# Patient Record
Sex: FEMALE | Race: ASIAN | NOT HISPANIC OR LATINO | Employment: FULL TIME | URBAN - METROPOLITAN AREA
[De-identification: names, ages, dates, MRNs, and addresses within clinical notes are randomized per-mention and may not be internally consistent; named-entity substitution may affect disease eponyms.]

---

## 2017-01-03 ENCOUNTER — OFFICE VISIT (OUTPATIENT)
Dept: FAMILY MEDICINE | Facility: CLINIC | Age: 42
End: 2017-01-03

## 2017-01-03 VITALS
OXYGEN SATURATION: 97 % | TEMPERATURE: 97.7 F | SYSTOLIC BLOOD PRESSURE: 115 MMHG | WEIGHT: 227 LBS | HEART RATE: 66 BPM | RESPIRATION RATE: 18 BRPM | BODY MASS INDEX: 35.63 KG/M2 | DIASTOLIC BLOOD PRESSURE: 78 MMHG | HEIGHT: 67 IN

## 2017-01-03 DIAGNOSIS — R73.03 PRE-DIABETES: Primary | ICD-10-CM

## 2017-01-03 DIAGNOSIS — Z00.00 HEALTHCARE MAINTENANCE: ICD-10-CM

## 2017-01-03 DIAGNOSIS — M54.6 ACUTE LEFT-SIDED THORACIC BACK PAIN: ICD-10-CM

## 2017-01-03 DIAGNOSIS — R05.3 PERSISTENT DRY COUGH: ICD-10-CM

## 2017-01-03 DIAGNOSIS — E89.0 POSTABLATIVE HYPOTHYROIDISM: ICD-10-CM

## 2017-01-03 LAB
HCT VFR BLD AUTO: 40.4 % (ref 35–47)
HEMOGLOBIN: 13 G/DL (ref 11.7–15.7)
MCH RBC QN AUTO: 28.3 PG (ref 26.5–35)
MCHC RBC AUTO-ENTMCNC: 32.2 G/DL (ref 32–36)
MCV RBC AUTO: 88.1 FL (ref 78–100)
PLATELET # BLD AUTO: 287 K/UL (ref 150–450)
RBC # BLD AUTO: 4.59 M/UL (ref 3.8–5.2)
WBC # BLD AUTO: 7.3 K/UL (ref 4–11)

## 2017-01-03 RX ORDER — METHOCARBAMOL 500 MG/1
1500 TABLET, FILM COATED ORAL 3 TIMES DAILY PRN
Qty: 30 TABLET | Refills: 1 | Status: SHIPPED | OUTPATIENT
Start: 2017-01-03 | End: 2017-09-07

## 2017-01-03 NOTE — PROGRESS NOTES
SUBJECTIVE: Mary Wray is a 41 year old female who presents with persistent cough since beginning of November, back pain for over a week.  Pimples on face for small niece thought to be herpes infection and sister had bad ear infection with mastoiditis.  Pt denies having any temps.  No drainage, hard to take a full breath, no ST.  Nonproductive cough, typically clears out in the morning, no allergies.  No BP meds.  Not having issues with acid reflux.  No fullness in ears and no sinus pain.  Delsum for a couple nights, Nyquil, ibuprofen taken for back pain and for her menses.  Back pain x 1 week, poor sleep and off schedule.  More strength training than she has done in a long time.  Tightness in left side of her chest, she hasn't seen any rash like shingles.  Hurts to wear her bra.  Treatment currently for anal fissure, using steroid cream.  On synthroid for Grave's disease.  A1C was high in Sept, has lost weight, wondering if her numbers have improved.  Mucous coughed but not really coming up.    PAST MEDICAL, SOCIAL, SURGICAL AND FAMILY HISTORY: She  has a past medical history of History of Graves' disease; Postablative hypothyroidism; Hypovitaminosis D; and Premenstrual dysphoria.  She  has past surgical history that includes leep tx, cervical (2002).  Her family history includes DIABETES in her father.  She reports that she has never smoked. She does not have any smokeless tobacco history on file. She reports that she drinks about 1.0 oz of alcohol per week. She reports that she does not use illicit drugs.      ALLERGIES: She has No Known Allergies.    CURRENT MEDICATIONS: She has a current medication list which includes the following prescription(s): methocarbamol, diltiazem 2% in plo cream (fv compounded), synthroid, synthroid, prenatal multivitamin  plus iron, and vitamin d.     REVIEW OF SYSTEMS: 10 point review of systems is negative except as noted above.    EXAM:  /78 mmHg  Pulse 66  Temp(Src)  "97.7  F (36.5  C) (Oral)  Resp 18  Ht 5' 7\" (170.2 cm)  Wt 227 lb (102.967 kg)  BMI 35.55 kg/m2  SpO2 97%  CONSTITUTIONIAL: healthy, alert, no distress, cooperative and obese  HEAD: Normocephalic. No masses, lesions, tenderness or abnormalities  ENT: ENT exam normal, no neck nodes or sinus tenderness  SKIN: no suspicious lesions or rashes  LUNGS: clear  Cardiac: RRR  GAIT: normal  Stance: normal  NEUROLOGIC: Normal muscle tone and strength, reflexes normal, sensation grossly normal.  PSYCHIATRIC: affect normal/bright and mentation appears normal.    MUSCULOSKELETAL: spasm and swelling of left side parathoracic spinal musculature, pain mid trap and rhomboids      ASSESSMENT/PLAN:  1. Persistent dry cough- CBC- normal, CXR- no infiltrates, trial of Prilosec OTC for possible silent reflux.  Denies allergy history, not on medications to induce cough  2. Pre-diabetes- weight 241 in Sept, down to 227 lbs.  Yoga and eating better.  No gluten, no sugar and no cheese.  recheck A1C.  Possibility of Lifestyle clinic with Dr. Stanley, pt may request at later date  3. Postablative hypothyroidism, TSH  4. Acute left sided thoracic back pain- robaxin trial, going to massage therapy, chiropractic, remain as active as she can  RTC 1 month  Encounter Diagnoses   Name Primary?     Persistent dry cough      Pre-diabetes Yes     Postablative hypothyroidism      Acute left-sided thoracic back pain      Healthcare maintenance        X-RAY INTERPRETATION:   CXR: Impression:  No acute cardiopulmonary abnormality.  "

## 2017-01-03 NOTE — MR AVS SNAPSHOT
"              After Visit Summary   1/3/2017    Mary Wray    MRN: [id number]           Patient Information     Date Of Birth          1975        Visit Information        Provider Department      1/3/2017 8:00 AM Barbara Lujan MD [facility]        Today's Diagnoses     Pre-diabetes    -  1     Persistent dry cough         Postablative hypothyroidism         Acute left-sided thoracic back pain         Healthcare maintenance            Follow-ups after your visit        Who to contact     Please call your clinic at [phone] to:    Ask questions about your health    Make or cancel appointments    Discuss your medicines    Learn about your test results    Speak to your doctor   If you have compliments or concerns about an experience at your clinic, or if you wish to file a complaint, please contact [facility] Patient Relations at [phone] or email us at [email or web address]         Additional Information About Your Visit        MyChart Information     [employer] gives you secure access to your electronic health record. If you see a primary care provider, you can also send messages to your care team and make appointments. If you have questions, please call your primary care clinic.  If you do not have a primary care provider, please call [phone] and they will assist you.      "[employer] is an electronic gateway that provides easy, online access to your medical records. With "[employer] you can request a clinic appointment, read your test results, renew a prescription or communicate with your care team.     To access your existing account, please contact your [facility] or call [phone] for assistance.        Your Vitals Were     Pulse Temperature Respirations Height BMI (Body Mass Index) Pulse Oximetry    66 97.7  F (36.5  C) (Oral) 18 5' 7\" (170.2 cm) 35.55 kg/m2 97%       Blood Pressure from Last 3 " Encounters:   01/03/17 115/78   11/28/16 119/73   09/27/16 110/74    Weight from Last 3 Encounters:   01/03/17 227 lb (102.967 kg)   11/28/16 231 lb 14.4 oz (105.189 kg)   09/27/16 240 lb 3.2 oz (108.954 kg)              We Performed the Following     ADMIN VACCINE, INITIAL     CBC with Plt ([facility])     Flu vaccine, quad, preserve-free, 0.5 ml          Today's Medication Changes          These changes are accurate as of: 1/3/17  9:28 AM.  If you have any questions, ask your nurse or doctor.               Start taking these medicines.        Dose/Directions    methocarbamol 500 MG tablet   Commonly known as:  ROBAXIN   Used for:  Acute left-sided thoracic back pain   Started by:  Barbara Lujan MD        Dose:  1500 mg   Take 3 tablets (1,500 mg) by mouth 3 times daily as needed for muscle spasms   Quantity:  30 tablet   Refills:  1            Where to get your medicines      These medications were sent to [address] [address]     Phone:  [phone]    - methocarbamol 500 MG tablet             Primary Care Provider Office Phone # Fax #    Jazlyn Huitron [phone]       [facility] [address] [facility] [address]        Thank you!     Thank you for choosing [facility]  for your care. Our goal is always to provide you with excellent care. Hearing back from our patients is one way we can continue to improve our services. Please take a few minutes to complete the written survey that you may receive in the mail after your visit with us. Thank you!             Your Updated Medication List - Protect others around you: Learn how to safely use, store and throw away your medicines at www.disposemymeds.org.          This list is accurate as of: 1/3/17  9:28 AM.  Always use your most recent med list.                   Brand Name Dispense Instructions for use    diltiazem  2% in PLO cream (FV COMPOUNDED) 2% Gel     60 g    To anal opening three times daily.  Use a pea-sized amount.  Store at room temperature.       methocarbamol 500 MG tablet    ROBAXIN    30 tablet    Take 3 tablets (1,500 mg) by mouth 3 times daily as needed for muscle spasms       prenatal multivitamin  plus iron 27-0.8 MG Tabs per tablet     100 tablet    Take 1 tablet by mouth daily       * SYNTHROID 150 MCG tablet   Generic drug:  levothyroxine     90 tablet    Take 1 tablet (150 mcg) by mouth daily       * SYNTHROID 175 MCG tablet   Generic drug:  levothyroxine     90 tablet    Take 1 tablet (175 mcg) by mouth daily       vitamin D 1000 UNITS capsule      Take 2 capsules by mouth daily       * Notice:  This list has 2 medication(s) that are the same as other medications prescribed for you. Read the directions carefully, and ask your doctor or other care provider to review them with you.

## 2017-01-05 ENCOUNTER — TELEPHONE (OUTPATIENT)
Dept: FAMILY MEDICINE | Facility: CLINIC | Age: 42
End: 2017-01-05

## 2017-01-05 NOTE — TELEPHONE ENCOUNTER
[facility] phone call message- patient requesting results:    Test: Lab    Date of test: 1/3/17    Additional Comments: Patient inquiring as to why [facility]    OK to leave a message on voice mail? Yes    Primary language: English      needed? No    Call taken on January 5, 2017 at 3:46 PM by Katharine Oropeza

## 2017-01-05 NOTE — TELEPHONE ENCOUNTER
Message routed to Dr. Lujan to release results in MyChart per patient request.    Annemarie Cutler RN

## 2017-01-06 LAB
HBA1C MFR BLD: 5.5 % (ref 4.1–5.7)
T4 FREE SERPL-MCNC: 1.91 NG/DL (ref 0.76–1.46)
TSH SERPL DL<=0.005 MIU/L-ACNC: 0.03 MU/L (ref 0.4–4)

## 2017-01-06 NOTE — TELEPHONE ENCOUNTER
Patient is calling about her results. They still are not released to [employer]. It does not look like these tests were done as they are future. Please follow up with the patient.

## 2017-01-16 ENCOUNTER — OFFICE VISIT (OUTPATIENT)
Dept: FAMILY MEDICINE | Facility: CLINIC | Age: 42
End: 2017-01-16

## 2017-01-16 VITALS
HEIGHT: 67 IN | SYSTOLIC BLOOD PRESSURE: 120 MMHG | OXYGEN SATURATION: 99 % | HEART RATE: 80 BPM | DIASTOLIC BLOOD PRESSURE: 75 MMHG | TEMPERATURE: 97.3 F | RESPIRATION RATE: 16 BRPM | WEIGHT: 235 LBS | BODY MASS INDEX: 36.88 KG/M2

## 2017-01-16 DIAGNOSIS — R05.3 CHRONIC COUGH: ICD-10-CM

## 2017-01-16 DIAGNOSIS — Z30.09 FAMILY PLANNING ADVICE: ICD-10-CM

## 2017-01-16 DIAGNOSIS — R73.03 PRE-DIABETES: Primary | ICD-10-CM

## 2017-01-16 DIAGNOSIS — R45.89 DYSPHORIC MOOD: ICD-10-CM

## 2017-01-16 ASSESSMENT — ENCOUNTER SYMPTOMS
ACTIVITY CHANGE: 0
FATIGUE: 0
COUGH: 1
SHORTNESS OF BREATH: 0
NEUROLOGICAL NEGATIVE: 1
UNEXPECTED WEIGHT CHANGE: 0
APPETITE CHANGE: 0
DYSPHORIC MOOD: 1
SLEEP DISTURBANCE: 0
NERVOUS/ANXIOUS: 0
BACK PAIN: 1

## 2017-01-16 NOTE — PROGRESS NOTES
"      HPI:       Mary Wray is a 41 year old who presents for the following  Patient presents with:  RECHECK: F/U on visit 2weeks ago      F/u on Cough  - trial of prilosec x 2 wks  - mild improvement, still with occasional cough  - using nasal saline to hydrate mucous membranes  - no SOB, allergy symptoms, no recent F/C/URI symptoms  - does do \"a lot\" of talking in the fall (admissions time), feels that throat gets dry and cough happens when talking sometimes  - drinks plenty of water    Back pain, gradually improving  - mild, working with chiropractor  - YOGA  - no weakness, numbness, tingling in extremities  - midback, L side, occurred over the holidays, different bed, hadn't been sleeping well    Mood  - election has affected her, hard to be \"positive\"   - put fertility plans on hold when family introduced her to a possible partner (semi-arranged), but that didn't work out  - feels that it's hard being alone, without much intimacy  - ok in general, just in the background    Back to horse-back riding - helps with activity and mood    Has f/u appt with Dr. Del Castillo scheduled    Problem, Medication and Allergy Lists were reviewed and are current.  Patient is an established patient of this clinic.         Review of Systems:   Review of Systems   Constitutional: Negative for activity change, appetite change, fatigue and unexpected weight change.   Respiratory: Positive for cough (mild ongoing cough - better since starting low dose of prilosec). Negative for shortness of breath.    Cardiovascular: Negative for chest pain.   Musculoskeletal: Positive for back pain (mild mid-thoracic pain - improving gradually, see HPI).   Neurological: Negative.    Psychiatric/Behavioral: Positive for dysphoric mood (on and off - definitely triggered by the election; trying to maintain positive outlook, staying in therapy, goind out). Negative for suicidal ideas and sleep disturbance. The patient is not nervous/anxious.             " " Physical Exam:   Patient Vitals for the past 24 hrs:   BP Temp Temp src Pulse Resp SpO2 Height Weight   01/16/17 1439 120/75 mmHg 97.3  F (36.3  C) Oral 80 16 99 % 5' 7\" (170.2 cm) 235 lb (106.595 kg)     Body mass index is 36.8 kg/(m^2).  Vitals were reviewed and were normal     Physical Exam   Constitutional: She is oriented to person, place, and time. She appears well-developed and well-nourished.   Pulmonary/Chest: Effort normal. No respiratory distress. She has no wheezes.   Musculoskeletal: Normal range of motion.   Neurological: She is alert and oriented to person, place, and time.   Psychiatric: She has a normal mood and affect. Her behavior is normal. Judgment and thought content normal.     MENTAL STATUS EXAM  Appearance: appropriate  Attitude: cooperative  Behavior: normal  Eye Contact: normal  Speech: normal  Orientation: oriented to person, place, time and situation  Mood: normal  Affect: Congruent with mood  Thought Process: appropriate  Suicidal Ideation: reports no suicidal ideation  Hallucination: denies      Results:     Results from last visit:  Office Visit on 01/03/2017   Component Date Value Ref Range Status     WBC 01/03/2017 7.3  4.0 - 11.0 K/uL Final     RBC 01/03/2017 4.59  3.80 - 5.20 M/uL Final     Hemoglobin 01/03/2017 13.0  11.7 - 15.7 g/dL Final     Hematocrit 01/03/2017 40.4  35.0 - 47.0 % Final     MCV 01/03/2017 88.1  78.0 - 100.0 fL Final     MCH 01/03/2017 28.3  26.5 - 35.0 pg Final     MCHC 01/03/2017 32.2  32.0 - 36.0 g/dL Final     Platelets 01/03/2017 287.0  150.0 - 450.0 K/uL Final     TSH 01/06/2017 0.03* 0.40 - 4.00 mU/L Final     Hemoglobin A1C 01/06/2017 5.5  4.1 - 5.7 % Final     T4 Free 01/06/2017 1.91* 0.76 - 1.46 ng/dL Final     Assessment and Plan     1. Pre-diabetes  Continue with exercise, healthy diet  Improvements noted on last set of labs  - consider lifestyle clinic referral if not continue to meet own goals    2. Dysphoric mood  Doing ok " overall  Yoga  Continue prn therapy  Stay social    3. Family planning advice  Discussed re-initiating plan for IUI  Will reconnect with [facility] (knows where to order sperm)  Discussed creating other connections for extended family when single-parenting (friendships, parenting groups)  Happy to provide obstetric care if she becomes pregnant    4. Chronic cough  Prilosec seems to be helping a little - could double the dose and see if that helps more  - would consider ENT referral for laryngoscopy first if PPI not entirely helping symptoms, evaluate larynx and see if there is evidence of edema (most of her job is talking)  - endoscopy if symptoms improve, she stays on PPI x 3-6 months and then symptoms recur when off the meds    There are no discontinued medications.  Options for treatment and follow-up care were reviewed with the patient. Mary Wray  engaged in the decision making process and verbalized understanding of the options discussed and agreed with the final plan.    Jazlyn Huitron MD

## 2017-02-20 NOTE — PATIENT INSTRUCTIONS
Use of a weekly pill tray (with boxes divided by day of the week)  is highly recommended.  IF you miss a dose one day, you can double the dose the next day.    Normal thyroid levels are important for pregnancy.  Ideally you would have normal thyroid labs prior to pregnancy.  You  should confirm normal thyroid levels immediately upon diagnosis of pregnancy.      Levothyroxine dose requirements often increase with pregnancy.      It is routine to check thyroid levels once/month for the lst 20 weeks gestation, then between 26 and 32 weeks, and also 2 months post partum.  We also check labs one month after any change in the levothyroxine dose.   Pregnancy specific TSH targets are < 2.5 lst trimester, < 3 2nd and 3rd trimester.    Information for patients on Levo-thyroxine      The thyroid hormone, Levo-thyroxine (L-T4) is one of the main hormones normally produced by the thyroid.  The drug is identical in chemical structure to the natural product.  Levothyroxine is used to treat hypothyroidism (underactive thyroid).  Sometimes it is used even when the thyroid is not underactive, to treat a goiter (enlarged thyroid) or a thyroid nodule.  For patients with a history of thyroid removal, L-T4 is used to replace the deficiency created by the surgery.    The purpose of giving L-T4 to treat hypothyroidism is to make up for the thyroid hormone previously produced by your thyroid before it failed.  Depending on the extent of your thyroid failure, you may require a higher or a lower dose of L-T4.  The goal is to make your blood level of TSH (a hormone made by the master gland, the pituitary, the gland which controls and judges the thyroid) normal.    This drug is usually well-tolerated and safe but it is important to take the proper dose for YOU.  This involves periodic measurements of TSH, usually within 1-2 months of a dose change.  You should be off biotin containing supplements for at least one week prior to thyroid blood  tests.    You should alert your doctor if you have signs you are getting too much L-T4.  These include excessive nervousness, heart fluttering or skipping beats, diarrhea, or feeling too hot and/or sweaty.      There are many brand names for Levo-thyroxine (L-T4), including Synthroid, Levoxyl, Levothroid, Unithroid, Tirosint and others.  Changing from one brand name thyroid hormone product to another or to a generic product (all generics are called levothyroxine) can cause changes in your thyroid hormone balance, even if the dose stays the same.  Since generic products can come from many different companies (such as [employer], [employer], [employer] and others), there may be less consistency among the different generic preparations.  The decision to change brands or to change to a generic product must be made with your physician or other caregiver.  Follow-up testing should be arranged to monitor for any needed adjustments.  Monitoring may need to be more frequent if you always receive a generic thyroid hormone product.    For proper thyroid hormone balance the dose of thyroid hormone in your pills must be precise and consistent.    Be sure to take the medication as prescribed on an empty stomach, and at least 4 hours apart from calcium supplements, iron and soy products.  Store the medication away from severe heat and humidity.    You should be OFF any biotin containing supplements at least 2 days prior to thyroid lab draws.       Many insurance companies and other providers charge higher co-payments for brand name medications.  Since brand name L-T4 is not very expensive, be sure to ask if the actual cost of buying the medication is less than the co-payment.  In some instances the charge for a co-payment may be greater than buying the drug outright, especially if the prescription needs to be refilled every 30 days.

## 2017-02-20 NOTE — PROGRESS NOTES
TELEPHONE VISIT    Attending ASSESSMENT/PLAN:     Abnormal TFTS on LT4 - labs read as biochemical thyrotoxicosis with suppressed TSH  and high free T4. The record suggests she was sick at the time of the 1/17 draw.   Repeat TFTSs now.   Information for patients on LT4 on correct administration practices.  Use of a pill tray is highly recommended .    Postablative hypothyroidism. Consistently unstable over time.  - as per #1 .    Family planning -upcoming IUI-  Detectable TSH is recommended prior to pregnancy. I have counseled her on pregnancy and the thyroid.    Information for patients on LT4.    History of Graves'     Obesity as defined by BMI > 30 kgm/2.  As per # 4    Prediabetes on past A1cs. Family history of DM;  The most recent from 1/6/17 is normal.      Acanthosis nigricans had been noted in the past.      Phone visit:   Start time 2/21/17 731 AM  Stop time 2/21/17 745 AM  Total time : 14 minutes    Loli Del Castillo MD      Cc/ HISTORY OF PRESENT ILLNESS 41 yr old woman presents for telephone visit follow up. I had last seen her in 8/14. She sent me a mychart note 2/20/17 inquiring about labs that had been drawn on 1/6/17: TSH 0.03, free T4 1.91. She was sick that day.   She was on Synthroid brand 175 mcg/day then and she remains on the same dose.  She dose not use a pill tray.  She doesn't think she ever forgets to take the Synthroid.     She has upcoming IUI.      In 1997 she was diagnosed with Graves' , treated with radioactive iodine, soon resulting in hypothyroidism. She has been on L-T4 since then. See my 8/14 note for her past history of TFTS dating 7843-5475.     When I lst met her 4/14 she was on Synthroid 150 mcg/day with high free T4 and normal TSH.  Since then, there have been a number of dose changes and mostly abnormal labs. As follows:  10/24/13: TSH 1.58  4/29/14 TSH 1.38, free T4 1.96 on Synthroid 150/day  6/16/14 TSH 18.5, free T4 1.07 on Synthroid 150 mcg/day  6/230/14: Rx Synthroid  "175 mcg/day  6/25/14: TSH 4.57, free T4 1.9  8/11/14 TSH 0.04, free T4 1.64-- recommend Synthroid 175 * 6.5/week , divided   10/2/14: TSH 0.06, free T4 1.46  2/3/15: TSH 0.15, free T4 1.46  11/30/15: TSH 0.07, free T4 1.46  11/30/15 clinic note - \"hard to remember to take 1/2 pill on Sunday\"  12/14/15: Rx Synthroid 150 mcg/day  9/1/16: TSH 35.53, free T4 1.02  9/8/16 Rx Synthroid 175 mcg/day  9/27/16 [place] with LUGOLS applied   1/6/17: TSH 0.03, free T4 1.91-- she was sick at the time -     REVIEW OF SYSTEMS  Weight loss 14#  Early to late fall- she did this by giving up gluten, sugar.  She continues on this diet; she isn't counting kcal - she doesn't know.   Feels normal  Energy is good  Sleep is OK - never great.  Hair continues to fall.  She is not taking   Cardiac: negative; heart pounding a little fast the other day  Respiratory: exercise tolerance is good - doing yoga 60 minutes, walking 30-45 minutes 3 -4 times/week  GI: negative  Menses monthly; IUI was supposed to start yesterday but it has been delayed.     Past Medical History  Past Medical History   Diagnosis Date     Anal fissure      History of Graves' disease      Graves     Hypovitaminosis D      Obesity      KHURRAM (obstructive sleep apnea)      Papanicolaou smear of cervix with low grade squamous intraepithelial lesion (LGSIL)      Postablative hypothyroidism      Prediabetes      Premenstrual dysphoria      Past Surgical History   Procedure Laterality Date     Leep tx, cervical  2002     \"for HPV\"       Medications  Current Outpatient Prescriptions   Medication Sig Dispense Refill     cholecalciferol (VITAMIN  -D) 1000 UNITS capsule Take 5 capsules (5,000 Units) by mouth daily 30 capsule      Prenatal Vit-Fe Fumarate-FA (PRENATAL MULTIVITAMIN  PLUS IRON) 27-0.8 MG TABS per tablet Take 1 tablet by mouth daily 100 tablet 3     methocarbamol (ROBAXIN) 500 MG tablet Take 3 tablets (1,500 mg) by mouth 3 times daily as needed for muscle spasms 30 " tablet 1     diltiazem 2% in PLO cream, FV COMPOUNDED, 2% GEL To anal opening three times daily.  Use a pea-sized amount.  Store at room temperature. 60 g 0     SYNTHROID 175 MCG tablet Take 1 tablet (175 mcg) by mouth daily 90 tablet 3       Allergies  No Known Allergies    Family History  family history includes DIABETES in her father; Obesity in her father and mother.    Social History  Social History   Substance Use Topics     Smoking status: Never Smoker     Smokeless tobacco: Not on file     Alcohol use 1.0 oz/week     2 drink(s) per week      Comment: occasional

## 2017-02-21 ENCOUNTER — OFFICE VISIT (OUTPATIENT)
Dept: ENDOCRINOLOGY | Facility: CLINIC | Age: 42
End: 2017-02-21

## 2017-02-21 DIAGNOSIS — Z00.00 ENCOUNTER FOR ROUTINE ADULT HEALTH EXAMINATION WITHOUT ABNORMAL FINDINGS: Primary | ICD-10-CM

## 2017-02-21 DIAGNOSIS — E89.0 POSTABLATIVE HYPOTHYROIDISM: Primary | ICD-10-CM

## 2017-02-21 DIAGNOSIS — Z30.09 FAMILY PLANNING: ICD-10-CM

## 2017-02-21 DIAGNOSIS — E89.0 POSTABLATIVE HYPOTHYROIDISM: ICD-10-CM

## 2017-02-21 DIAGNOSIS — Z86.39 HISTORY OF GRAVES' DISEASE: ICD-10-CM

## 2017-02-21 NOTE — LETTER
2/21/2017       RE: Mary Wray  [address] [address]     Dear Colleague,    Thank you for referring your patient, Mary Wray, to the [facility]. Please see a copy of my visit note below.    TELEPHONE VISIT    Attending ASSESSMENT/PLAN:     Abnormal TFTS on LT4 - labs read as biochemical thyrotoxicosis with suppressed TSH  and high free T4. The record suggests she was sick at the time of the 1/17 draw.   Repeat TFTSs now.   Information for patients on LT4 on correct administration practices.  Use of a pill tray is highly recommended .    Postablative hypothyroidism. Consistently unstable over time.  - as per #1 .    Family planning -upcoming IUI-  Detectable TSH is recommended prior to pregnancy. I have counseled her on pregnancy and the thyroid.    Information for patients on LT4.    History of Graves'     Obesity as defined by BMI > 30 kgm/2.  As per # 4    Prediabetes on past A1cs. Family history of DM;  The most recent from 1/6/17 is normal.      Acanthosis nigricans had been noted in the past.      Phone visit:   Start time 2/21/17 731 AM  Stop time 2/21/17 745 AM  Total time : 14 minutes    Loli Del Castillo MD      Cc/ HISTORY OF PRESENT ILLNESS 41 yr old woman presents for telephone visit follow up. I had last seen her in 8/14. She sent me a mychart note 2/20/17 inquiring about labs that had been drawn on 1/6/17: TSH 0.03, free T4 1.91. She was sick that day.   She was on Synthroid brand 175 mcg/day then and she remains on the same dose.  She dose not use a pill tray.  She doesn't think she ever forgets to take the Synthroid.     She has upcoming IUI.      In 1997 she was diagnosed with Graves' , treated with radioactive iodine, soon resulting in hypothyroidism. She has been on L-T4 since then. See my 8/14 note for her past history of TFTS dating 6083-6790.     When I lst met her 4/14 she was on Synthroid 150 mcg/day  "with high free T4 and normal TSH.  Since then, there have been a number of dose changes and mostly abnormal labs. As follows:  10/24/13: TSH 1.58  4/29/14 TSH 1.38, free T4 1.96 on Synthroid 150/day  6/16/14 TSH 18.5, free T4 1.07 on Synthroid 150 mcg/day  6/230/14: Rx Synthroid 175 mcg/day  6/25/14: TSH 4.57, free T4 1.9  8/11/14 TSH 0.04, free T4 1.64-- recommend Synthroid 175 * 6.5/week , divided   10/2/14: TSH 0.06, free T4 1.46  2/3/15: TSH 0.15, free T4 1.46  11/30/15: TSH 0.07, free T4 1.46  11/30/15 clinic note - \"hard to remember to take 1/2 pill on Sunday\"  12/14/15: Rx Synthroid 150 mcg/day  9/1/16: TSH 35.53, free T4 1.02  9/8/16 Rx Synthroid 175 mcg/day  9/27/16 [place] with LUGOLS applied   1/6/17: TSH 0.03, free T4 1.91-- she was sick at the time -     REVIEW OF SYSTEMS  Weight loss 14#  Early to late fall- she did this by giving up gluten, sugar.  She continues on this diet; she isn't counting kcal - she doesn't know.   Feels normal  Energy is good  Sleep is OK - never great.  Hair continues to fall.  She is not taking   Cardiac: negative; heart pounding a little fast the other day  Respiratory: exercise tolerance is good - doing yoga 60 minutes, walking 30-45 minutes 3 -4 times/week  GI: negative  Menses monthly; IUI was supposed to start yesterday but it has been delayed.     Past Medical History  Past Medical History   Diagnosis Date     Anal fissure      History of Graves' disease      Graves     Hypovitaminosis D      Obesity      KHURRAM (obstructive sleep apnea)      Papanicolaou smear of cervix with low grade squamous intraepithelial lesion (LGSIL)      Postablative hypothyroidism      Prediabetes      Premenstrual dysphoria      Past Surgical History   Procedure Laterality Date     Leep tx, cervical  2002     \"for HPV\"       Medications  Current Outpatient Prescriptions   Medication Sig Dispense Refill     cholecalciferol (VITAMIN  -D) 1000 UNITS capsule Take 5 capsules (5,000 Units) by mouth " daily 30 capsule      Prenatal Vit-Fe Fumarate-FA (PRENATAL MULTIVITAMIN  PLUS IRON) 27-0.8 MG TABS per tablet Take 1 tablet by mouth daily 100 tablet 3     methocarbamol (ROBAXIN) 500 MG tablet Take 3 tablets (1,500 mg) by mouth 3 times daily as needed for muscle spasms 30 tablet 1     diltiazem 2% in PLO cream, FV COMPOUNDED, 2% GEL To anal opening three times daily.  Use a pea-sized amount.  Store at room temperature. 60 g 0     SYNTHROID 175 MCG tablet Take 1 tablet (175 mcg) by mouth daily 90 tablet 3       Allergies  No Known Allergies    Family History  family history includes DIABETES in her father; Obesity in her father and mother.    Social History  Social History   Substance Use Topics     Smoking status: Never Smoker     Smokeless tobacco: Not on file     Alcohol use 1.0 oz/week     2 drink(s) per week      Comment: occasional       Again, thank you for allowing me to participate in the care of your patient.      Sincerely,    Loli Del Castillo MD

## 2017-02-21 NOTE — MR AVS SNAPSHOT
After Visit Summary   2/21/2017    Mary Wray    MRN: [id number]           Patient Information     Date Of Birth          1975        Visit Information        Provider Department      2/21/2017 7:30 AM Loli Del Castillo MD [facility]        Today's Diagnoses     Postablative hypothyroidism    -  1    History of Graves' disease        Family planning          Care Instructions    Use of a weekly pill tray (with boxes divided by day of the week)  is highly recommended.  IF you miss a dose one day, you can double the dose the next day.    Normal thyroid levels are important for pregnancy.  Ideally you would have normal thyroid labs prior to pregnancy.  You  should confirm normal thyroid levels immediately upon diagnosis of pregnancy.      Levothyroxine dose requirements often increase with pregnancy.      It is routine to check thyroid levels once/month for the lst 20 weeks gestation, then between 26 and 32 weeks, and also 2 months post partum.  We also check labs one month after any change in the levothyroxine dose.   Pregnancy specific TSH targets are < 2.5 lst trimester, < 3 2nd and 3rd trimester.    Information for patients on Levo-thyroxine      The thyroid hormone, Levo-thyroxine (L-T4) is one of the main hormones normally produced by the thyroid.  The drug is identical in chemical structure to the natural product.  Levothyroxine is used to treat hypothyroidism (underactive thyroid).  Sometimes it is used even when the thyroid is not underactive, to treat a goiter (enlarged thyroid) or a thyroid nodule.  For patients with a history of thyroid removal, L-T4 is used to replace the deficiency created by the surgery.    The purpose of giving L-T4 to treat hypothyroidism is to make up for the thyroid hormone previously produced by your thyroid before it failed.  Depending on the extent of your thyroid failure, you may require a higher or a lower dose of L-T4.  The goal is to make  your blood level of TSH (a hormone made by the master gland, the pituitary, the gland which controls and judges the thyroid) normal.    This drug is usually well-tolerated and safe but it is important to take the proper dose for YOU.  This involves periodic measurements of TSH, usually within 1-2 months of a dose change.  You should be off biotin containing supplements for at least one week prior to thyroid blood tests.    You should alert your doctor if you have signs you are getting too much L-T4.  These include excessive nervousness, heart fluttering or skipping beats, diarrhea, or feeling too hot and/or sweaty.      There are many brand names for Levo-thyroxine (L-T4), including Synthroid, Levoxyl, Levothroid, Unithroid, Tirosint and others.  Changing from one brand name thyroid hormone product to another or to a generic product (all generics are called levothyroxine) can cause changes in your thyroid hormone balance, even if the dose stays the same.  Since generic products can come from many different companies (such as [employer], M[employer], IGG and others), there may be less consistency among the different generic preparations.  The decision to change brands or to change to a generic product must be made with your physician or other caregiver.  Follow-up testing should be arranged to monitor for any needed adjustments.  Monitoring may need to be more frequent if you always receive a generic thyroid hormone product.    For proper thyroid hormone balance the dose of thyroid hormone in your pills must be precise and consistent.    Be sure to take the medication as prescribed on an empty stomach, and at least 4 hours apart from calcium supplements, iron and soy products.  Store the medication away from severe heat and humidity.    You should be OFF any biotin containing supplements at least 2 days prior to thyroid lab draws.       Many insurance companies and other providers charge higher co-payments for brand name  medications.  Since brand name L-T4 is not very expensive, be sure to ask if the actual cost of buying the medication is less than the co-payment.  In some instances the charge for a co-payment may be greater than buying the drug outright, especially if the prescription needs to be refilled every 30 days.            Follow-ups after your visit        Your next 10 appointments already scheduled     Mar 06, 2017  1:00 PM CST   (Arrive by 12:45 PM)   RETURN ENDOCRINE with Loli Del Castillo MD   [facility] Endocrinology ([facility] [place])    [address] [place] [id number] [phone]              Future tests that were ordered for you today     Open Future Orders        Priority Expected Expires Ordered    T4 free Routine  2/20/2018 2/20/2017    Thyroxine total Routine  2/20/2018 2/20/2017    T3 total Routine  2/20/2018 2/20/2017    TSH Routine  2/20/2018 2/20/2017            Who to contact     Please call your clinic at [phone] to:    Ask questions about your health    Make or cancel appointments    Discuss your medicines    Learn about your test results    Speak to your doctor   If you have compliments or concerns about an experience at your clinic, or if you wish to file a complaint, please contact [facility] Patient Relations at [phone] or email us at [email or web address]         Additional Information About Your Visit        LiveHotSpothart Information     L[employer]ar gives you secure access to your electronic health record. If you see a primary care provider, you can also send messages to your care team and make appointments. If you have questions, please call your primary care clinic.  If you do not have a primary care provider, please call [phone] and they will assist you.      Livestar is an electronic gateway that provides easy, online access to your medical records. With Livestar, you can request a clinic appointment, read  your test results, renew a prescription or communicate with your care team.     To access your existing account, please contact your [facility] or call [phone] for assistance.        Care EveryWhere ID     This is your Care EveryWhere ID. This could be used by other organizations to access your [place] medical records  [id number]         Blood Pressure from Last 3 Encounters:   01/16/17 120/75   01/03/17 115/78   11/28/16 119/73    Weight from Last 3 Encounters:   01/16/17 106.6 kg (235 lb)   01/03/17 103 kg (227 lb)   11/28/16 105.2 kg (231 lb 14.4 oz)               Primary Care Provider Office Phone # Fax #    Jazlyn Huitron [phone]       [facility] [address]  [facility] [address]        Thank you!     Thank you for choosing [facility]  for your care. Our goal is always to provide you with excellent care. Hearing back from our patients is one way we can continue to improve our services. Please take a few minutes to complete the written survey that you may receive in the mail after your visit with us. Thank you!             Your Updated Medication List - Protect others around you: Learn how to safely use, store and throw away your medicines at www.disposemymeds.org.          This list is accurate as of: 2/21/17  7:51 AM.  Always use your most recent med list.                   Brand Name Dispense Instructions for use    cholecalciferol 1000 UNITS capsule    vitamin  -D    30 capsule    Take 5 capsules (5,000 Units) by mouth daily       diltiazem 2% in PLO cream (FV COMPOUNDED) 2% Gel     60 g    To anal opening three times daily.  Use a pea-sized amount.  Store at room temperature.       methocarbamol 500 MG tablet    ROBAXIN    30 tablet    Take 3 tablets (1,500 mg) by mouth 3 times daily as needed for muscle spasms       prenatal multivitamin  plus iron 27-0.8 MG Tabs per tablet     100 tablet    Take 1 tablet by mouth  daily       SYNTHROID 175 MCG tablet   Generic drug:  levothyroxine     90 tablet    Take 1 tablet (175 mcg) by mouth daily

## 2017-02-22 LAB
HBV SURFACE AB SERPL IA-ACNC: ABNORMAL M[IU]/ML
HBV SURFACE AG SERPL QL IA: NONREACTIVE
HCV AB SERPL QL IA: NORMAL
HIV 1+2 AB+HIV1 P24 AG SERPL QL IA: NORMAL
T3 SERPL-MCNC: 85 NG/DL (ref 60–181)
T4 FREE SERPL-MCNC: 1.44 NG/DL (ref 0.76–1.46)
T4 SERPL-MCNC: 16 UG/DL (ref 4.5–13.9)
TSH SERPL DL<=0.05 MIU/L-ACNC: 0.56 MU/L (ref 0.4–4)

## 2017-02-23 ENCOUNTER — TELEPHONE (OUTPATIENT)
Dept: FAMILY MEDICINE | Facility: CLINIC | Age: 42
End: 2017-02-23

## 2017-02-23 LAB — T PALLIDUM IGG+IGM SER QL: NEGATIVE

## 2017-02-23 NOTE — TELEPHONE ENCOUNTER
[facility] Family [facility] phone call message- patient requesting results:    Test: Lab    Date of test: 02/21/17    Additional Comments: Patient is requesting that her lab results be released to [facility]. No result note or letter in the chart. Patient states she doesn't necessarily need a call back and would be fine with her results just being released to [facility].     OK to leave a message on voice mail? Yes    Primary language: English      needed? No    Call taken on February 23, 2017 at 8:57 AM by Abigail Hickey

## 2017-05-02 DIAGNOSIS — E89.0 POSTABLATIVE HYPOTHYROIDISM: Primary | ICD-10-CM

## 2017-05-04 DIAGNOSIS — E89.0 POSTABLATIVE HYPOTHYROIDISM: ICD-10-CM

## 2017-05-04 LAB
T4 FREE SERPL-MCNC: 1.36 NG/DL (ref 0.76–1.46)
TSH SERPL DL<=0.05 MIU/L-ACNC: 4.6 MU/L (ref 0.4–4)

## 2017-05-11 ENCOUNTER — TELEPHONE (OUTPATIENT)
Dept: OBGYN | Facility: CLINIC | Age: 42
End: 2017-05-11

## 2017-05-11 PROBLEM — Z87.19 HISTORY OF ANAL FISSURES: Status: ACTIVE | Noted: 2017-05-11

## 2017-05-11 NOTE — TELEPHONE ENCOUNTER
Spoke with Mary who is 4 weeks 2 days pregnant. She has been seen at [facility] for IUI with use of Follistim and that is how pregnancy was achieved. She states about 1 hour ago she noticed some spotting with wiping and significant cramping on her left side. She has a call in to [facility] and is awaiting a call back. She also made an appointment with her primary care doctor. Nurse advised that she wait for a plan from [facility]. If cramping is not improved with Tylenol then concern for ectopic and she should go to ED. Patient is agreeable to plan and will await a call back from [facility].

## 2017-06-01 ENCOUNTER — OFFICE VISIT (OUTPATIENT)
Dept: FAMILY MEDICINE | Facility: CLINIC | Age: 42
End: 2017-06-01

## 2017-06-01 VITALS
RESPIRATION RATE: 16 BRPM | HEART RATE: 69 BPM | HEIGHT: 67 IN | DIASTOLIC BLOOD PRESSURE: 78 MMHG | BODY MASS INDEX: 37.76 KG/M2 | OXYGEN SATURATION: 99 % | TEMPERATURE: 97.8 F | SYSTOLIC BLOOD PRESSURE: 116 MMHG | WEIGHT: 240.6 LBS

## 2017-06-01 DIAGNOSIS — Z53.9 ERRONEOUS ENCOUNTER--DISREGARD: Primary | ICD-10-CM

## 2017-06-01 ASSESSMENT — ANXIETY QUESTIONNAIRES
3. WORRYING TOO MUCH ABOUT DIFFERENT THINGS: NOT AT ALL
1. FEELING NERVOUS, ANXIOUS, OR ON EDGE: NOT AT ALL
IF YOU CHECKED OFF ANY PROBLEMS ON THIS QUESTIONNAIRE, HOW DIFFICULT HAVE THESE PROBLEMS MADE IT FOR YOU TO DO YOUR WORK, TAKE CARE OF THINGS AT HOME, OR GET ALONG WITH OTHER PEOPLE: NOT DIFFICULT AT ALL
7. FEELING AFRAID AS IF SOMETHING AWFUL MIGHT HAPPEN: NOT AT ALL
GAD7 TOTAL SCORE: 0
2. NOT BEING ABLE TO STOP OR CONTROL WORRYING: NOT AT ALL
5. BEING SO RESTLESS THAT IT IS HARD TO SIT STILL: NOT AT ALL
6. BECOMING EASILY ANNOYED OR IRRITABLE: NOT AT ALL

## 2017-06-01 ASSESSMENT — PATIENT HEALTH QUESTIONNAIRE - PHQ9: 5. POOR APPETITE OR OVEREATING: NOT AT ALL

## 2017-06-01 NOTE — PROGRESS NOTES
"      HPI:       Mary Wray is a 41 year old who presents for the following  Patient presents with:  RECHECK: F/U on miscarriage        Concern: Miscarriage   Description of the problem :Needs F/U for miscarriage     When did it start?: Ongoing    Intensity: moderate    Progression of Symptoms:  same    Therapies Tried Nothing    What has worked?  Nothing             Patient needed to leave to go to work - was not seen by the physician           Review of Systems:   Review of Systems          Physical Exam:   Patient Vitals for the past 24 hrs:   BP Temp Temp src Pulse Resp SpO2 Height Weight   06/01/17 1029 116/78 97.8  F (36.6  C) Oral 69 16 99 % 5' 7\" (170.2 cm) 240 lb 9.6 oz (109.1 kg)     Body mass index is 37.68 kg/(m^2).  Vitals were reviewed and were normal     Physical Exam        This encounter was opened in error. Please disregard.  "

## 2017-06-01 NOTE — MR AVS SNAPSHOT
"              After Visit Summary   6/1/2017    Mary Wray    MRN: [id number]           Patient Information     Date Of Birth          1975        Visit Information        Provider Department      6/1/2017 10:00 AM Jazlyn Huitron MD [facility]        Today's Diagnoses     ERRONEOUS ENCOUNTER--DISREGARD    -  1       Follow-ups after your visit        Who to contact     Please call your clinic at [phone] to:    Ask questions about your health    Make or cancel appointments    Discuss your medicines    Learn about your test results    Speak to your doctor   If you have compliments or concerns about an experience at your clinic, or if you wish to file a complaint, please contact [facility] Patient Relations at [phone] or email us at [email or web address]         Additional Information About Your Visit        MyChart Information     [employer] gives you secure access to your electronic health record. If you see a primary care provider, you can also send messages to your care team and make appointments. If you have questions, please call your primary care clinic.  If you do not have a primary care provider, please call [phone] and they will assist you.      World[employer] is an electronic gateway that provides easy, online access to your medical records. With World[employer], you can request a clinic appointment, read your test results, renew a prescription or communicate with your care team.     To access your existing account, please contact your [facility] or call [phone] for assistance.        Care EveryWhere ID     This is your Care EveryWhere ID. This could be used by other organizations to access your [place] medical records  [id number]        Your Vitals Were     Pulse Temperature Respirations Height Pulse Oximetry Breastfeeding?    69 97.8  F (36.6  C) (Oral) 16 5' 7\" (170.2 cm) 99% No    BMI (Body Mass " Index)                   37.68 kg/m2            Blood Pressure from Last 3 Encounters:   06/01/17 116/78   01/16/17 120/75   01/03/17 115/78    Weight from Last 3 Encounters:   06/01/17 240 lb 9.6 oz (109.1 kg)   01/16/17 235 lb (106.6 kg)   01/03/17 227 lb (103 kg)              Today, you had the following     No orders found for display       Primary Care Provider Office Phone # Fax #    Jazlyn Huitron [phone]       [facility] [address] [facility] [place] [address]        Thank you!     Thank you for choosing [facility]  for your care. Our goal is always to provide you with excellent care. Hearing back from our patients is one way we can continue to improve our services. Please take a few minutes to complete the written survey that you may receive in the mail after your visit with us. Thank you!             Your Updated Medication List - Protect others around you: Learn how to safely use, store and throw away your medicines at www.disposemymeds.org.          This list is accurate as of: 6/1/17 12:59 PM.  Always use your most recent med list.                   Brand Name Dispense Instructions for use    cholecalciferol 1000 UNITS capsule    vitamin  -D    30 capsule    Take 5 capsules (5,000 Units) by mouth daily       diltiazem 2% in PLO cream (FV COMPOUNDED) 2% Gel     60 g    To anal opening three times daily.  Use a pea-sized amount.  Store at room temperature.       methocarbamol 500 MG tablet    ROBAXIN    30 tablet    Take 3 tablets (1,500 mg) by mouth 3 times daily as needed for muscle spasms       prenatal multivitamin  plus iron 27-0.8 MG Tabs per tablet     100 tablet    Take 1 tablet by mouth daily       SYNTHROID 175 MCG tablet   Generic drug:  levothyroxine     90 tablet    Take 1 tablet (175 mcg) by mouth daily

## 2017-06-02 ASSESSMENT — PATIENT HEALTH QUESTIONNAIRE - PHQ9: SUM OF ALL RESPONSES TO PHQ QUESTIONS 1-9: 2

## 2017-06-02 ASSESSMENT — ANXIETY QUESTIONNAIRES: GAD7 TOTAL SCORE: 0

## 2017-06-12 ENCOUNTER — OFFICE VISIT (OUTPATIENT)
Dept: FAMILY MEDICINE | Facility: CLINIC | Age: 42
End: 2017-06-12

## 2017-06-12 VITALS
OXYGEN SATURATION: 99 % | SYSTOLIC BLOOD PRESSURE: 116 MMHG | DIASTOLIC BLOOD PRESSURE: 78 MMHG | TEMPERATURE: 97.8 F | RESPIRATION RATE: 16 BRPM | BODY MASS INDEX: 37.76 KG/M2 | HEART RATE: 69 BPM | HEIGHT: 67 IN | WEIGHT: 240.6 LBS

## 2017-06-12 DIAGNOSIS — Z30.09 FAMILY PLANNING: Primary | ICD-10-CM

## 2017-06-12 DIAGNOSIS — O03.9 MISCARRIAGE: ICD-10-CM

## 2017-06-12 NOTE — MR AVS SNAPSHOT
"              After Visit Summary   6/12/2017    Mary Wray    MRN: [id number]           Patient Information     Date Of Birth          1975        Visit Information        Provider Department      6/12/2017 4:20 PM Jazlyn Huitron MD [facility]        Today's Diagnoses     Family planning    -  1    Miscarriage           Follow-ups after your visit        Who to contact     Please call your clinic at [phone] to:    Ask questions about your health    Make or cancel appointments    Discuss your medicines    Learn about your test results    Speak to your doctor   If you have compliments or concerns about an experience at your clinic, or if you wish to file a complaint, please contact [facility] Patient Relations at [phone] or email us at [email or web address]         Additional Information About Your Visit        MyChart Information     [employer] gives you secure access to your electronic health record. If you see a primary care provider, you can also send messages to your care team and make appointments. If you have questions, please call your primary care clinic.  If you do not have a primary care provider, please call [phone] and they will assist you.      [employer] is an electronic gateway that provides easy, online access to your medical records. With [employer], you can request a clinic appointment, read your test results, renew a prescription or communicate with your care team.     To access your existing account, please contact your [facility] or call [phone] for assistance.        Care EveryWhere ID     This is your Care EveryWhere ID. This could be used by other organizations to access your [place] medical records  [id number]        Your Vitals Were     Pulse Temperature Respirations Height Pulse Oximetry Breastfeeding?    69 97.8  F (36.6  C) (Oral) 16 5' 7\" (170.2 cm) 99% No    BMI (Body Mass " Index)                   37.68 kg/m2            Blood Pressure from Last 3 Encounters:   06/12/17 116/78   06/01/17 116/78   01/16/17 120/75    Weight from Last 3 Encounters:   06/12/17 240 lb 9.6 oz (109.1 kg)   06/01/17 240 lb 9.6 oz (109.1 kg)   01/16/17 235 lb (106.6 kg)              Today, you had the following     No orders found for display       Primary Care Provider Office Phone # Fax #    Jazlyn Huitron [phone]       [facility] [address] [facility] [address]        Equal Access to Services     SUGAR MELISSA : Hadii aster hernandezo Somichelet, waaxda luqadaha, qaybta kaalmada maico, adrianna coy . So [facility] [phone].    ATENCIÓN: Si habla español, tiene a perdue disposición servicios gratuitos de asistencia lingüística. LlLakeHealth [facility] [phone].    We comply with applicable federal civil rights laws and [place] laws. We do not discriminate on the basis of race, color, national origin, age, disability sex, sexual orientation or gender identity.            Thank you!     Thank you for choosing [facility]  for your care. Our goal is always to provide you with excellent care. Hearing back from our patients is one way we can continue to improve our services. Please take a few minutes to complete the written survey that you may receive in the mail after your visit with us. Thank you!             Your Updated Medication List - Protect others around you: Learn how to safely use, store and throw away your medicines at www.disposemymeds.org.          This list is accurate as of: 6/12/17 11:59 PM.  Always use your most recent med list.                   Brand Name Dispense Instructions for use Diagnosis    cholecalciferol 1000 UNITS capsule    vitamin  -D    30 capsule    Take 5 capsules (5,000 Units) by mouth daily        diltiazem 2% in PLO cream (FV COMPOUNDED) 2% Gel     60 g    To anal opening three times daily.  Use a  pea-sized amount.  Store at room temperature.    Anal fissure       methocarbamol 500 MG tablet    ROBAXIN    30 tablet    Take 3 tablets (1,500 mg) by mouth 3 times daily as needed for muscle spasms    Acute left-sided thoracic back pain       prenatal multivitamin  plus iron 27-0.8 MG Tabs per tablet     100 tablet    Take 1 tablet by mouth daily    Encounter for preconception consultation       SYNTHROID 175 MCG tablet   Generic drug:  levothyroxine     90 tablet    Take 1 tablet (175 mcg) by mouth daily    Postablative hypothyroidism

## 2017-06-21 ASSESSMENT — ENCOUNTER SYMPTOMS
NEUROLOGICAL NEGATIVE: 1
ACTIVITY CHANGE: 0
CARDIOVASCULAR NEGATIVE: 1
GASTROINTESTINAL NEGATIVE: 1
PSYCHIATRIC NEGATIVE: 1
APPETITE CHANGE: 0

## 2017-06-21 NOTE — PROGRESS NOTES
"      HPI:       Mary Wray is a 41 year old who presents for the following  Patient presents with:  RECHECK: F/U miscarriage    Here to follow up on recent miscarriage, discuss next steps with fertility/ovulation.  Doing well, adjusting emotionally and physically  Waiting to have a real period again - discussed another trial of IUI with her fertility MD and they want to wait until after she returns to her normal cycle  - she's excited to try again  - has been using intravaginal progesterone with IUI  - measuring other options after this    New relationship  - happy with her partner, and he is quite supportive of her independent work on conception  - he has 2 (older) children and does not want to parent himself, but very supportive of her  - has helped her adjust after miscarriage    Generally doing ok - continuing with IUI for at least a few more attempts    Problem, Medication and Allergy Lists were reviewed and are current.  Patient is an established patient of this clinic.         Review of Systems:   Review of Systems   Constitutional: Negative for activity change and appetite change.   Cardiovascular: Negative.    Gastrointestinal: Negative.    Genitourinary: Negative.    Neurological: Negative.    Psychiatric/Behavioral: Negative.              Physical Exam:   /78  Pulse 69  Temp 97.8  F (36.6  C) (Oral)  Resp 16  Ht 5' 7\" (170.2 cm)  Wt 240 lb 9.6 oz (109.1 kg)  SpO2 99%  Breastfeeding? No  BMI 37.68 kg/m2  Body mass index is 37.68 kg/(m^2).  Vitals were reviewed and were normal     Physical Exam   Constitutional: She is oriented to person, place, and time. She appears well-developed and well-nourished.   Neck: Normal range of motion.   Pulmonary/Chest: Effort normal.   Musculoskeletal: Normal range of motion.   Neurological: She is alert and oriented to person, place, and time.   Psychiatric: She has a normal mood and affect. Her behavior is normal. Judgment and thought content normal. "     MENTAL STATUS EXAM  Appearance: appropriate  Attitude: cooperative  Behavior: normal  Eye Contact: normal  Speech: normal  Orientation: oriented to person, place, time and situation  Mood: normal  Affect: Congruent with mood  Thought Process: appropriate  Suicidal Ideation: reports no suicidal ideation  Hallucination: denies      Results:     Assessment and Plan     1. Family planning  Ongoing work with fertility clinic at [facility]  Planning on another IUI attempt  Using condoms with her new partner    2. Miscarriage  Reassurance re: normal return to menses after miscarriage  Emotionally doing well, good supports    There are no discontinued medications.  Options for treatment and follow-up care were reviewed with the patient. Mary Wray  engaged in the decision making process and verbalized understanding of the options discussed and agreed with the final plan.    Jazlyn Huitron MD

## 2017-07-07 DIAGNOSIS — E89.0 POSTABLATIVE HYPOTHYROIDISM: Primary | ICD-10-CM

## 2017-07-08 DIAGNOSIS — E89.0 POSTABLATIVE HYPOTHYROIDISM: ICD-10-CM

## 2017-07-08 LAB
T3 SERPL-MCNC: 81 NG/DL (ref 60–181)
T4 FREE SERPL-MCNC: 1.21 NG/DL (ref 0.76–1.46)
TSH SERPL DL<=0.05 MIU/L-ACNC: 8.1 MU/L (ref 0.4–4)

## 2017-07-10 DIAGNOSIS — E89.0 POSTABLATIVE HYPOTHYROIDISM: Primary | ICD-10-CM

## 2017-07-10 RX ORDER — LEVOTHYROXINE SODIUM 200 MCG
200 TABLET ORAL DAILY
Qty: 90 TABLET | Refills: 3 | Status: SHIPPED | OUTPATIENT
Start: 2017-07-10 | End: 2018-06-27

## 2017-07-11 ENCOUNTER — VIRTUAL VISIT (OUTPATIENT)
Dept: ENDOCRINOLOGY | Facility: CLINIC | Age: 42
End: 2017-07-11

## 2017-07-11 DIAGNOSIS — N97.9 FEMALE INFERTILITY: ICD-10-CM

## 2017-07-11 DIAGNOSIS — Z86.39 HISTORY OF GRAVES' DISEASE: ICD-10-CM

## 2017-07-11 DIAGNOSIS — E89.0 POSTABLATIVE HYPOTHYROIDISM: Primary | ICD-10-CM

## 2017-07-11 NOTE — MR AVS SNAPSHOT
After Visit Summary   7/11/2017    Mary Wray    MRN: [id number]           Patient Information     Date Of Birth          1975        Visit Information        Provider Department      7/11/2017 7:30 AM Loli Del Castillo MD [facility]        Today's Diagnoses     Postablative hypothyroidism    -  1    Female infertility        History of Graves' disease           Follow-ups after your visit        Your next 10 appointments already scheduled     Sep 07, 2017  8:20 AM CDT   PHYSICAL with Jazlyn Huitron MD   [facility] ([facility])    [facility] [address] [place] [address],  [address]  [place] [address]   [phone]              Future tests that were ordered for you today     Open Future Orders        Priority Expected Expires Ordered    TSH Routine 8/10/2017 7/10/2018 7/10/2017    T4 free Routine 8/10/2017 7/10/2018 7/10/2017            Who to contact     Please call your clinic at [phone] to:    Ask questions about your health    Make or cancel appointments    Discuss your medicines    Learn about your test results    Speak to your doctor   If you have compliments or concerns about an experience at your clinic, or if you wish to file a complaint, please contact [facility] Patient Relations at [phone] or email us at [email or web address]         Additional Information About Your Visit        MyChart Information     [employer] gives you secure access to your electronic health record. If you see a primary care provider, you can also send messages to your care team and make appointments. If you have questions, please call your primary care clinic.  If you do not have a primary care provider, please call [phone] and they will assist you.      [employer] is an electronic gateway that provides easy, online access to your medical records. With [employer], you can request a clinic appointment, read your test results, renew a  prescription or communicate with your care team.     To access your existing account, please contact your [facility] or call [phone] for assistance.        Care EveryWhere ID     This is your Care EveryWhere ID. This could be used by other organizations to access your [place] medical records  [id number]         Blood Pressure from Last 3 Encounters:   06/12/17 116/78   06/01/17 116/78   01/16/17 120/75    Weight from Last 3 Encounters:   06/12/17 109.1 kg (240 lb 9.6 oz)   06/01/17 109.1 kg (240 lb 9.6 oz)   01/16/17 106.6 kg (235 lb)              Today, you had the following     No orders found for display       Primary Care Provider Office Phone # Fax #    Jazlyn Huitron [phone]       [facility] [address] [facility] [phone]        Equal Access to Services     SUGAR MELISSA : Hadii aad ku hadasho Soomaali, waaxda luqadaha, qaybta kaalmada adeegyada, waxay jocelinin haytrenton coy . So [facility] [phone].    ATENCIÓN: Si habla español, tiene a perdue disposición servicios gratuitos de asistencia lingüística. Oscar al [phone].    We comply with applicable federal civil rights laws and [place] laws. We do not discriminate on the basis of race, color, national origin, age, disability sex, sexual orientation or gender identity.            Thank you!     Thank you for choosing [facility]  for your care. Our goal is always to provide you with excellent care. Hearing back from our patients is one way we can continue to improve our services. Please take a few minutes to complete the written survey that you may receive in the mail after your visit with us. Thank you!             Your Updated Medication List - Protect others around you: Learn how to safely use, store and throw away your medicines at www.disposemymeds.org.          This list is accurate as of: 7/11/17  7:49 AM.  Always use your most recent med list.                    Brand Name Dispense Instructions for use Diagnosis    cholecalciferol 1000 UNITS capsule    vitamin  -D    30 capsule    Take 5 capsules (5,000 Units) by mouth daily        diltiazem 2% in PLO cream (FV COMPOUNDED) 2% Gel     60 g    To anal opening three times daily.  Use a pea-sized amount.  Store at room temperature.    Anal fissure       methocarbamol 500 MG tablet    ROBAXIN    30 tablet    Take 3 tablets (1,500 mg) by mouth 3 times daily as needed for muscle spasms    Acute left-sided thoracic back pain       prenatal multivitamin  plus iron 27-0.8 MG Tabs per tablet     100 tablet    Take 1 tablet by mouth daily    Encounter for preconception consultation       SYNTHROID 200 MCG tablet   Generic drug:  levothyroxine     90 tablet    Take 1 tablet (200 mcg) by mouth daily    Postablative hypothyroidism

## 2017-07-11 NOTE — PROGRESS NOTES
TELEPHONE VISIT    Attending ASSESSMENT/PLAN:     Postablative hypothyroidism. Consistently unstable over time.  Recent TFTS abnormal. Incresae LT4 to 200 mcg/day. Repeat labs in one month.   I have counseled her on the LT4 absorption interference from Fe and ca (both of which are likely in the MVI). It is harder to anticipate the response to 2 changes (incresae dose and change in pattern of administration) than one change.  I initially suggested we make only one change but she has already started the 2nd change as well.      Infertility - undergoing  IUI- The hormones she is getting for IUI may stimulate both estrogen (The follistim) and the thyroid directly (HCG).  High estrogen will also impact TBG and LT4 requirements.      History of Graves'     Obesity as defined by BMI > 30 kgm/2.  As per # 4    Prediabetes on past A1cs. Family history of DM;  The most recent from 1/6/17 is normal.      Acanthosis nigricans had been noted in the past.      Phone visit:   Start time 0730 AM  Stop time 0736  Total time : 6 minutes    Loli Del Castillo MD      Cc/ HISTORY OF PRESENT ILLNESS 41 yr old woman presents for patient initiated telephone visit follow up.    She was on Synthroid brand 175 mcg/day then and she remains on the same dose.  She has been taking it at the same time as prenatal vitamin.  She dose not use a pill tray.  She doesn't think she ever forgets to take the Synthroid.    Her recent course included the following   2/21/17: TSH 0.56, Free T4 1.44, total T4 16, T3 85  4/12 to 4/19 I  follistim injections   4/19/17 ovidral shot.    inseminated  4/20 and started taking one progesterone pill vaginally everyday until  miscarried on 5/11.   5/4/17: TSH 4.6, free T4 1.36  She may have  continued the progesterone pills for a few more days after the miscarriage .  Today she tells me that in the last days she has been taking the Synthroid at 5 AM and then taking the vitamin 2 hours later.    In 1997 she was  "diagnosed with Graves' , treated with radioactive iodine, soon resulting in hypothyroidism. She has been on L-T4 since then. See my 8/14 note for her past history of TFTS dating 5689-6177. See me 2/17 note for TFTS 2/13 - 2015.      9/1/16: TSH 35.53, free T4 1.02  9/8/16 Rx Synthroid 175 mcg/day  9/27/16 [place] with LUGOLS applied   1/6/17: TSH 0.03, free T4 1.91-- she was sick at the time -       Past Medical History  Past Medical History:   Diagnosis Date     Anal fissure      History of Graves' disease     Graves     Hypovitaminosis D      Obesity      KHURRAM (obstructive sleep apnea)      Papanicolaou smear of cervix with low grade squamous intraepithelial lesion (LGSIL)      Postablative hypothyroidism      Prediabetes      Premenstrual dysphoria      Past Surgical History:   Procedure Laterality Date     LEEP TX, CERVICAL  2002    \"for HPV\"       Medications  Current Outpatient Prescriptions   Medication Sig Dispense Refill     SYNTHROID 200 MCG tablet Take 1 tablet (200 mcg) by mouth daily 90 tablet 3     cholecalciferol (VITAMIN  -D) 1000 UNITS capsule Take 5 capsules (5,000 Units) by mouth daily 30 capsule      Prenatal Vit-Fe Fumarate-FA (PRENATAL MULTIVITAMIN  PLUS IRON) 27-0.8 MG TABS per tablet Take 1 tablet by mouth daily 100 tablet 3     methocarbamol (ROBAXIN) 500 MG tablet Take 3 tablets (1,500 mg) by mouth 3 times daily as needed for muscle spasms 30 tablet 1     diltiazem 2% in PLO cream, FV COMPOUNDED, 2% GEL To anal opening three times daily.  Use a pea-sized amount.  Store at room temperature. 60 g 0     I changed the Rx from Synthroid 175 to 200 mg/day last night.        Results for ASHLI PRATHER (MRN [id number]) as of 7/11/2017 07:27   Ref. Range 2/21/2017 13:01 5/4/2017 12:44 7/8/2017 08:49   T4 Free Latest Ref Range: 0.76 - 1.46 ng/dL 1.44 1.36 1.21   Triiodothyronine (T3) Latest Ref Range: 60 - 181 ng/dL 85  81   TSH Latest Ref Range: 0.40 - 4.00 mU/L 0.56 4.60 (H) 8.10 (H)       "

## 2017-08-07 LAB
ALT SERPL-CCNC: 13 U/L (ref 6–29)
AST SERPL-CCNC: 15 U/L (ref 10–30)
C TRACH DNA SPEC QL PROBE+SIG AMP: NOT DETECTED
CREAT SERPL-MCNC: 0.85 MG/DL (ref 0.5–1.1)
GFR SERPL CREATININE-BSD FRML MDRD: 86 ML/MIN/1.73M2
GLUCOSE SERPL-MCNC: 107 MG/DL (ref 65–99)
HEP C HIM: NORMAL
N GONORRHOEA DNA SPEC QL PROBE+SIG AMP: NOT DETECTED
POTASSIUM SERPL-SCNC: 4.1 MMOL/L (ref 3.5–5.3)
SPECIMEN DESCRIP: NORMAL
SPECIMEN DESCRIPTION: NORMAL

## 2017-08-30 ENCOUNTER — TELEPHONE (OUTPATIENT)
Dept: ENDOCRINOLOGY | Facility: CLINIC | Age: 42
End: 2017-08-30

## 2017-08-30 NOTE — TELEPHONE ENCOUNTER
----- Message from Loli Del Castillo MD sent at 8/30/2017  5:20 PM CDT -----  Regarding: RE: Lab Order needed for Tomorrow - Dr Del Castillo  Contact: [phone]  Order for A1c is already on the system.      Loli Del Castillo      ----- Message -----     From: Ariana Washington RN     Sent: 8/30/2017   3:53 PM       To: Loli Del Castillo MD  Subject: FW: Lab Order needed for Tomorrow - Dr Orellana    She wants an order placed for A1C to be done tomorrow.  Do you want to order one for prediabetes?   ----- Message -----     From: Ирина Cunningham     Sent: 8/30/2017   3:39 PM       To: [facility] Triage-  Subject: Lab Order needed for Tomorrow - Dr YarbroughMagdalene    The pt would like to get an A1c lab drawn tomorrow when she comes in. Please add the order and let the pt know.    The pt can be reached at [phone]    Thanks Alcira Gifford    Please DO NOT send this message and/or reply back to sender.  Call Center Representatives DO NOT respond to messages.

## 2017-08-31 DIAGNOSIS — E89.0 POSTABLATIVE HYPOTHYROIDISM: ICD-10-CM

## 2017-08-31 DIAGNOSIS — N97.9 FEMALE FERTILITY PROBLEM: ICD-10-CM

## 2017-08-31 LAB
DEPRECATED CALCIDIOL+CALCIFEROL SERPL-MC: 25 UG/L (ref 20–75)
HBA1C MFR BLD: 5.9 % (ref 4.3–6)
T4 FREE SERPL-MCNC: 1.87 NG/DL (ref 0.76–1.46)
TSH SERPL DL<=0.005 MIU/L-ACNC: 0.01 MU/L (ref 0.4–4)

## 2017-09-03 LAB — TESTOST SERPL-MCNC: 21 NG/DL (ref 8–60)

## 2017-09-07 ENCOUNTER — OFFICE VISIT (OUTPATIENT)
Dept: FAMILY MEDICINE | Facility: CLINIC | Age: 42
End: 2017-09-07

## 2017-09-07 VITALS
DIASTOLIC BLOOD PRESSURE: 74 MMHG | HEART RATE: 72 BPM | TEMPERATURE: 98.3 F | WEIGHT: 233.4 LBS | HEIGHT: 66 IN | OXYGEN SATURATION: 98 % | SYSTOLIC BLOOD PRESSURE: 111 MMHG | BODY MASS INDEX: 37.51 KG/M2

## 2017-09-07 DIAGNOSIS — Z00.00 ROUTINE GENERAL MEDICAL EXAMINATION AT A HEALTH CARE FACILITY: Primary | ICD-10-CM

## 2017-09-07 DIAGNOSIS — R87.612 PAPANICOLAOU SMEAR OF CERVIX WITH LOW GRADE SQUAMOUS INTRAEPITHELIAL LESION (LGSIL): ICD-10-CM

## 2017-09-07 PROBLEM — Z87.19 HISTORY OF ANAL FISSURES: Status: RESOLVED | Noted: 2017-05-11 | Resolved: 2017-09-07

## 2017-09-07 NOTE — PROGRESS NOTES
"  Female Physical Note          HPI         Concerns today: No special concerns today.  Lighter period this month (and was 2 days late)  Started estrace/testosterone gel/doxycline on 1st day of LMP (9/5/17) - not sure if that had an effect on lightening her period a little  Has an appt coming up for IVF in a few weeks - both nervous and excited  - recent HSG was reassuring    Good relationship right now, feels blessed  Work going well, about to be the \"busy season\" again      Patient Active Problem List   Diagnosis     Vitamin D deficiency     Postablative hypothyroidism     Fatigue     External hemorrhoids     Pre-diabetes     Human papilloma virus (HPV) infection     Abnormal cervical Papanicolaou smear with positive human papilloma virus (HPV) DNA test     Family planning     History of anal fissures       Past Medical History:   Diagnosis Date     Anal fissure      History of Graves' disease     Graves     Hypovitaminosis D      Obesity      KUHRRAM (obstructive sleep apnea)      Postablative hypothyroidism      Premenstrual dysphoria        Family History   Problem Relation Age of Onset     Obesity Mother      DIABETES Father      Obesity Father             Review of Systems:     Review of Systems:  CONSTITUTIONAL: NEGATIVE for fever, chills, change in weight  INTEGUMENTARY/SKIN: NEGATIVE for worrisome rashes, moles or lesions  EYES: NEGATIVE for vision changes or irritation  ENT/MOUTH: NEGATIVE for ear, mouth and throat problems  RESP: NEGATIVE for significant cough or SOB  BREAST: NEGATIVE for masses, tenderness or discharge  CV: NEGATIVE for chest pain, palpitations or peripheral edema  GI: NEGATIVE for nausea, abdominal pain, heartburn, or change in bowel habits  : NEGATIVE for frequency, dysuria, or hematuria  MUSCULOSKELETAL: NEGATIVE for significant arthralgias or myalgia  NEURO: NEGATIVE for weakness, dizziness or paresthesias  ENDOCRINE: NEGATIVE for temperature intolerance, skin/hair " "changes  HEME/ALLERGY: NEGATIVE for bleeding problems  PSYCHIATRIC: NEGATIVE for changes in mood or affect  Sleep:   Do you snore most or the night (as reported by a family member)? No  Do you feel sleepy or extremely tired during most of the day? No           Social History     Social History     Social History     Marital status: Single     Spouse name: N/A     Number of children: N/A     Years of education: 22     Occupational History      [employer]     [employer] of [employer]     Social History Main Topics     Smoking status: Never Smoker     Smokeless tobacco: Never Used     Alcohol use 1.0 oz/week     2 Standard drinks or equivalent per week      Comment: occasional     Drug use: No     Sexual activity: Yes     Partners: Male     Other Topics Concern     Caffeine Concern No     Sleep Concern No     Stress Concern Yes     Weight Concern Yes     Exercise Yes     Bike Helmet Yes     Seat Belt Yes     Social History Narrative    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 self    Has anyone hurt you physically, for example by pushing, hitting, slapping or kicking you or forcing you to have sex? Denies  Do you feel threatened or controlled by a partner, ex-partner or anyone in your life? Denies    Sexual Health     Sexual concerns: No   STI History: Neg  Pregnancy History: No obstetric history on file.  LMP No LMP recorded. 9/5/17  Last Pap Smear Date:   Lab Results   Component Value Date    PAP LSIL 09/01/2016    PAP NIL 10/24/2013     Abnormal Pap History: See problem list    Recommended Screening     Pap due today in f/u for abnormal last year         Physical Exam:     Vitals: /74  Pulse 72  Temp 98.3  F (36.8  C) (Oral)  Ht 5' 6\" (167.6 cm)  Wt 233 lb 6.4 oz (105.9 kg)  SpO2 98%  BMI 37.67 kg/m2  BMI= Body mass " index is 37.67 kg/(m^2).   GENERAL: healthy, alert and no distress  EYES: Eyes grossly normal to inspection, extraocular movements - intact, and PERRL  HENT: ear canals- normal; TMs- normal; Nose- normal; Mouth- no ulcers, no lesions  NECK: no tenderness, no adenopathy, no asymmetry, no masses, no stiffness; thyroid- normal to palpation  RESP: lungs clear to auscultation - no rales, no rhonchi, no wheezes  BREAST: no masses, no tenderness, no nipple discharge, no palpable axillary masses or adenopathy  CV: regular rates and rhythm, normal S1 S2, no S3 or S4 and no murmur, no click or rub -  ABDOMEN: soft, no tenderness, no  hepatosplenomegaly, no masses, normal bowel sounds  MS: extremities- no gross deformities noted, no edema  SKIN: no suspicious lesions, no rashes  NEURO: strength and tone- normal, sensory exam- grossly normal, mentation- intact, speech- normal, reflexes- symmetric  BACK: no CVA tenderness, no paralumbar tenderness  - female: cervix- normal, no d/c  PSYCH: Alert and oriented times 3; speech- coherent , normal rate and volume; able to articulate logical thoughts, able to abstract reason, no tangential thoughts, no hallucinations or delusions, affect- normal  LYMPHATICS: ant. cervical- normal, post. cervical- normal, axillary- normal, supraclavicular- normal      Assessment and Plan      Mary was seen today for physical.    Diagnoses and all orders for this visit:    Routine general medical examination at a health care facility    Papanicolaou smear of cervix with low grade squamous intraepithelial lesion (LGSIL)  -     Pap imaged thin layer diagnostic with HPV (select HPV order below)  -     HPV High Risk Types DNA Cervical    Other orders  -     Cancel: Pap imaged thin layer screen with HPV - recommended age 30 - 65 years (select HPV order below)    PLAN:  1. Diagnostic pap/HPV co-testing today, discussed potential f/u plans depending on results  2. Continue with fertility plans, f/u with  [facility] as scheduled  3. Continue healthy diet, regular exercise  4. Discussed recent labs (for her fertility w/u), TSH is quite low again, she's discussed with Dr. Del Castillo and fertility and planning to stay on current levo dose for now, reassess as directed by Dr. Del Castillo    Options for treatment and follow-up care were reviewed with the patient . Dimsanta Wray and/or guardian engaged in the decision making process and verbalized understanding of the options discussed and agreed with the final plan.    Jazlyn Huitorn MD

## 2017-09-07 NOTE — MR AVS SNAPSHOT
After Visit Summary   9/7/2017    Mary Wray    MRN: [id number]           Patient Information     Date Of Birth          1975        Visit Information        Provider Department      9/7/2017 8:20 AM Jazlyn Huitron MD [facility]        Today's Diagnoses     Routine general medical examination at a health care facility    -  1    Papanicolaou smear of cervix with low grade squamous intraepithelial lesion (LGSIL)          Care Instructions      Preventive Health Recommendations  Female Ages 40 to 49    Yearly exam:     See your health care provider every year in order to  1. Review health changes.   2. Discuss preventive care.    3. Review your medicines if your doctor prescribed any.      Get a Pap test every three years (unless you have an abnormal result and your provider advises testing more often).      If you get Pap tests with HPV test, you only need to test every 5 years, unless you have an abnormal result. You do not need a Pap test if your uterus was removed (hysterectomy) and you have not had cancer.      You should be tested each year for STDs (sexually transmitted diseases), if you're at risk.       Ask your doctor if you should have a mammogram.      Have a colonoscopy (test for colon cancer) if someone in your family has had colon cancer or polyps before age 50.       Have a cholesterol test every 5 years.       Have a diabetes test (fasting glucose) after age 45. If you are at risk for diabetes, you should have this test every 3 years.    Shots: Get a flu shot each year. Get a tetanus shot every 10 years.     Nutrition:     Eat at least 5 servings of fruits and vegetables each day.    Eat whole-grain bread, whole-wheat pasta and brown rice instead of white grains and rice.    Talk to your provider about Calcium and Vitamin D.     Lifestyle    Exercise at least 150 minutes a week (an average of 30 minutes a day, 5 days a week). This will help you control  "your weight and prevent disease.    Limit alcohol to one drink per day.    No smoking.     Wear sunscreen to prevent skin cancer.    See your dentist every six months for an exam and cleaning.          Follow-ups after your visit        Who to contact     Please call your clinic at [phone] to:    Ask questions about your health    Make or cancel appointments    Discuss your medicines    Learn about your test results    Speak to your doctor   If you have compliments or concerns about an experience at your clinic, or if you wish to file a complaint, please contact [facility] Patient Relations at [phone] or email us at [email or web address]         Additional Information About Your Visit        TransLatticehar[employer]Game Information     [employer] gives you secure access to your electronic health record. If you see a primary care provider, you can also send messages to your care team and make appointments. If you have questions, please call your primary care clinic.  If you do not have a primary care provider, please call [phone] and they will assist you.      [employer] is an electronic gateway that provides easy, online access to your medical records. With [employer], you can request a clinic appointment, read your test results, renew a prescription or communicate with your care team.     To access your existing account, please contact your [facility] or call [phone] for assistance.        Care EveryWhere ID     This is your Care EveryWhere ID. This could be used by other organizations to access your [place] medical records  [id number]        Your Vitals Were     Pulse Temperature Height Pulse Oximetry BMI (Body Mass Index)       72 98.3  F (36.8  C) (Oral) 5' 6\" (167.6 cm) 98% 37.67 kg/m2        Blood Pressure from Last 3 Encounters:   09/07/17 111/74   06/12/17 116/78   06/01/17 116/78    Weight from Last 3 Encounters:   09/07/17 233 lb 6.4 oz (105.9 " kg)   06/12/17 240 lb 9.6 oz (109.1 kg)   06/01/17 240 lb 9.6 oz (109.1 kg)              We Performed the Following     HPV High Risk Types DNA Cervical     Pap imaged thin layer diagnostic with HPV (select HPV order below)          Today's Medication Changes          These changes are accurate as of: 9/7/17  9:32 AM.  If you have any questions, ask your nurse or doctor.               Stop taking these medicines if you haven't already. Please contact your care team if you have questions.     diltiazem 2% in PLO cream (FV COMPOUNDED) 2% Gel   Stopped by:  Jazlyn Huitron MD           methocarbamol 500 MG tablet   Commonly known as:  ROBAXIN   Stopped by:  Jazlyn Huitron MD                    Primary Care Provider Office Phone # Fax #    Jazlyn Huitron [phone]       [address] [address]        Equal Access to Services     [facility]: Hadii aster durham hadasho [place], waaxda luqadaha, qaybta kaalmada adeegyada, waxay devante coy . So [facility] [phone].    ATENCIÓN: Si habla español, tiene a perdue disposición servicios gratuitos de asistencia lingüística. Oscar al [phone].    We comply with applicable federal civil rights laws and [place] laws. We do not discriminate on the basis of race, color, national origin, age, disability sex, sexual orientation or gender identity.            Thank you!     Thank you for choosing [facility]  for your care. Our goal is always to provide you with excellent care. Hearing back from our patients is one way we can continue to improve our services. Please take a few minutes to complete the written survey that you may receive in the mail after your visit with us. Thank you!             Your Updated Medication List - Protect others around you: Learn how to safely use, store and throw away your medicines at www.disposemymeds.org.          This list is accurate as of: 9/7/17  9:32 AM.   Always use your most recent med list.                   Brand Name Dispense Instructions for use Diagnosis    cholecalciferol 1000 UNITS capsule    vitamin  -D    30 capsule    Take 5 capsules (5,000 Units) by mouth daily        prenatal multivitamin plus iron 27-0.8 MG Tabs per tablet     100 tablet    Take 1 tablet by mouth daily    Encounter for preconception consultation       SYNTHROID 200 MCG tablet   Generic drug:  levothyroxine     90 tablet    Take 1 tablet (200 mcg) by mouth daily    Postablative hypothyroidism

## 2017-09-12 LAB
COPATH REPORT: NORMAL
PAP: NORMAL

## 2017-09-13 LAB
FINAL DIAGNOSIS: NORMAL
HPV HR 12 DNA CVX QL NAA+PROBE: NEGATIVE
HPV16 DNA SPEC QL NAA+PROBE: NEGATIVE
HPV18 DNA SPEC QL NAA+PROBE: NEGATIVE
SPECIMEN DESCRIPTION: NORMAL

## 2017-09-18 LAB
ALT SERPL-CCNC: 16 U/L (ref 6–29)
AST SERPL-CCNC: 17 U/L (ref 10–30)
CREAT SERPL-MCNC: 0.73 MG/DL (ref 0.5–1.1)
GFR SERPL CREATININE-BSD FRML MDRD: 103 ML/MIN/1.73M2
GLUCOSE SERPL-MCNC: 101 MG/DL (ref 65–99)
HBA1C MFR BLD: 5.5 % (ref 0–5.7)
POTASSIUM SERPL-SCNC: 4.4 MMOL/L (ref 3.5–5.3)

## 2017-10-17 LAB
ALT SERPL-CCNC: 15 U/L (ref 6–29)
AST SERPL-CCNC: 17 U/L (ref 10–30)
CREAT SERPL-MCNC: 0.74 MG/DL (ref 0.5–1.1)
GFR SERPL CREATININE-BSD FRML MDRD: 101 ML/MIN/1.73M2
GLUCOSE SERPL-MCNC: 97 MG/DL (ref 65–99)
POTASSIUM SERPL-SCNC: 4.3 MMOL/L (ref 3.5–5.3)

## 2017-11-27 LAB
ALT SERPL-CCNC: 16 U/L (ref 6–29)
AST SERPL-CCNC: 18 U/L (ref 10–30)
CREAT SERPL-MCNC: 0.79 MG/DL (ref 0.5–1.1)
GFR SERPL CREATININE-BSD FRML MDRD: 93 ML/MIN/1.73M2
GLUCOSE SERPL-MCNC: 124 MG/DL (ref 65–99)
POTASSIUM SERPL-SCNC: 3.9 MMOL/L (ref 3.5–5.3)
TSH SERPL-ACNC: 0.01 UIU/ML (ref 0.27–4.2)

## 2017-12-08 LAB
ALT SERPL-CCNC: 12 U/L (ref 6–29)
AST SERPL-CCNC: 15 U/L (ref 10–30)
CREAT SERPL-MCNC: 0.74 MG/DL (ref 0.5–1.1)
GFR SERPL CREATININE-BSD FRML MDRD: 101 ML/MIN/1.73M2
GLUCOSE SERPL-MCNC: 92 MG/DL (ref 65–99)
POTASSIUM SERPL-SCNC: 4.1 MMOL/L (ref 3.5–5.3)

## 2017-12-19 ENCOUNTER — OFFICE VISIT (OUTPATIENT)
Dept: FAMILY MEDICINE | Facility: CLINIC | Age: 42
End: 2017-12-19
Payer: COMMERCIAL

## 2017-12-19 VITALS
TEMPERATURE: 98.2 F | SYSTOLIC BLOOD PRESSURE: 125 MMHG | WEIGHT: 241.2 LBS | BODY MASS INDEX: 38.76 KG/M2 | HEART RATE: 68 BPM | DIASTOLIC BLOOD PRESSURE: 80 MMHG | RESPIRATION RATE: 18 BRPM | OXYGEN SATURATION: 99 % | HEIGHT: 66 IN

## 2017-12-19 DIAGNOSIS — S93.402A SPRAIN OF LEFT ANKLE, UNSPECIFIED LIGAMENT, INITIAL ENCOUNTER: Primary | ICD-10-CM

## 2017-12-19 DIAGNOSIS — T25.212A PARTIAL THICKNESS BURN OF LEFT ANKLE, INITIAL ENCOUNTER: ICD-10-CM

## 2017-12-19 ASSESSMENT — ENCOUNTER SYMPTOMS
RESPIRATORY NEGATIVE: 1
ARTHRALGIAS: 1
APPETITE CHANGE: 0
CARDIOVASCULAR NEGATIVE: 1
PSYCHIATRIC NEGATIVE: 1
ACTIVITY CHANGE: 0

## 2017-12-19 NOTE — PROGRESS NOTES
"      HPI:       Mary Wray is a 42 year old who presents for the following  Patient presents with:  Burn: Ice pack, left ankle, 12/16/17,5/10, blistersred, dark       Concern: Burn    1. Left Ankle-Ice Pack,12/16/17,5/10,Red blisters  - twisted L ankle 4 days ago (missed a step in her [employer])  - swelling, but able to walk  - started using an ice pack, forgot to put the soft covering on it and ended up with a 2nd degree ice-burn from the ice pack  - using aquaphor topically, wrapping with ACE  - still able to walk  - no significant pain  - swelling improving    F/u on fertility  - lost the embryos (had 6, but none of them continued)  - good support at home  - MD thinks she should try again, good results from ovulation induction last time  - feels positive about another trial    Problem, Medication and Allergy Lists were reviewed and are current.  Patient is an established patient of this clinic.         Review of Systems:   Review of Systems   Constitutional: Negative for activity change and appetite change.   Respiratory: Negative.    Cardiovascular: Negative.    Musculoskeletal: Positive for arthralgias (see HPI).   Skin:        Rash - see drawing   Psychiatric/Behavioral: Negative.              Physical Exam:   Patient Vitals for the past 24 hrs:   BP Temp Temp src Pulse Resp SpO2 Height Weight   12/19/17 1004 125/80 98.2  F (36.8  C) Oral 68 18 99 % 5' 6\" (167.6 cm) 241 lb 3.2 oz (109.4 kg)     Body mass index is 38.93 kg/(m^2).  Vitals were reviewed and were normal     Physical Exam   Constitutional: She is oriented to person, place, and time. She appears well-developed and well-nourished.   Pulmonary/Chest: Effort normal.   Musculoskeletal:        Left ankle: She exhibits swelling. She exhibits normal range of motion and no ecchymosis. No tenderness. No lateral malleolus, no medial malleolus and no proximal fibula tenderness found.        Feet:    Neurological: She is alert and oriented to person, place, " and time.   Psychiatric: She has a normal mood and affect. Her behavior is normal. Judgment and thought content normal.         Results:     Assessment and Plan     1. Sprain of left ankle, unspecified ligament, initial encounter  Continue RICE (but use the protective sleeve for the ice!!)  Does not meet criteria for x-ray based on [facility] Ankle rules; if not improving in 4-6 weeks, reassess for x-ray    2. Partial thickness burn of left ankle, initial encounter  Continue aquaphor and gauze coverage  Healing well    There are no discontinued medications.  Options for treatment and follow-up care were reviewed with the patient. Mary Wray  engaged in the decision making process and verbalized understanding of the options discussed and agreed with the final plan.    Jazlyn Huitron MD

## 2017-12-19 NOTE — MR AVS SNAPSHOT
After Visit Summary   12/19/2017    Mary Wray    MRN: [id number]           Patient Information     Date Of Birth          1975        Visit Information        Provider Department      12/19/2017 10:00 AM Jazlyn Huitron MD [facility]        Today's Diagnoses     Sprain of left ankle, unspecified ligament, initial encounter    -  1    Partial thickness burn of left ankle, initial encounter           Follow-ups after your visit        Who to contact     Please call your clinic at [phone] to:    Ask questions about your health    Make or cancel appointments    Discuss your medicines    Learn about your test results    Speak to your doctor   If you have compliments or concerns about an experience at your clinic, or if you wish to file a complaint, please contact [facility] Patient Relations at [phone] or email us at [email or web address]         Additional Information About Your Visit        MyChart Information     [employer] gives you secure access to your electronic health record. If you see a primary care provider, you can also send messages to your care team and make appointments. If you have questions, please call your primary care clinic.  If you do not have a primary care provider, please call [phone] and they will assist you.      [employer] is an electronic gateway that provides easy, online access to your medical records. With [employer], you can request a clinic appointment, read your test results, renew a prescription or communicate with your care team.     To access your existing account, please contact your [facility] or call [phone] for assistance.        Care EveryWhere ID     This is your Care EveryWhere ID. This could be used by other organizations to access your [place] medical records  [id number]        Your Vitals Were     Pulse Temperature Respirations Height Pulse  "Oximetry BMI (Body Mass Index)    68 98.2  F (36.8  C) (Oral) 18 5' 6\" (167.6 cm) 99% 38.93 kg/m2       Blood Pressure from Last 3 Encounters:   12/19/17 125/80   09/07/17 111/74   06/12/17 116/78    Weight from Last 3 Encounters:   12/19/17 241 lb 3.2 oz (109.4 kg)   09/07/17 233 lb 6.4 oz (105.9 kg)   06/12/17 240 lb 9.6 oz (109.1 kg)              Today, you had the following     No orders found for display       Primary Care Provider Office Phone # Fax #    Jazlyn Huitron [phone]       [address] [address]        Equal Access to Services     [facility] : Martha Sandra, wathad saxena, gideon parkallaure nina, adrianna coy . So [facility] [phone].    ATENCIÓN: Si habla español, tiene a perdue disposición servicios gratuitos de asistencia lingüística. Oscar al [phone].    We comply with applicable federal civil rights laws and [place] laws. We do not discriminate on the basis of race, color, national origin, age, disability, sex, sexual orientation, or gender identity.            Thank you!     Thank you for choosing [facility]  for your care. Our goal is always to provide you with excellent care. Hearing back from our patients is one way we can continue to improve our services. Please take a few minutes to complete the written survey that you may receive in the mail after your visit with us. Thank you!             Your Updated Medication List - Protect others around you: Learn how to safely use, store and throw away your medicines at www.disposemymeds.org.          This list is accurate as of: 12/19/17 12:59 PM.  Always use your most recent med list.                   Brand Name Dispense Instructions for use Diagnosis    cholecalciferol 1000 UNITS capsule    vitamin  -D    30 capsule    Take 5 capsules (5,000 Units) by mouth daily        prenatal multivitamin plus iron 27-0.8 MG Tabs per " tablet     100 tablet    Take 1 tablet by mouth daily    Encounter for preconception consultation       SYNTHROID 200 MCG tablet   Generic drug:  levothyroxine     90 tablet    Take 1 tablet (200 mcg) by mouth daily    Postablative hypothyroidism

## 2018-02-07 LAB
C TRACH DNA SPEC QL PROBE+SIG AMP: NOT DETECTED
HEP C HIM: NORMAL
N GONORRHOEA DNA SPEC QL PROBE+SIG AMP: NOT DETECTED
SPECIMEN DESCRIP: NORMAL
SPECIMEN DESCRIPTION: NORMAL
TSH SERPL-ACNC: 0.01 UIU/ML (ref 0.27–4.2)

## 2018-03-09 LAB — TSH SERPL-ACNC: 0.01 UIU/ML (ref 0.27–4.2)

## 2018-03-14 LAB — TSH SERPL-ACNC: 0.01 UIU/ML (ref 0.27–4.2)

## 2018-03-26 LAB — TSH SERPL-ACNC: 0.04 UIU/ML (ref 0.27–4.2)

## 2018-03-30 DIAGNOSIS — Z34.90 EARLY STAGE OF PREGNANCY: ICD-10-CM

## 2018-03-30 DIAGNOSIS — Z34.90 EARLY STAGE OF PREGNANCY: Primary | ICD-10-CM

## 2018-03-30 LAB — B-HCG SERPL-ACNC: 1157 IU/L (ref 0–5)

## 2018-03-31 DIAGNOSIS — Z34.90 EARLY STAGE OF PREGNANCY: Primary | ICD-10-CM

## 2018-04-02 DIAGNOSIS — Z34.90 EARLY STAGE OF PREGNANCY: ICD-10-CM

## 2018-04-02 LAB — B-HCG SERPL-ACNC: 2781 IU/L (ref 0–5)

## 2018-04-04 DIAGNOSIS — Z34.90 EARLY STAGE OF PREGNANCY: ICD-10-CM

## 2018-04-04 LAB — B-HCG SERPL-ACNC: 4632 IU/L (ref 0–5)

## 2018-04-06 ENCOUNTER — OFFICE VISIT (OUTPATIENT)
Dept: OBGYN | Facility: CLINIC | Age: 43
End: 2018-04-06
Attending: OBSTETRICS & GYNECOLOGY
Payer: COMMERCIAL

## 2018-04-06 VITALS
HEART RATE: 64 BPM | DIASTOLIC BLOOD PRESSURE: 67 MMHG | WEIGHT: 234 LBS | BODY MASS INDEX: 36.73 KG/M2 | HEIGHT: 67 IN | SYSTOLIC BLOOD PRESSURE: 126 MMHG

## 2018-04-06 DIAGNOSIS — Z34.90 EARLY STAGE OF PREGNANCY: Primary | ICD-10-CM

## 2018-04-06 DIAGNOSIS — O09.811 PREGNANCY RESULTING FROM IN VITRO FERTILIZATION IN FIRST TRIMESTER: Primary | ICD-10-CM

## 2018-04-06 PROCEDURE — G0463 HOSPITAL OUTPT CLINIC VISIT: HCPCS

## 2018-04-06 PROCEDURE — 76817 TRANSVAGINAL US OBSTETRIC: CPT | Mod: ZF

## 2018-04-06 ASSESSMENT — PAIN SCALES - GENERAL: PAINLEVEL: NO PAIN (0)

## 2018-04-06 NOTE — PROGRESS NOTES
42 year old female, . IVF 03/15/2018, 5 6/7 weeks. Estimated Date of Delivery: 2018,  presents for confirmation of dates and assessment of viability. This study was done transvaginally.    Measurements     GS = 11.1 mm = 5 6/7 weeks  EGA.        Gestational sac and yolk sac identified. Fetal pole not visualized.     Fetal/Fetal Cardiac Activity: Absent.      Implantation: Intrauterine.     Cervix = 3.7 cm      Maternal structures  - multiple myomas, largest appears intramural-->subserosal at anterior fundus - 2.6 x 3.0 x 3.0cm.    Impression: US today with IUP at 5w6d, consider repeat viability ultrasound in 1-2 weeks given no FP.  Myomatous uterus.    JESUSITA Marti MD MPH

## 2018-04-06 NOTE — PROGRESS NOTES
"Pregnancy Verification Visit    41 yo  at 5+0 by IVF transfer date presents for pregnancy verification and location.   Beta hcg has been rising appropriately. She feels mild bloating and breast enlargement. No nausea.    Patient Active Problem List   Diagnosis     Vitamin D deficiency     Postablative hypothyroidism     Fatigue     Pre-diabetes     Human papilloma virus (HPV) infection     Papanicolaou smear of cervix with low grade squamous intraepithelial lesion (LGSIL)     Family planning     H/O mammogram     Past Medical History:   Diagnosis Date     Anal fissure      History of Graves' disease     Graves     Hypovitaminosis D      Obesity      KHURRAM (obstructive sleep apnea)      Postablative hypothyroidism      Premenstrual dysphoria      Past Surgical History:   Procedure Laterality Date     LEEP TX, CERVICAL      \"for HPV\"       Obstetric History       T0      L0     SAB1   TAB0   Ectopic0   Multiple0   Live Births0       # Outcome Date GA Lbr Arnold/2nd Weight Sex Delivery Anes PTL Lv   2 Current            1 SAB                 Family History   Problem Relation Age of Onset     Obesity Mother      DIABETES Father      Obesity Father      /67  Pulse 64  Ht 1.702 m (5' 7\")  Wt 106.1 kg (234 lb)  Breastfeeding? No  BMI 36.65 kg/m2  Appears well  Nervous, slightly anxious.     Ultrasound shows IUP at %+6 with yolk sac    A: early IUP in setting of AMA and IVF   Still on progesterone in oil injections, vaginal progesterone.   Seeing TIANA next Friday  P: RTC for NOB  Add TSH and T4 to NOB labs.   Schedule for genetic counseling (had pre implantation genetics) after NOB.     Luly Bai      "

## 2018-04-06 NOTE — MR AVS SNAPSHOT
After Visit Summary   4/6/2018    Mary Wray    MRN: [id number]           Patient Information     Date Of Birth          1975        Visit Information        Provider Department      4/6/2018 3:00 PM [facility]        Today's Diagnoses     Pregnancy resulting from in vitro fertilization in first trimester    -  1       Follow-ups after your visit        Who to contact     Please call your clinic at [phone] to:    Ask questions about your health    Make or cancel appointments    Discuss your medicines    Learn about your test results    Speak to your doctor            Additional Information About Your Visit        MyChart Information     [employer] gives you secure access to your electronic health record. If you see a primary care provider, you can also send messages to your care team and make appointments. If you have questions, please call your primary care clinic.  If you do not have a primary care provider, please call [phone] and they will assist you.      [employer] is an electronic gateway that provides easy, online access to your medical records. With [employer], you can request a clinic appointment, read your test results, renew a prescription or communicate with your care team.     To access your existing account, please contact your [facility] or call [phone] for assistance.        Care EveryWhere ID     This is your Care EveryWhere ID. This could be used by other organizations to access your [place] medical records  [id number]         Blood Pressure from Last 3 Encounters:   04/06/18 126/67   12/19/17 125/80   09/07/17 111/74    Weight from Last 3 Encounters:   04/06/18 106.1 kg (234 lb)   12/19/17 109.4 kg (241 lb 3.2 oz)   09/07/17 105.9 kg (233 lb 6.4 oz)               Primary Care Provider Office Phone # Fax #    Jazlyn Huitron [phone]       [address]  [place] [address]        Equal Access to Services     [facility] : Hadii aad ku hadfaviochamp Sandra, wasyldarno wassermanchristineha, diegoblake parksissyadrianna olvera. So [facility] [phone].    ATENCIÓN: Si habla español, tiene a perdue disposición servicios gratuitos de asistencia lingüística. Llame al [phone].    We comply with applicable federal civil rights laws and [place] laws. We do not discriminate on the basis of race, color, national origin, age, disability, sex, sexual orientation, or gender identity.            Thank you!     Thank you for choosing [facility]  for your care. Our goal is always to provide you with excellent care. Hearing back from our patients is one way we can continue to improve our services. Please take a few minutes to complete the written survey that you may receive in the mail after your visit with us. Thank you!             Your Updated Medication List - Protect others around you: Learn how to safely use, store and throw away your medicines at www.disposemymeds.org.          This list is accurate as of 4/6/18  3:42 PM.  Always use your most recent med list.                   Brand Name Dispense Instructions for use Diagnosis    ASPIRIN PO      Take 81 mg by mouth        cholecalciferol 1000 UNITS capsule    vitamin  -D    30 capsule    Take 5 capsules (5,000 Units) by mouth daily        prenatal multivitamin plus iron 27-0.8 MG Tabs per tablet     100 tablet    Take 1 tablet by mouth daily    Encounter for preconception consultation       SYNTHROID 200 MCG tablet   Generic drug:  levothyroxine     90 tablet    Take 1 tablet (200 mcg) by mouth daily    Postablative hypothyroidism

## 2018-04-06 NOTE — LETTER
"2018       RE: Mary Wray  [address]   [facility] [address]     Dear Colleague,    Thank you for referring your patient, Mary Wray, to the [facility] at [facility]. Please see a copy of my visit note below.    Pregnancy Verification Visit    41 yo  at 5+0 by IVF transfer date presents for pregnancy verification and location.   Beta hcg has been rising appropriately. She feels mild bloating and breast enlargement. No nausea.    Patient Active Problem List   Diagnosis     Vitamin D deficiency     Postablative hypothyroidism     Fatigue     Pre-diabetes     Human papilloma virus (HPV) infection     Papanicolaou smear of cervix with low grade squamous intraepithelial lesion (LGSIL)     Family planning     H/O mammogram     Past Medical History:   Diagnosis Date     Anal fissure      History of Graves' disease     Graves     Hypovitaminosis D      Obesity      KHURRAM (obstructive sleep apnea)      Postablative hypothyroidism      Premenstrual dysphoria      Past Surgical History:   Procedure Laterality Date     LEEP TX, CERVICAL      \"for HPV\"       Obstetric History       T0      L0     SAB1   TAB0   Ectopic0   Multiple0   Live Births0       # Outcome Date GA Lbr Arnold/2nd Weight Sex Delivery Anes PTL Lv   2 Current            1 SAB                 Family History   Problem Relation Age of Onset     Obesity Mother      DIABETES Father      Obesity Father      /67  Pulse 64  Ht 1.702 m (5' 7\")  Wt 106.1 kg (234 lb)  Breastfeeding? No  BMI 36.65 kg/m2  Appears well  Nervous, slightly anxious.     Ultrasound shows IUP at %+6 with yolk sac    A: early IUP in setting of AMA and IVF   Still on progesterone in oil injections, vaginal progesterone.   Seeing TIANA next Friday  P: RTC for NOB  Add TSH and T4 to NOB labs.   Schedule for genetic counseling (had pre implantation genetics) after NOB.     Luly Irvin " Richardson

## 2018-04-06 NOTE — MR AVS SNAPSHOT
"              After Visit Summary   4/6/2018    Mary Wray    MRN: [id number]           Patient Information     Date Of Birth          1975        Visit Information        Provider Department      4/6/2018 3:30 PM Luly Bai MD [facility]        Today's Diagnoses     Early stage of pregnancy    -  1       Follow-ups after your visit        Who to contact     Please call your clinic at [phone] to:    Ask questions about your health    Make or cancel appointments    Discuss your medicines    Learn about your test results    Speak to your doctor            Additional Information About Your Visit        MyChart Information     [employer] gives you secure access to your electronic health record. If you see a primary care provider, you can also send messages to your care team and make appointments. If you have questions, please call your primary care clinic.  If you do not have a primary care provider, please call [phone] and they will assist you.      [employer] is an electronic gateway that provides easy, online access to your medical records. With [employer], you can request a clinic appointment, read your test results, renew a prescription or communicate with your care team.     To access your existing account, please contact your [facility] or call [phone] for assistance.        Care EveryWhere ID     This is your Care EveryWhere ID. This could be used by other organizations to access your [facility] medical records  [id number]        Your Vitals Were     Pulse Height Breastfeeding? BMI (Body Mass Index)          64 1.702 m (5' 7\") No 36.65 kg/m2         Blood Pressure from Last 3 Encounters:   04/06/18 126/67   12/19/17 125/80   09/07/17 111/74    Weight from Last 3 Encounters:   04/06/18 106.1 kg (234 lb)   12/19/17 109.4 kg (241 lb 3.2 oz)   09/07/17 105.9 kg (233 lb 6.4 oz)              Today, you had the following     No orders found " for display       Primary Care Provider Office Phone # Fax #    Jazlyn Huitron [phone]       [address] [address]        Equal Access to Services     SUGAR MELISSA : Hadii aad ku hadgrey Sandra, wasylda luqgenoveva, gideon kasissyda maico, adrianna aguirre dorynava snow zbigniew goldstein. So [facility] [phone].    ATENCIÓN: Si habla español, tiene a perdue disposición servicios gratuitos de asistencia lingüística. Swatiame al [phone].    We comply with applicable federal civil rights laws and [place] laws. We do not discriminate on the basis of race, color, national origin, age, disability, sex, sexual orientation, or gender identity.            Thank you!     Thank you for choosing [facility]  for your care. Our goal is always to provide you with excellent care. Hearing back from our patients is one way we can continue to improve our services. Please take a few minutes to complete the written survey that you may receive in the mail after your visit with us. Thank you!             Your Updated Medication List - Protect others around you: Learn how to safely use, store and throw away your medicines at www.disposemymeds.org.          This list is accurate as of 4/6/18  4:41 PM.  Always use your most recent med list.                   Brand Name Dispense Instructions for use Diagnosis    ASPIRIN PO      Take 81 mg by mouth        cholecalciferol 1000 UNITS capsule    vitamin  -D    30 capsule    Take 5 capsules (5,000 Units) by mouth daily        prenatal multivitamin plus iron 27-0.8 MG Tabs per tablet     100 tablet    Take 1 tablet by mouth daily    Encounter for preconception consultation       SYNTHROID 200 MCG tablet   Generic drug:  levothyroxine     90 tablet    Take 1 tablet (200 mcg) by mouth daily    Postablative hypothyroidism

## 2018-04-08 ENCOUNTER — HOSPITAL ENCOUNTER (EMERGENCY)
Facility: CLINIC | Age: 43
Discharge: HOME | End: 2018-04-08
Attending: EMERGENCY MEDICINE | Admitting: EMERGENCY MEDICINE
Payer: COMMERCIAL

## 2018-04-08 ENCOUNTER — APPOINTMENT (OUTPATIENT)
Dept: ULTRASOUND IMAGING | Facility: CLINIC | Age: 43
End: 2018-04-08
Attending: EMERGENCY MEDICINE
Payer: COMMERCIAL

## 2018-04-08 VITALS
DIASTOLIC BLOOD PRESSURE: 71 MMHG | HEART RATE: 97 BPM | WEIGHT: 235.13 LBS | TEMPERATURE: 98.7 F | SYSTOLIC BLOOD PRESSURE: 106 MMHG | RESPIRATION RATE: 20 BRPM | OXYGEN SATURATION: 100 % | BODY MASS INDEX: 36.83 KG/M2

## 2018-04-08 DIAGNOSIS — O20.0 THREATENED MISCARRIAGE: ICD-10-CM

## 2018-04-08 DIAGNOSIS — O20.0 THREATENED ABORTION: ICD-10-CM

## 2018-04-08 LAB
ABO + RH BLD: NORMAL
ABO + RH BLD: NORMAL
B-HCG SERPL-ACNC: ABNORMAL IU/L (ref 0–5)
BASOPHILS # BLD AUTO: 0 10E9/L (ref 0–0.2)
BASOPHILS NFR BLD AUTO: 0.5 %
BLD GP AB SCN SERPL QL: NORMAL
BLOOD BANK CMNT PATIENT-IMP: NORMAL
DIFFERENTIAL METHOD BLD: NORMAL
EOSINOPHIL # BLD AUTO: 0.1 10E9/L (ref 0–0.7)
EOSINOPHIL NFR BLD AUTO: 1.9 %
ERYTHROCYTE [DISTWIDTH] IN BLOOD BY AUTOMATED COUNT: 13.5 % (ref 10–15)
HCT VFR BLD AUTO: 36.6 % (ref 35–47)
HGB BLD-MCNC: 12.3 G/DL (ref 11.7–15.7)
IMM GRANULOCYTES # BLD: 0 10E9/L (ref 0–0.4)
IMM GRANULOCYTES NFR BLD: 0.1 %
LYMPHOCYTES # BLD AUTO: 3 10E9/L (ref 0.8–5.3)
LYMPHOCYTES NFR BLD AUTO: 39.2 %
MCH RBC QN AUTO: 28.6 PG (ref 26.5–33)
MCHC RBC AUTO-ENTMCNC: 33.6 G/DL (ref 31.5–36.5)
MCV RBC AUTO: 85 FL (ref 78–100)
MONOCYTES # BLD AUTO: 0.4 10E9/L (ref 0–1.3)
MONOCYTES NFR BLD AUTO: 4.8 %
NEUTROPHILS # BLD AUTO: 4 10E9/L (ref 1.6–8.3)
NEUTROPHILS NFR BLD AUTO: 53.5 %
PLATELET # BLD AUTO: 251 10E9/L (ref 150–450)
RBC # BLD AUTO: 4.3 10E12/L (ref 3.8–5.2)
SPECIMEN EXP DATE BLD: NORMAL
WBC # BLD AUTO: 7.6 10E9/L (ref 4–11)

## 2018-04-08 PROCEDURE — 99284 EMERGENCY DEPT VISIT MOD MDM: CPT | Mod: 25 | Performed by: EMERGENCY MEDICINE

## 2018-04-08 PROCEDURE — 86850 RBC ANTIBODY SCREEN: CPT | Performed by: EMERGENCY MEDICINE

## 2018-04-08 PROCEDURE — 84702 CHORIONIC GONADOTROPIN TEST: CPT | Performed by: EMERGENCY MEDICINE

## 2018-04-08 PROCEDURE — 85004 AUTOMATED DIFF WBC COUNT: CPT | Performed by: EMERGENCY MEDICINE

## 2018-04-08 PROCEDURE — 76801 OB US < 14 WKS SINGLE FETUS: CPT

## 2018-04-08 PROCEDURE — 85027 COMPLETE CBC AUTOMATED: CPT | Performed by: EMERGENCY MEDICINE

## 2018-04-08 PROCEDURE — 86901 BLOOD TYPING SEROLOGIC RH(D): CPT | Performed by: EMERGENCY MEDICINE

## 2018-04-08 PROCEDURE — 86900 BLOOD TYPING SEROLOGIC ABO: CPT | Performed by: EMERGENCY MEDICINE

## 2018-04-08 PROCEDURE — 99283 EMERGENCY DEPT VISIT LOW MDM: CPT | Mod: Z6 | Performed by: EMERGENCY MEDICINE

## 2018-04-08 ASSESSMENT — ENCOUNTER SYMPTOMS
ABDOMINAL PAIN: 0
DYSURIA: 0
VOMITING: 0
SHORTNESS OF BREATH: 0
HEMATURIA: 0
NAUSEA: 0
DIFFICULTY URINATING: 0
FEVER: 0

## 2018-04-08 NOTE — ED AVS SNAPSHOT
[facility], [place], Emergency Department    [address]    [place] [address]    Phone:  [phone]    Fax:  [phone]                                       Mary Wray   MRN: [id number]    Department:  [facility], Emergency Department   Date of Visit:  4/8/2018           After Visit Summary Signature Page     I have received my discharge instructions, and my questions have been answered. I have discussed any challenges I see with this plan with the nurse or doctor.    ..........................................................................................................................................  Patient/Patient Representative Signature      ..........................................................................................................................................  Patient Representative Print Name and Relationship to Patient    ..................................................               ................................................  Date                                            Time    ..........................................................................................................................................  Reviewed by Signature/Title    ...................................................              ..............................................  Date                                                            Time

## 2018-04-08 NOTE — ED PROVIDER NOTES
"  History     Chief Complaint   Patient presents with     Vaginal Bleeding     pt is an IVF pt is 6 weeks pregnant and started bleeding in the last hour.  Has soaked panti liner soaked.  To see Marybeth horvath resident here.     HPI  Mary Wray is a 42 year old female who states that she is a IVF patient and approximately 6 weeks along.  Patient states that today she started to have some bleeding in her panty liner and states that it was less than a normal period.  Patient states that she had no passage of clots or tissue and is emotionally distraught because of her in vitro process.  Patient states that she is AB+ of her blood type and according to [facility] records the patient's most recent beta hCG was 4600 on 4/4, 4 days ago.  Patient denies any UTI symptoms.  She denies any vomiting, fevers, and presents to see the residency program upon arrival.    Past Medical History:   Diagnosis Date     Anal fissure      History of Graves' disease     Graves     Hypovitaminosis D      Obesity      KHURRAM (obstructive sleep apnea)      Postablative hypothyroidism      Premenstrual dysphoria        Past Surgical History:   Procedure Laterality Date     LEEP TX, CERVICAL  2002    \"for HPV\"       Family History   Problem Relation Age of Onset     Obesity Mother      DIABETES Father      Obesity Father        Social History   Substance Use Topics     Smoking status: Never Smoker     Smokeless tobacco: Never Used     Alcohol use 1.0 oz/week     2 Standard drinks or equivalent per week      Comment: occasional       Previous Medications    ASPIRIN PO    Take 81 mg by mouth    CHOLECALCIFEROL (VITAMIN  -D) 1000 UNITS CAPSULE    Take 5 capsules (5,000 Units) by mouth daily    PRENATAL VIT-FE FUMARATE-FA (PRENATAL MULTIVITAMIN  PLUS IRON) 27-0.8 MG TABS PER TABLET    Take 1 tablet by mouth daily    SYNTHROID 200 MCG TABLET    Take 1 tablet (200 mcg) by mouth daily        No Known Allergies      I have reviewed the Medications, Allergies, Past " Medical and Surgical History, and Social History in the Epic system.    Review of Systems   Constitutional: Negative for fever.   Respiratory: Negative for shortness of breath.    Cardiovascular: Negative for chest pain.   Gastrointestinal: Negative for abdominal pain, nausea and vomiting.   Genitourinary: Positive for vaginal bleeding. Negative for difficulty urinating, dysuria and hematuria.   All other systems reviewed and are negative.      Physical Exam   BP: 140/75  Pulse: 97  Temp: 98.7  F (37.1  C)  Resp: 16  Weight: 106.7 kg (235 lb 2 oz)  SpO2: 99 %      Physical Exam   Constitutional: She is oriented to person, place, and time.   Tearful but alert and conversant   HENT:   Head: Atraumatic.   Eyes: EOM are normal. Pupils are equal, round, and reactive to light.   Neck: Neck supple.   Pulmonary/Chest: No respiratory distress.   Abdominal: Soft. There is no tenderness.   Genitourinary:   Genitourinary Comments: Pelvic exam done by OB/GYN   Neurological: She is alert and oriented to person, place, and time.   Mostly intact and symmetric   Skin: Skin is warm.   Psychiatric: She has a normal mood and affect.       ED Course     ED Course     Procedures          Results for orders placed or performed during the hospital encounter of 18   US OB < 14 Weeks w Transvaginal    Narrative    US OBSTETRIC <14 WEEKS WITH TRANSVAGINAL SINGLE  2018 4:51 PM    HISTORY: Threatened .    TECHNIQUE: Transabdominal and transvaginal imaging were performed.  Transvaginal imaging was performed to better evaluate the uterus and  gestational sac.    COMPARISON:  None.    FINDINGS:      Estimated gestational age by current ultrasound measurement: 6 weeks.  Estimated date of delivery based on this ultrasound: 2018.  Patient reported LMP: Unknown.    Crown-rump length: 0.3 cm.   Embryonic cardiac activity: 102 bpm.   Yolk sac: Present.  Subchorionic hemorrhage: Small to moderate sized area of  subchorionic  hemorrhage measures 2.3 x 2.4 x 1.1 cm.    Right ovary: Not visualized.  Left ovary: Unremarkable.  Adnexal mass: None.  Free pelvic fluid: None.    There are several uterine fibroids noted, with the largest near the  fundus measuring 3.5 cm.      Impression    IMPRESSION:   1. Single live intrauterine pregnancy of estimated gestational age 6  weeks.  2. Small to moderate subchorionic hemorrhage.  3. Several uterine fibroids are noted, with the largest measuring 3.5  cm.     HCG quantitative pregnancy (blood)   Result Value Ref Range    HCG Quantitative Serum 98961 (H) 0 - 5 IU/L   CBC with platelets   Result Value Ref Range    WBC 7.6 4.0 - 11.0 10e9/L    RBC Count 4.30 3.8 - 5.2 10e12/L    Hemoglobin 12.3 11.7 - 15.7 g/dL    Hematocrit 36.6 35.0 - 47.0 %    MCV 85 78 - 100 fl    MCH 28.6 26.5 - 33.0 pg    MCHC 33.6 31.5 - 36.5 g/dL    RDW 13.5 10.0 - 15.0 %    Platelet Count 251 150 - 450 10e9/L   WBC Differential   Result Value Ref Range    Diff Method Automated Method     % Neutrophils 53.5 %    % Lymphocytes 39.2 %    % Monocytes 4.8 %    % Eosinophils 1.9 %    % Basophils 0.5 %    % Immature Granulocytes 0.1 %    Absolute Neutrophil 4.0 1.6 - 8.3 10e9/L    Absolute Lymphocytes 3.0 0.8 - 5.3 10e9/L    Absolute Monocytes 0.4 0.0 - 1.3 10e9/L    Absolute Eosinophils 0.1 0.0 - 0.7 10e9/L    Absolute Basophils 0.0 0.0 - 0.2 10e9/L    Abs Immature Granulocytes 0.0 0 - 0.4 10e9/L   ABO/Rh type and screen   Result Value Ref Range    ABO AB     RH(D) Pos     Antibody Screen Neg     Test Valid Only At          [facility]    Specimen Expires 04/11/2018        Labs Ordered and Resulted from Time of ED Arrival Up to the Time of Departure from the ED   HCG QUANTITATIVE PREGNANCY - Abnormal; Notable for the following:        Result Value    HCG Quantitative Serum 87664 (*)     All other components within normal limits   CBC WITH PLATELETS   DIFFERENTIAL   PERIPHERAL  IV CATHETER   ABO/RH TYPE AND SCREEN       Assessments & Plan (with Medical Decision Making)     I have reviewed the nursing notes.    Patient's ultrasound revealed some subchorionic hemorrhage but her beta hCG was within normal limits.  Fetal cardiac heart rate was slightly on the low side and at this time it is unclear whether or not the patient will go on to miscarry or not.  Case was discussed with the OB/GYN staff and at this time the patient will be sent home with usual precautions.  She states she has an appointment with her infertility specialist in 4 days.    I have reviewed the findings, diagnosis, plan and need for follow up with the patient.    Final diagnoses:   Threatened miscarriage     Please follow up with your OB/GYN clinic as scheduled later this week.    Return to the ER for worsening.    Routine discharge instructions were given for this diagnosis    Michael Franco MD    4/8/2018   [facility], EMERGENCY DEPARTMENT     Michael Franco MD  04/08/18 2288

## 2018-04-08 NOTE — CONSULTS
"OB GYN CONSULT    CC: Vaginal bleeding, IUP at 6w1d    HPI: Mary Wray is a 42 year old  at 6w1d by EDT presenting with vagina bleeding. She is upset and worried, as she has had an AB before and this is a strongly desired pregnancy. No cramping at this time. No clots, started bleeding about an hour ago and has passed bright red blood onto 1 panty liner, not soaking through. Denies sx otherwise, denies fevers, chills, SOB, CP, diarrhea. Denies dysuria or abnormal vaginal discharge. Using the progesterone suppositories. Feels well but worried.    10 point ROS negative unless noted in HPI.    Obstetric History       T0      L0     SAB1   TAB0   Ectopic0   Multiple0   Live Births0       # Outcome Date GA Lbr Arnold/2nd Weight Sex Delivery Anes PTL Lv   2 Current            1 SAB                 Past Medical History:   Diagnosis Date     Anal fissure      History of Graves' disease     Graves     Hypovitaminosis D      Obesity      KHURRAM (obstructive sleep apnea)      Postablative hypothyroidism      Premenstrual dysphoria      Past Surgical History:   Procedure Laterality Date     LEEP TX, CERVICAL      \"for HPV\"       Current Facility-Administered Medications:      sodium chloride (PF) 0.9% PF flush 3 mL, 3 mL, Intracatheter, Q1H PRN, Michael Franco MD     sodium chloride (PF) 0.9% PF flush 3 mL, 3 mL, Intracatheter, Q8H, Michael Franco MD    Current Outpatient Prescriptions:      ASPIRIN PO, Take 81 mg by mouth, Disp: , Rfl:      SYNTHROID 200 MCG tablet, Take 1 tablet (200 mcg) by mouth daily, Disp: 90 tablet, Rfl: 3     cholecalciferol (VITAMIN  -D) 1000 UNITS capsule, Take 5 capsules (5,000 Units) by mouth daily, Disp: 30 capsule, Rfl:      Prenatal Vit-Fe Fumarate-FA (PRENATAL MULTIVITAMIN  PLUS IRON) 27-0.8 MG TABS per tablet, Take 1 tablet by mouth daily, Disp: 100 tablet, Rfl: 3    Family History   Problem Relation Age of Onset     Obesity Mother      DIABETES Father  "     Obesity Father      Social History   Substance Use Topics     Smoking status: Never Smoker     Smokeless tobacco: Never Used     Alcohol use 1.0 oz/week     2 Standard drinks or equivalent per week      Comment: occasional   No etoh use in pregnancy     No Known Allergies    Vitals:    18 1502 18 1731   BP: 140/75 106/71   Pulse: 97    Resp: 16 20   Temp: 98.7  F (37.1  C)    TempSrc: Oral    SpO2: 99% 100%   Weight: 106.7 kg (235 lb 2 oz)      Gen: Crying, in NAD otherwise  CV: RR  Pulm: No wheezing, no increased WOB on RA  Abd: Nontender, no guarding, nondistended, nonpalpable fundus  Pelvic: Normal external genitalia, normal appearing vagina with suppository coating vaginal walls, no abnormal vaginal discharge. Cervix is visually closed. No tissue or clot appreciated. No new bleeding, small amt of old blood present.   Ext: Nontender, no edema    Results for orders placed or performed during the hospital encounter of 18   US OB < 14 Weeks w Transvaginal    Narrative    US OBSTETRIC <14 WEEKS WITH TRANSVAGINAL SINGLE  2018 4:51 PM    HISTORY: Threatened .    TECHNIQUE: Transabdominal and transvaginal imaging were performed.  Transvaginal imaging was performed to better evaluate the uterus and  gestational sac.    COMPARISON:  None.    FINDINGS:      Estimated gestational age by current ultrasound measurement: 6 weeks.  Estimated date of delivery based on this ultrasound: 2018.  Patient reported LMP: Unknown.    Crown-rump length: 0.3 cm.   Embryonic cardiac activity: 102 bpm.   Yolk sac: Present.  Subchorionic hemorrhage: Small to moderate sized area of subchorionic  hemorrhage measures 2.3 x 2.4 x 1.1 cm.    Right ovary: Not visualized.  Left ovary: Unremarkable.  Adnexal mass: None.  Free pelvic fluid: None.    There are several uterine fibroids noted, with the largest near the  fundus measuring 3.5 cm.      Impression    IMPRESSION:   1. Single live intrauterine pregnancy  of estimated gestational age 6  weeks.  2. Small to moderate subchorionic hemorrhage.  3. Several uterine fibroids are noted, with the largest measuring 3.5  cm.    JAZMYN WATERS MD     HCG Quantitative Serum   Date Value Ref Range Status   2018 02811 (H) 0 - 5 IU/L Final    HCG 4600    Hemoglobin   Date Value Ref Range Status   2018 12.3 11.7 - 15.7 g/dL Final     Lab Results   Component Value Date    ABO AB 2018    RH Pos 2018       Assessment and Recommendations:  Mary Wray is a 42 year old  at 6w1d presenting with threatened . Vaginal bleeding is scant, cervix closed. FHR confirmed on US with uptrending BHCG. Multiple factors are predictive of SAB, including  (less than 120 has been shown to be predictive of SAB after 6w3d), presence of subchorionic hematoma, and BHCG rate of increase is slightly lower than average. Recommend discharge today after reviewing miscarriage precautions with indications for return to care. No rhogam indicated, Rh positive. This is a strongly desired pregnancy, and we will arrange close clinical follow up and support for Ms. Wray in S clinic this week.    Marybeth Hicks MD  OB GYN PGY-3  2018  7:19 PM    The patient was discussed with Dr. Saab who is in agreement with the treatment plan.    I examined Mary Wray on 2018 with Dr. Hicks and agree with the presentation, exam and plan of care documented in this note with edits by me.   Lauren Saab MD MPH

## 2018-04-08 NOTE — ED AVS SNAPSHOT
[facility], Emergency Department    [address]    [place] [address]    Phone:  [phone]    Fax:  [phone]                                       Mary Wray   MRN: [id number]    Department:  [facility], Emergency Department   Date of Visit:  2018           Patient Information     Date Of Birth          1975        Your diagnoses for this visit were:     Threatened      Threatened miscarriage        You were seen by Michael Franco MD.        Discharge Instructions       Please follow up with your OB/GYN clinic as scheduled later this week.    Return to the ER for worsening.    Discharge References/Attachments     POSSIBLE MISCARRIAGE (THREATENED ) (ENGLISH)      24 Hour Appointment Hotline       To make an appointment at any [facility] clinic, call [phone] ([phone]). If you don't have a family doctor or clinic, we will help you find one. [facility] clinics are conveniently located to serve the needs of you and your family.             Review of your medicines      Our records show that you are taking the medicines listed below. If these are incorrect, please call your family doctor or clinic.        Dose / Directions Last dose taken    ASPIRIN PO   Dose:  81 mg        Take 81 mg by mouth   Refills:  0        cholecalciferol 1000 UNITS capsule   Commonly known as:  vitamin  -D   Dose:  5 capsule   Quantity:  30 capsule        Take 5 capsules (5,000 Units) by mouth daily   Refills:  0        prenatal multivitamin plus iron 27-0.8 MG Tabs per tablet   Dose:  1 tablet   Quantity:  100 tablet        Take 1 tablet by mouth daily   Refills:  3        SYNTHROID 200 MCG tablet   Dose:  200 mcg   Quantity:  90 tablet   Generic drug:  levothyroxine        Take 1 tablet (200 mcg) by mouth daily   Refills:  3                Procedures and tests performed during your visit     ABO/Rh type and screen    CBC with platelets    HCG quantitative pregnancy (blood)     Peripheral IV catheter    US OB < 14 Weeks w Transvaginal    WBC Differential      Orders Needing Specimen Collection     None      Pending Results     Date and Time Order Name Status Description    4/8/2018 1540 US OB < 14 Weeks w Transvaginal Preliminary             Pending Culture Results     No orders found from 4/6/2018 to 4/9/2018.            Pending Results Instructions     If you had any lab results that were not finalized at the time of your Discharge, you can call the ED Lab Result RN at [phone]. You will be contacted by this team for any positive Lab results or changes in treatment. The nurses are available 7 days a week from 10A to 6:30P.  You can leave a message 24 hours per day and they will return your call.        Thank you for choosing [facility]       Thank you for choosing [facility] for your care. Our goal is always to provide you with excellent care. Hearing back from our patients is one way we can continue to improve our services. Please take a few minutes to complete the written survey that you may receive in the mail after you visit with us. Thank you!        [facility] Information     [email or web address] gives you secure access to your electronic health record. If you see a primary care provider, you can also send messages to your care team and make appointments. If you have questions, please call your primary care clinic.  If you do not have a primary care provider, please call [phone] and they will assist you.        Care EveryWhere ID     This is your Care EveryWhere ID. This could be used by other organizations to access your [facility] medical records  [id number]        Equal Access to Services     SUGAR MELISSA : Hadii aster Sandra, waaxda luqadaha, qaybta kaalmada adenavayada, adrianna aguirre adenava goldstein. So [facility] [phone].    ATENCIÓN: Si habla español, tiene a perdue disposición servicios gratuitos de asistencia lingüística. Llame al [phone].    We comply with  applicable federal civil rights laws and [place] laws. We do not discriminate on the basis of race, color, national origin, age, disability, sex, sexual orientation, or gender identity.            After Visit Summary       This is your record. Keep this with you and show to your community pharmacist(s) and doctor(s) at your next visit.

## 2018-04-08 NOTE — DISCHARGE INSTRUCTIONS
Please follow up with your OB/GYN clinic as scheduled later this week.    Return to the ER for worsening.

## 2018-04-09 ENCOUNTER — TELEPHONE (OUTPATIENT)
Dept: OBGYN | Facility: CLINIC | Age: 43
End: 2018-04-09

## 2018-04-09 DIAGNOSIS — O09.521 ELDERLY MULTIGRAVIDA IN FIRST TRIMESTER: Primary | ICD-10-CM

## 2018-04-10 ENCOUNTER — TELEPHONE (OUTPATIENT)
Dept: OBGYN | Facility: CLINIC | Age: 43
End: 2018-04-10

## 2018-04-10 NOTE — TELEPHONE ENCOUNTER
----- Message from Lauren Estrada MD sent at 4/8/2018  4:58 PM CDT -----  Regarding: schedule follow up  Pt seen on Sunday with vaginal bleeding, wasn't too much but u/s with FHR that was somewhat low.. Can someone call on Monday and arrange f/u appt this week?  Thanks!  c

## 2018-04-10 NOTE — TELEPHONE ENCOUNTER
Mary was seen in ED this past weekend and Dr Saab would like her to have another US done this week. Left vm for Mary to call back and discuss with nurse.  There is availability Thursday 4/12/18.    Mary returned my call,she is getting an US done this Thursday at her IVF Clinic, [facility],so will keep her appts as scheduled for 4/26/18.Pt indicated understanding and agreed with plan.

## 2018-04-12 LAB — TSH SERPL-ACNC: 0.25 UIU/ML (ref 0.27–4.2)

## 2018-04-14 ENCOUNTER — HOSPITAL ENCOUNTER (EMERGENCY)
Facility: CLINIC | Age: 43
Discharge: HOME | End: 2018-04-14
Attending: FAMILY MEDICINE | Admitting: FAMILY MEDICINE
Payer: COMMERCIAL

## 2018-04-14 ENCOUNTER — APPOINTMENT (OUTPATIENT)
Dept: ULTRASOUND IMAGING | Facility: CLINIC | Age: 43
End: 2018-04-14
Attending: FAMILY MEDICINE
Payer: COMMERCIAL

## 2018-04-14 VITALS
RESPIRATION RATE: 16 BRPM | TEMPERATURE: 98.1 F | HEART RATE: 66 BPM | OXYGEN SATURATION: 100 % | SYSTOLIC BLOOD PRESSURE: 109 MMHG | DIASTOLIC BLOOD PRESSURE: 56 MMHG

## 2018-04-14 DIAGNOSIS — O20.9 BLEEDING IN EARLY PREGNANCY: ICD-10-CM

## 2018-04-14 LAB
B-HCG SERPL-ACNC: 4731 IU/L (ref 0–5)
BASOPHILS # BLD AUTO: 0.1 10E9/L (ref 0–0.2)
BASOPHILS NFR BLD AUTO: 0.6 %
DIFFERENTIAL METHOD BLD: NORMAL
EOSINOPHIL # BLD AUTO: 0.1 10E9/L (ref 0–0.7)
EOSINOPHIL NFR BLD AUTO: 1.5 %
ERYTHROCYTE [DISTWIDTH] IN BLOOD BY AUTOMATED COUNT: 13.3 % (ref 10–15)
HCT VFR BLD AUTO: 40.4 % (ref 35–47)
HGB BLD-MCNC: 13.7 G/DL (ref 11.7–15.7)
IMM GRANULOCYTES # BLD: 0 10E9/L (ref 0–0.4)
IMM GRANULOCYTES NFR BLD: 0.2 %
LYMPHOCYTES # BLD AUTO: 3.4 10E9/L (ref 0.8–5.3)
LYMPHOCYTES NFR BLD AUTO: 37 %
MCH RBC QN AUTO: 28.7 PG (ref 26.5–33)
MCHC RBC AUTO-ENTMCNC: 33.9 G/DL (ref 31.5–36.5)
MCV RBC AUTO: 85 FL (ref 78–100)
MONOCYTES # BLD AUTO: 0.5 10E9/L (ref 0–1.3)
MONOCYTES NFR BLD AUTO: 5.3 %
NEUTROPHILS # BLD AUTO: 5 10E9/L (ref 1.6–8.3)
NEUTROPHILS NFR BLD AUTO: 55.4 %
NRBC # BLD AUTO: 0 10*3/UL
NRBC BLD AUTO-RTO: 0 /100
PLATELET # BLD AUTO: 264 10E9/L (ref 150–450)
RBC # BLD AUTO: 4.78 10E12/L (ref 3.8–5.2)
SPECIMEN SOURCE: NORMAL
WBC # BLD AUTO: 9.1 10E9/L (ref 4–11)
WET PREP SPEC: NORMAL

## 2018-04-14 PROCEDURE — 85025 COMPLETE CBC W/AUTO DIFF WBC: CPT | Performed by: FAMILY MEDICINE

## 2018-04-14 PROCEDURE — 87491 CHLMYD TRACH DNA AMP PROBE: CPT | Performed by: FAMILY MEDICINE

## 2018-04-14 PROCEDURE — 87210 SMEAR WET MOUNT SALINE/INK: CPT | Performed by: FAMILY MEDICINE

## 2018-04-14 PROCEDURE — 76817 TRANSVAGINAL US OBSTETRIC: CPT

## 2018-04-14 PROCEDURE — 99283 EMERGENCY DEPT VISIT LOW MDM: CPT | Mod: Z6 | Performed by: FAMILY MEDICINE

## 2018-04-14 PROCEDURE — 84702 CHORIONIC GONADOTROPIN TEST: CPT | Performed by: FAMILY MEDICINE

## 2018-04-14 PROCEDURE — 99284 EMERGENCY DEPT VISIT MOD MDM: CPT | Mod: 25 | Performed by: FAMILY MEDICINE

## 2018-04-14 PROCEDURE — 87591 N.GONORRHOEAE DNA AMP PROB: CPT | Performed by: FAMILY MEDICINE

## 2018-04-14 NOTE — ED AVS SNAPSHOT
[facility], [place], Emergency Department    [address]    [place] [address]    Phone:  [phone]    Fax:  [phone]                                       Mary Wray   MRN: [id number]    Department:  [facility], Emergency Department   Date of Visit:  4/14/2018           After Visit Summary Signature Page     I have received my discharge instructions, and my questions have been answered. I have discussed any challenges I see with this plan with the nurse or doctor.    ..........................................................................................................................................  Patient/Patient Representative Signature      ..........................................................................................................................................  Patient Representative Print Name and Relationship to Patient    ..................................................               ................................................  Date                                            Time    ..........................................................................................................................................  Reviewed by Signature/Title    ...................................................              ..............................................  Date                                                            Time

## 2018-04-14 NOTE — ED PROVIDER NOTES
History     Chief Complaint   Patient presents with     Vaginal Bleeding     Onset one hour ago with vaginal bleeding, 7 weeks pregnant, IVF pt, denies pain, here on Tursday for bleeding, diagnosed with subchorionic hemorrhage and started on erythromycin.     BECKY Wray is a 42 year old female who  2 para 0010 at approximately 7-1/2 weeks gestation.  Patient presents for vaginal bleeding with light cramping.  She was seen in the emergency department a week ago with similar symptoms, is known to be Rh type positive, at that time a small subchorionic hemorrhage was seen on ultrasound.  She had no bleeding followed up in the clinic this week, but now presents today when she developed vaginal bleeding after sexual intercourse (oral sex).  She denies any other complaints.  Past Medical History:   Diagnosis Date     Anal fissure      History of Graves' disease     Graves     Hypovitaminosis D      Obesity      KHURRAM (obstructive sleep apnea)      Postablative hypothyroidism      Premenstrual dysphoria          I have reviewed the Medications, Allergies, Past Medical and Surgical History, and Social History in the Epic system.    Review of Systems  All other systems were reviewed and are negative    Physical Exam   BP: 121/75  Pulse: 66  Heart Rate: 66  Temp: 98.1  F (36.7  C)  Resp: 16  SpO2: 99 %      Physical Exam   Constitutional: She is oriented to person, place, and time. She appears well-developed and well-nourished. She appears distressed (She does anxious and tearful).   HENT:   Head: Normocephalic and atraumatic.   Mouth/Throat: Oropharynx is clear and moist.   Eyes: EOM are normal. Pupils are equal, round, and reactive to light.   Neck: Normal range of motion. Neck supple. No tracheal deviation present. No thyromegaly present.   Cardiovascular: Normal rate, regular rhythm, normal heart sounds and intact distal pulses.  Exam reveals no gallop and no friction rub.    No murmur  heard.  Pulmonary/Chest: Effort normal and breath sounds normal. She exhibits no tenderness.   Abdominal: Soft. Bowel sounds are normal. She exhibits no distension and no mass. There is no tenderness.   Genitourinary: Cervix exhibits no motion tenderness, no discharge and no friability. There is bleeding (There is dark blood in the vaginal vault and at the cervical loss.  There is no active bleeding.  There is no bright red blood.) in the vagina.   Genitourinary Comments: Cervix is closed to direct visualization and digital palpation   Musculoskeletal: She exhibits no edema or tenderness.   Neurological: She is alert and oriented to person, place, and time. No cranial nerve deficit. Coordination normal.   Skin: Skin is warm and dry. No rash noted.   Psychiatric: She has a normal mood and affect. Her behavior is normal.   Nursing note and vitals reviewed.      ED Course     ED Course     Procedures             Critical Care time:  none             Labs Ordered and Resulted from Time of ED Arrival Up to the Time of Departure from the ED   HCG QUANTITATIVE PREGNANCY - Abnormal; Notable for the following:        Result Value    HCG Quantitative Serum 4731 (*)     All other components within normal limits   CBC WITH PLATELETS DIFFERENTIAL   PREP FOR PROCEDURE   WET PREP   CHLAMYDIA TRACHOMATIS PCR   NEISSERIA GONORRHOEAE PCR            Assessments & Plan (with Medical Decision Making)   Patient of the first trimester of pregnancy presenting with vaginal bleeding and cramping.  Differential diagnosis includes spontaneous AB, ectopic pregnancy, subchorionic hemorrhage, ovarian cyst or torsion, trauma.  On exam vitals are normal, she is anxious but in no distress.  Soft and nontender abdomen.  There is some old blood in the vaginal vault, but no active bleeding, and the cervix is closed.  Patient is Rh type positive.  Patient's hemoglobin is stable, but her beta hCG is 4731 which is diminished since April 8, 2018 check.   This reason an ultrasound was obtained which shows a single live IUP at 7 weeks with a 3.1 cm subchorionic hemorrhage and a 3.7 cm fundal fibroid but otherwise unremarkable.  Due to the discrepancy between the ultrasound and dropping beta hCG I did discuss the case with OB/GYN, but they are reassured by the live pregnancy seen on ultrasound.  The patient was advised that at this time she appears to have a viable pregnancy and that her bleeding is likely due to subchorionic hemorrhage.  She will need to follow-up with her OB/GYN this week for reassessment and further information.  She appears stable for discharge at this time  .  I have reviewed the nursing notes.    I have reviewed the findings, diagnosis, plan and need for follow up with the patient.    New Prescriptions    No medications on file       Final diagnoses:   Bleeding in early pregnancy       4/14/2018   [facility], [place], EMERGENCY DEPARTMENT     Hakan Bhakta MD  04/14/18 8868

## 2018-04-14 NOTE — ED AVS SNAPSHOT
[facility], Emergency Department    [address]    [place] [address]    Phone:  [phone]    Fax:  [phone]                                       Mary Wray   MRN: [id number]    Department:  [facility], Emergency Department   Date of Visit:  4/14/2018           Patient Information     Date Of Birth          1975        Your diagnoses for this visit were:     Bleeding in early pregnancy        You were seen by Hakan Bhakta MD.        Discharge Instructions       Thank you for choosing [facility].     Please closely monitor for further symptoms. Return to the Emergency Department if you develop any new or worsening signs or symptoms.    If you received any opiate pain medications or sedatives during your visit, please do not drive for at least 8 hours.     Labs, cultures or final xray interpretations may still need to be reviewed.  We will call you if your plan of care needs to be changed.    Please follow up with your OB-GYN this week 2-3 days.      Bleeding During Early Pregnancy     Ultrasound can help check the health of your fetus.     If you ve had bleeding early in your pregnancy, you re not alone. Many other pregnant women have had early bleeding, too. And in most cases, nothing is wrong. But your healthcare provider still needs to know about it. He or she may want to do tests to find out why you re bleeding. Call your healthcare provider if you notice bleeding during pregnancy.  What causes early bleeding?  The cause of bleeding early in pregnancy is often unknown. But many factors early on in pregnancy may lead to bleeding or spotting. These include sexual intercourse, which may cause bleeding in any trimester. Here are some other causes:    Implantation of the embryo on the uterine wall    Subchorionic hemorrhage (bleeding between the sac membrane and the uterus)    Miscarriage    Ectopic (tubal) pregnancy  If you notice spotting  Spotting (very  light bleeding) is the most common type of bleeding in early pregnancy. If you notice it, call your healthcare provider. Chances are, he or she will tell you that you can care for yourself at home.  If tests are needed  Depending on how much you bleed, your healthcare provider may ask you to come in for some tests. A pelvic exam, for instance, can help see how far along your pregnancy is. You also may have an ultrasound or a Doppler test. These imaging tests use sound waves to check the health of your fetus. The ultrasound may be done on your belly or inside your vagina. Your healthcare provider also may order a special blood test. This test compares your hormone levels in blood samples taken 2 days apart. The results can help your healthcare provider learn more about the implantation of the embryo. Your blood type will also need to be checked to evaluate whether you will need to be treated for Rh sensitization.   Warning signs  If your bleeding doesn t stop or if you notice any of the following, seek medical help right away:    Soaking a sanitary pad each hour    Bleeding like you re having a period    Cramping or severe belly pain    Feeling dizzy or faint    Tissue passing through your vagina    Bleeding at any time after the first trimester  Questions you may be asked  Though not normal, bleeding early in pregnancy is common. If you ve noticed any bleeding, you may be concerned. But keep in mind that bleeding alone doesn t mean something is wrong. Call your healthcare provider right away, though. He or she may ask you questions like these to help find the cause of your bleeding:    When did your bleeding start?    Is your bleeding very light (spotting) or is it like a period?    Is the blood bright red or brownish?    Have you had sexual intercourse recently?    Have you had pain or cramping?    Have you felt dizzy or faint?  Monitoring your pregnancy  Bleeding will often stop as quickly as it began. Your  "pregnancy may go on a normal path again. You may need to make a few extra prenatal visits. But you and your baby will most likely be fine.  Date Last Reviewed: 6/1/2016 2000-2017 [employer]. [address] [place] [address], [place] [address]. All rights reserved. This information is not intended as a substitute for professional medical care. Always follow your healthcare professional's instructions.          Your next 10 appointments already scheduled     Apr 26, 2018  9:00 AM CDT   [facility]   [facility] ([facility])    [facility] [address]  [address]  [facility] [address]   [phone]            Apr 26, 2018  9:45 AM CDT   OB INTAKE with COLE Ward CNM   [facility] ([facility])    [facility] [address]  [address]  [facility] [address]   [phone]           Congratulations! You're Pregnant.  At [facility] we think of you as part of our team, working together toward a successful pregnancy and a healthy baby.  You might already have questions about all the different things that are happening to your body.  We have attached a link to our book \"The Expectant Family- From Pregnancy through Childbirth\" [email or web address] to help answer some of those concerns. (You will be given a hard copy at your first visit in clinic)  Chapter 2 (page 20) is a must read- from questions about morning sickness, headaches, warning signs to look for, to:\"why do I have to go to the bathroom so often?\"   Our Doctors, Resident Physicians, Certified nurse midwives do work closely together as a team both in clinic and in the hospital to make this experience remarkable. Our patients have on numerous occasions expressed their satisfaction in knowing that there is always a provider at the hospital or on the phone no matter what " time of the day it is, to talk, triage or deliver their babies.  Your time is very important to us, so we will like you to know what to expect.  Routine Prenatal Visit Schedule:  Visit 1: 8 Weeks - Dating Ultrasound and Intake with Nurse  Visit 2: 10-12 Weeks- First Prenatal Visit & Exam with Midwife or Resident Physician, scheduling for early genetic screening at [facility] (optional)  Visit 3: 16-18 Weeks 18-20 Weeks- Level II Ultrasound done at [facility]- 4th Floor  Visit 4: 22-24 weeks  Visit 5: 28 Weeks- Extended Education Visit with Midwife or Resident Physician  Visit 6: 32 Weeks  Visit 7: 36 Weeks  Visit 8-11: 38-41 Weeks (Weekly Visits)  Changes can or may occur during your pregnancy. Your provider may ask you to come in more often for testing or monitoring frequently than stated above.  If you still have questions our triage nurses will be more than welcome to help you. Send us a message via MY Chart, our secure health Portal or give us a call anytime at [phone].  Thank You for allowing us to be part of your care.  Yours sincerely,  [facility]              24 Hour Appointment Hotline       To make an appointment at any [facility], call [phone] ([phone]). If you don't have a family doctor or clinic, we will help you find one. [facility] clinics are conveniently located to serve the needs of you and your family.             Review of your medicines      Our records show that you are taking the medicines listed below. If these are incorrect, please call your family doctor or clinic.        Dose / Directions Last dose taken    ASPIRIN PO   Dose:  81 mg        Take 81 mg by mouth   Refills:  0        cholecalciferol 1000 units capsule   Commonly known as:  vitamin  -D   Dose:  5 capsule   Quantity:  30 capsule        Take 5 capsules (5,000 Units) by mouth daily   Refills:  0        prenatal multivitamin plus iron 27-0.8 MG Tabs per tablet   Dose:  1 tablet    Quantity:  100 tablet        Take 1 tablet by mouth daily   Refills:  3        SYNTHROID 200 MCG tablet   Dose:  200 mcg   Quantity:  90 tablet   Generic drug:  levothyroxine        Take 1 tablet (200 mcg) by mouth daily   Refills:  3                Procedures and tests performed during your visit     CBC with platelets differential    Chlamydia trachomatis PCR    HCG quantitative pregnancy    Neisseria gonorrhoea PCR    Prep for procedure - pelvic exam    US OB < 14 Weeks w Transvaginal    Wet prep      Orders Needing Specimen Collection     None      Pending Results     Date and Time Order Name Status Description    4/14/2018 1755 Neisseria gonorrhoea PCR In process     4/14/2018 1755 Chlamydia trachomatis PCR In process             Pending Culture Results     Date and Time Order Name Status Description    4/14/2018 1755 Neisseria gonorrhoea PCR In process     4/14/2018 1755 Chlamydia trachomatis PCR In process             Pending Results Instructions     If you had any lab results that were not finalized at the time of your Discharge, you can call the ED Lab Result RN at [phone]. You will be contacted by this team for any positive Lab results or changes in treatment. The nurses are available 7 days a week from 10A to 6:30P.  You can leave a message 24 hours per day and they will return your call.        Thank you for choosing [facility]       Thank you for choosing [facility] for your care. Our goal is always to provide you with excellent care. Hearing back from our patients is one way we can continue to improve our services. Please take a few minutes to complete the written survey that you may receive in the mail after you visit with us. Thank you!        [employer] Information     [employer] gives you secure access to your electronic health record. If you see a primary care provider, you can also send messages to your care team and make appointments. If you have questions, please call your primary care clinic.  If  you do not have a primary care provider, please call [phone] and they will assist you.        Care EveryWhere ID     This is your Care EveryWhere ID. This could be used by other organizations to access your [facility] medical records  [id number]        Equal Access to Services     SUGAR MELISSA : Martha Sandra, lesley saxena, gideon kaallaure nina, adrianna goldstein. So [facility] [phone].    ATENCIÓN: Si habla español, tiene a perdue disposición servicios gratuitos de asistencia lingüística. Llame al [phone].    We comply with applicable federal civil rights laws and [place] laws. We do not discriminate on the basis of race, color, national origin, age, disability, sex, sexual orientation, or gender identity.            After Visit Summary       This is your record. Keep this with you and show to your community pharmacist(s) and doctor(s) at your next visit.

## 2018-04-15 LAB
C TRACH DNA SPEC QL NAA+PROBE: NEGATIVE
N GONORRHOEA DNA SPEC QL NAA+PROBE: NEGATIVE
SPECIMEN SOURCE: NORMAL
SPECIMEN SOURCE: NORMAL

## 2018-04-15 NOTE — DISCHARGE INSTRUCTIONS
Thank you for choosing [facility].     Please closely monitor for further symptoms. Return to the Emergency Department if you develop any new or worsening signs or symptoms.    If you received any opiate pain medications or sedatives during your visit, please do not drive for at least 8 hours.     Labs, cultures or final xray interpretations may still need to be reviewed.  We will call you if your plan of care needs to be changed.    Please follow up with your OB-GYN this week 2-3 days.      Bleeding During Early Pregnancy     Ultrasound can help check the health of your fetus.     If you ve had bleeding early in your pregnancy, you re not alone. Many other pregnant women have had early bleeding, too. And in most cases, nothing is wrong. But your healthcare provider still needs to know about it. He or she may want to do tests to find out why you re bleeding. Call your healthcare provider if you notice bleeding during pregnancy.  What causes early bleeding?  The cause of bleeding early in pregnancy is often unknown. But many factors early on in pregnancy may lead to bleeding or spotting. These include sexual intercourse, which may cause bleeding in any trimester. Here are some other causes:    Implantation of the embryo on the uterine wall    Subchorionic hemorrhage (bleeding between the sac membrane and the uterus)    Miscarriage    Ectopic (tubal) pregnancy  If you notice spotting  Spotting (very light bleeding) is the most common type of bleeding in early pregnancy. If you notice it, call your healthcare provider. Chances are, he or she will tell you that you can care for yourself at home.  If tests are needed  Depending on how much you bleed, your healthcare provider may ask you to come in for some tests. A pelvic exam, for instance, can help see how far along your pregnancy is. You also may have an ultrasound or a Doppler test. These imaging tests use sound waves to check the health  of your fetus. The ultrasound may be done on your belly or inside your vagina. Your healthcare provider also may order a special blood test. This test compares your hormone levels in blood samples taken 2 days apart. The results can help your healthcare provider learn more about the implantation of the embryo. Your blood type will also need to be checked to evaluate whether you will need to be treated for Rh sensitization.   Warning signs  If your bleeding doesn t stop or if you notice any of the following, seek medical help right away:    Soaking a sanitary pad each hour    Bleeding like you re having a period    Cramping or severe belly pain    Feeling dizzy or faint    Tissue passing through your vagina    Bleeding at any time after the first trimester  Questions you may be asked  Though not normal, bleeding early in pregnancy is common. If you ve noticed any bleeding, you may be concerned. But keep in mind that bleeding alone doesn t mean something is wrong. Call your healthcare provider right away, though. He or she may ask you questions like these to help find the cause of your bleeding:    When did your bleeding start?    Is your bleeding very light (spotting) or is it like a period?    Is the blood bright red or brownish?    Have you had sexual intercourse recently?    Have you had pain or cramping?    Have you felt dizzy or faint?  Monitoring your pregnancy  Bleeding will often stop as quickly as it began. Your pregnancy may go on a normal path again. You may need to make a few extra prenatal visits. But you and your baby will most likely be fine.  Date Last Reviewed: 6/1/2016 2000-2017 [employer]. [address] [place] [address], [place] [address]. All rights reserved. This information is not intended as a substitute for professional medical care. Always follow your healthcare professional's instructions.

## 2018-04-23 ENCOUNTER — TRANSFERRED RECORDS (OUTPATIENT)
Dept: HEALTH INFORMATION MANAGEMENT | Facility: CLINIC | Age: 43
End: 2018-04-23

## 2018-04-26 ENCOUNTER — OFFICE VISIT (OUTPATIENT)
Dept: OBGYN | Facility: CLINIC | Age: 43
End: 2018-04-26
Attending: ADVANCED PRACTICE MIDWIFE
Payer: COMMERCIAL

## 2018-04-26 VITALS
HEIGHT: 67 IN | SYSTOLIC BLOOD PRESSURE: 114 MMHG | DIASTOLIC BLOOD PRESSURE: 78 MMHG | WEIGHT: 232.8 LBS | HEART RATE: 60 BPM | BODY MASS INDEX: 36.54 KG/M2

## 2018-04-26 DIAGNOSIS — O09.521 ELDERLY MULTIGRAVIDA IN FIRST TRIMESTER: ICD-10-CM

## 2018-04-26 DIAGNOSIS — E55.9 VITAMIN D DEFICIENCY: ICD-10-CM

## 2018-04-26 DIAGNOSIS — E89.0 POSTABLATIVE HYPOTHYROIDISM: ICD-10-CM

## 2018-04-26 DIAGNOSIS — O09.91 HIGH-RISK PREGNANCY IN FIRST TRIMESTER: Primary | ICD-10-CM

## 2018-04-26 DIAGNOSIS — O20.9 FIRST TRIMESTER BLEEDING: ICD-10-CM

## 2018-04-26 DIAGNOSIS — F32.A MILD DEPRESSION: ICD-10-CM

## 2018-04-26 PROBLEM — Z30.09 FAMILY PLANNING: Status: RESOLVED | Noted: 2017-02-21 | Resolved: 2018-04-26

## 2018-04-26 LAB
ABO + RH BLD: NORMAL
ABO + RH BLD: NORMAL
BASOPHILS # BLD AUTO: 0 10E9/L (ref 0–0.2)
BASOPHILS NFR BLD AUTO: 0.3 %
BLD GP AB SCN SERPL QL: NORMAL
BLOOD BANK CMNT PATIENT-IMP: NORMAL
DEPRECATED CALCIDIOL+CALCIFEROL SERPL-MC: 43 UG/L (ref 20–75)
DIFFERENTIAL METHOD BLD: NORMAL
EOSINOPHIL # BLD AUTO: 0.1 10E9/L (ref 0–0.7)
EOSINOPHIL NFR BLD AUTO: 1.2 %
ERYTHROCYTE [DISTWIDTH] IN BLOOD BY AUTOMATED COUNT: 13.5 % (ref 10–15)
HBA1C MFR BLD: 5.4 % (ref 0–6.4)
HBV SURFACE AG SERPL QL IA: NONREACTIVE
HCT VFR BLD AUTO: 40.4 % (ref 35–47)
HGB BLD-MCNC: 13.7 G/DL (ref 11.7–15.7)
HIV 1+2 AB+HIV1 P24 AG SERPL QL IA: NONREACTIVE
IMM GRANULOCYTES # BLD: 0 10E9/L (ref 0–0.4)
IMM GRANULOCYTES NFR BLD: 0.2 %
LYMPHOCYTES # BLD AUTO: 3.2 10E9/L (ref 0.8–5.3)
LYMPHOCYTES NFR BLD AUTO: 32.2 %
MCH RBC QN AUTO: 29 PG (ref 26.5–33)
MCHC RBC AUTO-ENTMCNC: 33.9 G/DL (ref 31.5–36.5)
MCV RBC AUTO: 85 FL (ref 78–100)
MONOCYTES # BLD AUTO: 0.4 10E9/L (ref 0–1.3)
MONOCYTES NFR BLD AUTO: 3.8 %
NEUTROPHILS # BLD AUTO: 6.2 10E9/L (ref 1.6–8.3)
NEUTROPHILS NFR BLD AUTO: 62.3 %
NRBC # BLD AUTO: 0 10*3/UL
NRBC BLD AUTO-RTO: 0 /100
PLATELET # BLD AUTO: 280 10E9/L (ref 150–450)
RBC # BLD AUTO: 4.73 10E12/L (ref 3.8–5.2)
SPECIMEN EXP DATE BLD: NORMAL
T4 FREE SERPL-MCNC: 1.58 NG/DL (ref 0.76–1.46)
TSH SERPL DL<=0.005 MIU/L-ACNC: 0.22 MU/L (ref 0.4–4)
WBC # BLD AUTO: 9.9 10E9/L (ref 4–11)

## 2018-04-26 PROCEDURE — 87088 URINE BACTERIA CULTURE: CPT | Performed by: ADVANCED PRACTICE MIDWIFE

## 2018-04-26 PROCEDURE — 87389 HIV-1 AG W/HIV-1&-2 AB AG IA: CPT | Performed by: ADVANCED PRACTICE MIDWIFE

## 2018-04-26 PROCEDURE — 86850 RBC ANTIBODY SCREEN: CPT | Performed by: ADVANCED PRACTICE MIDWIFE

## 2018-04-26 PROCEDURE — 87340 HEPATITIS B SURFACE AG IA: CPT | Performed by: ADVANCED PRACTICE MIDWIFE

## 2018-04-26 PROCEDURE — 84439 ASSAY OF FREE THYROXINE: CPT | Performed by: ADVANCED PRACTICE MIDWIFE

## 2018-04-26 PROCEDURE — 83036 HEMOGLOBIN GLYCOSYLATED A1C: CPT | Performed by: ADVANCED PRACTICE MIDWIFE

## 2018-04-26 PROCEDURE — 36415 COLL VENOUS BLD VENIPUNCTURE: CPT | Performed by: ADVANCED PRACTICE MIDWIFE

## 2018-04-26 PROCEDURE — 86762 RUBELLA ANTIBODY: CPT | Performed by: ADVANCED PRACTICE MIDWIFE

## 2018-04-26 PROCEDURE — 82306 VITAMIN D 25 HYDROXY: CPT | Performed by: ADVANCED PRACTICE MIDWIFE

## 2018-04-26 PROCEDURE — 86901 BLOOD TYPING SEROLOGIC RH(D): CPT | Performed by: ADVANCED PRACTICE MIDWIFE

## 2018-04-26 PROCEDURE — 86900 BLOOD TYPING SEROLOGIC ABO: CPT | Performed by: ADVANCED PRACTICE MIDWIFE

## 2018-04-26 PROCEDURE — 76817 TRANSVAGINAL US OBSTETRIC: CPT | Mod: ZF

## 2018-04-26 PROCEDURE — G0463 HOSPITAL OUTPT CLINIC VISIT: HCPCS | Mod: ZF

## 2018-04-26 PROCEDURE — 87086 URINE CULTURE/COLONY COUNT: CPT | Performed by: ADVANCED PRACTICE MIDWIFE

## 2018-04-26 PROCEDURE — 86780 TREPONEMA PALLIDUM: CPT | Performed by: ADVANCED PRACTICE MIDWIFE

## 2018-04-26 PROCEDURE — 85025 COMPLETE CBC W/AUTO DIFF WBC: CPT | Performed by: ADVANCED PRACTICE MIDWIFE

## 2018-04-26 PROCEDURE — 84443 ASSAY THYROID STIM HORMONE: CPT | Performed by: ADVANCED PRACTICE MIDWIFE

## 2018-04-26 RX ORDER — LEVOTHYROXINE SODIUM 137 UG/1
TABLET ORAL
COMMUNITY
Start: 2010-08-24 | End: 2018-06-27

## 2018-04-26 RX ORDER — PROGESTERONE 50 MG/ML
INJECTION, SOLUTION INTRAMUSCULAR DAILY
COMMUNITY
End: 2018-06-27

## 2018-04-26 NOTE — PROGRESS NOTES
42 year old female, . Estimated Date of Delivery: Dec 1, 2018, 8w5d  presents for confirmation of dates and assessment of viability. This study was done transvaginally.    Measurements     CRL = 19.6 mm = 8 4/7 weeks  EGA.        Fetal anatomy appears normal for gestational age.     Fetal/Fetal Cardiac Activity: Present.  FHR = 173bpm.     Implantation: Intrauterine. Subchorionic hemorrhage - 3.3 x 1.4cm     Cervix = 4.9 cm      Maternal structures appear normal.    Impression: IUP at 8w5d by embryo transfer date consistent with today's ultrasound, subchorionic hemorrhage noted    Recommend comprehensive scan at 18 to 20 weeks.    JESUSITA Marti MD

## 2018-04-26 NOTE — MR AVS SNAPSHOT
After Visit Summary   4/26/2018    Mary Wray    MRN: [id number]           Patient Information     Date Of Birth          1975        Visit Information        Provider Department      4/26/2018 9:00 AM [facility] ULTRASOUND [facility]        Today's Diagnoses     Elderly multigravida in first trimester           Follow-ups after your visit        Your next 10 appointments already scheduled     May 14, 2018  1:45 PM CDT   NEW OB with COLE Ward CNM   [facility] ([facility])    [facility]  [address]  [address]  [facility] [id number] [phone]              Who to contact     Please call your clinic at [phone] to:    Ask questions about your health    Make or cancel appointments    Discuss your medicines    Learn about your test results    Speak to your doctor            Additional Information About Your Visit        MyChart Information     [employer] gives you secure access to your electronic health record. If you see a primary care provider, you can also send messages to your care team and make appointments. If you have questions, please call your primary care clinic.  If you do not have a primary care provider, please call [phone] and they will assist you.      [employer] is an electronic gateway that provides easy, online access to your medical records. With [employer], you can request a clinic appointment, read your test results, renew a prescription or communicate with your care team.     To access your existing account, please contact your [facility] or call [phone] for assistance.        Care EveryWhere ID     This is your Care EveryWhere ID. This could be used by other organizations to access your [place] medical records  [id number]        Your Vitals Were     Last Period                   (LMP Unknown)            Blood Pressure from Last 3  Encounters:   04/26/18 114/78   04/14/18 109/56   04/08/18 106/71    Weight from Last 3 Encounters:   04/26/18 105.6 kg (232 lb 12.8 oz)   04/08/18 106.7 kg (235 lb 2 oz)   04/06/18 106.1 kg (234 lb)              We Performed the Following     Dating ultrasound less than 14 weeks gestation Transvag  - [address] (In Clinic)          Today's Medication Changes          These changes are accurate as of 4/26/18  5:05 PM.  If you have any questions, ask your nurse or doctor.               Stop taking these medicines if you haven't already. Please contact your care team if you have questions.     ASPIRIN PO   Stopped by:  Brandee Keene APRN CNM                    Primary Care Provider Office Phone # Fax #    Jazlyn Huitron [phone]       [address] [address]        Equal Access to Services     PATRICIA [facility] : Hadii aster Sandra, waaxdaron saxena, qaybta kaalmada maioc, adrianna coy . So [facility] [phone].    ATENCIÓN: Si habla español, tiene a perdue disposición servicios gratuitos de asistencia lingüística. Llame al [phone].    We comply with applicable federal civil rights laws and [place] laws. We do not discriminate on the basis of race, color, national origin, age, disability, sex, sexual orientation, or gender identity.            Thank you!     Thank you for choosing [facility]  for your care. Our goal is always to provide you with excellent care. Hearing back from our patients is one way we can continue to improve our services. Please take a few minutes to complete the written survey that you may receive in the mail after your visit with us. Thank you!             Your Updated Medication List - Protect others around you: Learn how to safely use, store and throw away your medicines at www.disposemymeds.org.          This list is accurate as of 4/26/18  5:05 PM.  Always use your most recent  med list.                   Brand Name Dispense Instructions for use Diagnosis    cholecalciferol 1000 units capsule    vitamin  -D    30 capsule    Take 5 capsules (5,000 Units) by mouth daily        prenatal multivitamin plus iron 27-0.8 MG Tabs per tablet     100 tablet    Take 1 tablet by mouth daily    Encounter for preconception consultation       progesterone (in sesame oil) 50 MG/ML injection      Inject into the muscle daily        * SYNTHROID 137 MCG tablet   Generic drug:  levothyroxine           * SYNTHROID 200 MCG tablet   Generic drug:  levothyroxine     90 tablet    Take 1 tablet (200 mcg) by mouth daily    Postablative hypothyroidism       * VIVELLE-DOT TD      4 patches Uses 4 patches every other day b        * VIVELLE-DOT TD      3 times a day        * Notice:  This list has 4 medication(s) that are the same as other medications prescribed for you. Read the directions carefully, and ask your doctor or other care provider to review them with you.

## 2018-04-26 NOTE — LETTER
2018       RE: Mary Wray  [address] [address]     Dear Colleague,    Thank you for referring your patient, Mary Wray, to the [facility] at [facility]. Please see a copy of my visit note below.    42 year old female, . Estimated Date of Delivery: Dec 1, 2018, 8w5d  presents for confirmation of dates and assessment of viability. This study was done transvaginally.    Measurements     CRL = 19.6 mm = 8 4/7 weeks  EGA.        Fetal anatomy appears normal for gestational age.     Fetal/Fetal Cardiac Activity: Present.  FHR = 173bpm.     Implantation: Intrauterine. Subchorionic hemorrhage - 3.3 x 1.4cm     Cervix = 4.9 cm      Maternal structures appear normal.    Impression: IUP at 8w5d by embryo transfer date consistent with today's ultrasound, subchorionic hemorrhage noted    Recommend comprehensive scan at 18 to 20 weeks.    JESUSITA Marti MD    Again, thank you for allowing me to participate in the care of your patient.      Sincerely,    [facility]ound

## 2018-04-26 NOTE — NURSING NOTE
Chief Complaint   Patient presents with     Prenatal Care     [id number] Has had two bleeds in the past three weeks

## 2018-04-26 NOTE — PATIENT INSTRUCTIONS
"  Thank you for choosing [facility] for your obstetrical care.  We are an integrated health clinic with obstetricians, midwives, a psychologist, an acupuncturist, a nutritionist, a pharmacist, internal medicine and family practice all in one.  If you have questions about services offered please ask.      o Please keep all appointments with your provider.  You will help catch, prevent and treat problems early.  o Review your Expectant Family booklet and folder given to you at this intake visit.  It can answer many basic questions including:    Treatment for nausea and vomiting    Medications that are safe in pregnancy    Healthy diet and weight gain    Exercise and activity  o ASK questions!!  Please use \"Questions for my New OB visit\" form to write down any questions or concerns for your next visit.  o Eat a healthy diet.  Visit www.choosemyplate.gov and click on  Pregnancy and Breastfeeding  for information and tips  o Do not smoke.  Avoid other people's smoke, too.  We are happy to help with referrals to stop smoking programs.  o Do not drink alcohol.  o Try to avoid people who have colds or other infections.  Practice good hand washing.  o Consider registering for prenatal education classes through [employer] at [facility].  You can view class schedules and register online at [email or web address] or call [phone] for questions     For urgent concerns, call [facility] at [phone] to speak with a triage nurse during regular clinic hours (8:00 am - 4:30 pm).  This line is answered by our service 24 hours a day, after hours a provider will return your call.  "

## 2018-04-26 NOTE — MR AVS SNAPSHOT
"              After Visit Summary   4/26/2018    Mary Wray    MRN: [id number]           Patient Information     Date Of Birth          1975        Visit Information        Provider Department      4/26/2018 9:45 AM Brandee Keene APRN CNM [facility]        Today's Diagnoses     High-risk pregnancy in first trimester    -  1    Vitamin D deficiency        Mild depression (H)        Elderly multigravida in first trimester        Postablative hypothyroidism        First trimester bleeding          Care Instructions      Thank you for choosing [facility] for your obstetrical care.  We are an integrated health clinic with obstetricians, midwives, a psychologist, an acupuncturist, a nutritionist, a pharmacist, internal medicine and family practice all in one.  If you have questions about services offered please ask.      o Please keep all appointments with your provider.  You will help catch, prevent and treat problems early.  o Review your Expectant Family booklet and folder given to you at this intake visit.  It can answer many basic questions including:    Treatment for nausea and vomiting    Medications that are safe in pregnancy    Healthy diet and weight gain    Exercise and activity  o ASK questions!!  Please use \"Questions for my New OB visit\" form to write down any questions or concerns for your next visit.  o Eat a healthy diet.  Visit www.choosemyplate.gov and click on  Pregnancy and Breastfeeding  for information and tips  o Do not smoke.  Avoid other people's smoke, too.  We are happy to help with referrals to stop smoking programs.  o Do not drink alcohol.  o Try to avoid people who have colds or other infections.  Practice good hand washing.  o Consider registering for prenatal education classes through [employer] at [facility].  You can view class schedules and register online at [email or web address] or call [phone] for questions     For urgent concerns, call [facility] at [phone] to speak with a triage nurse during regular clinic hours (8:00 am - 4:30 pm).  This line is answered by our service 24 hours a day, after hours a provider will return your call.          Follow-ups after your visit        Additional Services     MAT FETAL MED CTR REFERRAL-PREGNANCY       >> Patient may proceed with recommendations for further testing as directed by the Maternal Fetal Medicine Specialist >>    >> If requesting Fetal Echo: MFM will determine appropriate location for exam due to indication.    >> If requesting Lung Maturity Amnio:  If results indicate fetal lung maturity, induction or C/S is recommended within 36 hours.  Please schedule accordingly.     Dear Patient:   Please be aware that coverage of these services is subject to the terms and limitations of your health insurance plan.  Call member services at your health plan with any benefit or coverage questions.      Please bring the following to your appointment:    >>  Any x-rays, CTs or MRIs which have been performed.  Contact the facility where they were done to arrange for  prior to your scheduled appointment.  Any new CT, MRI or other procedures ordered by your specialist must be performed at a [place] facility or coordinated by your clinic's referral office.  >>  List of current medications   >>  This referral request   >>  Any documents/labs given to you for this referral                  Follow-up notes from your care team     Return in 2 weeks (on 5/10/2018) for next OB visit, NOB, 30min exam.      Your next 10 appointments already scheduled     May 14, 2018  1:45 PM CDT   NEW OB with COLE Ward CNM   [facility] ([facility])    [facility]  [address]  [address]  [facility] [address] [phone]              Future tests that were ordered for you today     Open Standing  "Orders        Priority Remaining Interval Expires Ordered    TSH Routine 12/12 1 month 4/30/2019 4/30/2018    Thyroxine total Routine 12/12 1 month 4/30/2019 4/30/2018          Open Future Orders        Priority Expected Expires Ordered    MFM Genetic Counseling Routine  5/1/2019 5/1/2018    Maternal Fetal Nuchal Translucency Routine  3/1/2019 5/1/2018    Thyroid stimulating immunoglobulin Routine  4/30/2019 4/30/2018    TSH Receptor Antibody to [facility] or [place]: Laboratory Miscellaneous Order Routine  4/30/2019 4/30/2018            Who to contact     Please call your clinic at [phone] to:    Ask questions about your health    Make or cancel appointments    Discuss your medicines    Learn about your test results    Speak to your doctor            Additional Information About Your Visit        [employer] Information     [employer] gives you secure access to your electronic health record. If you see a primary care provider, you can also send messages to your care team and make appointments. If you have questions, please call your primary care clinic.  If you do not have a primary care provider, please call [phone] and they will assist you.      [employer] is an electronic gateway that provides easy, online access to your medical records. With [employer], you can request a clinic appointment, read your test results, renew a prescription or communicate with your care team.     To access your existing account, please contact your [facility] or call [phone] for assistance.        Care EveryWhere ID     This is your Care EveryWhere ID. This could be used by other organizations to access your [place] medical records  [id number]        Your Vitals Were     Pulse Height Last Period BMI (Body Mass Index)          60 1.702 m (5' 7\") (LMP Unknown) 36.46 kg/m2         Blood Pressure from Last 3 Encounters:   04/26/18 114/78   04/14/18 109/56   04/08/18 106/71    Weight from Last 3 Encounters: "   04/26/18 105.6 kg (232 lb 12.8 oz)   04/08/18 106.7 kg (235 lb 2 oz)   04/06/18 106.1 kg (234 lb)              We Performed the Following     25- OH-Vitamin D     ABO/Rh Type and Screen [QWO909]     Anti Treponema [GFI2086]     CBC with Platelets Differential [SMG934]     Hemoglobin A1c     Hepatitis B Surface Antigen [QBA386]     HIV Antigen Antibody Combo [FTQ6531]     MAT FETAL MED CTR REFERRAL-PREGNANCY     Rubella Antibody IgG Quantitative [ZYK6071]     T4 free     TSH with free T4 reflex     Urine Culture Aerobic Bacterial [DAW075]          Today's Medication Changes          These changes are accurate as of 4/26/18 11:59 PM.  If you have any questions, ask your nurse or doctor.               Stop taking these medicines if you haven't already. Please contact your care team if you have questions.     ASPIRIN PO   Stopped by:  Brandee Keene APRN CNM                    Primary Care Provider Office Phone # Fax #    Jazlyn Huitron [phone]       [address] [address]        Equal Access to Services     [facility] : Hadii aster durham hadasho [place], waaxda luqadaha, qaybta kaalmadaron nina, adrianna coy . So [facility] [phone].    ATENCIÓN: Si habla español, tiene a perdue disposición servicios gratuitos de asistencia lingüística. Oscar al [phone].    We comply with applicable federal civil rights laws and [place] laws. We do not discriminate on the basis of race, color, national origin, age, disability, sex, sexual orientation, or gender identity.            Thank you!     Thank you for choosing [facility]  for your care. Our goal is always to provide you with excellent care. Hearing back from our patients is one way we can continue to improve our services. Please take a few minutes to complete the written survey that you may receive in the mail after your visit with us. Thank you!             Your  Updated Medication List - Protect others around you: Learn how to safely use, store and throw away your medicines at www.disposemymeds.org.          This list is accurate as of 4/26/18 11:59 PM.  Always use your most recent med list.                   Brand Name Dispense Instructions for use Diagnosis    cholecalciferol 1000 units capsule    vitamin  -D    30 capsule    Take 5 capsules (5,000 Units) by mouth daily        prenatal multivitamin plus iron 27-0.8 MG Tabs per tablet     100 tablet    Take 1 tablet by mouth daily    Encounter for preconception consultation       progesterone (in sesame oil) 50 MG/ML injection      Inject into the muscle daily        * SYNTHROID 137 MCG tablet   Generic drug:  levothyroxine           * SYNTHROID 200 MCG tablet   Generic drug:  levothyroxine     90 tablet    Take 1 tablet (200 mcg) by mouth daily    Postablative hypothyroidism       * VIVELLE-DOT TD      4 patches Uses 4 patches every other day b        * VIVELLE-DOT TD      3 times a day        * Notice:  This list has 4 medication(s) that are the same as other medications prescribed for you. Read the directions carefully, and ask your doctor or other care provider to review them with you.

## 2018-04-26 NOTE — LETTER
2018     RE: Mary Wray  [address] [facility] [address]  [place] [address]     Dear Colleague,    Thank you for referring your patient, Mary Wray, to the [facility] at [facility]. Please see a copy of my visit note below.    [facility] OB Intake note  Subjective   42 year old woman presents to clinic for initiation of OB care.  No LMP recorded (lmp unknown). Patient is pregnant.    at 8w6d by Estimated Date of Delivery: Dec 1, 2018 based on US confirms.  Reviewed dating ultrasound.   Pregnancy is planned. CCRM pt, IUI and IVF cycles. Early preg mgmt w hormones by CCRM      Symptoms since LMP include bleeding.  Patient has tried these relief measures: small frequent meals, increased rest and increased fluids.    - Genetic/Infection questionnaire completed, risks include AMA, obesty  Have you traveled during the pregnancy?No  Have your sexual partner(s) travelled during the pregnancy?No NA-no partner      - Current Medications    Current Outpatient Prescriptions   Medication Sig Dispense Refill     cholecalciferol (VITAMIN  -D) 1000 UNITS capsule Take 5 capsules (5,000 Units) by mouth daily 30 capsule      Estradiol (VIVELLE-DOT TD) 4 patches Uses 4 patches every other day b       Estradiol (VIVELLE-DOT TD) 3 times a day       levothyroxine (SYNTHROID) 137 MCG tablet        Prenatal Vit-Fe Fumarate-FA (PRENATAL MULTIVITAMIN  PLUS IRON) 27-0.8 MG TABS per tablet Take 1 tablet by mouth daily 100 tablet 3     progesterone, in sesame oil, 50 MG/ML injection Inject into the muscle daily       SYNTHROID 200 MCG tablet Take 1 tablet (200 mcg) by mouth daily 90 tablet 3         - Co-morbids    Past Medical History:   Diagnosis Date     History of Graves' disease     Graves     Hypovitaminosis D      Mild depression (H)     , no meds, resolved with move     Obesity      Postablative hypothyroidism     CCRM     Premenstrual dysphoria      - Risk for GDM -   Personal history of GDM, BMI>30, h/o prediabetes/glucose intolerance, first degree relative with GDM or DM     - The patient does h/o Pre Eclampsia, Current multi fetal gestation, Pre Gestational Diabetes (Type 1 or Type 2), chronic hypertension, renal disease, Autoimmune disease (systematic lupus erythematosus, antiphospholipid syndrome) so WILL start low dose aspirin (81mg) starting between 12 and 28 weeks to prevent preeclampsia.    - The patient  does not have a history of spontaneous  birth so  WILL NOT consider progesterone starting at 16-20 weeks and/or serial transvaginal cervical length ultrasounds from 16-24 weeks.         PERSONAL/SOCIAL HISTORY  single  Lives alone.  Employment: Full time.  Her job involves sedentary activity .  Additional items: None    Objective  -VS: reviewed and within normal limits   -General appearance: no acute distress, patient is comfortable   NEUROLOGICAL/PSYCHIATRIC   - Orientated x3,   -Mood and affect: : normal     Assessment/Plan  Mary was seen today for prenatal care.    Diagnoses and all orders for this visit:    High-risk pregnancy in first trimester  -     25- OH-Vitamin D  -     Urine Culture Aerobic Bacterial [ORE432]  -     ABO/Rh Type and Screen [CVG015]  -     CBC with Platelets Differential [RFX083]  -     Anti Treponema [EYF7227]  -     Rubella Antibody IgG Quantitative [UDF1180]  -     Hepatitis B Surface Antigen [LIY508]  -     HIV Antigen Antibody Combo [MRP3380]  -     Hemoglobin A1c  -     TSH with free T4 reflex  -     T4 free    Vitamin D deficiency    Mild depression (H)    Elderly multigravida in first trimester    Postablative hypothyroidism        42 year old  8w6d weeks of pregnancy with JORDAN of Dec 1, 2018 by LMP of No LMP recorded (lmp unknown). Patient is pregnant..  c/w  Outpatient Encounter Prescriptions as of 2018   Medication Sig Dispense Refill     cholecalciferol (VITAMIN  -D) 1000 UNITS capsule Take 5 capsules (5,000  Units) by mouth daily 30 capsule      Estradiol (VIVELLE-DOT TD) 4 patches Uses 4 patches every other day b       Estradiol (VIVELLE-DOT TD) 3 times a day       levothyroxine (SYNTHROID) 137 MCG tablet        Prenatal Vit-Fe Fumarate-FA (PRENATAL MULTIVITAMIN  PLUS IRON) 27-0.8 MG TABS per tablet Take 1 tablet by mouth daily 100 tablet 3     progesterone, in sesame oil, 50 MG/ML injection Inject into the muscle daily       SYNTHROID 200 MCG tablet Take 1 tablet (200 mcg) by mouth daily 90 tablet 3     [DISCONTINUED] ASPIRIN PO Take 81 mg by mouth       No facility-administered encounter medications on file as of 4/26/2018.       Orders Placed This Encounter   Procedures     25- OH-Vitamin D     CBC with Platelets Differential [EHC908]     Anti Treponema [JAJ9156]     Rubella Antibody IgG Quantitative [IIV3521]     Hepatitis B Surface Antigen [JTA552]     HIV Antigen Antibody Combo [WLS4814]     Hemoglobin A1c     TSH with free T4 reflex     T4 free     ABO/Rh Type and Screen [FKB410]                   Orders Placed This Encounter   Procedures     25- OH-Vitamin D     CBC with Platelets Differential [JYY477]     Anti Treponema [JSX8936]     Rubella Antibody IgG Quantitative [HGQ7946]     Hepatitis B Surface Antigen [NIR276]     HIV Antigen Antibody Combo [TYY1003]     Hemoglobin A1c     TSH with free T4 reflex     T4 free     ABO/Rh Type and Screen [XKN860]           - Oriented to Practice, types of care, and how to reach a provider.  Dr. Bai pt.  - Patient received 1st trimester new OB education packet complete with aide of The Expectant Family booklet including information on genetic screening test options.  - Patient desires 1st trimester screening and desires level II ultrasound which were ordered.  - Patient was encouraged to start prenatal vitamins as tolerated.    - Patient was sent to lab for routine OB labs including tsh.     -   - Reviewed risk for diabetes in pregnancy, pt agrees to early 1 hour  for  NOB visit.  - Reviewed recommendation for low dose aspirin daily to prevent pre eclampsia, pt agrees, follow up at NOB visit.   - Pregnancy concerns to be addressed by provider at new OB exam include: early preg mgmt from CCRM, when to start asa, early gct.    Pt to RTO for NOB visit in 2 weeks and prn if questions or concerns    Brandee Keene, APRN CNM

## 2018-04-27 LAB
RUBV IGG SERPL IA-ACNC: >250 IU/ML
T PALLIDUM IGG+IGM SER QL: NEGATIVE

## 2018-04-27 NOTE — PROGRESS NOTES
[facility] OB Intake note  Subjective   42 year old woman presents to clinic for initiation of OB care.  No LMP recorded (lmp unknown). Patient is pregnant.    at 8w6d by Estimated Date of Delivery: Dec 1, 2018 based on US confirms.  Reviewed dating ultrasound.   Pregnancy is planned. [facility]M pt, IUI and IVF cycles. Early preg mgmt w hormones by [facility]M      Symptoms since LMP include bleeding.  Patient has tried these relief measures: small frequent meals, increased rest and increased fluids.    - Genetic/Infection questionnaire completed, risks include AMA, obesty  Have you traveled during the pregnancy?No  Have your sexual partner(s) travelled during the pregnancy?No NA-no partner      - Current Medications    Current Outpatient Prescriptions   Medication Sig Dispense Refill     cholecalciferol (VITAMIN  -D) 1000 UNITS capsule Take 5 capsules (5,000 Units) by mouth daily 30 capsule      Estradiol (VIVELLE-DOT TD) 4 patches Uses 4 patches every other day b       Estradiol (VIVELLE-DOT TD) 3 times a day       levothyroxine (SYNTHROID) 137 MCG tablet        Prenatal Vit-Fe Fumarate-FA (PRENATAL MULTIVITAMIN  PLUS IRON) 27-0.8 MG TABS per tablet Take 1 tablet by mouth daily 100 tablet 3     progesterone, in sesame oil, 50 MG/ML injection Inject into the muscle daily       SYNTHROID 200 MCG tablet Take 1 tablet (200 mcg) by mouth daily 90 tablet 3         - Co-morbids    Past Medical History:   Diagnosis Date     History of Graves' disease     Graves     Hypovitaminosis D      Mild depression (H)     , no meds, resolved with move     Obesity      Postablative hypothyroidism     CCRM     Premenstrual dysphoria      - Risk for GDM -  Personal history of GDM, BMI>30, h/o prediabetes/glucose intolerance, first degree relative with GDM or DM     - The patient does h/o Pre Eclampsia, Current multi fetal gestation, Pre Gestational Diabetes (Type 1 or Type 2), chronic hypertension, renal disease, Autoimmune disease (systematic  lupus erythematosus, antiphospholipid syndrome) so WILL start low dose aspirin (81mg) starting between 12 and 28 weeks to prevent preeclampsia.    - The patient  does not have a history of spontaneous  birth so  WILL NOT consider progesterone starting at 16-20 weeks and/or serial transvaginal cervical length ultrasounds from 16-24 weeks.         PERSONAL/SOCIAL HISTORY  single  Lives alone.  Employment: Full time.  Her job involves sedentary activity .  Additional items: None    Objective  -VS: reviewed and within normal limits   -General appearance: no acute distress, patient is comfortable   NEUROLOGICAL/PSYCHIATRIC   - Orientated x3,   -Mood and affect: : normal     Assessment/Plan  Mary was seen today for prenatal care.    Diagnoses and all orders for this visit:    High-risk pregnancy in first trimester  -     25- OH-Vitamin D  -     Urine Culture Aerobic Bacterial [EJW809]  -     ABO/Rh Type and Screen [[id number]]  -     CBC with Platelets Differential [EDB358]  -     Anti Treponema [CVP7108]  -     Rubella Antibody IgG Quantitative [CSX6141]  -     Hepatitis B Surface Antigen [HBY780]  -     HIV Antigen Antibody Combo [KJM1529]  -     Hemoglobin A1c  -     TSH with free T4 reflex  -     T4 free    Vitamin D deficiency    Mild depression (H)    Elderly multigravida in first trimester    Postablative hypothyroidism        42 year old  8w6d weeks of pregnancy with JORDAN of Dec 1, 2018 by LMP of No LMP recorded (lmp unknown). Patient is pregnant..  c/w  Outpatient Encounter Prescriptions as of 2018   Medication Sig Dispense Refill     cholecalciferol (VITAMIN  -D) 1000 UNITS capsule Take 5 capsules (5,000 Units) by mouth daily 30 capsule      Estradiol (VIVELLE-DOT TD) 4 patches Uses 4 patches every other day b       Estradiol (VIVELLE-DOT TD) 3 times a day       levothyroxine (SYNTHROID) 137 MCG tablet        Prenatal Vit-Fe Fumarate-FA (PRENATAL MULTIVITAMIN  PLUS IRON) 27-0.8 MG TABS per tablet  Take 1 tablet by mouth daily 100 tablet 3     progesterone, in sesame oil, 50 MG/ML injection Inject into the muscle daily       SYNTHROID 200 MCG tablet Take 1 tablet (200 mcg) by mouth daily 90 tablet 3     [DISCONTINUED] ASPIRIN PO Take 81 mg by mouth       No facility-administered encounter medications on file as of 4/26/2018.       Orders Placed This Encounter   Procedures     25- OH-Vitamin D     CBC with Platelets Differential [[id number]]     Anti Treponema [OGF1730]     Rubella Antibody IgG Quantitative [ROM8410]     Hepatitis B Surface Antigen [SHD826]     HIV Antigen Antibody Combo [ZBB2066]     Hemoglobin A1c     TSH with free T4 reflex     T4 free     ABO/Rh Type and Screen [XKI304]                   Orders Placed This Encounter   Procedures     25- OH-Vitamin D     CBC with Platelets Differential [YSA832]     Anti Treponema [YSU7871]     Rubella Antibody IgG Quantitative [LPT4289]     Hepatitis B Surface Antigen [IHE070]     HIV Antigen Antibody Combo [BQO2077]     Hemoglobin A1c     TSH with free T4 reflex     T4 free     ABO/Rh Type and Screen [ZLQ022]           - Oriented to Practice, types of care, and how to reach a provider.  Dr. Bai pt.  - Patient received 1st trimester new OB education packet complete with aide of [facility] booklet including information on genetic screening test options.  - Patient desires 1st trimester screening and desires level II ultrasound which were ordered.  - Patient was encouraged to start prenatal vitamins as tolerated.    - Patient was sent to lab for routine OB labs including tsh.     -   - Reviewed risk for diabetes in pregnancy, pt agrees to early 1 hour  for NOB visit.  - Reviewed recommendation for low dose aspirin daily to prevent pre eclampsia, pt agrees, follow up at NOB visit.   - Pregnancy concerns to be addressed by provider at new OB exam include: early preg mgmt from [facility], when to start asa, early gct.    Pt to RTO for NOB visit in 2 weeks and  prn if questions or concerns    Brandee Keene, APRN CNM

## 2018-04-28 ENCOUNTER — TELEPHONE (OUTPATIENT)
Dept: OBGYN | Facility: CLINIC | Age: 43
End: 2018-04-28

## 2018-04-28 DIAGNOSIS — O09.91 HIGH-RISK PREGNANCY IN FIRST TRIMESTER: Primary | ICD-10-CM

## 2018-04-28 DIAGNOSIS — E89.0 POSTABLATIVE HYPOTHYROIDISM: ICD-10-CM

## 2018-04-28 DIAGNOSIS — R82.71 GBS BACTERIURIA: ICD-10-CM

## 2018-04-28 LAB
BACTERIA SPEC CULT: ABNORMAL
Lab: ABNORMAL
SPECIMEN SOURCE: ABNORMAL

## 2018-04-28 RX ORDER — AMOXICILLIN 500 MG/1
500 CAPSULE ORAL 2 TIMES DAILY
Qty: 10 CAPSULE | Refills: 0 | Status: SHIPPED | OUTPATIENT
Start: 2018-04-28 | End: 2018-06-27

## 2018-04-28 RX ORDER — LEVOTHYROXINE SODIUM 75 MCG
75 TABLET ORAL DAILY
Qty: 30 TABLET | Refills: 1 | Status: SHIPPED | OUTPATIENT
Start: 2018-04-28 | End: 2018-06-27

## 2018-04-30 DIAGNOSIS — E89.0 POSTABLATIVE HYPOTHYROIDISM: Primary | ICD-10-CM

## 2018-04-30 DIAGNOSIS — Z86.39 HISTORY OF GRAVES' DISEASE: ICD-10-CM

## 2018-05-01 PROBLEM — O20.9 FIRST TRIMESTER BLEEDING: Status: ACTIVE | Noted: 2018-05-01

## 2018-05-03 ENCOUNTER — TELEPHONE (OUTPATIENT)
Dept: OBGYN | Facility: CLINIC | Age: 43
End: 2018-05-03

## 2018-05-03 NOTE — TELEPHONE ENCOUNTER
Received call from Mary who states that she has a bad cough and congestion- wondering what she can take in pregnancy. No fevers, shortness of breath, n/v/d. Discussed meds safe to use in pregnancy and advised that she call back if she develops new or worsening symptoms. Recommended use of humidifier, increasing fluids, and rest. She understood plan and had no further questions.

## 2018-05-10 ENCOUNTER — TELEPHONE (OUTPATIENT)
Dept: OBGYN | Facility: CLINIC | Age: 43
End: 2018-05-10

## 2018-05-10 NOTE — TELEPHONE ENCOUNTER
"----- Message from Mahi Dove RN sent at 5/8/2018  4:59 PM CDT -----  Regarding: requesting call back  Pt on VM requesting call back for \"non urgent questions\".  "

## 2018-05-11 ENCOUNTER — OFFICE VISIT (OUTPATIENT)
Dept: FAMILY MEDICINE | Facility: CLINIC | Age: 43
End: 2018-05-11
Payer: COMMERCIAL

## 2018-05-11 VITALS
BODY MASS INDEX: 35.65 KG/M2 | HEART RATE: 65 BPM | OXYGEN SATURATION: 98 % | TEMPERATURE: 97.7 F | DIASTOLIC BLOOD PRESSURE: 73 MMHG | SYSTOLIC BLOOD PRESSURE: 106 MMHG | WEIGHT: 227.6 LBS

## 2018-05-11 DIAGNOSIS — J06.9 VIRAL UPPER RESPIRATORY TRACT INFECTION: Primary | ICD-10-CM

## 2018-05-11 NOTE — PROGRESS NOTES
BECKY Hernandez is a 42 year old  female  who presents:    Chief Complaint   Patient presents with     Cough     Cough and congestions x1.5 wks. Pt doesn't complain of sore throat or having allergies     1.5 weeks of cough congestion, headache. No sore throat.  Have not been able to sleep. Not great eating.  Thought she was getting better but now is feeling a bit worse.  Coughing is keeping her up, Congestion,.  Also some hunger at night.  No fevers. No sob.  Has been working.  Cough makes her nausea worse. They said she could take Delsyum and robitussin on and off. Doesn't seem to help.      Was on erythromycin due to chorionic hemmorage and then started on 10 day course of amox for GBS bacteruria - she just finished that about 4 days ago.      No real illness contact. Never had allergies in the past.      Last progesterone shot today.  Also on PNV and Vit D. And Omega D.  Calcium and Vitamins.  Probiotic.           Patient Active Problem List   Diagnosis     Vitamin D deficiency     Postablative hypothyroidism     Pre-diabetes     Human papilloma virus (HPV) infection     Papanicolaou smear of cervix with low grade squamous intraepithelial lesion (LGSIL)     H/O mammogram     High-risk pregnancy in first trimester     Mild depression (H)     Elderly multigravida in first trimester     GBS bacteriuria     First trimester bleeding       Current Outpatient Prescriptions   Medication Sig Dispense Refill     cholecalciferol (VITAMIN  -D) 1000 UNITS capsule Take 5 capsules (5,000 Units) by mouth daily 30 capsule      Prenatal Vit-Fe Fumarate-FA (PRENATAL MULTIVITAMIN  PLUS IRON) 27-0.8 MG TABS per tablet Take 1 tablet by mouth daily 100 tablet 3     progesterone, in sesame oil, 50 MG/ML injection Inject into the muscle daily       SYNTHROID 200 MCG tablet Take 1 tablet (200 mcg) by mouth daily 90 tablet 3     amoxicillin (AMOXIL) 500 MG capsule Take 1 capsule (500 mg) by mouth 2 times daily (Patient not taking:  Reported on 5/11/2018) 10 capsule 0     Estradiol (VIVELLE-DOT TD) 4 patches Uses 4 patches every other day b       Estradiol (VIVELLE-DOT TD) 3 times a day       levothyroxine (SYNTHROID) 137 MCG tablet        SYNTHROID 75 MCG tablet Take 1 tablet (75 mcg) by mouth daily (Patient not taking: Reported on 5/11/2018) 30 tablet 1        No Known Allergies             Review of Systems:               Physical Exam:     Vitals:    05/11/18 0755   BP: 106/73   Pulse: 65   Temp: 97.7  F (36.5  C)   TempSrc: Oral   SpO2: 98%   Weight: 227 lb 9.6 oz (103.2 kg)     Body mass index is 35.65 kg/(m^2).  Vitals were reviewed and were normal  GENERAL: healthy, alert, well nourished, well hydrated, no distress  HENT: ear canals- normal; TMs- normal; Nose- normal; Mouth- no ulcers, no lesions  NECK: no tenderness, no adenopathy, no asymmetry, no masses, no stiffness; thyroid- normal to palpation  RESP: lungs clear to auscultation - no rales, no rhonchi, no wheezes  CV: regular rates and rhythm, normal S1 S2, no S3 or S4 and no murmur, no click or rub -    Office Visit on 04/26/2018   Component Date Value Ref Range Status     Vitamin D Deficiency screening 04/26/2018 43  20 - 75 ug/L Final    Comment: Season, race, dietary intake, and treatment affect the concentration of   25-hydroxy-Vitamin D. Values may decrease during winter months and increase   during summer months. Values 20-29 ug/L may indicate Vitamin D insufficiency   and values <20 ug/L may indicate Vitamin D deficiency.  Vitamin D determination is routinely performed by an immunoassay specific for   25 hydroxyvitamin D3.  If an individual is on vitamin D2 (ergocalciferol)   supplementation, please specify 25 OH vitamin D2 and D3 level determination by   LCMSMS test [id number].       Specimen Description 04/26/2018 Midstream Urine   Final     Special Requests 04/26/2018 Specimen received in preservative   Final     Culture Micro 04/26/2018 *  Final                     Value:10,000 to 50,000 colonies/mL  Beta hemolytic Streptococcus group B  Group B Streptococcus may be significant in OB patients, however, it is part of the normal   urogenital shania.       Culture Micro 04/26/2018    Final                    Value:Group B Streptococci are susceptible to ampicillin, penicillin, and cefazolin, but may be   erythromycin and/or clindamycin resistant. Contact Microbiology if your patient is   allergic to penicillin and you need erythromycin and/or clindamycin testing to be   performed.  Clindamycin and Erythromycin are not routinely prescribed for isolates from the urinary   tract.  Susceptibility testing must be requested within 5 days.       Culture Micro 04/26/2018    Final                    Value:Plus  <10,000 colonies/mL  mixed urogenital shania  Susceptibility testing not routinely done       ABO 04/26/2018 AB   Final     RH(D) 04/26/2018 Pos   Final     Antibody Screen 04/26/2018 Neg   Final     Test Valid Only At 04/26/2018 [facility]      Final     Specimen Expires 04/26/2018 04/29/2018   Final     WBC 04/26/2018 9.9  4.0 - 11.0 10e9/L Final     RBC Count 04/26/2018 4.73  3.8 - 5.2 10e12/L Final     Hemoglobin 04/26/2018 13.7  11.7 - 15.7 g/dL Final     Hematocrit 04/26/2018 40.4  35.0 - 47.0 % Final     MCV 04/26/2018 85  78 - 100 fl Final     MCH 04/26/2018 29.0  26.5 - 33.0 pg Final     MCHC 04/26/2018 33.9  31.5 - 36.5 g/dL Final     RDW 04/26/2018 13.5  10.0 - 15.0 % Final     Platelet Count 04/26/2018 280  150 - 450 10e9/L Final     Diff Method 04/26/2018 Automated Method   Final     % Neutrophils 04/26/2018 62.3  % Final     % Lymphocytes 04/26/2018 32.2  % Final     % Monocytes 04/26/2018 3.8  % Final     % Eosinophils 04/26/2018 1.2  % Final     % Basophils 04/26/2018 0.3  % Final     % Immature Granulocytes 04/26/2018 0.2  % Final     Nucleated RBCs 04/26/2018 0  0 /100 Final     Absolute Neutrophil 04/26/2018 6.2  1.6 -  8.3 10e9/L Final     Absolute Lymphocytes 04/26/2018 3.2  0.8 - 5.3 10e9/L Final     Absolute Monocytes 04/26/2018 0.4  0.0 - 1.3 10e9/L Final     Absolute Eosinophils 04/26/2018 0.1  0.0 - 0.7 10e9/L Final     Absolute Basophils 04/26/2018 0.0  0.0 - 0.2 10e9/L Final     Abs Immature Granulocytes 04/26/2018 0.0  0 - 0.4 10e9/L Final     Absolute Nucleated RBC 04/26/2018 0.0   Final     Treponema pallidum Antibody 04/26/2018 Negative  NEG^Negative Final     Rubella Antibody IgG Quantitative 04/26/2018 >250  IU/mL Final    Comment: Positive.  Suggests previous exposure or immunization and probable immunity  Reference Range:    Unvaccinated Negative 0-7 IU/mL  Vaccinated or previous exposure Positive 10 IU/ml or greater       Hep B Surface Agn 04/26/2018 Nonreactive  NR^Nonreactive Final     HIV Antigen Antibody Combo 04/26/2018 Nonreactive  NR^Nonreactive     Final    HIV-1 p24 Ag & HIV-1/HIV-2 Ab Not Detected     Hemoglobin A1C 04/26/2018 5.4  0 - 6.4 % Final    Comment: Normal <5.7% Prediabetes 5.7-6.4%  Diabetes 6.5% or higher - adopted from ADA   consensus guidelines.       TSH 04/26/2018 0.22* 0.40 - 4.00 mU/L Final     T4 Free 04/26/2018 1.58* 0.76 - 1.46 ng/dL Final         Assessment and Plan     Mary was seen today for cough.    Diagnoses and all orders for this visit:    Viral upper respiratory tract infection - most likely viral and has been on antibiotics through a lot of the URI symptoms (stopped just 4 days ago).  Reviewed how symptoms are worse in pregnancy and may linger longer. Reviewed her meds and recommended addition of affrin trial for a few nights to allow her to sleep.  She has a list of meds, and if Tessalon is on them, then could try that during the day (C recommendation). Also recommended nasal saline    If continued symptoms or worsening will MyChart and could consider augmentin for presumptive sinusitis but would like to avoid more antibiotics.     Total time - 25 min with more than half  spent counseling on options for her URI.     There are no discontinued medications.    Options for treatment and follow-up care were reviewed with the patient . Mary Wray  engaged in the decision making process and verbalized understanding of the options discussed and agreed with the final plan.    Miriam Gonzalez MD

## 2018-05-11 NOTE — Clinical Note
SVETLANA - she seemed fine with not treating with antibiotics but also miserable. Told her to contact you if still miserable in 5 days.  Nancy

## 2018-05-11 NOTE — MR AVS SNAPSHOT
After Visit Summary   5/11/2018    Mary Wray    MRN: [id number]           Patient Information     Date Of Birth          1975        Visit Information        Provider Department      5/11/2018 7:50 AM Miriam Gonzalez MD [facility]        Today's Diagnoses     Viral upper respiratory tract infection    -  1      Care Instructions    Here is the plan from today's visit    1. Viral upper respiratory tract infection  Affrin - at night for about 2 nights.    Flonase - antiallergy nose spray - that might help if this is also some allergies on top of a cold. Flonase in the am.    Ocean nasal spray - use multiple times a day to clean out your nose.   Netti pot will drain things better.   Tessalon pearls - are helpful to suppress the cough.  (prescription).          Please call or return to clinic if your symptoms don't go away.    Follow up plan  As needed.      Thank you for coming to [facility] today.  Lab Testing:  **If you had lab testing today and your results are reassuring or normal they will be mailed to you or sent through [employer] within 7 days.   **If the lab tests need quick action we will call you with the results.  The phone number we will call with results is # [phone] (home) . If this is not the best number please call our clinic and change the number.  Medication Refills:  If you need any refills please call your pharmacy and they will contact us.   If you need to  your refill at a new pharmacy, please contact the new pharmacy directly. The new pharmacy will help you get your medications transferred faster.   Scheduling:  If you have any concerns about today's visit or wish to schedule another appointment please call our office during normal business hours [phone] (8-5:00 M-F)  If a referral was made to a [facility] and you don't get a call from central scheduling please call [phone].  If a Mammogram was ordered  for you at [facility] call [phone] to schedule or change your appointment.  If you had an XRay/CT/Ultrasound/MRI ordered the number is [phone] to schedule or change your radiology appointment.   Medical Concerns:  If you have urgent medical concerns please call [phone] at any time of the day.  If you have a medical emergency please call 911.          Follow-ups after your visit        Your next 10 appointments already scheduled     May 14, 2018  1:45 PM CDT   NEW OB with COLE Ward CNM   [facility] ([email or web address] Clinics)    [facility]  [address]  [address]  [facility] [address] [phone]            May 23, 2018 10:15 AM CDT   Genetic Counseling with UR GEN COUNSELOR 1   [facility] - [facility])    [address]  [facility] [address]   [phone]            May 23, 2018 11:00 AM CDT   MFM NUCHAL TRANSLUCENCY with MARIA [id number]   [facility] Ultrasound - [facility])    [address]  [facility] [address] [phone]            May 23, 2018 11:30 AM CDT   Radiology MD with MARIA EUGENIA HERNANDEZ MD   [facility])    [address]  [facility] [address]   [phone]           Please arrive at the time given for your first appointment. This visit is used internally to schedule the physician's time during your ultrasound.              Who to contact     Please call your clinic at [phone] to:    Ask questions about your health    Make or cancel appointments    Discuss your medicines    Learn about your test results    Speak to your doctor            Additional Information About Your Visit        [employer] Information     [employer] gives you secure access to your electronic health record. If you see a  primary care provider, you can also send messages to your care team and make appointments. If you have questions, please call your primary care clinic.  If you do not have a primary care provider, please call [phone] and they will assist you.      [employer] is an electronic gateway that provides easy, online access to your medical records. With [employer], you can request a clinic appointment, read your test results, renew a prescription or communicate with your care team.     To access your existing account, please contact your [facility] or call [phone] for assistance.        Care EveryWhere ID     This is your Care EveryWhere ID. This could be used by other organizations to access your [facility] medical records  [id number]        Your Vitals Were     Pulse Temperature Last Period Pulse Oximetry Breastfeeding? BMI (Body Mass Index)    65 97.7  F (36.5  C) (Oral) (LMP Unknown) 98% No 35.65 kg/m2       Blood Pressure from Last 3 Encounters:   05/11/18 106/73   04/26/18 114/78   04/14/18 109/56    Weight from Last 3 Encounters:   05/11/18 227 lb 9.6 oz (103.2 kg)   04/26/18 232 lb 12.8 oz (105.6 kg)   04/08/18 235 lb 2 oz (106.7 kg)              Today, you had the following     No orders found for display       Primary Care Provider Office Phone # Fax #    Jazlyn Huitron [phone]       [address] [address]        Equal Access to Services     SUGAR MELISSA : Hadii aster hernandezo [place], waaxda luqadaha, qaybta kaalmada maico, adrianna coy . So [facility] [phone].    ATENCIÓN: Si habla mounika, tiene a perdue disposición servicios gratuitos de asistencia lingüística. Llame al [phone].    We comply with applicable federal civil rights laws and [place] laws. We do not discriminate on the basis of race, color, national origin, age, disability, sex, sexual orientation, or gender identity.             Thank you!     Thank you for choosing [facility]  for your care. Our goal is always to provide you with excellent care. Hearing back from our patients is one way we can continue to improve our services. Please take a few minutes to complete the written survey that you may receive in the mail after your visit with us. Thank you!             Your Updated Medication List - Protect others around you: Learn how to safely use, store and throw away your medicines at www.disposemymeds.org.          This list is accurate as of 5/11/18  8:21 AM.  Always use your most recent med list.                   Brand Name Dispense Instructions for use Diagnosis    amoxicillin 500 MG capsule    AMOXIL    10 capsule    Take 1 capsule (500 mg) by mouth 2 times daily    GBS bacteriuria       cholecalciferol 1000 units capsule    vitamin  -D    30 capsule    Take 5 capsules (5,000 Units) by mouth daily        prenatal multivitamin plus iron 27-0.8 MG Tabs per tablet     100 tablet    Take 1 tablet by mouth daily    Encounter for preconception consultation       progesterone (in sesame oil) 50 MG/ML injection      Inject into the muscle daily        * SYNTHROID 137 MCG tablet   Generic drug:  levothyroxine           * SYNTHROID 200 MCG tablet   Generic drug:  levothyroxine     90 tablet    Take 1 tablet (200 mcg) by mouth daily    Postablative hypothyroidism       * SYNTHROID 75 MCG tablet   Generic drug:  levothyroxine     30 tablet    Take 1 tablet (75 mcg) by mouth daily    Postablative hypothyroidism       * VIVELLE-DOT TD      4 patches Uses 4 patches every other day b        * VIVELLE-DOT TD      3 times a day        * Notice:  This list has 5 medication(s) that are the same as other medications prescribed for you. Read the directions carefully, and ask your doctor or other care provider to review them with you.

## 2018-05-11 NOTE — PATIENT INSTRUCTIONS
Here is the plan from today's visit    1. Viral upper respiratory tract infection  Affrin - at night for about 2 nights.    Flonase - antiallergy nose spray - that might help if this is also some allergies on top of a cold. Flonase in the am.    Ocean nasal spray - use multiple times a day to clean out your nose.   Netti pot will drain things better.   Tessalon pearls - are helpful to suppress the cough.  (prescription).          Please call or return to clinic if your symptoms don't go away.    Follow up plan  As needed.      Thank you for coming to [facility] today.  Lab Testing:  **If you had lab testing today and your results are reassuring or normal they will be mailed to you or sent through [facility] within 7 days.   **If the lab tests need quick action we will call you with the results.  The phone number we will call with results is # [phone] (home) . If this is not the best number please call our clinic and change the number.  Medication Refills:  If you need any refills please call your pharmacy and they will contact us.   If you need to  your refill at a new pharmacy, please contact the new pharmacy directly. The new pharmacy will help you get your medications transferred faster.   Scheduling:  If you have any concerns about today's visit or wish to schedule another appointment please call our office during normal business hours [phone] (8-5:00 M-F)  If a referral was made to a [facility] and you don't get a call from central scheduling please call [phone].  If a Mammogram was ordered for you at [facility] call [phone] to schedule or change your appointment.  If you had an XRay/CT/Ultrasound/MRI ordered the number is [phone] to schedule or change your radiology appointment.   Medical Concerns:  If you have urgent medical concerns please call [phone] at any time of the day.  If you have a medical emergency please call [phone].

## 2018-05-14 ENCOUNTER — OFFICE VISIT (OUTPATIENT)
Dept: OBGYN | Facility: CLINIC | Age: 43
End: 2018-05-14
Attending: ADVANCED PRACTICE MIDWIFE
Payer: COMMERCIAL

## 2018-05-14 VITALS
DIASTOLIC BLOOD PRESSURE: 71 MMHG | SYSTOLIC BLOOD PRESSURE: 106 MMHG | HEART RATE: 82 BPM | WEIGHT: 228 LBS | HEIGHT: 67 IN | BODY MASS INDEX: 35.79 KG/M2

## 2018-05-14 DIAGNOSIS — O09.521 ELDERLY MULTIGRAVIDA IN FIRST TRIMESTER: ICD-10-CM

## 2018-05-14 DIAGNOSIS — R87.612 PAPANICOLAOU SMEAR OF CERVIX WITH LOW GRADE SQUAMOUS INTRAEPITHELIAL LESION (LGSIL): ICD-10-CM

## 2018-05-14 DIAGNOSIS — E66.812 CLASS 2 OBESITY DUE TO EXCESS CALORIES WITHOUT SERIOUS COMORBIDITY WITH BODY MASS INDEX (BMI) OF 36.0 TO 36.9 IN ADULT: ICD-10-CM

## 2018-05-14 DIAGNOSIS — O09.91 HIGH-RISK PREGNANCY IN FIRST TRIMESTER: Primary | ICD-10-CM

## 2018-05-14 DIAGNOSIS — E66.09 CLASS 2 OBESITY DUE TO EXCESS CALORIES WITHOUT SERIOUS COMORBIDITY WITH BODY MASS INDEX (BMI) OF 36.0 TO 36.9 IN ADULT: ICD-10-CM

## 2018-05-14 DIAGNOSIS — O20.9 FIRST TRIMESTER BLEEDING: ICD-10-CM

## 2018-05-14 PROCEDURE — 87591 N.GONORRHOEAE DNA AMP PROB: CPT | Performed by: ADVANCED PRACTICE MIDWIFE

## 2018-05-14 PROCEDURE — 87491 CHLMYD TRACH DNA AMP PROBE: CPT | Performed by: ADVANCED PRACTICE MIDWIFE

## 2018-05-14 PROCEDURE — G0463 HOSPITAL OUTPT CLINIC VISIT: HCPCS | Mod: ZF

## 2018-05-14 PROCEDURE — 87086 URINE CULTURE/COLONY COUNT: CPT | Performed by: ADVANCED PRACTICE MIDWIFE

## 2018-05-14 ASSESSMENT — PAIN SCALES - GENERAL: PAINLEVEL: NO PAIN (0)

## 2018-05-14 NOTE — MR AVS SNAPSHOT
After Visit Summary   5/14/2018    Mary Wray    MRN: [id number]           Patient Information     Date Of Birth          1975        Visit Information        Provider Department      5/14/2018 1:45 PM Brandee Keeen APRN CNM [facility]        Today's Diagnoses     High-risk pregnancy in first trimester    -  1    First trimester bleeding        Elderly multigravida in first trimester        Papanicolaou smear of cervix with low grade squamous intraepithelial lesion (LGSIL)        Class 2 obesity due to excess calories without serious comorbidity with body mass index (BMI) of 36.0 to 36.9 in adult           Follow-ups after your visit        Follow-up notes from your care team     Return in about 4 weeks (around 6/11/2018) for SIOMARA.      Your next 10 appointments already scheduled     May 23, 2018 10:15 AM CDT   Genetic Counseling with MARIA EUGENIA GEN COUNSELOR 1   [facility] Maternal Fetal Medicine - [facility])    [address]  [facility] [address]   [phone]            May 23, 2018 11:00 AM CDT   MFM NUCHAL TRANSLUCENCY with [id number]   [facility] Maternal Fetal Medicine Ultrasound - [place] ([facility])    [address]  [facility] [address] [phone]            May 23, 2018 11:30 AM CDT   Radiology MD with MARIA EUGENIA HERNANDEZ MD   [facility] Maternal Fetal Medicine - [place] ([facility])    [address]  [facility] [address]   [phone]           Please arrive at the time given for your first appointment. This visit is used internally to schedule the physician's time during your ultrasound.            Jun 27, 2018  8:45 AM CDT   RETURN OB with Luly Bai MD   [facility] ([facility])    [facility] [address]  [address]  [facility] [address] [phone]  "             Who to contact     Please call your clinic at [phone] to:    Ask questions about your health    Make or cancel appointments    Discuss your medicines    Learn about your test results    Speak to your doctor            Additional Information About Your Visit        [employer] Information     [employer] gives you secure access to your electronic health record. If you see a primary care provider, you can also send messages to your care team and make appointments. If you have questions, please call your primary care clinic.  If you do not have a primary care provider, please call [phone] and they will assist you.      [employer] is an electronic gateway that provides easy, online access to your medical records. With [employer], you can request a clinic appointment, read your test results, renew a prescription or communicate with your care team.     To access your existing account, please contact your [facility] or call [phone] for assistance.        Care EveryWhere ID     This is your Care EveryWhere ID. This could be used by other organizations to access your [facility] medical records  [id number]        Your Vitals Were     Pulse Height Last Period BMI (Body Mass Index)          82 1.702 m (5' 7\") (LMP Unknown) 35.71 kg/m2         Blood Pressure from Last 3 Encounters:   05/14/18 106/71   05/11/18 106/73   04/26/18 114/78    Weight from Last 3 Encounters:   05/14/18 103.4 kg (228 lb)   05/11/18 103.2 kg (227 lb 9.6 oz)   04/26/18 105.6 kg (232 lb 12.8 oz)              We Performed the Following     Chlamydia PCR (Clinic Collect)     Gonorrhea PCR     Urine Culture Aerobic Bacterial        Primary Care Provider Office Phone # Fax #    Jazlyn Huitron [phone]       [address] [address]        Equal Access to Services     SUGAR MELISSA AH: lesley Waldron qaybta kaalmada adeegyada, waxay idiin " yuniormaximedaniel connorsnava ericksheri laSariaadaniel ah. So [facility] [phone].    ATENCIÓN: Si chanla mounika, tiene a perdue disposición servicios gratuitos de asistencia lingüística. Oscar hunt [phone].    We comply with applicable federal civil rights laws and [place] laws. We do not discriminate on the basis of race, color, national origin, age, disability, sex, sexual orientation, or gender identity.            Thank you!     Thank you for choosing [facility]  for your care. Our goal is always to provide you with excellent care. Hearing back from our patients is one way we can continue to improve our services. Please take a few minutes to complete the written survey that you may receive in the mail after your visit with us. Thank you!             Your Updated Medication List - Protect others around you: Learn how to safely use, store and throw away your medicines at www.disposemymeds.org.          This list is accurate as of 5/14/18 11:59 PM.  Always use your most recent med list.                   Brand Name Dispense Instructions for use Diagnosis    amoxicillin 500 MG capsule    AMOXIL    10 capsule    Take 1 capsule (500 mg) by mouth 2 times daily    GBS bacteriuria       cholecalciferol 1000 units capsule    vitamin  -D    30 capsule    Take 5 capsules (5,000 Units) by mouth daily        prenatal multivitamin plus iron 27-0.8 MG Tabs per tablet     100 tablet    Take 1 tablet by mouth daily    Encounter for preconception consultation       progesterone (in sesame oil) 50 MG/ML injection      Inject into the muscle daily        * SYNTHROID 137 MCG tablet   Generic drug:  levothyroxine           * SYNTHROID 200 MCG tablet   Generic drug:  levothyroxine     90 tablet    Take 1 tablet (200 mcg) by mouth daily    Postablative hypothyroidism       * SYNTHROID 75 MCG tablet   Generic drug:  levothyroxine     30 tablet    Take 1 tablet (75 mcg) by mouth daily    Postablative hypothyroidism       * VIVELLE-DOT TD      4  patches Uses 4 patches every other day b        * VIVELLE-DOT TD      3 times a day        * Notice:  This list has 5 medication(s) that are the same as other medications prescribed for you. Read the directions carefully, and ask your doctor or other care provider to review them with you.

## 2018-05-14 NOTE — LETTER
2018       RE: Mary Wray  [address]  [place] [address]     Dear Colleague,    Thank you for referring your patient, Mary Wray, to the [facility] at [facility]. Please see a copy of my visit note below.    SUBJECTIVE:    42 year old, female, , 12w1d,  who presents to the clinic today for a new ob visit.    Feels well. Has  started PNV.  Estimated Date of Delivery: Dec 1, 2018   Dec 1, 2018 is calculated from No LMP recorded (lmp unknown). Patient is pregnant..      She has not had more bleeding since last visit.   She has had mild nausea. Weight loss has not occurred.   This was a planned pregnancy.   Donor sperm. Is in a relationship w male, but not planning to co-parent  single  OTHER CONCERNS: worried about chance of bleeding and miscarriage. Has completed all supportive hormones from [facility]. Has started ASA daily    ===========================================  ROS  PSYCHIATRIC:  Denies mood changes, depression or anxiety  PHQ9: Last PHQ-9 score on record= 2  Social History   Substance Use Topics     Smoking status: Never Smoker     Smokeless tobacco: Never Used     Alcohol use 1.0 oz/week     2 Standard drinks or equivalent per week      Comment: occasional     History   Drug Use No     History   Smoking Status     Never Smoker   Smokeless Tobacco     Never Used       Alcohol use Yes 1.0 oz/week 2 Standard drinks or equivalent per week   Comment: occasional      Family History   Problem Relation Age of Onset     Obesity Mother      DIABETES Father 68     Obesity Father      Obesity Sister      ============================================  MEDICAL HISTORY   No Known Allergies    [unfilled]      Current Outpatient Prescriptions:      amoxicillin (AMOXIL) 500 MG capsule, Take 1 capsule (500 mg) by mouth 2 times daily (Patient not taking: Reported on 2018), Disp: 10 capsule, Rfl: 0     cholecalciferol (VITAMIN  -D)  "1000 UNITS capsule, Take 5 capsules (5,000 Units) by mouth daily, Disp: 30 capsule, Rfl:      Estradiol (VIVELLE-DOT TD), 4 patches Uses 4 patches every other day b, Disp: , Rfl:      Estradiol (VIVELLE-DOT TD), 3 times a day, Disp: , Rfl:      levothyroxine (SYNTHROID) 137 MCG tablet, , Disp: , Rfl:      Prenatal Vit-Fe Fumarate-FA (PRENATAL MULTIVITAMIN  PLUS IRON) 27-0.8 MG TABS per tablet, Take 1 tablet by mouth daily, Disp: 100 tablet, Rfl: 3     progesterone, in sesame oil, 50 MG/ML injection, Inject into the muscle daily, Disp: , Rfl:      SYNTHROID 200 MCG tablet, Take 1 tablet (200 mcg) by mouth daily, Disp: 90 tablet, Rfl: 3     SYNTHROID 75 MCG tablet, Take 1 tablet (75 mcg) by mouth daily (Patient not taking: Reported on 2018), Disp: 30 tablet, Rfl: 1    Past Medical History:   Diagnosis Date     History of Graves' disease     Graves     Hypovitaminosis D      Mild depression (H)     , no meds, resolved with move     Obesity      Postablative hypothyroidism     CCRM     Premenstrual dysphoria        Past Surgical History:   Procedure Laterality Date     LEEP TX, CERVICAL      \"for HPV\"            Obstetric History       T0      L0     SAB1   TAB0   Ectopic0   Multiple0   Live Births0       # Outcome Date GA Lbr Arnold/2nd Weight Sex Delivery Anes PTL Lv   2 Current            1 17                  GYN History- has had Abnormal Pap Smears, last pap normal/neg HPV. Pt desires repeat Pap in 2018                                History of STI: none    I personally reviewed the past social/family/medical and surgical history on the date of service.   I reviewed lab work done at Intake visit with patient.    OBJECTIVE:   PHYSICAL EXAM:  /71  Pulse 82  Ht 1.702 m (5' 7\")  Wt 103.4 kg (228 lb)  LMP  (LMP Unknown)  BMI 35.71 kg/m2  BMI- Body mass index is 35.71 kg/(m^2)., GENERAL:  Pleasant pregnant female, alert, cooperative  and well groomed.  SKIN:  Warm and dry, " without lesions or rashes  HEAD: Symmetrical features.  MOUTH:  Buccal mucosa pink, moist without lesions.  Teeth in good repair.    NECK:  Thyroid without enlargement and nodules.  Lymph nodes not palpable.   LUNGS:  Clear to auscultation.  BREAST:    No dominant, fixed or suspicious masses are noted.  No skin or nipple changes or axillary nodes.   Nipples everted.      HEART:  RRR without murmur.  ABDOMEN: Soft without masses , tenderness or organomegaly.  No CVA tenderness.  Uterus palpable at size equal to dates.  No scars noted.. Fetal heart tones present.  MUSCULOSKELETAL:  Full range of motion  EXTREMITIES:  No edema. No significant varicosities.   PELVIC EXAM:  GENITALIA: EGBUS  External genitalia, Bartholin's glands, urethra & Marlin's glands:normal. Vulva reveals no erythema or lesions.         VAGINA:  pink, normal rugae and discharge, no lesions, good tone.   CERVIX:  smooth, without discharge or CMT.                UTERUS: Anteverted,  nontender 11 week size.   ADNEXA:  Without masses or tenderness.   RECTAL:  Normal appearance.  Digital exam deferred.  WET PREP:Not done  GC/CHLAMYDIA CULTURE OBTAINED:YES    ASSESSMENT:  Intrauterine pregnancy 11d size  consistent with dates  Genetic Screening: First Trimester Screen  Encounter Diagnoses   Name Primary?     First trimester bleeding      Elderly multigravida in first trimester      Papanicolaou smear of cervix with low grade squamous intraepithelial lesion (LGSIL)      Class 2 obesity due to excess calories without serious comorbidity with body mass index (BMI) of 36.0 to 36.9 in adult      High-risk pregnancy in first trimester Yes        PLAN:  Orders Placed This Encounter   Procedures     Glucose 1 Hour     No orders of the defined types were placed in this encounter.    - Reviewed use of triage nurse line and contacting the on-call provider after hours for an urgent need such as fever, vagina bleeding, bladder or vaginal infection, rupture of  membranes,  or term labor.    - Reviewed best evidence for: weight gain for her weight and height for pregnancy:  RECOMMENDED WEIGHT GAIN: < 15 lbs.   healthy diet and foods to avoid; exercise and activity during pregnancy;avoiding exposure to toxoplasmosis; and maintenance of a generally healthy lifestyle.   - Discussed the harms, benefits, side effects and alternative therapies for current prescribed and OTC medications.    - All pt's questions discussed and answered.  Pt verbalized understanding of and agreement to plan of care.     - Continue scheduled prenatal care and prn if questions or concerns    COLE Paulino CNM

## 2018-05-16 LAB
BACTERIA SPEC CULT: ABNORMAL
SPECIMEN SOURCE: ABNORMAL

## 2018-05-20 NOTE — PROGRESS NOTES
SUBJECTIVE:    42 year old, female, , 12w1d,  who presents to the clinic today for a new ob visit.    Feels well. Has  started PNV.  Estimated Date of Delivery: Dec 1, 2018   Dec 1, 2018 is calculated from No LMP recorded (lmp unknown). Patient is pregnant..      She has not had more bleeding since last visit.   She has had mild nausea. Weight loss has not occurred.   This was a planned pregnancy.   Donor sperm. Is in a relationship w male, but not planning to co-parent  single  OTHER CONCERNS: worried about chance of bleeding and miscarriage. Has completed all supportive hormones from [facility]. Has started ASA daily    ===========================================  ROS  PSYCHIATRIC:  Denies mood changes, depression or anxiety  PHQ9: Last PHQ-9 score on record= 2  Social History   Substance Use Topics     Smoking status: Never Smoker     Smokeless tobacco: Never Used     Alcohol use 1.0 oz/week     2 Standard drinks or equivalent per week      Comment: occasional     History   Drug Use No     History   Smoking Status     Never Smoker   Smokeless Tobacco     Never Used       Alcohol use Yes 1.0 oz/week 2 Standard drinks or equivalent per week   Comment: occasional      Family History   Problem Relation Age of Onset     Obesity Mother      DIABETES Father 68     Obesity Father      Obesity Sister      ============================================  MEDICAL HISTORY   No Known Allergies    [unfilled]      Current Outpatient Prescriptions:      amoxicillin (AMOXIL) 500 MG capsule, Take 1 capsule (500 mg) by mouth 2 times daily (Patient not taking: Reported on 2018), Disp: 10 capsule, Rfl: 0     cholecalciferol (VITAMIN  -D) 1000 UNITS capsule, Take 5 capsules (5,000 Units) by mouth daily, Disp: 30 capsule, Rfl:      Estradiol (VIVELLE-DOT TD), 4 patches Uses 4 patches every other day b, Disp: , Rfl:      Estradiol (VIVELLE-DOT TD), 3 times a day, Disp: , Rfl:      levothyroxine (SYNTHROID) 137 MCG tablet, , Disp: ,  "Rfl:      Prenatal Vit-Fe Fumarate-FA (PRENATAL MULTIVITAMIN  PLUS IRON) 27-0.8 MG TABS per tablet, Take 1 tablet by mouth daily, Disp: 100 tablet, Rfl: 3     progesterone, in sesame oil, 50 MG/ML injection, Inject into the muscle daily, Disp: , Rfl:      SYNTHROID 200 MCG tablet, Take 1 tablet (200 mcg) by mouth daily, Disp: 90 tablet, Rfl: 3     SYNTHROID 75 MCG tablet, Take 1 tablet (75 mcg) by mouth daily (Patient not taking: Reported on 2018), Disp: 30 tablet, Rfl: 1    Past Medical History:   Diagnosis Date     History of Graves' disease     Graves     Hypovitaminosis D      Mild depression (H)     , no meds, resolved with move     Obesity      Postablative hypothyroidism     CCRM     Premenstrual dysphoria        Past Surgical History:   Procedure Laterality Date     LEEP TX, CERVICAL      \"for HPV\"            Obstetric History       T0      L0     SAB1   TAB0   Ectopic0   Multiple0   Live Births0       # Outcome Date GA Lbr Arnold/2nd Weight Sex Delivery Anes PTL Lv   2 Current            1 17                  GYN History- has had Abnormal Pap Smears, last pap normal/neg HPV. Pt desires repeat Pap in 2018                                History of STI: none    I personally reviewed the past social/family/medical and surgical history on the date of service.   I reviewed lab work done at Intake visit with patient.    OBJECTIVE:   PHYSICAL EXAM:  /71  Pulse 82  Ht 1.702 m (5' 7\")  Wt 103.4 kg (228 lb)  LMP  (LMP Unknown)  BMI 35.71 kg/m2  BMI- Body mass index is 35.71 kg/(m^2)., GENERAL:  Pleasant pregnant female, alert, cooperative  and well groomed.  SKIN:  Warm and dry, without lesions or rashes  HEAD: Symmetrical features.  MOUTH:  Buccal mucosa pink, moist without lesions.  Teeth in good repair.    NECK:  Thyroid without enlargement and nodules.  Lymph nodes not palpable.   LUNGS:  Clear to auscultation.  BREAST:    No dominant, fixed or suspicious masses are " noted.  No skin or nipple changes or axillary nodes.   Nipples everted.      HEART:  RRR without murmur.  ABDOMEN: Soft without masses , tenderness or organomegaly.  No CVA tenderness.  Uterus palpable at size equal to dates.  No scars noted.. Fetal heart tones present.  MUSCULOSKELETAL:  Full range of motion  EXTREMITIES:  No edema. No significant varicosities.   PELVIC EXAM:  GENITALIA: EGBUS  External genitalia, Bartholin's glands, urethra & Landmark's glands:normal. Vulva reveals no erythema or lesions.         VAGINA:  pink, normal rugae and discharge, no lesions, good tone.   CERVIX:  smooth, without discharge or CMT.                UTERUS: Anteverted,  nontender 11 week size.   ADNEXA:  Without masses or tenderness.   RECTAL:  Normal appearance.  Digital exam deferred.  WET PREP:Not done  GC/CHLAMYDIA CULTURE OBTAINED:YES    ASSESSMENT:  Intrauterine pregnancy 11d size  consistent with dates  Genetic Screening: First Trimester Screen  Encounter Diagnoses   Name Primary?     First trimester bleeding      Elderly multigravida in first trimester      Papanicolaou smear of cervix with low grade squamous intraepithelial lesion (LGSIL)      Class 2 obesity due to excess calories without serious comorbidity with body mass index (BMI) of 36.0 to 36.9 in adult      High-risk pregnancy in first trimester Yes        PLAN:  Orders Placed This Encounter   Procedures     Glucose 1 Hour     No orders of the defined types were placed in this encounter.    - Reviewed use of triage nurse line and contacting the on-call provider after hours for an urgent need such as fever, vagina bleeding, bladder or vaginal infection, rupture of membranes,  or term labor.    - Reviewed best evidence for: weight gain for her weight and height for pregnancy:  RECOMMENDED WEIGHT GAIN: < 15 lbs.   healthy diet and foods to avoid; exercise and activity during pregnancy;avoiding exposure to toxoplasmosis; and maintenance of a generally healthy  lifestyle.   - Discussed the harms, benefits, side effects and alternative therapies for current prescribed and OTC medications.    - All pt's questions discussed and answered.  Pt verbalized understanding of and agreement to plan of care.     - Continue scheduled prenatal care and prn if questions or concerns    Brandee Keene, COLE MELGAR

## 2018-05-21 ENCOUNTER — PRE VISIT (OUTPATIENT)
Dept: MATERNAL FETAL MEDICINE | Facility: CLINIC | Age: 43
End: 2018-05-21

## 2018-05-23 ENCOUNTER — HOSPITAL ENCOUNTER (OUTPATIENT)
Dept: ULTRASOUND IMAGING | Facility: CLINIC | Age: 43
Discharge: HOME | End: 2018-05-23
Attending: ADVANCED PRACTICE MIDWIFE | Admitting: OBSTETRICS & GYNECOLOGY
Payer: COMMERCIAL

## 2018-05-23 ENCOUNTER — OFFICE VISIT (OUTPATIENT)
Dept: MATERNAL FETAL MEDICINE | Facility: CLINIC | Age: 43
End: 2018-05-23
Attending: ADVANCED PRACTICE MIDWIFE
Payer: COMMERCIAL

## 2018-05-23 DIAGNOSIS — O09.511 AMA (ADVANCED MATERNAL AGE) PRIMIGRAVIDA 35+, FIRST TRIMESTER: Primary | ICD-10-CM

## 2018-05-23 DIAGNOSIS — Z31.430 ENCOUNTER OF FEMALE FOR TESTING FOR GENETIC DISEASE CARRIER STATUS FOR PROCREATIVE MANAGEMENT: Primary | ICD-10-CM

## 2018-05-23 DIAGNOSIS — O26.90 PREGNANCY RELATED CONDITION, ANTEPARTUM: ICD-10-CM

## 2018-05-23 DIAGNOSIS — O09.521 SUPERVISION OF ELDERLY MULTIGRAVIDA IN FIRST TRIMESTER: ICD-10-CM

## 2018-05-23 PROBLEM — D25.9 FIBROID UTERUS: Status: ACTIVE | Noted: 2018-05-23

## 2018-05-23 PROCEDURE — 96040 ZZH GENETIC COUNSELING, EACH 30 MINUTES: CPT | Mod: ZF | Performed by: GENETIC COUNSELOR, MS

## 2018-05-23 PROCEDURE — 40000791 ZZHCL STATISTIC VERIFI PRENATAL TRISOMY 21,18,13: Performed by: OBSTETRICS & GYNECOLOGY

## 2018-05-23 PROCEDURE — 76813 OB US NUCHAL MEAS 1 GEST: CPT

## 2018-05-23 PROCEDURE — 40000954 ZZHCL STATISTIC COUNSYL FAMILY PREP: Performed by: OBSTETRICS & GYNECOLOGY

## 2018-05-23 PROCEDURE — 36415 COLL VENOUS BLD VENIPUNCTURE: CPT | Performed by: OBSTETRICS & GYNECOLOGY

## 2018-05-23 NOTE — MR AVS SNAPSHOT
After Visit Summary   5/23/2018    Mary Wray    MRN: [id number]           Patient Information     Date Of Birth          1975        Visit Information        Provider Department      5/23/2018 11:30 AM Malissa Kaba,  [facility]        Today's Diagnoses     AMA (advanced maternal age) primigravida 35+, first trimester    -  1       Follow-ups after your visit        Your next 10 appointments already scheduled     Jun 27, 2018  8:45 AM CDT   RETURN OB with Luly Bai MD   [facility] ([facility])    [facility] [address]  [address]  [address]  [facility] [id number] [phone]              Future tests that were ordered for you today     Open Future Orders        Priority Expected Expires Ordered    Non Invasive Prenatal Test Cell Free DNA Routine  8/21/2018 5/23/2018    Counsyl Foresight Carrier Screen Routine  8/21/2018 5/23/2018            Who to contact     If you have questions or need follow up information about today's clinic visit or your schedule please contact [facility] directly at [phone].  Normal or non-critical lab and imaging results will be communicated to you by MyChart, letter or phone within 4 business days after the clinic has received the results. If you do not hear from us within 7 days, please contact the clinic through [employer] or phone. If you have a critical or abnormal lab result, we will notify you by phone as soon as possible.  Submit refill requests through [employer] or call your pharmacy and they will forward the refill request to us. Please allow 3 business days for your refill to be completed.          Additional Information About Your Visit        MyChart Information     [employer] gives you secure access to your electronic health record. If you see a primary care provider, you can also send messages to your care team  and make appointments. If you have questions, please call your primary care clinic.  If you do not have a primary care provider, please call [phone] and they will assist you.        Care EveryWhere ID     This is your Care EveryWhere ID. This could be used by other organizations to access your [place] medical records  [id number]        Your Vitals Were     Last Period                   (LMP Unknown)            Blood Pressure from Last 3 Encounters:   05/14/18 106/71   05/11/18 106/73   04/26/18 114/78    Weight from Last 3 Encounters:   05/14/18 103.4 kg (228 lb)   05/11/18 103.2 kg (227 lb 9.6 oz)   04/26/18 105.6 kg (232 lb 12.8 oz)              Today, you had the following     No orders found for display       Primary Care Provider Office Phone # Fax #    Jazlyn Huitron [phone]       [address] [address]        Equal Access to Services     SUGAR MELISSA : Hadii aad ku hadasho Soomaali, waaxda luqadaha, qaybta kaalmada adeegyada, waxay jocelinin timothy coy . So [facility] [phone].    ATENCIÓN: Si habla español, tiene a perdue disposición servicios gratuitos de asistencia lingüística. [facility] [phone].    We comply with applicable federal civil rights laws and [place] laws. We do not discriminate on the basis of race, color, national origin, age, disability, sex, sexual orientation, or gender identity.            Thank you!     Thank you for choosing [facility]  for your care. Our goal is always to provide you with excellent care. Hearing back from our patients is one way we can continue to improve our services. Please take a few minutes to complete the written survey that you may receive in the mail after your visit with us. Thank you!             Your Updated Medication List - Protect others around you: Learn how to safely use, store and throw away your medicines at www.disposemymeds.org.          This list  is accurate as of 5/23/18 12:19 PM.  Always use your most recent med list.                   Brand Name Dispense Instructions for use Diagnosis    amoxicillin 500 MG capsule    AMOXIL    10 capsule    Take 1 capsule (500 mg) by mouth 2 times daily    GBS bacteriuria       cholecalciferol 1000 units capsule    vitamin  -D    30 capsule    Take 5 capsules (5,000 Units) by mouth daily        prenatal multivitamin plus iron 27-0.8 MG Tabs per tablet     100 tablet    Take 1 tablet by mouth daily    Encounter for preconception consultation       progesterone (in sesame oil) 50 MG/ML injection      Inject into the muscle daily        * SYNTHROID 137 MCG tablet   Generic drug:  levothyroxine           * SYNTHROID 200 MCG tablet   Generic drug:  levothyroxine     90 tablet    Take 1 tablet (200 mcg) by mouth daily    Postablative hypothyroidism       * SYNTHROID 75 MCG tablet   Generic drug:  levothyroxine     30 tablet    Take 1 tablet (75 mcg) by mouth daily    Postablative hypothyroidism       * VIVELLE-DOT TD      4 patches Uses 4 patches every other day b        * VIVELLE-DOT TD      3 times a day        * Notice:  This list has 5 medication(s) that are the same as other medications prescribed for you. Read the directions carefully, and ask your doctor or other care provider to review them with you.

## 2018-05-23 NOTE — PROGRESS NOTES
"Please see \"Imaging\" tab under \"Chart Review\" for details of today's US.    Malissa Kaba, DO    "

## 2018-05-23 NOTE — MR AVS SNAPSHOT
After Visit Summary   5/23/2018    Mary Wray    MRN: [id number]           Patient Information     Date Of Birth          1975        Visit Information        Provider Department      5/23/2018 10:15 AM Dav Elizalde GC [facility] Maternal Fetal Medicine [facility]        Today's Diagnoses     Encounter of female for testing for genetic disease carrier status for procreative management    -  1    Pregnancy related condition, antepartum        Supervision of elderly multigravida in first trimester           Follow-ups after your visit        Your next 10 appointments already scheduled     Jun 27, 2018  8:45 AM CDT   RETURN OB with Luly Bai MD   [facility] ([facility])    [facility] [address]  [address]  [address]  [facility] [id number] [phone]            Jul 11, 2018  8:45 AM CDT   MFM US COMP with [id number]   [facility] Maternal Fetal Medicine Ultrasound - [place] ([facility])    [address]  [facility] [address] [phone]           Wear comfortable clothes and leave your valuables at home.            Jul 11, 2018  9:15 AM CDT   Radiology MD with MARIA EUGENIA HERNANDEZ MD   [facility] Maternal Fetal Medicine - [facility])    [address]  [facility] [address] [phone]           Please arrive at the time given for your first appointment. This visit is used internally to schedule the physician's time during your ultrasound.              Who to contact     If you have questions or need follow up information about today's clinic visit or your schedule please contact [facility] directly at [phone].  Normal or non-critical lab and imaging results will be communicated to you by MyChart, letter or phone within 4 business days after the clinic has received the results. If you do not hear  from us within 7 days, please contact the clinic through [facility] or phone. If you have a critical or abnormal lab result, we will notify you by phone as soon as possible.  Submit refill requests through [facility] or call your pharmacy and they will forward the refill request to us. Please allow 3 business days for your refill to be completed.          Additional Information About Your Visit        [employer] Information     [facility] gives you secure access to your electronic health record. If you see a primary care provider, you can also send messages to your care team and make appointments. If you have questions, please call your primary care clinic.  If you do not have a primary care provider, please call [phone] and they will assist you.        Care EveryWhere ID     This is your Care EveryWhere ID. This could be used by other organizations to access your [place] medical records  [id number]        Your Vitals Were     Last Period                   (LMP Unknown)            Blood Pressure from Last 3 Encounters:   05/14/18 106/71   05/11/18 106/73   04/26/18 114/78    Weight from Last 3 Encounters:   05/14/18 103.4 kg (228 lb)   05/11/18 103.2 kg (227 lb 9.6 oz)   04/26/18 105.6 kg (232 lb 12.8 oz)              We Performed the Following     Counsyl Foresight Carrier Screen     M Genetic Counseling     Non Invasive Prenatal Test Cell Free DNA        Primary Care Provider Office Phone # Fax #    Jazlyn Huitron [phone]       [address] [address]        Equal Access to Services     SUGAR MELISSA : Hadii aad ku hadasho Soomaali, waaxda luqadaha, qaybta kaalmada congyadaron, wax[employer] Systems devante coy . So [facility] [phone].    ATENCIÓN: Si habla español, tiene a perdue disposición servicios gratuitos de asistencia lingüística. Llame al [phone].    We comply with applicable federal civil rights laws and [place] laws. We do not discriminate on the  basis of race, color, national origin, age, disability, sex, sexual orientation, or gender identity.            Thank you!     Thank you for choosing [facility]  for your care. Our goal is always to provide you with excellent care. Hearing back from our patients is one way we can continue to improve our services. Please take a few minutes to complete the written survey that you may receive in the mail after your visit with us. Thank you!             Your Updated Medication List - Protect others around you: Learn how to safely use, store and throw away your medicines at www.disposemymeds.org.          This list is accurate as of 5/23/18  4:26 PM.  Always use your most recent med list.                   Brand Name Dispense Instructions for use Diagnosis    amoxicillin 500 MG capsule    AMOXIL    10 capsule    Take 1 capsule (500 mg) by mouth 2 times daily    GBS bacteriuria       cholecalciferol 1000 units capsule    vitamin  -D    30 capsule    Take 5 capsules (5,000 Units) by mouth daily        prenatal multivitamin plus iron 27-0.8 MG Tabs per tablet     100 tablet    Take 1 tablet by mouth daily    Encounter for preconception consultation       progesterone (in sesame oil) 50 MG/ML injection      Inject into the muscle daily        * SYNTHROID 137 MCG tablet   Generic drug:  levothyroxine           * SYNTHROID 200 MCG tablet   Generic drug:  levothyroxine     90 tablet    Take 1 tablet (200 mcg) by mouth daily    Postablative hypothyroidism       * SYNTHROID 75 MCG tablet   Generic drug:  levothyroxine     30 tablet    Take 1 tablet (75 mcg) by mouth daily    Postablative hypothyroidism       * VIVELLE-DOT TD      4 patches Uses 4 patches every other day b        * VIVELLE-DOT TD      3 times a day        * Notice:  This list has 5 medication(s) that are the same as other medications prescribed for you. Read the directions carefully, and ask your doctor or other care provider to  review them with you.

## 2018-05-23 NOTE — PROGRESS NOTES
[facility] [place]  Genetic Counseling Consult    Patient: Mary Wray YOB: 1975   Date of Service: 18      Mary Wray was seen at [facility] [place] for genetic consultation to discuss the options for screening and testing for fetal chromosome abnormalities.  The indication for genetic counseling is advanced maternal age.        Impression/Plan:   1.  Mary had an ultrasound and blood draw for NIPT (Innatal test through [employer] [facility]  Results are expected within 7-10 days, and will be available in [employer].  We will contact her to discuss the results, and a copy will be forwarded to the office of the referring OB provider.  Mary will be contacted at the phone number she provided, [phone] to discuss results.  Mary requests that fetal sex not be disclosed unless a sex chromosome abnormality is identified.     2.  Mary had blood drawn for expanded carrier screening (Foresight Expanded Carrier Panel through [employer]).  Results are expected within 10-14 days, and will be available in [employer].  We will contact her to discuss the results, and a copy will be forwarded to the office of the referring OB provider.  Mary will be contacted at the phone number she provided, [phone] to discuss results.      3.  Maternal serum AFP (single marker screen) is recommended after 15 weeks to screen for open neural tube defects. A quad screen should not be performed.    4.  An 18-20 week comprehensive ultrasound is standard of care for all women 35 or older at delivery.    5.  A fetal echocardiogram at 20-22 weeks gestational age is recommended for all pregnancies conceived via IVF to assess fetal heart structures.      Pregnancy History:   /Parity:    Age at Delivery: 43 year old  JORDAN: 2018, by Ultrasound  Gestational Age: 12w4d    No significant complications or exposures were reported in the current  pregnancy.    Mary jackson pregnancy was conceived via IVF with pre-implantation genetic screening (referred to as CCS-comprehensive chromosome screen by her IVF clinic) with an anonymous sperm donor.    Medical History:   Mary jackson reported medical history is not expected to impact pregnancy management or risks to fetal development.       Family History:   A three-generation pedigree was obtained, and is scanned under the  Media  tab.   The following significant findings were reported by Mary:      Available information for the sperm donor includes:    Age: low 30s    Ethnic Background: [place]    Normal karyotype    Normal hemoglobin electrophoresis    Negative carrier screening for cystic fibrosis, spinal muscular atrophy, and phenylalanine hydroxylase deficiency    Otherwise, the reported family history is negative for multiple miscarriages, stillbirths, birth defects, cognitive impairment, known genetic conditions, and consanguinity.       Carrier Screening:   The patient reports that she is of  ancestry:      The hemoglobinopathies are a group of genetic blood diseases that occur with increased frequency in individuals of  ancestry and carrier screening for these conditions is available.  Carrier screening for the hemoglobinopathies includes a CBC with red blood cell indices, a ferritin level, and a quantitative hemoglobin electrophoresis or HPLC.  In addition,  screening in the [facility] includes many of the hemoglobinopathies.      Expanded carrier screening for mutations in a large panel of genes associated with autosomal recessive conditions including cystic fibrosis, spinal muscular atrophy, and others, is now available.      The patient elected to pursue carrier screening today.  Her blood was drawn for Foresight Carrier Screening, Universal Panel +Fragile X Syndrome, and sent to [facility] laboratory.  We will contact her when these test results become  available, and a copy of these results will be relayed to her primary OB provider.      During today's appointment we discussed the limitations of carrier testing when only 1 of the parents is available for testing.  If Mary is found to be a carrier, we will be unable to test the sperm donor to learn his carrier status, but we will know that the pregnancy is at increased risk.  Mary reports understanding this limitation and wishes to proceed with testing to gather as much risk information as possible as she moves forward with pregnancy.         Risk Assessment for Chromosome Conditions:   We explained that the risk for fetal chromosome abnormalities increases with maternal age. We discussed specific features of common chromosome abnormalities, including Down syndrome, trisomy 13, trisomy 18, and sex chromosome trisomies.      - At age 42 at midtrimester, the risk to have a baby with Down syndrome is 1 in 56.     - At age 42 at midtrimester, the risk to have a baby with any chromosome abnormality is 1 in 31.     - At age 42 at delivery, the risk to have a baby with Down syndrome is 1 in 89.     - At age 42 at delivery, the risk to have a baby with any chromosome abnormality is 1 in 51.     Mary had comprehensive chromosome screening (CCS) as a part of her IVF process to test embryos for chromosome abnormalities prior to embryo transfer, and only embryos with normal results are considered for transfer.  CCS does not completely eliminate the risks for a pregnancy to have a chromosome abnormality, but does reduce them below the age related risk alone.  It is recommended women with pregnancies that have had this testing are still offered routine testing for these conditions.       Testing Options:   We discussed the following options:   First trimester screening    First trimester ultrasound with nuchal translucency and nasal bone assessments, maternal plasma hCG, GUERDA-A, and AFP measurement    Screens for fetal  trisomy 21, trisomy 13, and trisomy 18    Cannot screen for open neural tube defects; maternal serum AFP after 15 weeks is recommended       Non-invasive Prenatal Testing (NIPT)    Maternal plasma cell-free DNA testing; first trimester ultrasound with nuchal translucency and nasal bone assessment is recommended, when appropriate    Screens for fetal trisomy 21, trisomy 13, trisomy 18, and sex chromosome aneuploidy    Cannot screen for open neural tube defects; maternal serum AFP after 15 weeks is recommended       Chorionic villus sampling (CVS)    Invasive procedure typically performed in the first trimester by which placental villi are obtained for the purpose of chromosome analysis and/or other prenatal genetic analysis    Diagnostic results; >99% sensitivity for fetal chromosome abnormalities    Cannot test for open neural tube defects; maternal serum AFP after 15 weeks is recommended       Genetic Amniocentesis    Invasive procedure typically performed in the second trimester by which amniotic fluid is obtained for the purpose of chromosome analysis and/or other prenatal genetic analysis    Diagnostic results; >99% sensitivity for fetal chromosome abnormalities    AFAFP measurement tests for open neural tube defects       Comprehensive (Level II) ultrasound:    Detailed ultrasound performed between 18-22 weeks gestation    Screen for major birth defects and markers for aneuploidy    We reviewed the benefits and limitations of this testing.  Screening tests provide a risk assessment specific to the pregnancy for certain fetal chromosome abnormalities, but cannot definitively diagnose or exclude a fetal chromosome abnormality.  Follow-up genetic counseling and consideration of diagnostic testing is recommended with any abnormal screening result. Diagnostic tests carry inherent risks- including risk of miscarriage- that require careful consideration.  These tests can detect fetal chromosome abnormalities with  "greater than 99% certainty.  Results can be compromised by maternal cell contamination or mosaicism, and are limited by the resolution of cytogenetic G-banding technology.  There is no screening nor diagnostic test that can detect all forms of birth defects or mental disability.    Mary reports that she is interested in obtaining as much information as possible regarding the possibility of a genetic condition affecting her ongoing pregnancy, as she would elect to terminate a pregnancy that has a health condition that she would consider significant.  We discussed at length the different factors that an individual might consider when deciding if a genetic diagnosis is \"severe enough to terminate for.\"  We discussed that this is an extremely personal decision, and that each individual is going to have their own perspectives on this idea.  We also discussed that there is no test that can completely eliminate the risk for any and all genetic conditions or complications.       It was a pleasure to be involved with Mary s care. Face-to-face time of the meeting was 70 minutes.      Dav Elizalde MS, [facility]  Licensed Genetic Counselor  Phone: [phone]  Pager: [phone]        "

## 2018-05-30 ENCOUNTER — TELEPHONE (OUTPATIENT)
Dept: MATERNAL FETAL MEDICINE | Facility: CLINIC | Age: 43
End: 2018-05-30

## 2018-05-30 DIAGNOSIS — O28.0 ABNORMAL MATERNAL SERUM SCREENING TEST: Primary | ICD-10-CM

## 2018-05-30 DIAGNOSIS — O35.10X1 MATERNAL CARE FOR (SUSPECTED) CHROMOSOMAL ABNORMALITY IN FETUS, FETUS 1: ICD-10-CM

## 2018-05-30 NOTE — TELEPHONE ENCOUNTER
5/30/2018       Called Mary to discuss NIPT results.  Results came back POSITIVE INCREASED RISK FOR TRISOMY 21.  This result increases the risks for Mary's ongoing pregnancy to be affected with trisomy 21.      We discussed that this testing is a screening test, and is not a definitive result.  We discussed that with screening tests, there is a risk value that can be calculated called a Positive Predictive Value, which indicates the probability for a positive screen result to be a true positive test result.  Calculating a PPV requires several accuracy metrics from the test itself along with the pre-test risk for the pregnancy to be affected.  A PPV calculated from [employer] published accuracy metrics and Mary's age related risks alone is approximately 91%, but this is not an accurate reflection of the true risks for Mary's pregnancy to have trisomy 21, because her pre-test risks were actually lower than her age related risk alone due to the testing performed on the embryo prior to implantation.  Mary's fertility clinic has a pre-implantation genetic screening called Comprehensive Chromosome Analysis, which screens for chromosomally normal embryos prior to transfer.  Mary's fertility clinic reports that this testing has an accuracy of 94%, but at this time it is unclear how or if this accuracy measure can be incorporated into a PPV calculation.  All of this information was reviewed with Mary, and she requested that I discuss the accuracy of the CCS analysis with her [facility] physician, Dr. Gee, to attempt to clarify a current risk value for her if possible.  Currently, the best possible risk estimate for Mary's pregnancy is <91% likelihood for the pregnancy to be affected with Down syndrome.    Mary had several questions regarding steps moving forward, and we discussed an amniocentesis as a diagnostic test that is available to clarify the status of her pregnancy.  We briefly reviewed that an  amniocentesis is an invasive procedure in which a needle is used to sample amniotic fluid from around the fetus for genetic testing.  This fluid has cells from the fetus itself and allows for diagnostic, or definitive, testing for genetic conditions in an ongoing pregnancy.  Mary indicated that she might decide to terminate the pregnancy based on a diagnosis of Down syndrome, and asked whether or not she should wait for an amniocentesis to make that decision.  We discussed that it is strongly recommended that patients NOT make decisions about pregnancy termination based on a screening test such as NIPT, because of the known false positive risks for that test.  We discussed that if she is considering making pregnancy termination decisions, an amniocentesis would be able to provide definitive information regarding genetic conditions affecting the pregnancy.  We discussed that Amniocentesis can be scheduled starting at approximately 16 weeks gestational age, and that preliminary results for Down syndrome would be available within 24-48 hours of the procedure.      Plan:    1.  Orders are placed for an amniocentesis and our schedulers will contact Mary to arrange.    2.  Genetic counseling will contact Mary's IVF physician Dr. Gee to discuss the accuracy of the CCS screen to hopefully better clarify current risks for Mary's pregnancy.      Mary was encouraged to contact me with any questions or concerns moving forward, and we will continue to be in contact moving forward as we plan Mary's amniocentesis.      Dav Elizalde MS, [facility]  Licensed Genetic Counselor  Phone: [phone]  Pager: [phone]

## 2018-05-31 ENCOUNTER — TELEPHONE (OUTPATIENT)
Dept: MATERNAL FETAL MEDICINE | Facility: CLINIC | Age: 43
End: 2018-05-31

## 2018-05-31 LAB — LAB SCANNED RESULT: ABNORMAL

## 2018-05-31 NOTE — TELEPHONE ENCOUNTER
5/31/2018    Mary called with several questions regarding her positive NIPT result and continuing care.  We discussed the following topics:      Calculating an accurate positive predictive value is going to be difficult for Mary's NIPT, because one component of that calculation is her pre-test risks.  Typically this corresponds to her age related risk, but Mary's age related risk is not an accurate reflection of risk due to the Comprehensive Chromosome Screening performed during her IVF process which lowers the risks for chromosome abnormalities.  The exact residual risk for Down syndrome after CCS is unclear.     Mary's NT ultrasound was normal, which is reassuring, but we discussed that pregnancies can have a chromosome abnormality and not present with any findings on ultrasound    We discussed that preliminary results from an amniocentesis (called FISH testing) are available typically within 48 hours of the test, and that while these FISH results are considered preliminary, we would have high confidence in those results    Amniocentesis is typically scheduled at or after 16 weeks gestational age, however, [id number] will fall on a Saturday for Mary.  We discussed the possibility of scheduling her amnio the preceding Friday at [id number] gestational age with an [facility] physician, and this plan was acceptable.  Scheduling will contact Mary to arrange the amnio at a time that works for her schedule.      Mary was encouraged to continue to reach out to our clinic with any questions or concerns.  She reports that she has an appointment with her IVF provider 6/1, and may have additional questions after discussing the CCS test in more detail with them.     Dav Elizalde MS, [facility]  Licensed Genetic Counselor  Phone: [phone]  Pager: [phone]

## 2018-06-04 ENCOUNTER — TELEPHONE (OUTPATIENT)
Dept: MATERNAL FETAL MEDICINE | Facility: CLINIC | Age: 43
End: 2018-06-04

## 2018-06-04 LAB — LAB SCANNED RESULT: NORMAL

## 2018-06-04 NOTE — TELEPHONE ENCOUNTER
6/4/2018    Mary called with several questions regarding her recent positive NIPT result and left a voicemail asking for a return call.  I called Mary and we discussed the following topics:      Carrier Screening Results:  Mary had blood drawn for expanded carrier screening ([facility] [place] Panel + Fragile X syndrome through [employer]) Results are screen negative for all 176 genetic conditions assessed.  This means that Mary's ongoing pregnancy is considered at very low risk for being affected with any of these conditions.  This test result does not impact her NIPT results or risks for a chromosome abnormality in any way.     Mary asked about the possibility of obtaining the raw data results from her NIPT test.  We discussed that because we outsource this testing to a private company, we do not ever have access to the actual data file generated by testing.  We only received the lab results report that has been scanned into Mary's medical record.  We discussed that the NIPT test uses Next Generation Sequencing to generate a large volume of data, and that the output from this testing requires specific bioinformatics software to interpret.  Mary is interested in pursuing access to her test data, and I will have one of [employer]'s representatives reach out to Mary to discuss further.      Mary asked about the nuchal translucency measurement from her NT ultrasound, and we discussed that the measurement from that scan was 1.7mm, which is well within the normal range    Mary asked for copies of her NIPT test report and all of the images taken at her NT ultrasound; she was directed to [facility] to obtain a copy of the NIPT report, and I will discuss with our clinic how best to facilitate obtaining all of the images from her NT ultrasound.      Mary was encouraged to continue to contact me with any questions or concerns.     Dav Elizalde MS, [facility]  Licensed Genetic Counselor  Phone:  [phone]  Pager: [phone]

## 2018-06-04 NOTE — TELEPHONE ENCOUNTER
6/4/2018    Called Mary to discuss her request for all of the images from her NT ultrasound.  I provided the contact information for the [facility] Radiology Film Room ([phone]) and encouraged her to contact them to assist in obtaining the desired images.      Dav Elizalde MS, [facility]  Licensed Genetic Counselor  Phone: [phone]  Pager: [phone]

## 2018-06-13 ENCOUNTER — TELEPHONE (OUTPATIENT)
Dept: MATERNAL FETAL MEDICINE | Facility: CLINIC | Age: 43
End: 2018-06-13

## 2018-06-13 NOTE — TELEPHONE ENCOUNTER
6/13/2018    Mary called to discuss several questions regarding her upcoming amniocentesis, and we discussed the following topics:      Mary is scheduled for an amniocentesis this coming Friday, 6/15.  Her appointment begins at 1:30 with a genetic counseling appointment, the the amniocentesis to follow.  We discussed that all of our amniocentesis patients meet with genetic counseling prior to discuss the specific genetic tests being ordered and complete the appropriate consent forms.  We discussed that I will not be the genetic counselor that meets with her at her appointment, but that I would discuss Mary's history and situation with the GC who would be meeting with her that day prior to the appointment.    Mary asked for a review of the results that we have so far, specifically the difference in her comprehensive chromosome screen, which was done as a part of her IVF process, and her NIPT.  The CCS results indicated that the embryo had a normal chromosome complement, while the NIPT was positive for Down syndrome.  We discussed that both tests are considered screening tests, which means that they have risks for false positives and false negatives.  We also discussed that because CCS is only sampling a few individuals cells from an embryo, it may not detect a mosaic embryo, if it does not biopsy one of the cells with an abnormal chromosome complement.  We discussed that cells from the embryo eventually develop into the placenta, which are the primary source of the DNA that is assessed in NIPT screening.  In rare instances, a placenta may develop a genetic change that is not present in the fetus itself.  This phenomenon is called confined placental mosaicism, and may be a possible explanation for false positive NIPT results.      The amniocentesis is considered diagnostic testing, meaning that it provides a definitive answer regarding the genetic status of the fetus itself.  Amniotic fluid contains skin,  bladder, and lung cells from the developing fetus, so the genetic test results correspond directly to the genetics of the developing fetus.  Mary asked if doing another round of NIPT after the amniocentesis would be an option, and we discussed that after a diagnostic test, it would not be recommended to do any additional screening testing for Down syndrome, because all screening tests have risks for false positives and negatives.    Previously we had discussed pregnancy termination options if the pregnancy is diagnosed with Down Syndrome, and today we discussed that the final karyotype results would be able to provide definitive answers for this decision making process, and that these results typically take 10 days to 2 weeks.      We discussed that there is no specific test that can be done to predict what the outcome will be for an individual with Down syndrome, and that it is a highly variable condition.  We discussed that if the fetus is mosaic for Down syndrome, meaning that some, but not all, of its cells contain an extra copy of chromosome 21, there is some evidence that mosaic individuals may trend towards the more mild end of the spectrum of outcomes, but that there is still a high level of uncertainty in those situations.     Mary asked if it is possible that human error could be an explanation if her pregnancy ends up having Down syndrome, specifically asking if it is possible that there was an error in processing her CCS sample, results, or embryo transfer, and also asked if I would disclose that information to her if I knew of it.  I told Mary that currently I am not aware of any human error having impacted her care at any point in time, and that I will always provide honest answers to her questions the the best of my ability.      All of Mary's questions were answered to her satisfaction at this time and she was encouraged to contact me with any questions or concerns moving forward.      Dav Elizalde MS, [facility]  Licensed Genetic Counselor  Phone: [phone]  Pager: [phone]

## 2018-06-14 ENCOUNTER — TELEPHONE (OUTPATIENT)
Dept: OBGYN | Facility: CLINIC | Age: 43
End: 2018-06-14

## 2018-06-14 DIAGNOSIS — R39.15 URINARY URGENCY: Primary | ICD-10-CM

## 2018-06-14 DIAGNOSIS — N39.41 URGENCY INCONTINENCE: ICD-10-CM

## 2018-06-14 NOTE — TELEPHONE ENCOUNTER
"Mary called to report a \"tingling sensation\" in vagina which she describes as more pleasant than unpleasant, but is also accompanied by increased urgency to urinate but voiding small amounts of urine. Denies burning pain, hematuria,change in discharge, cramping pain, odor.     Advised pt may be experiencing this sensation due to increasing hormones and change in vascularity in genitals. May also be s/sx UTI. Spoke with Brandee Keene in clinic who agrees and authorized order for UA and UC.     Placed order and advised pt to present to lab cale UA/UC. Patient agrees qith plan and has no further questions or concerns  "

## 2018-06-15 ENCOUNTER — OFFICE VISIT (OUTPATIENT)
Dept: MATERNAL FETAL MEDICINE | Facility: CLINIC | Age: 43
End: 2018-06-15
Attending: OBSTETRICS & GYNECOLOGY
Payer: COMMERCIAL

## 2018-06-15 ENCOUNTER — HOSPITAL ENCOUNTER (OUTPATIENT)
Dept: ULTRASOUND IMAGING | Facility: CLINIC | Age: 43
Discharge: HOME | End: 2018-06-15
Attending: OBSTETRICS & GYNECOLOGY | Admitting: OBSTETRICS & GYNECOLOGY
Payer: COMMERCIAL

## 2018-06-15 DIAGNOSIS — O28.0 ABNORMAL MATERNAL SERUM SCREENING TEST: Primary | ICD-10-CM

## 2018-06-15 DIAGNOSIS — O35.10X1 MATERNAL CARE FOR (SUSPECTED) CHROMOSOMAL ABNORMALITY IN FETUS, FETUS 1: ICD-10-CM

## 2018-06-15 DIAGNOSIS — O28.0 ABNORMAL MATERNAL SERUM SCREENING TEST: ICD-10-CM

## 2018-06-15 DIAGNOSIS — R39.15 URINARY URGENCY: ICD-10-CM

## 2018-06-15 LAB
ALBUMIN UR-MCNC: NEGATIVE MG/DL
APPEARANCE UR: CLEAR
BACTERIA #/AREA URNS HPF: ABNORMAL /HPF
BILIRUB UR QL STRIP: NEGATIVE
COLOR UR AUTO: ABNORMAL
GLUCOSE UR STRIP-MCNC: NEGATIVE MG/DL
HGB UR QL STRIP: NEGATIVE
KETONES UR STRIP-MCNC: NEGATIVE MG/DL
LEUKOCYTE ESTERASE UR QL STRIP: NEGATIVE
MUCOUS THREADS #/AREA URNS LPF: PRESENT /LPF
NITRATE UR QL: NEGATIVE
PH UR STRIP: 5 PH (ref 5–7)
RBC #/AREA URNS AUTO: 2 /HPF (ref 0–2)
SOURCE: ABNORMAL
SP GR UR STRIP: 1.01 (ref 1–1.03)
SQUAMOUS #/AREA URNS AUTO: <1 /HPF (ref 0–1)
UROBILINOGEN UR STRIP-MCNC: NORMAL MG/DL (ref 0–2)
WBC #/AREA URNS AUTO: 1 /HPF (ref 0–5)

## 2018-06-15 PROCEDURE — 87086 URINE CULTURE/COLONY COUNT: CPT | Performed by: ADVANCED PRACTICE MIDWIFE

## 2018-06-15 PROCEDURE — 81001 URINALYSIS AUTO W/SCOPE: CPT | Performed by: ADVANCED PRACTICE MIDWIFE

## 2018-06-15 PROCEDURE — 88285 CHROMOSOME COUNT ADDITIONAL: CPT | Performed by: OBSTETRICS & GYNECOLOGY

## 2018-06-15 PROCEDURE — 88235 TISSUE CULTURE PLACENTA: CPT | Performed by: OBSTETRICS & GYNECOLOGY

## 2018-06-15 PROCEDURE — 96040 ZZH GENETIC COUNSELING, EACH 30 MINUTES: CPT | Mod: ZF | Performed by: GENETIC COUNSELOR, MS

## 2018-06-15 PROCEDURE — 88271 CYTOGENETICS DNA PROBE: CPT | Performed by: OBSTETRICS & GYNECOLOGY

## 2018-06-15 PROCEDURE — 82106 ALPHA-FETOPROTEIN AMNIOTIC: CPT | Performed by: OBSTETRICS & GYNECOLOGY

## 2018-06-15 PROCEDURE — 88275 CYTOGENETICS 100-300: CPT | Performed by: OBSTETRICS & GYNECOLOGY

## 2018-06-15 PROCEDURE — 88269 CHROMOSOME ANALYS AMNIOTIC: CPT | Performed by: OBSTETRICS & GYNECOLOGY

## 2018-06-15 PROCEDURE — 76946 ECHO GUIDE FOR AMNIOCENTESIS: CPT | Performed by: OBSTETRICS & GYNECOLOGY

## 2018-06-15 PROCEDURE — 76805 OB US >/= 14 WKS SNGL FETUS: CPT

## 2018-06-15 PROCEDURE — 88280 CHROMOSOME KARYOTYPE STUDY: CPT | Performed by: OBSTETRICS & GYNECOLOGY

## 2018-06-15 PROCEDURE — 87088 URINE BACTERIA CULTURE: CPT | Performed by: ADVANCED PRACTICE MIDWIFE

## 2018-06-15 NOTE — NURSING NOTE
Pt here for amniocentesis d/t NIPT +Trisomy 21. Saw [facility], see their dictation.  After consent signed and TimeOut completed, Dr. Kaba withdrew adequate fluid x1 transabdominal pass.    Patient reports no pain.  Pt is RH positive.  Rhogam was not given today. Discharge teaching completed and questions answered.  Pt discharged ambulatory and stable.   [place] charge pager called (for [facility] amniocentesis specimen) at [phone].  Return call 1605 received.  [facility] notified of specimen.  Specimen transported to main lab, warm hand-off completed.  Ana Church RN

## 2018-06-15 NOTE — MR AVS SNAPSHOT
After Visit Summary   6/15/2018    Mary Wray    MRN: [id number]           Patient Information     Date Of Birth          1975        Visit Information        Provider Department      6/15/2018 2:45 PM Malissa Kaba,  [facility]etal [facility]        Today's Diagnoses     Abnormal maternal serum screening test    -  1       Follow-ups after your visit        Your next 10 appointments already scheduled     Jun 27, 2018  8:45 AM CDT   RETURN OB with Luly Bai MD   [facility] ([facility])    [facility] [address]  [address]  [facility] [address]   [phone]            Jul 11, 2018  8:45 AM CDT   MFM US COMP with MARIA [id number]   [facility]etal [facility] Ultrasound - [facility] ([facility])    [address]  [facility] [id number] [phone]           Wear comfortable clothes and leave your valuables at home.            Jul 11, 2018  9:15 AM CDT   Radiology MD with MARIA EUGENIA HERNANDEZ MD   [facility])    [address]  [facility] [address] [phone]           Please arrive at the time given for your first appointment. This visit is used internally to schedule the physician's time during your ultrasound.              Who to contact     If you have questions or need follow up information about today's clinic visit or your schedule please contact [facility] directly at [phone].  Normal or non-critical lab and imaging results will be communicated to you by MyChart, letter or phone within 4 business days after the clinic has received the results. If you do not hear from us within 7 days, please contact the clinic through MyChart or phone. If you have a critical or abnormal lab result, we will notify you by phone as soon as  possible.  Submit refill requests through [employer] or call your pharmacy and they will forward the refill request to us. Please allow 3 business days for your refill to be completed.          Additional Information About Your Visit        [email or web address] Information     [employer] gives you secure access to your electronic health record. If you see a primary care provider, you can also send messages to your care team and make appointments. If you have questions, please call your primary care clinic.  If you do not have a primary care provider, please call [phone] and they will assist you.        Care EveryWhere ID     This is your Care EveryWhere ID. This could be used by other organizations to access your [place] medical records  [id number]        Your Vitals Were     Last Period                   (LMP Unknown)            Blood Pressure from Last 3 Encounters:   05/14/18 106/71   05/11/18 106/73   04/26/18 114/78    Weight from Last 3 Encounters:   05/14/18 103.4 kg (228 lb)   05/11/18 103.2 kg (227 lb 9.6 oz)   04/26/18 105.6 kg (232 lb 12.8 oz)              We Performed the Following     AFP Amniotic Reflex to Acetylchol     CHROMOSOME AMNIOTIC FLUID With Professional Interpretation     FISH ANEUSCREEN With Professional Interpretation     OBTAIN AFFIRMATION OF INFORMED CONSENT        Primary Care Provider Office Phone # Fax #    Jazlyn Huitron [phone]       [address] [address]        Equal Access to Services     SUGAR MELISSA : Hadii aster biswas [place], waaxda luqadaha, qaybta kaalmada adrianna nina. So [facility] [phone].    ATENCIÓN: Si habla español, tiene a perdue disposición servicios gratuitos de asistencia lingüística. Oscar al [phone].    We comply with applicable federal civil rights laws and [place] laws. We do not discriminate on the basis of race, color, national origin, age, disability, sex, sexual  orientation, or gender identity.            Thank you!     Thank you for choosing [facility]  for your care. Our goal is always to provide you with excellent care. Hearing back from our patients is one way we can continue to improve our services. Please take a few minutes to complete the written survey that you may receive in the mail after your visit with us. Thank you!             Your Updated Medication List - Protect others around you: Learn how to safely use, store and throw away your medicines at www.disposemymeds.org.          This list is accurate as of 6/15/18  4:53 PM.  Always use your most recent med list.                   Brand Name Dispense Instructions for use Diagnosis    amoxicillin 500 MG capsule    AMOXIL    10 capsule    Take 1 capsule (500 mg) by mouth 2 times daily    GBS bacteriuria       cholecalciferol 1000 units capsule    vitamin  -D    30 capsule    Take 5 capsules (5,000 Units) by mouth daily        prenatal multivitamin plus iron 27-0.8 MG Tabs per tablet     100 tablet    Take 1 tablet by mouth daily    Encounter for preconception consultation       progesterone (in sesame oil) 50 MG/ML injection      Inject into the muscle daily        * SYNTHROID 137 MCG tablet   Generic drug:  levothyroxine           * SYNTHROID 200 MCG tablet   Generic drug:  levothyroxine     90 tablet    Take 1 tablet (200 mcg) by mouth daily    Postablative hypothyroidism       * SYNTHROID 75 MCG tablet   Generic drug:  levothyroxine     30 tablet    Take 1 tablet (75 mcg) by mouth daily    Postablative hypothyroidism       * VIVELLE-DOT TD      4 patches Uses 4 patches every other day b        * VIVELLE-DOT TD      3 times a day        * Notice:  This list has 5 medication(s) that are the same as other medications prescribed for you. Read the directions carefully, and ask your doctor or other care provider to review them with you.

## 2018-06-15 NOTE — PROGRESS NOTES
[facility]  Genetic Counseling Consult    Patient: Mary Wray YOB: 1975   Date of Service: 6/15/18      Mary Wray was seen at [facility] for an amniocentesis today. She was accompanied to the visit by her mother and father.       Impression/Plan:   Mary had an ultrasound and amniocentesis. The following testing was ordered on the prenatal sample: Chromosome analysis, FISH preliminary analysis and AFAFP.  Results of the FISH analysis are expected within 24-36 hours and chromosome analysis in 10-14 days. These results will be available in [facility].  We will contact her to discuss the results, and a copy will be forwarded to the office of the referring OB provider. She is requesting a detailed voice message with these results on her cell phone.    Risk Assessment for Chromosome Conditions:   I met with Mary and her parents today prior to the procedure. Mary has been counseled by Dav Elizalde, genetic counselor, with regards to her normal pre-implantation genetic screening via [facility], her normal nuchal translucency ultrasound on 5/23, and her abnormal non-invasive prenatal screen (NIPT) via [employer]. See genetic counseling notes dated 5/30, 5/31, and 6/13 for details of discussion. We reviewed all of these results as well. Mary has a firm understanding of what is currently known and what information is not available to us at this time. She had questions regarding the accuracy of the amniocentesis, whether or not it would identify mosaicism, and what impact confined placental mosaicism would have for the pregnancy. The benefits, risks, and limitations of amniocentesis were reviewed in detail. Mary would like to proceed with amniocentesis today.      Genetic Amniocentesis  - This is an invasive procedure typically performed at 15 weeks or later, through which amniotic fluid is obtained for the purpose of  chromosome analysis and/or other prenatal genetic analysis.  - Amniocentesis is considered a diagnostic test for chromosome problems during pregnancy.  - The risk of pregnancy loss associated with amniocentesis is generally estimated to be 1/500 or less.  - Amniotic fluid AFP measurement is also done to screen for the possibility of open neural tube or ventral defects.    We reviewed the amniocentesis procedure consent form as well as the genetic testing consent form. All questions were answered to their apparent satisfaction. The following testing was ordered on the prenatal sample: Chromosome analysis, FISH preliminary analysis, and AFAFP. Results of the FISH analysis are expected within 24-36 hours and chromosome analysis in 10-14 days.        [facility] was notified of the incoming amniocentesis sample.    We reviewed the benefits and limitations of this testing.  Screening tests provide a risk assessment specific to the pregnancy for certain fetal chromosome abnormalities, but cannot definitively diagnose or exclude a fetal chromosome abnormality.  Follow-up genetic counseling and consideration of diagnostic testing is recommended with any abnormal screening result. Diagnostic tests carry inherent risks- including risk of miscarriage- that require careful consideration.  These tests can detect fetal chromosome abnormalities with greater than 99% certainty.  Results can be compromised by maternal cell contamination or mosaicism, and are limited by the resolution of cytogenetic G-banding technology.  There is no screening nor diagnostic test that can detect all forms of birth defects or mental disability.     It was a pleasure to be involved with [facility]. Face-to-face time of the meeting was 45 minutes.    Diandra Salazar MS, [facility]  Maternal Fetal Medicine  [facility]  Ph: [phone]  [email or web address]

## 2018-06-15 NOTE — MR AVS SNAPSHOT
After Visit Summary   6/15/2018    Mary Wray    MRN: [id number]           Patient Information     Date Of Birth          1975        Visit Information        Provider Department      6/15/2018 1:30 PM Kathryn Salazar GC [facility] Maternal Fetal Medicine [facility]        Today's Diagnoses     Abnormal maternal serum screening test    -  1    Maternal care for (suspected) chromosomal abnormality in fetus, fetus 1           Follow-ups after your visit        Your next 10 appointments already scheduled     Jun 27, 2018  8:45 AM CDT   RETURN OB with Luly Bai MD   [facility])    [place] [facility] [address]  [address]  [address]  [facility] [address]   [phone]            Jul 11, 2018  8:45 AM CDT   MFM US COMP with MARIA EUGENIAMFEDERICOUSR2   [facility] Maternal Fetal Medicine Ultrasound - [place] ([facility])    [address]  [facility] [id number] [phone]           Wear comfortable clothes and leave your valuables at home.            Jul 11, 2018  9:15 AM CDT   Radiology MD with UR MARY VILLAREAL   [facility] Maternal Fetal Medicine - [facility])    [address]  [facility] [address] [phone]           Please arrive at the time given for your first appointment. This visit is used internally to schedule the physician's time during your ultrasound.              Who to contact     If you have questions or need follow up information about today's clinic visit or your schedule please contact [facility] MATERNAL FETAL MEDICINE [facility] directly at [phone].  Normal or non-critical lab and imaging results will be communicated to you by MyChart, letter or phone within 4 business days after the clinic has received the results. If you do not hear from us within 7 days, please contact the clinic through MyChart or phone. If you have a  critical or abnormal lab result, we will notify you by phone as soon as possible.  Submit refill requests through Halalati or call your pharmacy and they will forward the refill request to us. Please allow 3 business days for your refill to be completed.          Additional Information About Your Visit        Cordiumhart Information     Halalati gives you secure access to your electronic health record. If you see a primary care provider, you can also send messages to your care team and make appointments. If you have questions, please call your primary care clinic.  If you do not have a primary care provider, please call [phone] and they will assist you.        Care EveryWhere ID     This is your Care EveryWhere ID. This could be used by other organizations to access your [place] medical records  [id number]        Your Vitals Were     Last Period                   (LMP Unknown)            Blood Pressure from Last 3 Encounters:   05/14/18 106/71   05/11/18 106/73   04/26/18 114/78    Weight from Last 3 Encounters:   05/14/18 103.4 kg (228 lb)   05/11/18 103.2 kg (227 lb 9.6 oz)   04/26/18 105.6 kg (232 lb 12.8 oz)              We Performed the Following     [facility] Genetic Counseling        Primary Care Provider Office Phone # Fax #    Jazlyn Huitron [phone]       [address] [address]        Equal Access to Services     SUGAR MELISSA : Hadii aster hernandezo Jocy, waaxda luqadaha, qaybta kaalmada maico, adrianna coy . So [facility] [phone].    ATENCIÓN: Si habla español, tiene a perdue disposición servicios gratuitos de asistencia lingüística. Oscar al [phone].    We comply with applicable federal civil rights laws and [place] laws. We do not discriminate on the basis of race, color, national origin, age, disability, sex, sexual orientation, or gender identity.            Thank you!     Thank you for choosing [facility]  [facility]  for your care. Our goal is always to provide you with excellent care. Hearing back from our patients is one way we can continue to improve our services. Please take a few minutes to complete the written survey that you may receive in the mail after your visit with us. Thank you!             Your Updated Medication List - Protect others around you: Learn how to safely use, store and throw away your medicines at www.disposemymeds.org.          This list is accurate as of 6/15/18  4:21 PM.  Always use your most recent med list.                   Brand Name Dispense Instructions for use Diagnosis    amoxicillin 500 MG capsule    AMOXIL    10 capsule    Take 1 capsule (500 mg) by mouth 2 times daily    GBS bacteriuria       cholecalciferol 1000 units capsule    vitamin  -D    30 capsule    Take 5 capsules (5,000 Units) by mouth daily        prenatal multivitamin plus iron 27-0.8 MG Tabs per tablet     100 tablet    Take 1 tablet by mouth daily    Encounter for preconception consultation       progesterone (in sesame oil) 50 MG/ML injection      Inject into the muscle daily        * SYNTHROID 137 MCG tablet   Generic drug:  levothyroxine           * SYNTHROID 200 MCG tablet   Generic drug:  levothyroxine     90 tablet    Take 1 tablet (200 mcg) by mouth daily    Postablative hypothyroidism       * SYNTHROID 75 MCG tablet   Generic drug:  levothyroxine     30 tablet    Take 1 tablet (75 mcg) by mouth daily    Postablative hypothyroidism       * VIVELLE-DOT TD      4 patches Uses 4 patches every other day b        * VIVELLE-DOT TD      3 times a day        * Notice:  This list has 5 medication(s) that are the same as other medications prescribed for you. Read the directions carefully, and ask your doctor or other care provider to review them with you.

## 2018-06-16 ENCOUNTER — TELEPHONE (OUTPATIENT)
Dept: MATERNAL FETAL MEDICINE | Facility: CLINIC | Age: 43
End: 2018-06-16

## 2018-06-16 PROBLEM — O28.9 ABNORMAL FINDINGS ON PRENATAL SCREENING: Status: ACTIVE | Noted: 2018-06-16

## 2018-06-16 LAB — COPATH REPORT: NORMAL

## 2018-06-16 NOTE — TELEPHONE ENCOUNTER
6/16/2018       Called Mary to discuss FISH results from her amniocentesis which was performed 6/15/18.  Results came back as normal, female pattern.  This result indicates two copies of chromosomes 21, 18, & 13 detected, as well as 2 X chromosomes, consistent with female sex.  This result shows no evidence for the pregnancy to be affected with Down syndrome, and we discussed the possibility that her NIPT was a false positive result.  Mary had no questions at this time and was encouraged to reach out if she has any questions or concerns in the future.       Dav Elizalde MS, [facility]  Licensed Genetic Counselor  Phone: [phone]  Pager: [phone]

## 2018-06-17 LAB
A-FETO PROTEIN  AMNGEST AGE ULTRA: NORMAL
AFP AMN-MCNC: NORMAL NG/ML
AFP INTERP AMN-IMP: NEGATIVE
AFP MOM AMN: 1.18
BACTERIA SPEC CULT: ABNORMAL
GA: NORMAL WK
LMP START DATE: NORMAL
Lab: ABNORMAL
SPECIMEN SOURCE: ABNORMAL

## 2018-06-19 ENCOUNTER — TELEPHONE (OUTPATIENT)
Dept: MATERNAL FETAL MEDICINE | Facility: CLINIC | Age: 43
End: 2018-06-19

## 2018-06-19 NOTE — TELEPHONE ENCOUNTER
6/19/2018    Mary called to review the FISH results from her amniocentesis and to discuss several follow up questions.  We discussed the following topics:      FISH results are normal, detecting only 2 copies of chromosome 21, 18, and 13.  Sex chromosomes were normal as well.  We discussed that this test result is not definitive, but does greatly reduce the risk for the pregnancy to have extra copies of these specific chromosomes.  We discussed that FISH can also detect mosaicism.   We discussed that chromosome analysis will provide final results and will be available in 10-14 days; Mary will be contacted to discuss results when they are available.      Mary asked about possible causes for a false positive result on NIPT.  Specifically, she asked if it is possible for the mother to be mosaic for a chromosome abnormality, which is then detected as increased DNA by the NIPT test.  We discussed that this is one of the possible causes for a false positive result, but that maternal mosaicism for Down syndrome would likely have some signs or symptoms present for Mary.  Mary asked about the possibility of testing her chromosomes for mosaic trisomy 21, and we discussed that this could be done through chromosome analysis of Mary herself, but that currently this testing is not necessarily indicated.      All of Mary's questions were answered to her satisfaction at this time and she was encouraged to continue to reach out with questions or concerns in the future.     Dav Elizalde MS, [facility]  Licensed Genetic Counselor  Phone: [phone]  Pager: [phone]

## 2018-06-26 ENCOUNTER — TELEPHONE (OUTPATIENT)
Dept: MATERNAL FETAL MEDICINE | Facility: CLINIC | Age: 43
End: 2018-06-26

## 2018-06-26 NOTE — TELEPHONE ENCOUNTER
6/26/2018    Mary called and asked about the status of her pending chromosome analysis.  [facility] confirmed that this testing is still in progress with results likely expected before the end of this week.  This information was called and discussed with Mary and she had no further questions at this time.     Dav Elizalde MS, [facility]  Licensed Genetic Counselor  Phone: [phone]  Pager: [phone]

## 2018-06-27 ENCOUNTER — OFFICE VISIT (OUTPATIENT)
Dept: OBGYN | Facility: CLINIC | Age: 43
End: 2018-06-27
Attending: OBSTETRICS & GYNECOLOGY
Payer: COMMERCIAL

## 2018-06-27 VITALS
WEIGHT: 226.6 LBS | SYSTOLIC BLOOD PRESSURE: 108 MMHG | BODY MASS INDEX: 35.56 KG/M2 | HEART RATE: 72 BPM | DIASTOLIC BLOOD PRESSURE: 70 MMHG | HEIGHT: 67 IN

## 2018-06-27 DIAGNOSIS — Z34.90 EARLY STAGE OF PREGNANCY: ICD-10-CM

## 2018-06-27 DIAGNOSIS — E89.0 POSTABLATIVE HYPOTHYROIDISM: ICD-10-CM

## 2018-06-27 DIAGNOSIS — Z86.39 HISTORY OF GRAVES' DISEASE: ICD-10-CM

## 2018-06-27 DIAGNOSIS — O09.521 ELDERLY MULTIGRAVIDA IN FIRST TRIMESTER: ICD-10-CM

## 2018-06-27 DIAGNOSIS — O09.92 HIGH-RISK PREGNANCY IN SECOND TRIMESTER: Primary | ICD-10-CM

## 2018-06-27 DIAGNOSIS — O28.9 ABNORMAL FINDINGS ON PRENATAL SCREENING: ICD-10-CM

## 2018-06-27 PROBLEM — O20.9 FIRST TRIMESTER BLEEDING: Status: RESOLVED | Noted: 2018-05-01 | Resolved: 2018-06-27

## 2018-06-27 LAB — COPATH REPORT: NORMAL

## 2018-06-27 RX ORDER — LEVOTHYROXINE SODIUM 200 MCG
200 TABLET ORAL DAILY
Qty: 90 TABLET | Refills: 3 | Status: SHIPPED | OUTPATIENT
Start: 2018-06-27 | End: 2019-01-04

## 2018-06-27 NOTE — PROGRESS NOTES
Reviewed extremely stressful test results situation and plans.   Reports cervical shortening in mom (bedrest, lopez, 36 weeks and sister (twins, bedrest, del at 36 weeks). Will eval prn symptoms.   No bleeding.   Feels well.     RTC 4 weeks, talk this week re: amnio final results.   Luly Bai

## 2018-06-27 NOTE — MR AVS SNAPSHOT
After Visit Summary   6/27/2018    Mary Wray    MRN: [id number]           Patient Information     Date Of Birth          1975        Visit Information        Provider Department      6/27/2018 8:45 AM Luly Bai MD [facility]        Today's Diagnoses     Postablative hypothyroidism        Abnormal findings on prenatal screening        High-risk pregnancy in second trimester           Follow-ups after your visit        Your next 10 appointments already scheduled     Jul 11, 2018  8:45 AM CDT   MFM US COMP with [id number]   [facility])    [address]  [facility] [id number] [phone]           Wear comfortable clothes and leave your valuables at home.            Jul 11, 2018  9:15 AM CDT   Radiology MD with MARIA EUGENIA HERNANDEZ MD   [facility])    [address]  [facility] [address] [phone]           Please arrive at the time given for your first appointment. This visit is used internally to schedule the physician's time during your ultrasound.            Jul 25, 2018  9:30 AM CDT   RETURN OB with Luly Bai MD   [facility])    [place] [facility] [address]  [address]  [address]  [facility] [address] [phone]              Who to contact     Please call your clinic at [phone] to:    Ask questions about your health    Make or cancel appointments    Discuss your medicines    Learn about your test results    Speak to your doctor            Additional Information About Your Visit        MyChart Information     51fanlit gives you secure access to your electronic health record. If you see a primary care provider, you can also send messages to your care team and make appointments. If you have questions,  "please call your primary care clinic.  If you do not have a primary care provider, please call [phone] and they will assist you.      Atreca is an electronic gateway that provides easy, online access to your medical records. With Atreca, you can request a clinic appointment, read your test results, renew a prescription or communicate with your care team.     To access your existing account, please contact your [facility] or call [phone] for assistance.        Care EveryWhere ID     This is your Care EveryWhere ID. This could be used by other organizations to access your [place] medical records  [id number]        Your Vitals Were     Pulse Height Last Period BMI (Body Mass Index)          72 1.702 m (5' 7\") (LMP Unknown) 35.49 kg/m2         Blood Pressure from Last 3 Encounters:   06/27/18 108/70   05/14/18 106/71   05/11/18 106/73    Weight from Last 3 Encounters:   06/27/18 102.8 kg (226 lb 9.6 oz)   05/14/18 103.4 kg (228 lb)   05/11/18 103.2 kg (227 lb 9.6 oz)              Today, you had the following     No orders found for display         Today's Medication Changes          These changes are accurate as of 6/27/18 12:42 PM.  If you have any questions, ask your nurse or doctor.               These medicines have changed or have updated prescriptions.        Dose/Directions    SYNTHROID 200 MCG tablet   This may have changed:  Another medication with the same name was removed. Continue taking this medication, and follow the directions you see here.   Used for:  Postablative hypothyroidism   Generic drug:  levothyroxine   Changed by:  Luly Bai MD        Dose:  200 mcg   Take 1 tablet (200 mcg) by mouth daily   Quantity:  90 tablet   Refills:  3         Stop taking these medicines if you haven't already. Please contact your care team if you have questions.     amoxicillin 500 MG capsule   Commonly known as:  AMOXIL   Stopped by:  Luly Bai MD     "       progesterone (in sesame oil) 50 MG/ML injection   Stopped by:  Luly Bai MD VIVELLE-DOT TD   Stopped by:  Luly Bai MD                Where to get your medicines      These medications were sent to [facility] [place], [place] - [address]  [address] [address]     Phone:  [phone]     SYNTHROID 200 MCG tablet                Primary Care Provider Office Phone # Fax #    Jazlyn Huitron [phone]       [address] [facility] [address]  [facility] [address]        Equal Access to Services     [facility]: Hadii aad ku hadasho Soomaali, waaxda luqadaha, qaybta kaalmada adeegyada, adrianna coy . So [facility] [phone].    ATENCIÓN: Si habla español, tiene a perdue disposición servicios gratuitos de asistencia lingüística. [facility] [phone].    We comply with applicable federal civil rights laws and [place] laws. We do not discriminate on the basis of race, color, national origin, age, disability, sex, sexual orientation, or gender identity.            Thank you!     Thank you for choosing [facility]  for your care. Our goal is always to provide you with excellent care. Hearing back from our patients is one way we can continue to improve our services. Please take a few minutes to complete the written survey that you may receive in the mail after your visit with us. Thank you!             Your Updated Medication List - Protect others around you: Learn how to safely use, store and throw away your medicines at www.disposemymeds.org.          This list is accurate as of 6/27/18 12:42 PM.  Always use your most recent med list.                   Brand Name Dispense Instructions for use Diagnosis    cholecalciferol 1000 units capsule    vitamin  -D    30 capsule    Take 5 capsules (5,000 Units) by mouth daily        prenatal multivitamin plus iron 27-0.8 MG Tabs per tablet     100  tablet    Take 1 tablet by mouth daily    Encounter for preconception consultation       SYNTHROID 200 MCG tablet   Generic drug:  levothyroxine     90 tablet    Take 1 tablet (200 mcg) by mouth daily    Postablative hypothyroidism

## 2018-06-27 NOTE — LETTER
6/27/2018       RE: Mary Wray  [address] [address]     Dear Colleague,    Thank you for referring your patient, Mary Wray, to the [facility] at [facility]. Please see a copy of my visit note below.    Reviewed extremely stressful test results situation and plans.   Reports cervical shortening in mom (bedrest, lopez, 36 weeks and sister (twins, bedrest, del at 36 weeks). Will eval prn symptoms.   No bleeding.   Feels well.     RTC 4 weeks, talk this week re: amnio final results.   Luly Bai

## 2018-06-28 ENCOUNTER — TELEPHONE (OUTPATIENT)
Dept: MATERNAL FETAL MEDICINE | Facility: CLINIC | Age: 43
End: 2018-06-28

## 2018-06-28 NOTE — TELEPHONE ENCOUNTER
6/28/2018       Called Mary to discuss the final results from her amniocentesis.  Results are 46, XX, which represents a normal female karyotype.  No numerical or major structural chromosomal abnormalities were detected.  This result indicates that Mary's pregnancy does not have Down syndrome, which was the primary risk indicated by NIPT, and is negative for any other large scale chromosome abnormality.  At this time, it appears that Mary's NIPT was a false positive result.  We briefly reviewed the testing results that Mary has had so far in pregnancy, including expanded carrier screening, which puts Mary's pregnancy at low risk for any of the 176 recessive and x linked genetic conditions assessed, amniotic fluid AFP, which puts the pregnancy at low risk for open neural tube defects such as spina bifida, and finally this chromosome analysis which did not detect any indication of a large scale chromosome abnormality.  We discussed that there is no test that can definitively rule out every possible genetic condition, but that currently there would be no other testing indicated, and that Mary's pregnancy is at extremely low risk for all conditions that we have assessed.  All of Mary's questions were answered to her satisfaction at this time, and she was encouraged to continue to contact me with any questions or concerns moving forward.        Dav Elizalde MS, [facility]  Licensed Genetic Counselor  Phone: [phone]  Pager: [phone]

## 2018-07-09 ENCOUNTER — ALLIED HEALTH/NURSE VISIT (OUTPATIENT)
Dept: OBGYN | Facility: CLINIC | Age: 43
End: 2018-07-09
Payer: COMMERCIAL

## 2018-07-09 DIAGNOSIS — Z86.39 HISTORY OF GRAVES' DISEASE: ICD-10-CM

## 2018-07-09 DIAGNOSIS — E89.0 POSTABLATIVE HYPOTHYROIDISM: ICD-10-CM

## 2018-07-09 DIAGNOSIS — Z34.90 PRENATAL CARE: Primary | ICD-10-CM

## 2018-07-09 LAB
T4 SERPL-MCNC: 20.8 UG/DL (ref 4.5–13.9)
TSH SERPL DL<=0.005 MIU/L-ACNC: 0.12 MU/L (ref 0.4–4)

## 2018-07-09 PROCEDURE — 84445 ASSAY OF TSI GLOBULIN: CPT

## 2018-07-09 PROCEDURE — 84436 ASSAY OF TOTAL THYROXINE: CPT

## 2018-07-09 PROCEDURE — 84443 ASSAY THYROID STIM HORMONE: CPT

## 2018-07-09 PROCEDURE — 36415 COLL VENOUS BLD VENIPUNCTURE: CPT

## 2018-07-09 PROCEDURE — 83520 IMMUNOASSAY QUANT NOS NONAB: CPT

## 2018-07-09 NOTE — NURSING NOTE
Pt presented to clinic for dopp tones for reassurance. FHR found 145 RRR. Pt had no further questions at this time

## 2018-07-09 NOTE — MR AVS SNAPSHOT
After Visit Summary   7/9/2018    Mary Wray    MRN: [id number]           Patient Information     Date Of Birth          1975        Visit Information        Provider Department      7/9/2018 1:30 PM Nurse, [facility]        Today's Diagnoses     Prenatal care    -  1       Follow-ups after your visit        Your next 10 appointments already scheduled     Jul 11, 2018  8:45 AM CDT   MFM US COMP with [id number]   [facility] - [facility])    [address]  [facility] [address] [phone]           Wear comfortable clothes and leave your valuables at home.            Jul 11, 2018  9:15 AM CDT   Radiology MD with UR MARY VILLAREAL   [facility])    [address]  [facility] [address] [phone]           Please arrive at the time given for your first appointment. This visit is used internally to schedule the physician's time during your ultrasound.            Jul 25, 2018  9:30 AM CDT   RETURN OB with Luly Bai MD   [facility] ([facility])    [place] [facility] [address]  [address]  [address]  [facility] [id number] [phone]              Who to contact     Please call your clinic at [phone] to:    Ask questions about your health    Make or cancel appointments    Discuss your medicines    Learn about your test results    Speak to your doctor            Additional Information About Your Visit        MyChart Information     Bancha gives you secure access to your electronic health record. If you see a primary care provider, you can also send messages to your care team and make appointments. If you have questions, please call your primary care clinic.  If you do not have a primary care provider, please call [phone] and they  will assist you.      [email or web address] is an electronic gateway that provides easy, online access to your medical records. With [email or web address], you can request a clinic appointment, read your test results, renew a prescription or communicate with your care team.     To access your existing account, please contact your [facility] or call [phone] for assistance.        Care EveryWhere ID     This is your Care EveryWhere ID. This could be used by other organizations to access your [place] medical records  [id number]        Your Vitals Were     Last Period                   (LMP Unknown)            Blood Pressure from Last 3 Encounters:   06/27/18 108/70   05/14/18 106/71   05/11/18 106/73    Weight from Last 3 Encounters:   06/27/18 102.8 kg (226 lb 9.6 oz)   05/14/18 103.4 kg (228 lb)   05/11/18 103.2 kg (227 lb 9.6 oz)              Today, you had the following     No orders found for display       Primary Care Provider Office Phone # Fax #    Jazlyn Huitron [phone]       [address] [address]        Equal Access to Services     SUGAR MELISSA : Hadii aster biswas [place], waaxda ludebra, qaybta kaalmadaron nina, adrianna coy . So [facility] [phone].    ATENCIÓN: Si habla español, tiene a perdue disposición servicios gratuitos de asistencia lingüística. [facility] [phone].    We comply with applicable federal civil rights laws and [place] laws. We do not discriminate on the basis of race, color, national origin, age, disability, sex, sexual orientation, or gender identity.            Thank you!     Thank you for choosing [facility]  for your care. Our goal is always to provide you with excellent care. Hearing back from our patients is one way we can continue to improve our services. Please take a few minutes to complete the written survey that you may receive in the mail after your visit with  us. Thank you!             Your Updated Medication List - Protect others around you: Learn how to safely use, store and throw away your medicines at www.disposemymeds.org.          This list is accurate as of 7/9/18  1:58 PM.  Always use your most recent med list.                   Brand Name Dispense Instructions for use Diagnosis    cholecalciferol 1000 units capsule    vitamin  -D    30 capsule    Take 5 capsules (5,000 Units) by mouth daily        prenatal multivitamin plus iron 27-0.8 MG Tabs per tablet     100 tablet    Take 1 tablet by mouth daily    Encounter for preconception consultation       SYNTHROID 200 MCG tablet   Generic drug:  levothyroxine     90 tablet    Take 1 tablet (200 mcg) by mouth daily    Postablative hypothyroidism

## 2018-07-11 ENCOUNTER — OFFICE VISIT (OUTPATIENT)
Dept: MATERNAL FETAL MEDICINE | Facility: CLINIC | Age: 43
End: 2018-07-11
Attending: ADVANCED PRACTICE MIDWIFE
Payer: COMMERCIAL

## 2018-07-11 ENCOUNTER — HOSPITAL ENCOUNTER (OUTPATIENT)
Dept: ULTRASOUND IMAGING | Facility: CLINIC | Age: 43
Discharge: HOME | End: 2018-07-11
Attending: ADVANCED PRACTICE MIDWIFE | Admitting: OBSTETRICS & GYNECOLOGY
Payer: COMMERCIAL

## 2018-07-11 DIAGNOSIS — O09.812 PREGNANCY RESULTING FROM IN VITRO FERTILIZATION IN SECOND TRIMESTER: ICD-10-CM

## 2018-07-11 DIAGNOSIS — O26.90 PREGNANCY RELATED CONDITION, ANTEPARTUM: ICD-10-CM

## 2018-07-11 DIAGNOSIS — O09.522 ELDERLY MULTIGRAVIDA IN SECOND TRIMESTER: Primary | ICD-10-CM

## 2018-07-11 DIAGNOSIS — O35.9XX0 SUSPECTED FETAL ANOMALY, ANTEPARTUM, SINGLE OR UNSPECIFIED FETUS: ICD-10-CM

## 2018-07-11 LAB
GLUCOSE 1H P 50 G GLC PO SERPL-MCNC: 132 MG/DL (ref 60–129)
TSH RECEP AB SER-ACNC: <1 IU/L

## 2018-07-11 PROCEDURE — 36415 COLL VENOUS BLD VENIPUNCTURE: CPT | Performed by: ADVANCED PRACTICE MIDWIFE

## 2018-07-11 PROCEDURE — 76811 OB US DETAILED SNGL FETUS: CPT

## 2018-07-11 PROCEDURE — 82950 GLUCOSE TEST: CPT | Performed by: ADVANCED PRACTICE MIDWIFE

## 2018-07-11 NOTE — MR AVS SNAPSHOT
After Visit Summary   7/11/2018    Mary Wray    MRN: [id number]           Patient Information     Date Of Birth          1975        Visit Information        Provider Department      7/11/2018 9:15 AM Malissa Kaba DO [facility] Maternal Fetal Medicine - [place]        Today's Diagnoses     Elderly multigravida in second trimester    -  1    Suspected fetal anomaly, antepartum, single or unspecified fetus        Pregnancy resulting from in vitro fertilization in second trimester           Follow-ups after your visit        Your next 10 appointments already scheduled     Jul 25, 2018  9:30 AM CDT   RETURN OB with Luly Bai MD   [facility] ([facility])    [place] [facility]  [address]  [address]  [facility] [address]   [phone]            Aug 07, 2018 10:00 AM CDT   Ech Fetal Complete* with URFETR1   [facility] Echo/EKG ([facility])    [address] [place] [address]  [facility] [address]               Aug 07, 2018 11:00 AM CDT   MFM US COMPRE SINGLE F/U with URMFMUSR2   [facility] Maternal Fetal Medicine Ultrasound - [facility])    [address]  [facility] [address] [phone]           Wear comfortable clothes and leave your valuables at home.            Aug 07, 2018 11:30 AM CDT   Radiology MD with UR MARY VILLAREAL   [facility] Maternal Fetal Medicine - [facility])    [address]  [facility] [address]   [phone]           Please arrive at the time given for your first appointment. This visit is used internally to schedule the physician's time during your ultrasound.              Future tests that were ordered for you today     Open Future Orders        Priority Expected Expires Ordered    MFM US Comprehensive Single F/U Routine 8/8/2018 7/11/2019 7/11/2018    Echo Fetal Complete-[facility]  Cardiology Routine 7/25/2018 7/11/2019 7/11/2018            Who to contact     If you have questions or need follow up information about today's clinic visit or your schedule please contact [facility] MATERNAL FETAL MEDICINE [facility] directly at [phone].  Normal or non-critical lab and imaging results will be communicated to you by MyChart, letter or phone within 4 business days after the clinic has received the results. If you do not hear from us within 7 days, please contact the clinic through [employer] or phone. If you have a critical or abnormal lab result, we will notify you by phone as soon as possible.  Submit refill requests through [employer] or call your pharmacy and they will forward the refill request to us. Please allow 3 business days for your refill to be completed.          Additional Information About Your Visit        [employer] Information     [employer] gives you secure access to your electronic health record. If you see a primary care provider, you can also send messages to your care team and make appointments. If you have questions, please call your primary care clinic.  If you do not have a primary care provider, please call [phone] and they will assist you.        Care EveryWhere ID     This is your Care EveryWhere ID. This could be used by other organizations to access your [place] medical records  [id number]        Your Vitals Were     Last Period                   (LMP Unknown)            Blood Pressure from Last 3 Encounters:   06/27/18 108/70   05/14/18 106/71   05/11/18 106/73    Weight from Last 3 Encounters:   06/27/18 102.8 kg (226 lb 9.6 oz)   05/14/18 103.4 kg (228 lb)   05/11/18 103.2 kg (227 lb 9.6 oz)              We Performed the Following     Glucose tolerance gest screen 1 hour        Primary Care Provider Office Phone # Fax #    Jazlyn Huitron [phone]       [address] [address]        Equal Access to Services     SUGAR  GAAR : Hadii aad herminio michaelfaviochamp Jocy, wasylda luqgenoveva, qacecita kasissydaron garsabinadaron, adrianna suarezsylviajuventino goldstein. So [facility] [phone].    ATENCIÓN: Si habla español, tiene a perdue disposición servicios gratuitos de asistencia lingüística. Llame al [phone].    We comply with applicable federal civil rights laws and [place] laws. We do not discriminate on the basis of race, color, national origin, age, disability, sex, sexual orientation, or gender identity.            Thank you!     Thank you for choosing [facility]  for your care. Our goal is always to provide you with excellent care. Hearing back from our patients is one way we can continue to improve our services. Please take a few minutes to complete the written survey that you may receive in the mail after your visit with us. Thank you!             Your Updated Medication List - Protect others around you: Learn how to safely use, store and throw away your medicines at www.disposemymeds.org.          This list is accurate as of 7/11/18  1:24 PM.  Always use your most recent med list.                   Brand Name Dispense Instructions for use Diagnosis    cholecalciferol 1000 units capsule    vitamin  -D    30 capsule    Take 5 capsules (5,000 Units) by mouth daily        prenatal multivitamin plus iron 27-0.8 MG Tabs per tablet     100 tablet    Take 1 tablet by mouth daily    Encounter for preconception consultation       SYNTHROID 200 MCG tablet   Generic drug:  levothyroxine     90 tablet    Take 1 tablet (200 mcg) by mouth daily    Postablative hypothyroidism

## 2018-07-12 DIAGNOSIS — O09.92 HIGH-RISK PREGNANCY IN SECOND TRIMESTER: ICD-10-CM

## 2018-07-12 DIAGNOSIS — R73.09 ABNORMAL SERUM GLUCOSE LEVEL: Primary | ICD-10-CM

## 2018-07-12 LAB — TSI SER-ACNC: <1 TSI INDEX

## 2018-07-13 NOTE — PROGRESS NOTES
Spoke with Mary and gave her 1 hr glucose results and recommendation for 3 hr glucose test.  Educated on what the test entails and answered all questions.  She will come for testing Monday, July 16 at 0730. A note will be forwarded to the  to schedule.    She also had a question about whether its safe to dye her hair and she was informed this is safe.

## 2018-07-16 DIAGNOSIS — O09.92 HIGH-RISK PREGNANCY IN SECOND TRIMESTER: ICD-10-CM

## 2018-07-16 DIAGNOSIS — R73.09 ABNORMAL SERUM GLUCOSE LEVEL: ICD-10-CM

## 2018-07-16 LAB
GLUCOSE 1H P 100 G GLC PO SERPL-MCNC: 141 MG/DL (ref 60–179)
GLUCOSE 2H P 100 G GLC PO SERPL-MCNC: 114 MG/DL (ref 60–154)
GLUCOSE 3H P 100 G GLC PO SERPL-MCNC: 84 MG/DL (ref 60–139)
GLUCOSE P FAST SERPL-MCNC: 92 MG/DL (ref 60–94)

## 2018-07-16 PROCEDURE — 82951 GLUCOSE TOLERANCE TEST (GTT): CPT | Performed by: ADVANCED PRACTICE MIDWIFE

## 2018-07-16 PROCEDURE — 36415 COLL VENOUS BLD VENIPUNCTURE: CPT | Performed by: ADVANCED PRACTICE MIDWIFE

## 2018-07-16 PROCEDURE — 82952 GTT-ADDED SAMPLES: CPT | Performed by: ADVANCED PRACTICE MIDWIFE

## 2018-07-19 ENCOUNTER — TELEPHONE (OUTPATIENT)
Dept: OBGYN | Facility: CLINIC | Age: 43
End: 2018-07-19

## 2018-07-19 NOTE — LETTER
July 19, 2018    To Whom It May Concern:    Mary Wray is being seen in our clinic for prenatal care.      Her Estimated Date of Delivery: Dec 1, 2018.        Sincerely,        Tova Santos RN BSN sent on behalf of  Luly Bai MD

## 2018-07-25 ENCOUNTER — OFFICE VISIT (OUTPATIENT)
Dept: OBGYN | Facility: CLINIC | Age: 43
End: 2018-07-25
Attending: OBSTETRICS & GYNECOLOGY
Payer: COMMERCIAL

## 2018-07-25 VITALS
DIASTOLIC BLOOD PRESSURE: 67 MMHG | SYSTOLIC BLOOD PRESSURE: 105 MMHG | BODY MASS INDEX: 35.49 KG/M2 | WEIGHT: 226.1 LBS | HEIGHT: 67 IN | HEART RATE: 71 BPM

## 2018-07-25 DIAGNOSIS — O09.812 PREGNANCY RESULTING FROM IN VITRO FERTILIZATION IN SECOND TRIMESTER: Primary | ICD-10-CM

## 2018-07-25 DIAGNOSIS — O09.92 HIGH-RISK PREGNANCY IN SECOND TRIMESTER: ICD-10-CM

## 2018-07-25 PROCEDURE — G0463 HOSPITAL OUTPT CLINIC VISIT: HCPCS | Mod: ZF

## 2018-07-25 NOTE — LETTER
2018       RE: Mary Wray  [address] [address]     Dear Colleague,    Thank you for referring your patient, Mary Wray, to the [facility] at [facility]. Please see a copy of my visit note below.    Reviewed LEVEL II findings and recommendations. Discussed fibroids and expectations and possible implications for delivery or .   Discussed normal GTT and repeat testing at 26-28 weeks.   Discussed longterm parental leave plans.       Again, thank you for allowing me to participate in the care of your patient.      Sincerely,    Luly Bai MD

## 2018-07-25 NOTE — MR AVS SNAPSHOT
After Visit Summary   7/25/2018    Mary Wray    MRN: [id number]           Patient Information     Date Of Birth          1975        Visit Information        Provider Department      7/25/2018 9:30 AM Luly Bai MD [facility]        Today's Diagnoses     Pregnancy resulting from in vitro fertilization in second trimester    -  1    High-risk pregnancy in second trimester          Care Instructions    Jessica growth ultraound and [place] week of 8/20          Follow-ups after your visit        Follow-up notes from your care team     Return in about 4 weeks (around 8/22/2018).      Your next 10 appointments already scheduled     Aug 07, 2018 10:00 AM CDT   Ech Fetal Complete* with URFETR1   [facility] Echo/EKG ([facility])    [address]  [facility] [address]               Aug 07, 2018 11:00 AM CDT   MFM US COMPRE SINGLE F/U with URMFMUSR2   [facility] - [facility])    [address]  [facility] [address] [phone]           Wear comfortable clothes and leave your valuables at home.            Aug 07, 2018 11:30 AM CDT   Radiology MD with UR MARY VILLAREAL   [facility] - [facility])    [address]  [facility] [address] [phone]           Please arrive at the time given for your first appointment. This visit is used internally to schedule the physician's time during your ultrasound.            Aug 22, 2018  9:30 AM CDT   (Arrive by 9:15 AM)   US OB SINGLE FOLLOW UP REPEAT with URWHSUS1   [facility] ([facility])    [place] [facility]  3rd Floor, [address]  [address] [facility] [address] [phone]           Please bring a list of your medicines (including vitamins, minerals and over-the-counter drugs).  Also, tell your doctor about any allergies you may have. Wear comfortable clothes and leave your valuables at home.  Drink four 8-ounce glasses of fluid an hour before your exam. If you need to empty your bladder before your exam, try to release only a little urine. Then, drink another glass of fluid.  You may have up to two family members in the exam room. If you bring a small child, an adult must be there to care for him or her. No video or camera photography during the procedure.  Please call the Imaging Department at your exam site with any questions.            Aug 22, 2018 10:15 AM CDT   RETURN OB with Heide Davalos MD   [facility] ([facility])    [place] [facility]  [address]  [address]  [facility] [address] [phone]              Future tests that were ordered for you today     Open Standing Orders        Priority Remaining Interval Expires Ordered    Growth Ultrasound 28355 Repeat 4/4 4 weeks 11/22/2018 7/25/2018          Open Future Orders        Priority Expected Expires Ordered    Growth Ultrasound 97136 Routine  11/22/2018 7/25/2018            Who to contact     Please call your clinic at [phone] to:    Ask questions about your health    Make or cancel appointments    Discuss your medicines    Learn about your test results    Speak to your doctor            Additional Information About Your Visit        [employer] Information     [employer] gives you secure access to your electronic health record. If you see a primary care provider, you can also send messages to your care team and make appointments. If you have questions, please call your primary care clinic.  If you do not have a primary care provider, please call [phone] and they will assist you.      [employer] is an electronic gateway that provides easy, online access to your medical records. With [employer], you can request a clinic appointment, read your test results, renew a  "prescription or communicate with your care team.     To access your existing account, please contact your [facility] or call [phone] for assistance.        Care EveryWhere ID     This is your Care EveryWhere ID. This could be used by other organizations to access your [place] medical records  [id number]        Your Vitals Were     Pulse Height Last Period BMI (Body Mass Index)          71 1.702 m (5' 7\") (LMP Unknown) 35.41 kg/m2         Blood Pressure from Last 3 Encounters:   07/25/18 105/67   06/27/18 108/70   05/14/18 106/71    Weight from Last 3 Encounters:   07/25/18 102.6 kg (226 lb 1.6 oz)   06/27/18 102.8 kg (226 lb 9.6 oz)   05/14/18 103.4 kg (228 lb)               Primary Care Provider Office Phone # Fax #    Jazlyn Huitron [phone]       [address] [address]        Equal Access to Services     SUGAR MELISSA : Hadii aster ku hadasho Soomaali, waaxda luqadaha, qaybta kaalmada adeegyada, adrianna coy . So [facility] [phone].    ATENCIÓN: Si habla español, tiene a perdue disposición servicios gratuitos de asistencia lingüística. [facility] [phone].    We comply with applicable federal civil rights laws and [place] laws. We do not discriminate on the basis of race, color, national origin, age, disability, sex, sexual orientation, or gender identity.            Thank you!     Thank you for choosing [facility]  for your care. Our goal is always to provide you with excellent care. Hearing back from our patients is one way we can continue to improve our services. Please take a few minutes to complete the written survey that you may receive in the mail after your visit with us. Thank you!             Your Updated Medication List - Protect others around you: Learn how to safely use, store and throw away your medicines at www.disposemymeds.org.          This list is accurate as of " 7/25/18 11:59 PM.  Always use your most recent med list.                   Brand Name Dispense Instructions for use Diagnosis    cholecalciferol 1000 units capsule    vitamin  -D    30 capsule    Take 5 capsules (5,000 Units) by mouth daily        prenatal multivitamin plus iron 27-0.8 MG Tabs per tablet     100 tablet    Take 1 tablet by mouth daily    Encounter for preconception consultation       SYNTHROID 200 MCG tablet   Generic drug:  levothyroxine     90 tablet    Take 1 tablet (200 mcg) by mouth daily    Postablative hypothyroidism

## 2018-07-26 PROBLEM — O28.9 ABNORMAL FINDINGS ON PRENATAL SCREENING: Status: RESOLVED | Noted: 2018-06-16 | Resolved: 2018-07-26

## 2018-07-26 NOTE — PROGRESS NOTES
Reviewed LEVEL II findings and recommendations. Discussed fibroids and expectations and possible implications for delivery or .   Discussed normal GTT and repeat testing at 26-28 weeks.   Discussed longterm parental leave plans.     Luly Bai

## 2018-08-07 ENCOUNTER — OFFICE VISIT (OUTPATIENT)
Dept: MATERNAL FETAL MEDICINE | Facility: CLINIC | Age: 43
End: 2018-08-07
Attending: OBSTETRICS & GYNECOLOGY
Payer: COMMERCIAL

## 2018-08-07 ENCOUNTER — HOSPITAL ENCOUNTER (OUTPATIENT)
Dept: ULTRASOUND IMAGING | Facility: CLINIC | Age: 43
Discharge: HOME | End: 2018-08-07
Attending: OBSTETRICS & GYNECOLOGY | Admitting: OBSTETRICS & GYNECOLOGY
Payer: COMMERCIAL

## 2018-08-07 ENCOUNTER — HOSPITAL ENCOUNTER (OUTPATIENT)
Dept: CARDIOLOGY | Facility: CLINIC | Age: 43
End: 2018-08-07
Attending: OBSTETRICS & GYNECOLOGY
Payer: COMMERCIAL

## 2018-08-07 DIAGNOSIS — E89.0 POSTABLATIVE HYPOTHYROIDISM: ICD-10-CM

## 2018-08-07 DIAGNOSIS — O35.9XX0 SUSPECTED FETAL ANOMALY, ANTEPARTUM, SINGLE OR UNSPECIFIED FETUS: ICD-10-CM

## 2018-08-07 DIAGNOSIS — O09.812 PREGNANCY RESULTING FROM IN VITRO FERTILIZATION IN SECOND TRIMESTER: ICD-10-CM

## 2018-08-07 DIAGNOSIS — O09.521 ELDERLY MULTIGRAVIDA IN FIRST TRIMESTER: Primary | ICD-10-CM

## 2018-08-07 LAB
T4 SERPL-MCNC: 21.3 UG/DL (ref 4.5–13.9)
TSH SERPL DL<=0.005 MIU/L-ACNC: 0.11 MU/L (ref 0.4–4)

## 2018-08-07 PROCEDURE — 84436 ASSAY OF TOTAL THYROXINE: CPT

## 2018-08-07 PROCEDURE — 36415 COLL VENOUS BLD VENIPUNCTURE: CPT

## 2018-08-07 PROCEDURE — 76816 OB US FOLLOW-UP PER FETUS: CPT

## 2018-08-07 PROCEDURE — 84443 ASSAY THYROID STIM HORMONE: CPT

## 2018-08-07 PROCEDURE — 76827 ECHO EXAM OF FETAL HEART: CPT

## 2018-08-07 NOTE — PROGRESS NOTES
Please see ultrasound report under imaging tab for details on ultrasound performed today.    Heide Darling MD  , OB/GYN  Maternal-Fetal Medicine  [email or web address]  [phone] (Academic office)  [phone] (Pager)

## 2018-08-07 NOTE — PROGRESS NOTES
Fetal Cardiology Consultation    Patient:  Mary Wray MRN:  [id number]   YOB: 1975 Age:  42 year old   Date of Visit:  8/7/2018 PCP:  Jazlyn Huitron MD   JORDAN: 12/1/2018, by Ultrasound EGA: 23w3d weeks     Dear Dr. Kaba:    I had the pleasure of seeing Mary Wray at the [facility] Fetal Echocardiography Laboratory in [place] on 8/7/2018 in consultation for fetal echocardiography results. She presented today by herself. As you know, she is a 42 year old female with current pregnancy achieved through IVF.    The fetal echocardiogram was technically challenging. Likely normal fetal echocardiogram. Normal fetal cardiac anatomy. Normal right and left ventricular size and function without hypertrophy. No evidence of diastolic dysfunction. No pericardial effusion. No arrhythmia.     I reviewed and interpreted the fetal echocardiogram today. I discussed the normal results with Ms. Wray. While these results are normal, it is important to note that fetal echocardiography cannot exclude small atrial or ventricular septal defects, persistent ductus arteriosus, mild coarctation of the aorta, partial anomalous pulmonary venous return, minor anatomic valve anomalies, or coronary artery anomalies.     Thank you for allowing me to participate in Ms. Wray's care. Please don't hesitate to contact me or the Fetal Cardiology team at [facility] with any questions or concerns.     I spent a total of 10 minutes face-to-face with Ms. Wray during today's office visit. Over 50% of this time was spent counseling the patient and/or coordinating care regarding the fetal echocardiography results.     Cesar Martinez  Pediatric Cardiology  [facility]  Phone [phone]

## 2018-08-07 NOTE — MR AVS SNAPSHOT
After Visit Summary   8/7/2018    Mary Wray    MRN: [id number]           Patient Information     Date Of Birth          1975        Visit Information        Provider Department      8/7/2018 11:30 AM Heide Darling MD [facility] Maternal Fetal Medicine - [place]        Today's Diagnoses     AMA    -  1    Pregnancy resulting from in vitro fertilization in second trimester           Follow-ups after your visit        Your next 10 appointments already scheduled     Aug 22, 2018  9:30 AM CDT   (Arrive by 9:15 AM)   US OB SINGLE FOLLOW UP REPEAT with URWHSUS1   [facility] ([facility])    [place] [facility]  3rd Floor, [address]  [address] [facility] [address] [phone]           Please bring a list of your medicines (including vitamins, minerals and over-the-counter drugs). Also, tell your doctor about any allergies you may have. Wear comfortable clothes and leave your valuables at home.  Drink four 8-ounce glasses of fluid an hour before your exam. If you need to empty your bladder before your exam, try to release only a little urine. Then, drink another glass of fluid.  You may have up to two family members in the exam room. If you bring a small child, an adult must be there to care for him or her. No video or camera photography during the procedure.  Please call the Imaging Department at your exam site with any questions.            Aug 22, 2018 10:15 AM CDT   RETURN OB with Heide Davalos MD   [facility] ([facility])    [place] [facility]  [address]rd Fl[address]  [address] [facility] [id number] [phone]              Future tests that were ordered for you today     Open Future Orders        Priority Expected Expires Ordered    MFM US Comprehensive Single F/U Routine 9/4/2018 8/7/2019 8/7/2018            Who to contact     If you have questions or need follow up  information about today's clinic visit or your schedule please contact [facility] MATERNAL FETAL MEDICINE [facility] directly at [phone].  Normal or non-critical lab and imaging results will be communicated to you by MyChart, letter or phone within 4 business days after the clinic has received the results. If you do not hear from us within 7 days, please contact the clinic through [employer] or phone. If you have a critical or abnormal lab result, we will notify you by phone as soon as possible.  Submit refill requests through [employer] or call your pharmacy and they will forward the refill request to us. Please allow 3 business days for your refill to be completed.          Additional Information About Your Visit        [employer] Information     [employer] gives you secure access to your electronic health record. If you see a primary care provider, you can also send messages to your care team and make appointments. If you have questions, please call your primary care clinic.  If you do not have a primary care provider, please call [phone] and they will assist you.        Care EveryWhere ID     This is your Care EveryWhere ID. This could be used by other organizations to access your [place] medical records  [id number]        Your Vitals Were     Last Period                   (LMP Unknown)            Blood Pressure from Last 3 Encounters:   07/25/18 105/67   06/27/18 108/70   05/14/18 106/71    Weight from Last 3 Encounters:   07/25/18 102.6 kg (226 lb 1.6 oz)   06/27/18 102.8 kg (226 lb 9.6 oz)   05/14/18 103.4 kg (228 lb)               Primary Care Provider Office Phone # Fax #    Jazlyn Huitron [phone]       [address] [address]        Equal Access to Services     SUGAR MELISSA : Martha Sandra, lesley saxena, adrianna bansal. So [facility] [phone].    ATENCIÓN: Si ember grace perdue  disposición servicios gratuitos de asistencia lingüística. Oscar hunt [phone].    We comply with applicable federal civil rights laws and [place] laws. We do not discriminate on the basis of race, color, national origin, age, disability, sex, sexual orientation, or gender identity.            Thank you!     Thank you for choosing [facility]  for your care. Our goal is always to provide you with excellent care. Hearing back from our patients is one way we can continue to improve our services. Please take a few minutes to complete the written survey that you may receive in the mail after your visit with us. Thank you!             Your Updated Medication List - Protect others around you: Learn how to safely use, store and throw away your medicines at www.disposemymeds.org.          This list is accurate as of 8/7/18  1:32 PM.  Always use your most recent med list.                   Brand Name Dispense Instructions for use Diagnosis    cholecalciferol 1000 units capsule    vitamin  -D    30 capsule    Take 5 capsules (5,000 Units) by mouth daily        prenatal multivitamin plus iron 27-0.8 MG Tabs per tablet     100 tablet    Take 1 tablet by mouth daily    Encounter for preconception consultation       SYNTHROID 200 MCG tablet   Generic drug:  levothyroxine     90 tablet    Take 1 tablet (200 mcg) by mouth daily    Postablative hypothyroidism

## 2018-08-22 ENCOUNTER — RADIANT APPOINTMENT (OUTPATIENT)
Dept: ULTRASOUND IMAGING | Facility: CLINIC | Age: 43
End: 2018-08-22
Attending: OBSTETRICS & GYNECOLOGY
Payer: COMMERCIAL

## 2018-08-22 DIAGNOSIS — O09.812 PREGNANCY RESULTING FROM IN VITRO FERTILIZATION IN SECOND TRIMESTER: ICD-10-CM

## 2018-08-22 PROCEDURE — 76816 OB US FOLLOW-UP PER FETUS: CPT

## 2018-08-23 ENCOUNTER — OFFICE VISIT (OUTPATIENT)
Dept: OBGYN | Facility: CLINIC | Age: 43
End: 2018-08-23
Attending: OBSTETRICS & GYNECOLOGY
Payer: COMMERCIAL

## 2018-08-23 VITALS
HEART RATE: 74 BPM | BODY MASS INDEX: 36.26 KG/M2 | DIASTOLIC BLOOD PRESSURE: 72 MMHG | SYSTOLIC BLOOD PRESSURE: 108 MMHG | WEIGHT: 231 LBS | HEIGHT: 67 IN

## 2018-08-23 DIAGNOSIS — O09.92 HIGH-RISK PREGNANCY IN SECOND TRIMESTER: Primary | ICD-10-CM

## 2018-08-23 DIAGNOSIS — B37.31 CANDIDIASIS OF VAGINA: ICD-10-CM

## 2018-08-23 LAB
CLUE CELLS: NORMAL
TRICHOMONAS (WET PREP): NORMAL
YEAST (WET PREP): NORMAL

## 2018-08-23 PROCEDURE — 87210 SMEAR WET MOUNT SALINE/INK: CPT | Mod: ZF | Performed by: OBSTETRICS & GYNECOLOGY

## 2018-08-23 RX ORDER — TERCONAZOLE 0.4 %
1 CREAM WITH APPLICATOR VAGINAL AT BEDTIME
Qty: 45 G | Refills: 1 | Status: SHIPPED | OUTPATIENT
Start: 2018-08-23 | End: 2018-08-30

## 2018-08-23 NOTE — MR AVS SNAPSHOT
After Visit Summary   8/23/2018    Mary Wray    MRN: [id number]           Patient Information     Date Of Birth          1975        Visit Information        Provider Department      8/23/2018 1:30 PM Luly Bai MD [facility]        Today's Diagnoses     High-risk pregnancy in second trimester    -  1    Candidiasis of vagina           Follow-ups after your visit        Your next 10 appointments already scheduled     Sep 06, 2018  8:45 AM CDT   MFM US COMPRE SINGLE F/U with [id number]   [facility])    [address]  [facility] [id number] [phone]           Wear comfortable clothes and leave your valuables at home.            Sep 06, 2018  9:15 AM CDT   Radiology MD with UR MARY VILLAREAL   [facility])    [address]  [facility] [address] [phone]           Please arrive at the time given for your first appointment. This visit is used internally to schedule the physician's time during your ultrasound.            Sep 14, 2018  4:00 PM CDT   RETURN OB with Luly Bai MD   [facility] ([facility])    [facility] [facility]  [address]  [address]  [facility] [id number] [phone]              Who to contact     Please call your clinic at [phone] to:    Ask questions about your health    Make or cancel appointments    Discuss your medicines    Learn about your test results    Speak to your doctor            Additional Information About Your Visit        MyChart Information     [employer] gives you secure access to your electronic health record. If you see a primary care provider, you can also send messages to your care team and make appointments. If you have questions, please call your primary care clinic.   "If you do not have a primary care provider, please call [phone] and they will assist you.      [employer] is an electronic gateway that provides easy, online access to your medical records. With [employer], you can request a clinic appointment, read your test results, renew a prescription or communicate with your care team.     To access your existing account, please contact your [facility] or call [phone] for assistance.        Care EveryWhere ID     This is your Care EveryWhere ID. This could be used by other organizations to access your [place] medical records  [id number]        Your Vitals Were     Pulse Height Last Period BMI (Body Mass Index)          74 1.702 m (5' 7\") (LMP Unknown) 36.18 kg/m2         Blood Pressure from Last 3 Encounters:   08/23/18 108/72   08/22/18 102/71   07/25/18 105/67    Weight from Last 3 Encounters:   08/23/18 104.8 kg (231 lb)   08/22/18 104.6 kg (230 lb 11.2 oz)   07/25/18 102.6 kg (226 lb 1.6 oz)              We Performed the Following     Wet Prep POCT          Today's Medication Changes          These changes are accurate as of 8/23/18  3:38 PM.  If you have any questions, ask your nurse or doctor.               Start taking these medicines.        Dose/Directions    terconazole 0.4 % cream   Commonly known as:  TERAZOL 7   Used for:  Candidiasis of vagina   Started by:  Luly Bai MD        Dose:  1 applicator   Place 1 applicator vaginally At Bedtime for 7 days   Quantity:  45 g   Refills:  1            Where to get your medicines      These medications were sent to [facility] [place], [place] - [address] [facility] [address] [place] [address] [address] [address]    Hours:  TRANSPLANT PHONE NUMBER [phone] Phone:  [phone]     terconazole 0.4 % cream                Primary Care Provider Office Phone # Fax #    Jazlyn Huitron [phone]       [address]  [facility] [address]        Equal Access to Services     SUGAR MELISSA : Hadii aad ku hadfaviochamp Sandra, wasyldaron saxena, melinaadrianna cyr. So [facility] [phone].    ATENCIÓN: Si habla español, tiene a perdue disposición servicios gratuitos de asistencia lingüística. Swatiame al [phone].    We comply with applicable federal civil rights laws and [place] laws. We do not discriminate on the basis of race, color, national origin, age, disability, sex, sexual orientation, or gender identity.            Thank you!     Thank you for choosing [facility]  for your care. Our goal is always to provide you with excellent care. Hearing back from our patients is one way we can continue to improve our services. Please take a few minutes to complete the written survey that you may receive in the mail after your visit with us. Thank you!             Your Updated Medication List - Protect others around you: Learn how to safely use, store and throw away your medicines at www.disposemymeds.org.          This list is accurate as of 8/23/18  3:38 PM.  Always use your most recent med list.                   Brand Name Dispense Instructions for use Diagnosis    omega 3 1000 MG Caps           PRENATAL VITAMIN PO           SYNTHROID 200 MCG tablet   Generic drug:  levothyroxine     90 tablet    Take 1 tablet (200 mcg) by mouth daily    Postablative hypothyroidism       terconazole 0.4 % cream    TERAZOL 7    45 g    Place 1 applicator vaginally At Bedtime for 7 days    Candidiasis of vagina       VITAMIN D (CHOLECALCIFEROL) PO      Take by mouth daily

## 2018-08-23 NOTE — PROGRESS NOTES
Doing well. Ultrasound yesterday adequate growth. Notes some irritating vaginal discharge.   Exam: abdomen: uterus palpable below umbilicus with 2 firm masses to right of umbilicus c/w her known myomata  Eg: thick white discharge and erythema  Vagina: thick white discharge.   Wet prep shows hyphae    A: candida vaginitis P: terazol topical.   Luly Bai

## 2018-08-23 NOTE — LETTER
8/23/2018       RE: Mary Wray  [address] [address]     Dear Colleague,    Thank you for referring your patient, Mary Wray, to the [facility] at [facility]. Please see a copy of my visit note below.    Doing well. Ultrasound yesterday adequate growth. Notes some irritating vaginal discharge.   Exam: abdomen: uterus palpable below umbilicus with 2 firm masses to right of umbilicus c/w her known myomata  Eg: thick white discharge and erythema  Vagina: thick white discharge.   Wet prep shows hyphae    A: candida vaginitis P: terazol topical.   Luly Bai

## 2018-09-06 ENCOUNTER — HOSPITAL ENCOUNTER (OUTPATIENT)
Dept: ULTRASOUND IMAGING | Facility: CLINIC | Age: 43
Discharge: HOME | End: 2018-09-06
Attending: OBSTETRICS & GYNECOLOGY | Admitting: OBSTETRICS & GYNECOLOGY
Payer: COMMERCIAL

## 2018-09-06 ENCOUNTER — OFFICE VISIT (OUTPATIENT)
Dept: MATERNAL FETAL MEDICINE | Facility: CLINIC | Age: 43
End: 2018-09-06
Attending: OBSTETRICS & GYNECOLOGY
Payer: COMMERCIAL

## 2018-09-06 DIAGNOSIS — E07.9 THYROID DISEASE DURING PREGNANCY IN SECOND TRIMESTER: Primary | ICD-10-CM

## 2018-09-06 DIAGNOSIS — O09.521 ELDERLY MULTIGRAVIDA IN FIRST TRIMESTER: ICD-10-CM

## 2018-09-06 DIAGNOSIS — O99.282 THYROID DISEASE DURING PREGNANCY IN SECOND TRIMESTER: Primary | ICD-10-CM

## 2018-09-06 PROCEDURE — 76816 OB US FOLLOW-UP PER FETUS: CPT

## 2018-09-06 NOTE — MR AVS SNAPSHOT
After Visit Summary   9/6/2018    Mary Wray    MRN: [id number]           Patient Information     Date Of Birth          1975        Visit Information        Provider Department      9/6/2018 9:15 AM Arlin Cochran MD [employer] [facility]        Today's Diagnoses     Thyroid disease during pregnancy in second trimester    -  1       Follow-ups after your visit        Your next 10 appointments already scheduled     Sep 14, 2018  4:00 PM CDT   RETURN OB with Luly Bai MD   [facility] ([facility])    [place] [facility]  [address]  [address]  [facility] [address] [phone]              Who to contact     If you have questions or need follow up information about today's clinic visit or your schedule please contact [facility] directly at [phone].  Normal or non-critical lab and imaging results will be communicated to you by [employer], letter or phone within 4 business days after the clinic has received the results. If you do not hear from us within 7 days, please contact the clinic through [employer] or phone. If you have a critical or abnormal lab result, we will notify you by phone as soon as possible.  Submit refill requests through [employer] or call your pharmacy and they will forward the refill request to us. Please allow 3 business days for your refill to be completed.          Additional Information About Your Visit        MyChart Information     [employer] gives you secure access to your electronic health record. If you see a primary care provider, you can also send messages to your care team and make appointments. If you have questions, please call your primary care clinic.  If you do not have a primary care provider, please call [phone] and they will assist you.        Care EveryWhere ID     This is your Care EveryWhere ID. This could be used by other  organizations to access your [place] medical records  [id number]        Your Vitals Were     Last Period                   (LMP Unknown)            Blood Pressure from Last 3 Encounters:   08/23/18 108/72   08/22/18 102/71   07/25/18 105/67    Weight from Last 3 Encounters:   08/23/18 104.8 kg (231 lb)   08/22/18 104.6 kg (230 lb 11.2 oz)   07/25/18 102.6 kg (226 lb 1.6 oz)              Today, you had the following     No orders found for display       Primary Care Provider Office Phone # Fax #    Jazlyn Huitron [phone]       [address] [address]        Equal Access to Services     SUGAR MELISSA : Martha Sandra, wathad saxena, qabert kaalmadaron nina, adrianna goldstein. So [facility] [phone].    ATENCIÓN: Si habla español, tiene a perdue disposición servicios gratuitos de asistencia lingüística. [facility] [phone].    We comply with applicable federal civil rights laws and [place] laws. We do not discriminate on the basis of race, color, national origin, age, disability, sex, sexual orientation, or gender identity.            Thank you!     Thank you for choosing [facility]  for your care. Our goal is always to provide you with excellent care. Hearing back from our patients is one way we can continue to improve our services. Please take a few minutes to complete the written survey that you may receive in the mail after your visit with us. Thank you!             Your Updated Medication List - Protect others around you: Learn how to safely use, store and throw away your medicines at www.disposemymeds.org.          This list is accurate as of 9/6/18 10:24 AM.  Always use your most recent med list.                   Brand Name Dispense Instructions for use Diagnosis    omega 3 1000 MG Caps           PRENATAL VITAMIN PO           SYNTHROID 200 MCG tablet   Generic drug:  levothyroxine     90  tablet    Take 1 tablet (200 mcg) by mouth daily    Postablative hypothyroidism       VITAMIN D (CHOLECALCIFEROL) PO      Take by mouth daily

## 2018-09-14 ENCOUNTER — OFFICE VISIT (OUTPATIENT)
Dept: OBGYN | Facility: CLINIC | Age: 43
End: 2018-09-14
Attending: OBSTETRICS & GYNECOLOGY
Payer: COMMERCIAL

## 2018-09-14 VITALS
WEIGHT: 234 LBS | BODY MASS INDEX: 36.73 KG/M2 | HEIGHT: 67 IN | SYSTOLIC BLOOD PRESSURE: 103 MMHG | HEART RATE: 84 BPM | DIASTOLIC BLOOD PRESSURE: 69 MMHG

## 2018-09-14 DIAGNOSIS — O09.92 HIGH-RISK PREGNANCY IN SECOND TRIMESTER: Primary | ICD-10-CM

## 2018-09-14 DIAGNOSIS — E89.0 POSTABLATIVE HYPOTHYROIDISM: ICD-10-CM

## 2018-09-14 LAB
GLUCOSE 1H P 50 G GLC PO SERPL-MCNC: 148 MG/DL (ref 60–129)
TSH SERPL DL<=0.005 MIU/L-ACNC: 0.26 MU/L (ref 0.4–4)

## 2018-09-14 PROCEDURE — G0463 HOSPITAL OUTPT CLINIC VISIT: HCPCS

## 2018-09-14 PROCEDURE — 84436 ASSAY OF TOTAL THYROXINE: CPT

## 2018-09-14 PROCEDURE — 82950 GLUCOSE TEST: CPT

## 2018-09-14 PROCEDURE — 84443 ASSAY THYROID STIM HORMONE: CPT

## 2018-09-14 NOTE — LETTER
9/14/2018       RE: Mary Wray  [address] [facility] [address]  [facility] [place] [address]     Dear Colleague,    Thank you for referring your patient, Mary Wray, to the [facility] at [facility]. Please see a copy of my visit note below.    Doing well. All questions answered. Has growth scan next week. Last was 40th %ile.  Reviewed BPPs later in pregnancy.   GCT etc today    Luly Bai      Again, thank you for allowing me to participate in the care of your patient.      Sincerely,    Luly Bai MD

## 2018-09-14 NOTE — PROGRESS NOTES
Doing well. All questions answered. Has growth scan next week. Last was 40th %ile.  Reviewed BPPs later in pregnancy.   GCT etc today    Luly Bai

## 2018-09-14 NOTE — MR AVS SNAPSHOT
After Visit Summary   9/14/2018    Mary Wray    MRN: [id number]           Patient Information     Date Of Birth          1975        Visit Information        Provider Department      9/14/2018 4:00 PM Luly Bai MD [facility]        Today's Diagnoses     High-risk pregnancy in second trimester    -  1    Postablative hypothyroidism           Follow-ups after your visit        Your next 10 appointments already scheduled     Sep 19, 2018  8:00 AM CDT   (Arrive by 7:45 AM)   US OB SINGLE FOLLOW UP REPEAT with [id number]   [facility])    [facility]  3rd Floor, [address]  [address] [place] [address] [phone]           How do I prepare for my exam? (Food and drink instructions) Drink four 8-ounce glasses of fluid an hour before your exam. If you need to empty your bladder before your exam, try to release only a little urine. Then, drink another glass of fluid.  How do I prepare for my exam? (Other instructions) You may have up to two family members in the exam room. If you bring a small child, an adult must be there to care for him or her. No video or camera photography during the procedure.  What should I wear: Wear comfortable clothes.  How long does the exam take: Most ultrasounds take 30 to 60 minutes.  What should I bring: Bring a list of your medicines, including vitamins, minerals and over-the-counter drugs. It is safest to leave personal items at home.  Do I need a :  No  is needed.  What do I need to tell my doctor: Tell your doctor about any allergies you may have.  What should I do after the exam: No restrictions, You may resume normal activities.  What is this test: An ultrasound uses sound waves to make pictures of the body. Sound waves do not cause pain. The only discomfort may be the pressure of the wand against your skin or full bladder.  Who should I  call with questions: If you have any questions, please call the Imaging Department where you will have your exam. Directions, parking instructions, and other information is available on our website, [email or web address].            Oct 19, 2018  4:00 PM CDT   RETURN OB with Luly Bai MD   [facility] ([facility])    [place] [facility]  [address]  [address]  [facility] [address] [phone]            Nov 02, 2018  4:00 PM CDT   RETURN OB with Luly Bai MD   [facility] ([facility])    [facility] [facility] [address]  [address]  [address]  [facility] [address] [phone]            Nov 09, 2018  4:00 PM CST   RETURN OB with Luly Bai MD   [facility] ([facility])    [facility] [facility]  [address]  [address]  [facility] [address] [phone]            Nov 16, 2018  4:00 PM CST   RETURN OB with Luly Bai MD   [facility] ([facility])    [facility]  [address]  [address]  [facility] [address] [phone]            Nov 21, 2018  8:15 AM CST   RETURN OB with Luly Bai MD   [facility] ([facility])    [facility] [facility]  [address]  [address]  [facility] [address] [phone]              Future tests that were ordered for you today     Open Future Orders        Priority Expected Expires Ordered    US OB Single Follow Up Repeat Routine  9/14/2019 9/14/2018            Who to contact     Please call your clinic at [phone] to:    Ask questions about your health    Make or cancel appointments    Discuss your medicines    Learn about your test results    Speak to your doctor            Additional Information About Your Visit        MyChart Information     [employer] gives you secure  "access to your electronic health record. If you see a primary care provider, you can also send messages to your care team and make appointments. If you have questions, please call your primary care clinic.  If you do not have a primary care provider, please call [phone] and they will assist you.      [employer] is an electronic gateway that provides easy, online access to your medical records. With [employer], you can request a clinic appointment, read your test results, renew a prescription or communicate with your care team.     To access your existing account, please contact your [facility] or call [phone] for assistance.        Care EveryWhere ID     This is your Care EveryWhere ID. This could be used by other organizations to access your [place] medical records  [id number]        Your Vitals Were     Pulse Height Last Period BMI (Body Mass Index)          84 1.702 m (5' 7\") (LMP Unknown) 36.65 kg/m2         Blood Pressure from Last 3 Encounters:   09/14/18 103/69   08/23/18 108/72   08/22/18 102/71    Weight from Last 3 Encounters:   09/14/18 106.1 kg (234 lb)   08/23/18 104.8 kg (231 lb)   08/22/18 104.6 kg (230 lb 11.2 oz)              We Performed the Following     Thyroxine total     TSH        Primary Care Provider Office Phone # Fax #    Jazlyn Huitron [phone]       [address] [address]        Equal Access to Services     SUGAR MELISSA : Hadii aster Sandra, waaxda lisyadaha, qaybta kaalmada maico, adrianna coy . So [facility] [phone].    ATENCIÓN: Si habla español, tiene a perdue disposición servicios gratuitos de asistencia lingüística. Llame al [phone].    We comply with applicable federal civil rights laws and [place] laws. We do not discriminate on the basis of race, color, national origin, age, disability, sex, sexual orientation, or gender identity.            Thank " you!     Thank you for choosing [facility]  for your care. Our goal is always to provide you with excellent care. Hearing back from our patients is one way we can continue to improve our services. Please take a few minutes to complete the written survey that you may receive in the mail after your visit with us. Thank you!             Your Updated Medication List - Protect others around you: Learn how to safely use, store and throw away your medicines at www.disposemymeds.org.          This list is accurate as of 9/14/18  5:44 PM.  Always use your most recent med list.                   Brand Name Dispense Instructions for use Diagnosis    omega 3 1000 MG Caps           PRENATAL VITAMIN PO           SYNTHROID 200 MCG tablet   Generic drug:  levothyroxine     90 tablet    Take 1 tablet (200 mcg) by mouth daily    Postablative hypothyroidism       VITAMIN D (CHOLECALCIFEROL) PO      Take by mouth daily

## 2018-09-15 LAB — T4 SERPL-MCNC: 18.1 UG/DL (ref 4.5–13.9)

## 2018-09-18 DIAGNOSIS — O09.293 HISTORY OF PRE-ECLAMPSIA IN PRIOR PREGNANCY, CURRENTLY PREGNANT, THIRD TRIMESTER: Primary | ICD-10-CM

## 2018-09-18 NOTE — PROGRESS NOTES
Did you see you did NOT pass the one hour test? We need to schedule the 3 hour test for you.... ;(  For that you need to come in fasting, drink the stuff and then have glucose checks at one hour, two hours and three hours.     Bring your laptop!

## 2018-09-19 ENCOUNTER — RADIANT APPOINTMENT (OUTPATIENT)
Dept: ULTRASOUND IMAGING | Facility: CLINIC | Age: 43
End: 2018-09-19
Attending: OBSTETRICS & GYNECOLOGY
Payer: COMMERCIAL

## 2018-09-19 DIAGNOSIS — O09.812 PREGNANCY RESULTING FROM IN VITRO FERTILIZATION IN SECOND TRIMESTER: ICD-10-CM

## 2018-09-19 PROCEDURE — 76816 OB US FOLLOW-UP PER FETUS: CPT

## 2018-09-28 ENCOUNTER — TELEPHONE (OUTPATIENT)
Dept: OBGYN | Facility: CLINIC | Age: 43
End: 2018-09-28

## 2018-09-28 NOTE — TELEPHONE ENCOUNTER
Spoke with Mary who is 30 weeks pregnant and was treated a couple weeks ago for a yeast infection. She states that her symptoms have resurfaced. The symptoms did diminish for a couple weeks but are back. She is mostly having itching on the external vagina and some discharge. Nurse advised that she try the refill of the terconazole cream. After this if no improvement she should come in to be seen. Patient agreeable and will  the refill.

## 2018-10-19 ENCOUNTER — OFFICE VISIT (OUTPATIENT)
Dept: OBGYN | Facility: CLINIC | Age: 43
End: 2018-10-19
Attending: OBSTETRICS & GYNECOLOGY
Payer: COMMERCIAL

## 2018-10-19 ENCOUNTER — RADIANT APPOINTMENT (OUTPATIENT)
Dept: ULTRASOUND IMAGING | Facility: CLINIC | Age: 43
End: 2018-10-19
Attending: OBSTETRICS & GYNECOLOGY
Payer: COMMERCIAL

## 2018-10-19 VITALS
HEART RATE: 69 BPM | DIASTOLIC BLOOD PRESSURE: 87 MMHG | SYSTOLIC BLOOD PRESSURE: 137 MMHG | WEIGHT: 240 LBS | BODY MASS INDEX: 37.59 KG/M2

## 2018-10-19 DIAGNOSIS — O09.812 PREGNANCY RESULTING FROM IN VITRO FERTILIZATION IN SECOND TRIMESTER: ICD-10-CM

## 2018-10-19 DIAGNOSIS — O09.93 HIGH-RISK PREGNANCY IN THIRD TRIMESTER: Primary | ICD-10-CM

## 2018-10-19 PROBLEM — Z92.89 H/O MAMMOGRAM: Status: ACTIVE | Noted: 2017-12-11

## 2018-10-19 PROBLEM — O99.810 ABNORMAL GLUCOSE AFFECTING PREGNANCY: Status: ACTIVE | Noted: 2018-10-19

## 2018-10-19 LAB — GLUCOSE BLDC GLUCOMTR-MCNC: 78 MG/DL (ref 70–99)

## 2018-10-19 PROCEDURE — G0463 HOSPITAL OUTPT CLINIC VISIT: HCPCS | Mod: ZF

## 2018-10-19 PROCEDURE — 76816 OB US FOLLOW-UP PER FETUS: CPT

## 2018-10-19 PROCEDURE — 82962 GLUCOSE BLOOD TEST: CPT

## 2018-10-19 ASSESSMENT — PAIN SCALES - GENERAL: PAINLEVEL: NO PAIN (0)

## 2018-10-19 NOTE — MR AVS SNAPSHOT
After Visit Summary   10/19/2018    Mary Wray    MRN: [id number]           Patient Information     Date Of Birth          1975        Visit Information        Provider Department      10/19/2018 4:00 PM Luly Bai MD [facility]        Today's Diagnoses     High-risk pregnancy in third trimester    -  1       Follow-ups after your visit        Your next 10 appointments already scheduled     Oct 23, 2018  2:00 PM CDT   Office Visit with Heide Lang [facility]   [facility] ([facility])    [facility]  [address] [place], [place] [address]e [address], [address]  [place] [id number] [phone]           Bring a current list of meds and any records pertaining to this visit. For Physicals, please bring immunization records and any forms needing to be filled out. Please arrive 10 minutes early to complete paperwork.            Nov 02, 2018  1:00 PM CDT   (Arrive by 12:45 PM)   US FETAL BIOPHYS PROFILE W/O NON STRESS TEST with URWHSUS1   [facility] ([facility])    [facility]  3rd Floor, [address]  [address] [place] [address] [phone]           How do I prepare for my exam? (Food and drink instructions) Drink four 8-ounce glasses of fluid an hour before your exam. If you need to empty your bladder before your exam, try to release only a little urine. Then, drink another glass of fluid.  How do I prepare for my exam? (Other instructions) You may have up to two family members in the exam room. If you bring a small child, an adult must be there to care for him or her. No video or camera photography during the procedure.  What should I wear: Wear comfortable clothes.  How long does the exam take: Most ultrasounds take 30 to 60 minutes.  What should I bring: Bring a list of your medicines, including vitamins, minerals and  over-the-counter drugs. It is safest to leave personal items at home.  Do I need a :  No  is needed.  What do I need to tell my doctor: Tell your doctor about any allergies you may have.  What should I do after the exam: No restrictions, You may resume normal activities.  What is this test: An ultrasound uses sound waves to make pictures of the body. Sound waves do not cause pain. The only discomfort may be the pressure of the wand against your skin or full bladder.  Who should I call with questions: If you have any questions, please call the Imaging Department where you will have your exam. Directions, parking instructions, and other information is available on our website, [email or web address].            Nov 02, 2018  1:15 PM CDT   RETURN OB with Luly Bai MD   [facility] ([facility])    [facility] [address]  [address]  [address] [place] [address]   [phone]            Nov 09, 2018  9:40 AM CST   Return Visit with Jazlyn Huitron MD   [facility] ([facility])    [address],  [address]  [facility] [address]   [phone]            Nov 09, 2018  3:30 PM CST   (Arrive by 3:15 PM)   US FETAL BIOPHYS PROFILE W/O NON STRESS TEST with URWHSUS1   [facility] ([facility])    [facility]  3rd [facility], [address]  [address] [place] [id number]   [phone]           How do I prepare for my exam? (Food and drink instructions) Drink four 8-ounce glasses of fluid an hour before your exam. If you need to empty your bladder before your exam, try to release only a little urine. Then, drink another glass of fluid.  How do I prepare for my exam? (Other instructions) You may have up to two family members in the exam room. If you bring a small child, an adult must be there to care for him or her. No video or camera photography during the  procedure.  What should I wear: Wear comfortable clothes.  How long does the exam take: Most ultrasounds take 30 to 60 minutes.  What should I bring: Bring a list of your medicines, including vitamins, minerals and over-the-counter drugs. It is safest to leave personal items at home.  Do I need a :  No  is needed.  What do I need to tell my doctor: Tell your doctor about any allergies you may have.  What should I do after the exam: No restrictions, You may resume normal activities.  What is this test: An ultrasound uses sound waves to make pictures of the body. Sound waves do not cause pain. The only discomfort may be the pressure of the wand against your skin or full bladder.  Who should I call with questions: If you have any questions, please call the Imaging Department where you will have your exam. Directions, parking instructions, and other information is available on our website, [email or web address].            Nov 09, 2018  4:00 PM CST   RETURN OB with Luly Bai MD   [facility] ([facility])    [facility] [address]  [address]  [address] [place] [address]   [phone]            Nov 16, 2018  3:30 PM CST   (Arrive by 3:15 PM)   US FETAL BIOPHYS PROFILE W/O NON STRESS TEST with [id number]   [facility] ([facility])    [facility]  3rd Floor, [address]  [address] [place] [address]   [phone]           How do I prepare for my exam? (Food and drink instructions) Drink four 8-ounce glasses of fluid an hour before your exam. If you need to empty your bladder before your exam, try to release only a little urine. Then, drink another glass of fluid.  How do I prepare for my exam? (Other instructions) You may have up to two family members in the exam room. If you bring a small child, an adult must be there to care for him or her. No video or camera photography  during the procedure.  What should I wear: Wear comfortable clothes.  How long does the exam take: Most ultrasounds take 30 to 60 minutes.  What should I bring: Bring a list of your medicines, including vitamins, minerals and over-the-counter drugs. It is safest to leave personal items at home.  Do I need a :  No  is needed.  What do I need to tell my doctor: Tell your doctor about any allergies you may have.  What should I do after the exam: No restrictions, You may resume normal activities.  What is this test: An ultrasound uses sound waves to make pictures of the body. Sound waves do not cause pain. The only discomfort may be the pressure of the wand against your skin or full bladder.  Who should I call with questions: If you have any questions, please call the Imaging Department where you will have your exam. Directions, parking instructions, and other information is available on our website, [email or web address].            Nov 16, 2018  4:00 PM CST   RETURN OB with Luly Bai MD   [facility] ([facility])    [facility] [address]  [address]  [address] [place] [address]   [phone]            Nov 21, 2018  8:00 AM CST   (Arrive by 7:45 AM)   US FETAL BIOPHYS PROFILE W/O NON STRESS TEST with [id number]   [facility] ([facility])    [facility]  3rd Floor, [address]  [address] [place] [address]   [phone]           How do I prepare for my exam? (Food and drink instructions) Drink four 8-ounce glasses of fluid an hour before your exam. If you need to empty your bladder before your exam, try to release only a little urine. Then, drink another glass of fluid.  How do I prepare for my exam? (Other instructions) You may have up to two family members in the exam room. If you bring a small child, an adult must be there to care for him or her. No video or camera  photography during the procedure.  What should I wear: Wear comfortable clothes.  How long does the exam take: Most ultrasounds take 30 to 60 minutes.  What should I bring: Bring a list of your medicines, including vitamins, minerals and over-the-counter drugs. It is safest to leave personal items at home.  Do I need a :  No  is needed.  What do I need to tell my doctor: Tell your doctor about any allergies you may have.  What should I do after the exam: No restrictions, You may resume normal activities.  What is this test: An ultrasound uses sound waves to make pictures of the body. Sound waves do not cause pain. The only discomfort may be the pressure of the wand against your skin or full bladder.  Who should I call with questions: If you have any questions, please call the Imaging Department where you will have your exam. Directions, parking instructions, and other information is available on our website, [email or web address].            Nov 21, 2018  8:15 AM CST   RETURN OB with Luly Bai MD   [facility] ([facility])    [facility] [address]  [address]  [address]  [facility] [address]   [phone]              Future tests that were ordered for you today     Open Standing Orders        Priority Remaining Interval Expires Ordered    US OB Fetal Biophys Prf wo NonStrs Singls Sgl Routine 5/5 weekly 10/19/2019 10/19/2018          Open Future Orders        Priority Expected Expires Ordered    US OB Fetal Biophys Prf wo NonStrs Singls Sgl Routine  10/19/2019 10/19/2018    US OB Fetal Biophys Prf wo NonStrs Singls Sgl Routine  10/19/2019 10/19/2018    US OB Fetal Biophys Prf wo NonStrs Singls Sgl Routine  10/19/2019 10/19/2018    US OB Fetal Biophys Prf wo NonStrs Singls Sgl Routine  10/19/2019 10/19/2018    US OB Fetal Biophys Prf wo NonStrs Singls Sgl Routine  10/19/2019 10/19/2018    TSH with free T4 reflex Routine  10/19/2019 10/19/2018     CBC with Platelets Routine  10/19/2019 10/19/2018            Who to contact     Please call your clinic at [phone] to:    Ask questions about your health    Make or cancel appointments    Discuss your medicines    Learn about your test results    Speak to your doctor            Additional Information About Your Visit        [facility] Information     [employer] gives you secure access to your electronic health record. If you see a primary care provider, you can also send messages to your care team and make appointments. If you have questions, please call your primary care clinic.  If you do not have a primary care provider, please call [phone] and they will assist you.      [facility] is an electronic gateway that provides easy, online access to your medical records. With [employer], you can request a clinic appointment, read your test results, renew a prescription or communicate with your care team.     To access your existing account, please contact your [facility] or call [phone] for assistance.        Care EveryWhere ID     This is your Care EveryWhere ID. This could be used by other organizations to access your [facility] medical records  [id number]        Your Vitals Were     Pulse Last Period Breastfeeding? BMI (Body Mass Index)          69 (LMP Unknown) No 37.59 kg/m2         Blood Pressure from Last 3 Encounters:   10/19/18 137/87   09/14/18 103/69   08/23/18 108/72    Weight from Last 3 Encounters:   10/19/18 108.9 kg (240 lb)   09/14/18 106.1 kg (234 lb)   08/23/18 104.8 kg (231 lb)              We Performed the Following     Glucose by meter     Glucose Whole Blood POCT        Primary Care Provider Office Phone # Fax #    Jazlyn Huitron [phone]       [address] [address]        Equal Access to Services     SUGAR MELISSA : lesley Waldron qaybta kaalmada adeegyada, waxay idiin hayaan  cong suarezsylviajuventino singhSariaadaniel ah. So [facility] [phone].    ATENCIÓN: Si habla mounika, tiene a perdue disposición servicios gratuitos de asistencia lingüística. Oscar al [phone].    We comply with applicable federal civil rights laws and [place] laws. We do not discriminate on the basis of race, color, national origin, age, disability, sex, sexual orientation, or gender identity.            Thank you!     Thank you for choosing [facility]  for your care. Our goal is always to provide you with excellent care. Hearing back from our patients is one way we can continue to improve our services. Please take a few minutes to complete the written survey that you may receive in the mail after your visit with us. Thank you!             Your Updated Medication List - Protect others around you: Learn how to safely use, store and throw away your medicines at www.disposemymeds.org.          This list is accurate as of 10/19/18  9:13 PM.  Always use your most recent med list.                   Brand Name Dispense Instructions for use Diagnosis    aspirin 81 MG EC tablet     30 tablet    Take 1 tablet (81 mg) by mouth daily    History of pre-eclampsia in prior pregnancy, currently pregnant, third trimester       omega 3 1000 MG Caps           PRENATAL VITAMIN PO           SYNTHROID 200 MCG tablet   Generic drug:  levothyroxine     90 tablet    Take 1 tablet (200 mcg) by mouth daily    Postablative hypothyroidism       VITAMIN D (CHOLECALCIFEROL) PO      Take by mouth daily

## 2018-10-19 NOTE — LETTER
10/19/2018       RE: Mary Wray  [address] [address]     Dear Colleague,    Thank you for referring your patient, Mary Wray, to the [facility] at [facility]. Please see a copy of my visit note below.    preg complicated by:  Patient Active Problem List   Diagnosis     Vitamin D deficiency     Postablative hypothyroidism     Pre-diabetes     Human papilloma virus (HPV) infection     Papanicolaou smear of cervix with low grade squamous intraepithelial lesion (LGSIL)     H/O mammogram     High-risk pregnancy in second trimester     Mild depression (H)     AMA     GBS bacteriuria     BMI 36.0-36.9,adult     Fibroid uterus     In lieu of GTT she will do FSBG at home and report back to me q week. Random FSBG today was 78 (2 hours PP)  Will see Heide early next week for glucometer training.   Start BPPs in 2 weeks.   Luly Bai

## 2018-10-19 NOTE — NURSING NOTE
Chief Complaint   Patient presents with     Prenatal Care     SIOMARA 33 weeks and 6 days   Magda Saini LPN

## 2018-10-20 NOTE — PROGRESS NOTES
preg complicated by:  Patient Active Problem List   Diagnosis     Vitamin D deficiency     Postablative hypothyroidism     Pre-diabetes     Human papilloma virus (HPV) infection     Papanicolaou smear of cervix with low grade squamous intraepithelial lesion (LGSIL)     H/O mammogram     High-risk pregnancy in second trimester     Mild depression (H)     AMA     GBS bacteriuria     BMI 36.0-36.9,adult     Fibroid uterus     In lieu of GTT she will do FSBG at home and report back to me q week. Random FSBG today was 78 (2 hours PP)  Will see Heide early next week for glucometer training.   Start BPPs in 2 weeks.   Luly Bai

## 2018-10-23 ENCOUNTER — OFFICE VISIT (OUTPATIENT)
Dept: PHARMACY | Facility: CLINIC | Age: 43
End: 2018-10-23
Payer: COMMERCIAL

## 2018-10-23 DIAGNOSIS — R73.03 PRE-DIABETES: Primary | ICD-10-CM

## 2018-10-23 PROCEDURE — 99207 ZZC NO CHARGE LOS: CPT | Performed by: PHARMACIST

## 2018-10-23 RX ORDER — BLOOD-GLUCOSE METER
EACH MISCELLANEOUS
Qty: 1 KIT | Refills: 0 | Status: SHIPPED | OUTPATIENT
Start: 2018-10-23 | End: 2018-12-13

## 2018-10-23 RX ORDER — LANCETS
EACH MISCELLANEOUS
Qty: 100 EACH | Refills: 3 | Status: SHIPPED | OUTPATIENT
Start: 2018-10-23 | End: 2018-12-13

## 2018-10-23 NOTE — PROGRESS NOTES
"Clinical Consult:                                                    Mary Wray is a 43 year old female coming in for a clinical pharmacist consult.  She was referred to me from Dr. Bai     Reason for Consult: glucometer training    Discussion: Dr. Bai has recommended home BG monitoring to provide further screening for gestational diabetes.  Patient was taught how to use a meter and lancet device using demo accu-chek janet.  Patient was able to demonstrate understanding, and we completed a test together in clinic together.  The available in-clinic demo strips were  (and therefore may not be accurate but still suitable for demonstration), and her reading was 100, which was 1 hour after her last meal.      Patient eats a vegetarian diet.  Carbs include whole grains, beans, fruits.  Is getting protein through protein powder, nut butters, and dairy.  Feels she may be eating \"too much\" because she's frequently very hungry.     Plan:  1. Educated on goal BG in pregnancy.   2. Educated on general understanding of influence of pregnancy on insulin resistance, impact of carb intake on glucose levels.    3. Patient is scheduled to f/u with Dr. Bai on .  Patient educated to call if home BG are consistently out of range;  Otherwise bring log into next visit for discussion and evaluation.      Heide Lang, Pharm.D., BCPS    "

## 2018-10-23 NOTE — PATIENT INSTRUCTIONS
1. Goal blood sugars in pregnancy are:    Fasting:  Less than 96  1 hour after meals:  Less than 140  2 hours after meals:  Less than 120      2. Check your blood sugar every morning before breakfast and 1 hour after 2 meals each day.  Dr. Bai indicated you can send your results to her via email or [employer] messages.  Feel free to send them to me as well, or call me at the number below with any questions you may have.          My Clinical Pharmacist's contact information:                                                      It was a pleasure seeing you today!  Please feel free to contact me with any questions or concerns you have.      Heide Lang, Pharm.D., [facility]  Phone:  [phone]

## 2018-10-23 NOTE — MR AVS SNAPSHOT
After Visit Summary   10/23/2018    Mary Wray    MRN: [id number]           Patient Information     Date Of Birth          1975        Visit Information        Provider Department      10/23/2018 2:00 PM Heide Lang, [facility]        Today's Diagnoses     Pre-diabetes    -  1      Care Instructions        1. Goal blood sugars in pregnancy are:    Fasting:  Less than 96  1 hour after meals:  Less than 140  2 hours after meals:  Less than 120      2. Check your blood sugar every morning before breakfast and 1 hour after 2 meals each day.  Dr. Bai indicated you can send your results to her via email or Airborne Mobile messages.  Feel free to send them to me as well, or call me at the number below with any questions you may have.          My Clinical Pharmacist's contact information:                                                      It was a pleasure seeing you today!  Please feel free to contact me with any questions or concerns you have.      Heide Lang, Pharm.D., [facility]  Phone:  [phone]                  Follow-ups after your visit        Your next 10 appointments already scheduled     Nov 02, 2018  1:00 PM CDT   (Arrive by 12:45 PM)   US FETAL BIOPHYS PROFILE W/O NON STRESS TEST with URWHSUS1   [facility] ([facility])    [facility]  3rd Floor, [address]  [address] [place] [id number] [phone]           How do I prepare for my exam? (Food and drink instructions) Drink four 8-ounce glasses of fluid an hour before your exam. If you need to empty your bladder before your exam, try to release only a little urine. Then, drink another glass of fluid.  How do I prepare for my exam? (Other instructions) You may have up to two family members in the exam room. If you bring a small child, an adult must be there to care for him or her. No video or camera photography during the  procedure.  What should I wear: Wear comfortable clothes.  How long does the exam take: Most ultrasounds take 30 to 60 minutes.  What should I bring: Bring a list of your medicines, including vitamins, minerals and over-the-counter drugs. It is safest to leave personal items at home.  Do I need a :  No  is needed.  What do I need to tell my doctor: Tell your doctor about any allergies you may have.  What should I do after the exam: No restrictions, You may resume normal activities.  What is this test: An ultrasound uses sound waves to make pictures of the body. Sound waves do not cause pain. The only discomfort may be the pressure of the wand against your skin or full bladder.  Who should I call with questions: If you have any questions, please call the Imaging Department where you will have your exam. Directions, parking instructions, and other information is available on our website, [email or web address].            Nov 02, 2018  1:15 PM CDT   RETURN OB with Luly Bai MD   [facility] ([facility])    [place] [facility] [address]  3rd Flr,[address]  [address] [place] [id number]   [phone]            Nov 07, 2018 10:30 AM CST   (Arrive by 10:15 AM)   US FETAL BIOPHYS PROFILE W/O NON STRESS TEST with URWHSUS1   [facility] ([facility])    [place] [facility]  3rd Floor, [address]  [address] [place] [phone]   [phone]           How do I prepare for my exam? (Food and drink instructions) Drink four 8-ounce glasses of fluid an hour before your exam. If you need to empty your bladder before your exam, try to release only a little urine. Then, drink another glass of fluid.  How do I prepare for my exam? (Other instructions) You may have up to two family members in the exam room. If you bring a small child, an adult must be there to care for him or her. No video or camera photography  during the procedure.  What should I wear: Wear comfortable clothes.  How long does the exam take: Most ultrasounds take 30 to 60 minutes.  What should I bring: Bring a list of your medicines, including vitamins, minerals and over-the-counter drugs. It is safest to leave personal items at home.  Do I need a :  No  is needed.  What do I need to tell my doctor: Tell your doctor about any allergies you may have.  What should I do after the exam: No restrictions, You may resume normal activities.  What is this test: An ultrasound uses sound waves to make pictures of the body. Sound waves do not cause pain. The only discomfort may be the pressure of the wand against your skin or full bladder.  Who should I call with questions: If you have any questions, please call the Imaging Department where you will have your exam. Directions, parking instructions, and other information is available on our website, [email or web address].            Nov 07, 2018 11:00 AM CST   RETURN OB with Luly Bai MD   [facility] ([facility])    [place] [facility] [address]  3rd Flr[address]  [address] [place] [phone]            Nov 09, 2018  9:40 AM CST   Return Visit with Jazlyn Huitron MD   [facility] ([facility])    [address] [place] [address],  [address] [place] [address]   [phone]            Nov 16, 2018  3:30 PM CST   (Arrive by 3:15 PM)   US FETAL BIOPHYS PROFILE W/O NON STRESS TEST with URWHSUS1   [facility] ([facility])    [place] [facility]  3rd [facility], [address]  [address] [place] [id number] [phone]           How do I prepare for my exam? (Food and drink instructions) Drink four 8-ounce glasses of fluid an hour before your exam. If you need to empty your bladder before your exam, try to release only a little urine. Then, drink another glass of fluid.   How do I prepare for my exam? (Other instructions) You may have up to two family members in the exam room. If you bring a small child, an adult must be there to care for him or her. No video or camera photography during the procedure.  What should I wear: Wear comfortable clothes.  How long does the exam take: Most ultrasounds take 30 to 60 minutes.  What should I bring: Bring a list of your medicines, including vitamins, minerals and over-the-counter drugs. It is safest to leave personal items at home.  Do I need a :  No  is needed.  What do I need to tell my doctor: Tell your doctor about any allergies you may have.  What should I do after the exam: No restrictions, You may resume normal activities.  What is this test: An ultrasound uses sound waves to make pictures of the body. Sound waves do not cause pain. The only discomfort may be the pressure of the wand against your skin or full bladder.  Who should I call with questions: If you have any questions, please call the Imaging Department where you will have your exam. Directions, parking instructions, and other information is available on our website, [email or web address].            Nov 16, 2018  4:00 PM CST   RETURN OB with Luly Bai MD   [facility] ([facility])    [place] [facility] [address]  3rd [address]  [address] [place] [phone]            Nov 21, 2018  8:00 AM CST   (Arrive by 7:45 AM)   US FETAL BIOPHYS PROFILE W/O NON STRESS TEST with URWHSUS1   [facility] ([facility])    [place] [facility]  3rd Floor, [address]  [address] [place] [phone]           How do I prepare for my exam? (Food and drink instructions) Drink four 8-ounce glasses of fluid an hour before your exam. If you need to empty your bladder before your exam, try to release only a little urine. Then, drink another glass of  fluid.  How do I prepare for my exam? (Other instructions) You may have up to two family members in the exam room. If you bring a small child, an adult must be there to care for him or her. No video or camera photography during the procedure.  What should I wear: Wear comfortable clothes.  How long does the exam take: Most ultrasounds take 30 to 60 minutes.  What should I bring: Bring a list of your medicines, including vitamins, minerals and over-the-counter drugs. It is safest to leave personal items at home.  Do I need a :  No  is needed.  What do I need to tell my doctor: Tell your doctor about any allergies you may have.  What should I do after the exam: No restrictions, You may resume normal activities.  What is this test: An ultrasound uses sound waves to make pictures of the body. Sound waves do not cause pain. The only discomfort may be the pressure of the wand against your skin or full bladder.  Who should I call with questions: If you have any questions, please call the Imaging Department where you will have your exam. Directions, parking instructions, and other information is available on our website, [email or web address].            Nov 21, 2018  8:15 AM CST   RETURN OB with Luly Bai MD   [facility] ([facility])    [place] [facility] [address]  3rd [address]  [address] [place] [phone]            Nov 30, 2018  3:30 PM CST   (Arrive by 3:15 PM)   US FETAL BIOPHYS PROFILE W/O NON STRESS TEST with URWHSUS1   [facility] ([facility])    [place] [facility]  3rd Floor, [address]  [address] [place] [phone]           How do I prepare for my exam? (Food and drink instructions) Drink four 8-ounce glasses of fluid an hour before your exam. If you need to empty your bladder before your exam, try to release only a little urine. Then, drink another  glass of fluid.  How do I prepare for my exam? (Other instructions) You may have up to two family members in the exam room. If you bring a small child, an adult must be there to care for him or her. No video or camera photography during the procedure.  What should I wear: Wear comfortable clothes.  How long does the exam take: Most ultrasounds take 30 to 60 minutes.  What should I bring: Bring a list of your medicines, including vitamins, minerals and over-the-counter drugs. It is safest to leave personal items at home.  Do I need a :  No  is needed.  What do I need to tell my doctor: Tell your doctor about any allergies you may have.  What should I do after the exam: No restrictions, You may resume normal activities.  What is this test: An ultrasound uses sound waves to make pictures of the body. Sound waves do not cause pain. The only discomfort may be the pressure of the wand against your skin or full bladder.  Who should I call with questions: If you have any questions, please call the Imaging Department where you will have your exam. Directions, parking instructions, and other information is available on our website, [email or web address].              Who to contact     If you have questions or need follow up information about today's clinic visit or your schedule please contact [facility] directly at [phone].  Normal or non-critical lab and imaging results will be communicated to you by MyChart, letter or phone within 4 business days after the clinic has received the results. If you do not hear from us within 7 days, please contact the clinic through MyChart or phone. If you have a critical or abnormal lab result, we will notify you by phone as soon as possible.  Submit refill requests through [employer] or call your pharmacy and they will forward the refill request to us. Please allow 3 business days for your refill to be completed.          Additional Information About  Your Visit        Soundtrackerhart Information     Swiftype gives you secure access to your electronic health record. If you see a primary care provider, you can also send messages to your care team and make appointments. If you have questions, please call your primary care clinic.  If you do not have a primary care provider, please call [phone] and they will assist you.        Care EveryWhere ID     This is your Care EveryWhere ID. This could be used by other organizations to access your [facility] medical records  [id number]        Your Vitals Were     Last Period                   (LMP Unknown)            Blood Pressure from Last 3 Encounters:   10/19/18 137/87   09/14/18 103/69   08/23/18 108/72    Weight from Last 3 Encounters:   10/19/18 240 lb (108.9 kg)   09/14/18 234 lb (106.1 kg)   08/23/18 231 lb (104.8 kg)              Today, you had the following     No orders found for display         Today's Medication Changes          These changes are accurate as of 10/23/18  2:32 PM.  If you have any questions, ask your nurse or doctor.               Start taking these medicines.        Dose/Directions    blood glucose monitoring lancets   Used for:  Pre-diabetes        Use to test blood sugar 3-4 times daily or as directed.  Ok to substitute alternative if insurance prefers.   Quantity:  100 each   Refills:  3       blood glucose monitoring meter device kit   Used for:  Pre-diabetes        Use to test blood sugar 3-4 times daily or as directed.  Ok to substitute alternative if insurance prefers.   Quantity:  1 kit   Refills:  0       blood glucose monitoring test strip   Commonly known as:  ACCU-CHEK CYNTHIA   Used for:  Pre-diabetes        Use to test blood sugars 3-4 times daily as directed.  OK to substitute per formulary   Quantity:  100 strip   Refills:  PRN            Where to get your medicines      These medications were sent to [facility] [place], [place] - [address]  [facility] [facility] [address]     Phone:  [phone]     blood glucose monitoring lancets    blood glucose monitoring meter device kit    blood glucose monitoring test strip                Primary Care Provider Office Phone # Fax #    Jazlyn Huitron [phone]       [address]   [facility] [phone]        Equal Access to Services     SUGAR MELISSA : Hadii aad ku hadasho Soomaali, waaxda luqadaha, qaybta kaalmada adeegyada, waxay idiin hayaan adeeg khsylviash la'aan ah. So [facility] [phone].    ATENCIÓN: Si habla español, tiene a perdue disposición servicios gratuitos de asistencia lingüística. Oscar al [phone].    We comply with applicable federal civil rights laws and [place] laws. We do not discriminate on the basis of race, color, national origin, age, disability, sex, sexual orientation, or gender identity.            Thank you!     Thank you for choosing [facility]  for your care. Our goal is always to provide you with excellent care. Hearing back from our patients is one way we can continue to improve our services. Please take a few minutes to complete the written survey that you may receive in the mail after your visit with us. Thank you!             Your Updated Medication List - Protect others around you: Learn how to safely use, store and throw away your medicines at www.disposemymeds.org.          This list is accurate as of 10/23/18  2:32 PM.  Always use your most recent med list.                   Brand Name Dispense Instructions for use Diagnosis    aspirin 81 MG EC tablet     30 tablet    Take 1 tablet (81 mg) by mouth daily    History of pre-eclampsia in prior pregnancy, currently pregnant, third trimester       blood glucose monitoring lancets     100 each    Use to test blood sugar 3-4 times daily or as directed.  Ok to substitute alternative if insurance prefers.    Pre-diabetes       blood glucose monitoring meter device  kit     1 kit    Use to test blood sugar 3-4 times daily or as directed.  Ok to substitute alternative if insurance prefers.    Pre-diabetes       blood glucose monitoring test strip    ACCU-CHEK CYNTHIA    100 strip    Use to test blood sugars 3-4 times daily as directed.  OK to substitute per formulary    Pre-diabetes       omega 3 1000 MG Caps           PRENATAL VITAMIN PO           SYNTHROID 200 MCG tablet   Generic drug:  levothyroxine     90 tablet    Take 1 tablet (200 mcg) by mouth daily    Postablative hypothyroidism       VITAMIN D (CHOLECALCIFEROL) PO      Take by mouth daily

## 2018-10-25 ENCOUNTER — TELEPHONE (OUTPATIENT)
Dept: OBGYN | Facility: CLINIC | Age: 43
End: 2018-10-25

## 2018-10-25 RX ORDER — BREAST PUMP
1 EACH MISCELLANEOUS PRN
Qty: 1 EACH | Refills: 0 | Status: SHIPPED | OUTPATIENT
Start: 2018-10-25 | End: 2018-12-13

## 2018-10-29 ENCOUNTER — TELEPHONE (OUTPATIENT)
Dept: OBGYN | Facility: CLINIC | Age: 43
End: 2018-10-29

## 2018-10-29 DIAGNOSIS — Z34.93 PRENATAL CARE, THIRD TRIMESTER: ICD-10-CM

## 2018-10-29 DIAGNOSIS — G56.00 CTS (CARPAL TUNNEL SYNDROME): Primary | ICD-10-CM

## 2018-10-29 RX ORDER — BREAST PUMP
1 EACH MISCELLANEOUS PRN
Qty: 1 EACH | Refills: 0 | Status: SHIPPED | OUTPATIENT
Start: 2018-10-29 | End: 2018-11-27

## 2018-10-29 NOTE — TELEPHONE ENCOUNTER
Patient called to discuss complains of tingling in hand and wrist which she has experienced with flares of carpul tunnel in the past. Advised can  wrist splint to wear at night and while completing tasks such as typing. Pt has appointment in clinic on Friday, advised to discuss with Dr. Bai when she is in clinic if splint is not effective.    Patient also requesting breast pump order be sent to medical supply.     Faxed orders to [facility] medical on [address] in [place].

## 2018-11-02 ENCOUNTER — OFFICE VISIT (OUTPATIENT)
Dept: OBGYN | Facility: CLINIC | Age: 43
End: 2018-11-02
Attending: OBSTETRICS & GYNECOLOGY
Payer: COMMERCIAL

## 2018-11-02 ENCOUNTER — RADIANT APPOINTMENT (OUTPATIENT)
Dept: ULTRASOUND IMAGING | Facility: CLINIC | Age: 43
End: 2018-11-02
Attending: OBSTETRICS & GYNECOLOGY
Payer: COMMERCIAL

## 2018-11-02 VITALS
HEIGHT: 67 IN | SYSTOLIC BLOOD PRESSURE: 132 MMHG | BODY MASS INDEX: 39.72 KG/M2 | WEIGHT: 253.1 LBS | DIASTOLIC BLOOD PRESSURE: 85 MMHG | HEART RATE: 60 BPM

## 2018-11-02 DIAGNOSIS — O09.93 HIGH-RISK PREGNANCY IN THIRD TRIMESTER: ICD-10-CM

## 2018-11-02 DIAGNOSIS — O09.93 HIGH-RISK PREGNANCY IN THIRD TRIMESTER: Primary | ICD-10-CM

## 2018-11-02 LAB
BASOPHILS # BLD AUTO: 0 10E9/L (ref 0–0.2)
BASOPHILS NFR BLD AUTO: 0.2 %
DIFFERENTIAL METHOD BLD: NORMAL
EOSINOPHIL # BLD AUTO: 0.1 10E9/L (ref 0–0.7)
EOSINOPHIL NFR BLD AUTO: 1 %
ERYTHROCYTE [DISTWIDTH] IN BLOOD BY AUTOMATED COUNT: 13.4 % (ref 10–15)
HCT VFR BLD AUTO: 36 % (ref 35–47)
HGB BLD-MCNC: 12.2 G/DL (ref 11.7–15.7)
IMM GRANULOCYTES # BLD: 0 10E9/L (ref 0–0.4)
IMM GRANULOCYTES NFR BLD: 0.1 %
LYMPHOCYTES # BLD AUTO: 2.7 10E9/L (ref 0.8–5.3)
LYMPHOCYTES NFR BLD AUTO: 33.2 %
MCH RBC QN AUTO: 28.9 PG (ref 26.5–33)
MCHC RBC AUTO-ENTMCNC: 33.9 G/DL (ref 31.5–36.5)
MCV RBC AUTO: 85 FL (ref 78–100)
MONOCYTES # BLD AUTO: 0.3 10E9/L (ref 0–1.3)
MONOCYTES NFR BLD AUTO: 4.1 %
NEUTROPHILS # BLD AUTO: 5 10E9/L (ref 1.6–8.3)
NEUTROPHILS NFR BLD AUTO: 61.4 %
NRBC # BLD AUTO: 0 10*3/UL
NRBC BLD AUTO-RTO: 0 /100
PLATELET # BLD AUTO: 212 10E9/L (ref 150–450)
RBC # BLD AUTO: 4.22 10E12/L (ref 3.8–5.2)
TSH SERPL DL<=0.005 MIU/L-ACNC: 0.41 MU/L (ref 0.4–4)
WBC # BLD AUTO: 8.1 10E9/L (ref 4–11)

## 2018-11-02 PROCEDURE — 76819 FETAL BIOPHYS PROFIL W/O NST: CPT

## 2018-11-02 PROCEDURE — 36415 COLL VENOUS BLD VENIPUNCTURE: CPT | Performed by: OBSTETRICS & GYNECOLOGY

## 2018-11-02 PROCEDURE — 84443 ASSAY THYROID STIM HORMONE: CPT | Performed by: OBSTETRICS & GYNECOLOGY

## 2018-11-02 PROCEDURE — 85025 COMPLETE CBC W/AUTO DIFF WBC: CPT | Performed by: OBSTETRICS & GYNECOLOGY

## 2018-11-02 NOTE — PROGRESS NOTES
Doing well. Checking [facility]: fasting 70s and 1hr PP   Protocol BP ok, discussed signs of Pre-E  Patient Active Problem List   Diagnosis     Vitamin D deficiency     Postablative hypothyroidism     Pre-diabetes     Papanicolaou smear of cervix with low grade squamous intraepithelial lesion (LGSIL)     H/O mammogram     HRP     AMA     GBS bacteriuria     BMI 36.0-36.9,adult     Fibroid uterus     abnl GCT: will follow [facility] in lieu of GTT   BPP 8/8  36 week labs today  RTC one week  Luly Bai

## 2018-11-02 NOTE — MR AVS SNAPSHOT
After Visit Summary   11/2/2018    Mary Wray    MRN: [id number]           Patient Information     Date Of Birth          1975        Visit Information        Provider Department      11/2/2018 1:15 PM Luly Bai MD [facility]        Today's Diagnoses     High-risk pregnancy in third trimester    -  1       Follow-ups after your visit        Your next 10 appointments already scheduled     Nov 07, 2018 10:30 AM CST   (Arrive by 10:15 AM)   US FETAL BIOPHYS PROFILE W/O NON STRESS TEST with URWHSUS1   [facility] ([facility])    [facility]  3rd Floor, [address]  [address] [place] [id number] [phone]           How do I prepare for my exam? (Food and drink instructions) Drink four 8-ounce glasses of fluid an hour before your exam. If you need to empty your bladder before your exam, try to release only a little urine. Then, drink another glass of fluid.  How do I prepare for my exam? (Other instructions) You may have up to two family members in the exam room. If you bring a small child, an adult must be there to care for him or her. No video or camera photography during the procedure.  What should I wear: Wear comfortable clothes.  How long does the exam take: Most ultrasounds take 30 to 60 minutes.  What should I bring: Bring a list of your medicines, including vitamins, minerals and over-the-counter drugs. It is safest to leave personal items at home.  Do I need a :  No  is needed.  What do I need to tell my doctor: Tell your doctor about any allergies you may have.  What should I do after the exam: No restrictions, You may resume normal activities.  What is this test: An ultrasound uses sound waves to make pictures of the body. Sound waves do not cause pain. The only discomfort may be the pressure of the wand against your skin or full bladder.  Who should I call with questions:  If you have any questions, please call the Imaging Department where you will have your exam. Directions, parking instructions, and other information is available on our website, [email or web address].            Nov 07, 2018 11:00 AM CST   RETURN OB with Luly Bai MD   [facility] ([facility])    [place] [facility] [address]  3rd Flr,[address]  [address] [facility] [address]   [phone]            Nov 09, 2018  9:40 AM CST   Return Visit with Jazlyn Huitron MD   [facility] ([facility])    [address],  [address]  [facility] [address]   [phone]            Nov 16, 2018  3:30 PM CST   (Arrive by 3:15 PM)   US FETAL BIOPHYS PROFILE W/O NON STRESS TEST with URWHSUS1   [facility] ([facility])    [place] [facility]  3rd Floor, [address]  [address] [address]   [phone]           How do I prepare for my exam? (Food and drink instructions) Drink four 8-ounce glasses of fluid an hour before your exam. If you need to empty your bladder before your exam, try to release only a little urine. Then, drink another glass of fluid.  How do I prepare for my exam? (Other instructions) You may have up to two family members in the exam room. If you bring a small child, an adult must be there to care for him or her. No video or camera photography during the procedure.  What should I wear: Wear comfortable clothes.  How long does the exam take: Most ultrasounds take 30 to 60 minutes.  What should I bring: Bring a list of your medicines, including vitamins, minerals and over-the-counter drugs. It is safest to leave personal items at home.  Do I need a :  No  is needed.  What do I need to tell my doctor: Tell your doctor about any allergies you may have.  What should I do after the exam: No restrictions, You may resume normal activities.  What is this test:  An ultrasound uses sound waves to make pictures of the body. Sound waves do not cause pain. The only discomfort may be the pressure of the wand against your skin or full bladder.  Who should I call with questions: If you have any questions, please call the Imaging Department where you will have your exam. Directions, parking instructions, and other information is available on our website, [email or web address].            Nov 16, 2018  4:00 PM CST   RETURN OB with Luly Bai MD   [facility] ([facility])    [place] [facility] [address]  3rd Fl[address]  [address] [facility] [address]   [phone]            Nov 21, 2018  8:00 AM CST   (Arrive by 7:45 AM)   US FETAL BIOPHYS PROFILE W/O NON STRESS TEST with URWHSUS1   [facility] ([facility])    [place] [facility]  3rd Floor, [address]  [address] [place] [id number] [phone]           How do I prepare for my exam? (Food and drink instructions) Drink four 8-ounce glasses of fluid an hour before your exam. If you need to empty your bladder before your exam, try to release only a little urine. Then, drink another glass of fluid.  How do I prepare for my exam? (Other instructions) You may have up to two family members in the exam room. If you bring a small child, an adult must be there to care for him or her. No video or camera photography during the procedure.  What should I wear: Wear comfortable clothes.  How long does the exam take: Most ultrasounds take 30 to 60 minutes.  What should I bring: Bring a list of your medicines, including vitamins, minerals and over-the-counter drugs. It is safest to leave personal items at home.  Do I need a :  No  is needed.  What do I need to tell my doctor: Tell your doctor about any allergies you may have.  What should I do after the exam: No restrictions, You may resume normal activities.  What is  this test: An ultrasound uses sound waves to make pictures of the body. Sound waves do not cause pain. The only discomfort may be the pressure of the wand against your skin or full bladder.  Who should I call with questions: If you have any questions, please call the Imaging Department where you will have your exam. Directions, parking instructions, and other information is available on our website, [email or web address].            Nov 21, 2018  8:15 AM CST   RETURN OB with Luly Bai MD   [facility] ([facility])    [place] [facility] [address]  3rd Flr,[address]  [address] [facility] [address] [phone]            Nov 30, 2018  3:30 PM CST   (Arrive by 3:15 PM)   US FETAL BIOPHYS PROFILE W/O NON STRESS TEST with URWHSUS1   [facility] ([facility])    [place] [facility]  3rd Floor, [address]  [address] [place] [id number] [phone]           How do I prepare for my exam? (Food and drink instructions) Drink four 8-ounce glasses of fluid an hour before your exam. If you need to empty your bladder before your exam, try to release only a little urine. Then, drink another glass of fluid.  How do I prepare for my exam? (Other instructions) You may have up to two family members in the exam room. If you bring a small child, an adult must be there to care for him or her. No video or camera photography during the procedure.  What should I wear: Wear comfortable clothes.  How long does the exam take: Most ultrasounds take 30 to 60 minutes.  What should I bring: Bring a list of your medicines, including vitamins, minerals and over-the-counter drugs. It is safest to leave personal items at home.  Do I need a :  No  is needed.  What do I need to tell my doctor: Tell your doctor about any allergies you may have.  What should I do after the exam: No restrictions, You may resume normal activities.   "What is this test: An ultrasound uses sound waves to make pictures of the body. Sound waves do not cause pain. The only discomfort may be the pressure of the wand against your skin or full bladder.  Who should I call with questions: If you have any questions, please call the Imaging Department where you will have your exam. Directions, parking instructions, and other information is available on our website, [email or web address].              Who to contact     Please call your clinic at [phone] to:    Ask questions about your health    Make or cancel appointments    Discuss your medicines    Learn about your test results    Speak to your doctor            Additional Information About Your Visit        [employer] Information     [employer] gives you secure access to your electronic health record. If you see a primary care provider, you can also send messages to your care team and make appointments. If you have questions, please call your primary care clinic.  If you do not have a primary care provider, please call [phone] and they will assist you.      [employer] is an electronic gateway that provides easy, online access to your medical records. With [employer], you can request a clinic appointment, read your test results, renew a prescription or communicate with your care team.     To access your existing account, please contact your [facility] or call [phone] for assistance.        Care EveryWhere ID     This is your Care EveryWhere ID. This could be used by other organizations to access your [facility] medical records  [id number]        Your Vitals Were     Pulse Height Last Period BMI (Body Mass Index)          60 1.702 m (5' 7\") (LMP Unknown) 39.64 kg/m2         Blood Pressure from Last 3 Encounters:   11/02/18 132/85   10/19/18 137/87   09/14/18 103/69    Weight from Last 3 Encounters:   11/02/18 114.8 kg (253 lb 1.6 oz)   10/19/18 108.9 kg (240 lb)   09/14/18 106.1 kg (234 lb) "              We Performed the Following     CBC with Platelets Differential     TSH with free T4 reflex        Primary Care Provider Office Phone # Fax #    Jazlyn Huitron [phone]       [address] [address]        Equal Access to Services     SUGAR MELISSA : Hadedwardo durham zulay Somichelet, wasylda luqadaha, qaybta kaalmada adeketan, adrianna jocelinin hayaadaniel connorsnava snow zbigniew . So [facility] [phone].    ATENCIÓN: Si habla español, tiene a perdue disposición servicios gratuitos de asistencia lingüística. Llame al [phone].    We comply with applicable federal civil rights laws and [place] laws. We do not discriminate on the basis of race, color, national origin, age, disability, sex, sexual orientation, or gender identity.            Thank you!     Thank you for choosing [facility]  for your care. Our goal is always to provide you with excellent care. Hearing back from our patients is one way we can continue to improve our services. Please take a few minutes to complete the written survey that you may receive in the mail after your visit with us. Thank you!             Your Updated Medication List - Protect others around you: Learn how to safely use, store and throw away your medicines at www.disposemymeds.org.          This list is accurate as of 11/2/18  2:09 PM.  Always use your most recent med list.                   Brand Name Dispense Instructions for use Diagnosis    aspirin 81 MG EC tablet     30 tablet    Take 1 tablet (81 mg) by mouth daily    History of pre-eclampsia in prior pregnancy, currently pregnant, third trimester       blood glucose monitoring lancets     100 each    Use to test blood sugar 3-4 times daily or as directed.  Ok to substitute alternative if insurance prefers.    Pre-diabetes       blood glucose monitoring meter device kit     1 kit    Use to test blood sugar 3-4 times daily or as directed.  Ok to substitute  alternative if insurance prefers.    Pre-diabetes       blood glucose monitoring test strip    ACCU-CHEK CYNTHIA    100 strip    Use to test blood sugars 3-4 times daily as directed.  OK to substitute per formulary    Pre-diabetes       * breast pump Misc     1 each    1 each as needed (as needed)    Postpartum care and examination of lactating mother       * breast pump Misc     1 each    1 each as needed (prn)    Prenatal care, third trimester       omega 3 1000 MG Caps           PRENATAL VITAMIN PO           SYNTHROID 200 MCG tablet   Generic drug:  levothyroxine     90 tablet    Take 1 tablet (200 mcg) by mouth daily    Postablative hypothyroidism       VITAMIN D (CHOLECALCIFEROL) PO      Take by mouth daily        * Notice:  This list has 2 medication(s) that are the same as other medications prescribed for you. Read the directions carefully, and ask your doctor or other care provider to review them with you.

## 2018-11-02 NOTE — LETTER
11/2/2018     RE: Mary Wray  [address] [address]     Dear Colleague,    Thank you for referring your patient, Mary Wray, to the [facility] at [facility]. Please see a copy of my visit note below.    Doing well. Checking FSBG: fasting 70s and 1hr PP   Protocol BP ok, discussed signs of Pre-E  Patient Active Problem List   Diagnosis     Vitamin D deficiency     Postablative hypothyroidism     Pre-diabetes     Papanicolaou smear of cervix with low grade squamous intraepithelial lesion (LGSIL)     H/O mammogram     HRP     AMA     GBS bacteriuria     BMI 36.0-36.9,adult     Fibroid uterus     abnl GCT: will follow FSBG in lieu of GTT   BPP 8/8  36 week labs today  RTC one week  Luly Bai

## 2018-11-07 ENCOUNTER — OFFICE VISIT (OUTPATIENT)
Dept: OBGYN | Facility: CLINIC | Age: 43
End: 2018-11-07
Attending: OBSTETRICS & GYNECOLOGY
Payer: COMMERCIAL

## 2018-11-07 ENCOUNTER — HOSPITAL ENCOUNTER (INPATIENT)
Facility: CLINIC | Age: 43
LOS: 7 days | Discharge: HOME HEALTH | End: 2018-11-14
Attending: OBSTETRICS & GYNECOLOGY | Admitting: OBSTETRICS & GYNECOLOGY
Payer: COMMERCIAL

## 2018-11-07 ENCOUNTER — RADIANT APPOINTMENT (OUTPATIENT)
Dept: ULTRASOUND IMAGING | Facility: CLINIC | Age: 43
End: 2018-11-07
Attending: OBSTETRICS & GYNECOLOGY
Payer: COMMERCIAL

## 2018-11-07 VITALS
WEIGHT: 253 LBS | SYSTOLIC BLOOD PRESSURE: 165 MMHG | BODY MASS INDEX: 39.71 KG/M2 | DIASTOLIC BLOOD PRESSURE: 102 MMHG | HEART RATE: 54 BPM | HEIGHT: 67 IN

## 2018-11-07 DIAGNOSIS — O09.92 HIGH-RISK PREGNANCY IN SECOND TRIMESTER: Primary | ICD-10-CM

## 2018-11-07 DIAGNOSIS — O09.93 HIGH-RISK PREGNANCY IN THIRD TRIMESTER: ICD-10-CM

## 2018-11-07 DIAGNOSIS — O16.3 ELEVATED BLOOD PRESSURE AFFECTING PREGNANCY IN THIRD TRIMESTER, ANTEPARTUM: ICD-10-CM

## 2018-11-07 DIAGNOSIS — Z98.891 STATUS POST C-SECTION: Primary | ICD-10-CM

## 2018-11-07 LAB
ABO + RH BLD: NORMAL
ABO + RH BLD: NORMAL
ALT SERPL W P-5'-P-CCNC: 25 U/L (ref 0–50)
AST SERPL W P-5'-P-CCNC: 24 U/L (ref 0–45)
BLD GP AB SCN SERPL QL: NORMAL
BLOOD BANK CMNT PATIENT-IMP: NORMAL
CREAT SERPL-MCNC: 0.78 MG/DL (ref 0.52–1.04)
CREAT UR-MCNC: 44 MG/DL
ERYTHROCYTE [DISTWIDTH] IN BLOOD BY AUTOMATED COUNT: 13.5 % (ref 10–15)
GFR SERPL CREATININE-BSD FRML MDRD: 82 ML/MIN/1.7M2
GLUCOSE BLDC GLUCOMTR-MCNC: 123 MG/DL (ref 70–99)
GLUCOSE BLDC GLUCOMTR-MCNC: 155 MG/DL (ref 70–99)
HCT VFR BLD AUTO: 36.6 % (ref 35–47)
HGB BLD-MCNC: 12.4 G/DL (ref 11.7–15.7)
MCH RBC QN AUTO: 29.2 PG (ref 26.5–33)
MCHC RBC AUTO-ENTMCNC: 33.9 G/DL (ref 31.5–36.5)
MCV RBC AUTO: 86 FL (ref 78–100)
PLATELET # BLD AUTO: 210 10E9/L (ref 150–450)
PROT UR-MCNC: 0.21 G/L
PROT/CREAT 24H UR: 0.48 G/G CR (ref 0–0.2)
RBC # BLD AUTO: 4.25 10E12/L (ref 3.8–5.2)
SPECIMEN EXP DATE BLD: NORMAL
WBC # BLD AUTO: 8.8 10E9/L (ref 4–11)

## 2018-11-07 PROCEDURE — 84450 TRANSFERASE (AST) (SGOT): CPT | Performed by: OBSTETRICS & GYNECOLOGY

## 2018-11-07 PROCEDURE — 25000132 ZZH RX MED GY IP 250 OP 250 PS 637: Performed by: STUDENT IN AN ORGANIZED HEALTH CARE EDUCATION/TRAINING PROGRAM

## 2018-11-07 PROCEDURE — 82565 ASSAY OF CREATININE: CPT | Performed by: OBSTETRICS & GYNECOLOGY

## 2018-11-07 PROCEDURE — 84156 ASSAY OF PROTEIN URINE: CPT | Performed by: OBSTETRICS & GYNECOLOGY

## 2018-11-07 PROCEDURE — 25000128 H RX IP 250 OP 636: Performed by: OBSTETRICS & GYNECOLOGY

## 2018-11-07 PROCEDURE — 36415 COLL VENOUS BLD VENIPUNCTURE: CPT | Performed by: OBSTETRICS & GYNECOLOGY

## 2018-11-07 PROCEDURE — 86780 TREPONEMA PALLIDUM: CPT | Performed by: OBSTETRICS & GYNECOLOGY

## 2018-11-07 PROCEDURE — 86901 BLOOD TYPING SEROLOGIC RH(D): CPT | Performed by: OBSTETRICS & GYNECOLOGY

## 2018-11-07 PROCEDURE — 86900 BLOOD TYPING SEROLOGIC ABO: CPT | Performed by: OBSTETRICS & GYNECOLOGY

## 2018-11-07 PROCEDURE — 25000132 ZZH RX MED GY IP 250 OP 250 PS 637: Performed by: OBSTETRICS & GYNECOLOGY

## 2018-11-07 PROCEDURE — G0463 HOSPITAL OUTPT CLINIC VISIT: HCPCS

## 2018-11-07 PROCEDURE — 86850 RBC ANTIBODY SCREEN: CPT | Performed by: OBSTETRICS & GYNECOLOGY

## 2018-11-07 PROCEDURE — 85027 COMPLETE CBC AUTOMATED: CPT | Performed by: OBSTETRICS & GYNECOLOGY

## 2018-11-07 PROCEDURE — 76819 FETAL BIOPHYS PROFIL W/O NST: CPT

## 2018-11-07 PROCEDURE — 00000146 ZZHCL STATISTIC GLUCOSE BY METER IP

## 2018-11-07 PROCEDURE — 12000032 ZZH R&B OB CRITICAL UMMC

## 2018-11-07 PROCEDURE — 84460 ALANINE AMINO (ALT) (SGPT): CPT | Performed by: OBSTETRICS & GYNECOLOGY

## 2018-11-07 RX ORDER — OXYTOCIN 10 [USP'U]/ML
10 INJECTION, SOLUTION INTRAMUSCULAR; INTRAVENOUS
Status: DISCONTINUED | OUTPATIENT
Start: 2018-11-07 | End: 2018-11-09

## 2018-11-07 RX ORDER — TERBUTALINE SULFATE 1 MG/ML
0.25 INJECTION, SOLUTION SUBCUTANEOUS
Status: DISCONTINUED | OUTPATIENT
Start: 2018-11-07 | End: 2018-11-08

## 2018-11-07 RX ORDER — ACETAMINOPHEN 325 MG/1
975 TABLET ORAL ONCE
Status: COMPLETED | OUTPATIENT
Start: 2018-11-07 | End: 2018-11-07

## 2018-11-07 RX ORDER — DEXTROSE MONOHYDRATE 25 G/50ML
25-50 INJECTION, SOLUTION INTRAVENOUS
Status: DISCONTINUED | OUTPATIENT
Start: 2018-11-07 | End: 2018-11-09

## 2018-11-07 RX ORDER — MAGNESIUM SULFATE HEPTAHYDRATE 40 MG/ML
4 INJECTION, SOLUTION INTRAVENOUS ONCE
Status: COMPLETED | OUTPATIENT
Start: 2018-11-07 | End: 2018-11-07

## 2018-11-07 RX ORDER — OXYTOCIN/0.9 % SODIUM CHLORIDE 30/500 ML
100-340 PLASTIC BAG, INJECTION (ML) INTRAVENOUS CONTINUOUS PRN
Status: DISCONTINUED | OUTPATIENT
Start: 2018-11-07 | End: 2018-11-09

## 2018-11-07 RX ORDER — SODIUM CHLORIDE, SODIUM LACTATE, POTASSIUM CHLORIDE, CALCIUM CHLORIDE 600; 310; 30; 20 MG/100ML; MG/100ML; MG/100ML; MG/100ML
INJECTION, SOLUTION INTRAVENOUS CONTINUOUS
Status: DISCONTINUED | OUTPATIENT
Start: 2018-11-07 | End: 2018-11-09

## 2018-11-07 RX ORDER — MAGNESIUM SULFATE HEPTAHYDRATE 500 MG/ML
4 INJECTION, SOLUTION INTRAMUSCULAR; INTRAVENOUS
Status: DISCONTINUED | OUTPATIENT
Start: 2018-11-07 | End: 2018-11-14 | Stop reason: HOSPADM

## 2018-11-07 RX ORDER — IBUPROFEN 800 MG/1
800 TABLET, FILM COATED ORAL
Status: DISCONTINUED | OUTPATIENT
Start: 2018-11-07 | End: 2018-11-09

## 2018-11-07 RX ORDER — HYDROXYZINE HYDROCHLORIDE 50 MG/1
100 TABLET, FILM COATED ORAL ONCE
Status: COMPLETED | OUTPATIENT
Start: 2018-11-07 | End: 2018-11-07

## 2018-11-07 RX ORDER — HYDRALAZINE HYDROCHLORIDE 20 MG/ML
10 INJECTION INTRAMUSCULAR; INTRAVENOUS
Status: DISCONTINUED | OUTPATIENT
Start: 2018-11-07 | End: 2018-11-14 | Stop reason: HOSPADM

## 2018-11-07 RX ORDER — MISOPROSTOL 100 UG/1
25 TABLET ORAL
Status: COMPLETED | OUTPATIENT
Start: 2018-11-07 | End: 2018-11-08

## 2018-11-07 RX ORDER — LIDOCAINE 40 MG/G
CREAM TOPICAL
Status: DISCONTINUED | OUTPATIENT
Start: 2018-11-07 | End: 2018-11-10

## 2018-11-07 RX ORDER — METHYLERGONOVINE MALEATE 0.2 MG/ML
200 INJECTION INTRAVENOUS
Status: DISCONTINUED | OUTPATIENT
Start: 2018-11-07 | End: 2018-11-07

## 2018-11-07 RX ORDER — BETAMETHASONE SODIUM PHOSPHATE AND BETAMETHASONE ACETATE 3; 3 MG/ML; MG/ML
12 INJECTION, SUSPENSION INTRA-ARTICULAR; INTRALESIONAL; INTRAMUSCULAR; SOFT TISSUE EVERY 24 HOURS
Status: COMPLETED | OUTPATIENT
Start: 2018-11-07 | End: 2018-11-08

## 2018-11-07 RX ORDER — MAGNESIUM SULFATE HEPTAHYDRATE 40 MG/ML
4 INJECTION, SOLUTION INTRAVENOUS
Status: DISCONTINUED | OUTPATIENT
Start: 2018-11-07 | End: 2018-11-14 | Stop reason: HOSPADM

## 2018-11-07 RX ORDER — HYDRALAZINE HYDROCHLORIDE 20 MG/ML
5 INJECTION INTRAMUSCULAR; INTRAVENOUS
Status: DISCONTINUED | OUTPATIENT
Start: 2018-11-07 | End: 2018-11-14 | Stop reason: HOSPADM

## 2018-11-07 RX ORDER — NALOXONE HYDROCHLORIDE 0.4 MG/ML
.1-.4 INJECTION, SOLUTION INTRAMUSCULAR; INTRAVENOUS; SUBCUTANEOUS
Status: DISCONTINUED | OUTPATIENT
Start: 2018-11-07 | End: 2018-11-09

## 2018-11-07 RX ORDER — CARBOPROST TROMETHAMINE 250 UG/ML
250 INJECTION, SOLUTION INTRAMUSCULAR
Status: DISCONTINUED | OUTPATIENT
Start: 2018-11-07 | End: 2018-11-09

## 2018-11-07 RX ORDER — ACETAMINOPHEN 325 MG/1
650 TABLET ORAL EVERY 4 HOURS PRN
Status: DISCONTINUED | OUTPATIENT
Start: 2018-11-07 | End: 2018-11-07

## 2018-11-07 RX ORDER — OXYCODONE AND ACETAMINOPHEN 5; 325 MG/1; MG/1
1 TABLET ORAL
Status: DISCONTINUED | OUTPATIENT
Start: 2018-11-07 | End: 2018-11-09

## 2018-11-07 RX ORDER — MAGNESIUM SULFATE IN WATER 40 MG/ML
2 INJECTION, SOLUTION INTRAVENOUS CONTINUOUS
Status: DISPENSED | OUTPATIENT
Start: 2018-11-07 | End: 2018-11-10

## 2018-11-07 RX ORDER — ONDANSETRON 2 MG/ML
4 INJECTION INTRAMUSCULAR; INTRAVENOUS EVERY 6 HOURS PRN
Status: DISCONTINUED | OUTPATIENT
Start: 2018-11-07 | End: 2018-11-09

## 2018-11-07 RX ORDER — FENTANYL CITRATE 50 UG/ML
50-100 INJECTION, SOLUTION INTRAMUSCULAR; INTRAVENOUS
Status: DISCONTINUED | OUTPATIENT
Start: 2018-11-07 | End: 2018-11-09

## 2018-11-07 RX ORDER — LORAZEPAM 2 MG/ML
2 INJECTION INTRAMUSCULAR
Status: DISCONTINUED | OUTPATIENT
Start: 2018-11-07 | End: 2018-11-14 | Stop reason: HOSPADM

## 2018-11-07 RX ORDER — SODIUM CHLORIDE, SODIUM LACTATE, POTASSIUM CHLORIDE, CALCIUM CHLORIDE 600; 310; 30; 20 MG/100ML; MG/100ML; MG/100ML; MG/100ML
10-125 INJECTION, SOLUTION INTRAVENOUS CONTINUOUS
Status: DISCONTINUED | OUTPATIENT
Start: 2018-11-07 | End: 2018-11-11

## 2018-11-07 RX ORDER — NICOTINE POLACRILEX 4 MG
15-30 LOZENGE BUCCAL
Status: DISCONTINUED | OUTPATIENT
Start: 2018-11-07 | End: 2018-11-09

## 2018-11-07 RX ORDER — CALCIUM GLUCONATE 94 MG/ML
1 INJECTION, SOLUTION INTRAVENOUS
Status: DISCONTINUED | OUTPATIENT
Start: 2018-11-07 | End: 2018-11-14 | Stop reason: HOSPADM

## 2018-11-07 RX ORDER — SODIUM CHLORIDE, SODIUM LACTATE, POTASSIUM CHLORIDE, CALCIUM CHLORIDE 600; 310; 30; 20 MG/100ML; MG/100ML; MG/100ML; MG/100ML
10-125 INJECTION, SOLUTION INTRAVENOUS CONTINUOUS
Status: DISCONTINUED | OUTPATIENT
Start: 2018-11-07 | End: 2018-11-14 | Stop reason: HOSPADM

## 2018-11-07 RX ORDER — PENICILLIN G POTASSIUM 5000000 [IU]/1
5 INJECTION, POWDER, FOR SOLUTION INTRAMUSCULAR; INTRAVENOUS ONCE
Status: COMPLETED | OUTPATIENT
Start: 2018-11-07 | End: 2018-11-08

## 2018-11-07 RX ORDER — NIFEDIPINE 10 MG/1
10 CAPSULE ORAL
Status: DISCONTINUED | OUTPATIENT
Start: 2018-11-07 | End: 2018-11-14 | Stop reason: HOSPADM

## 2018-11-07 RX ADMIN — SODIUM CHLORIDE, POTASSIUM CHLORIDE, SODIUM LACTATE AND CALCIUM CHLORIDE 75 ML/HR: 600; 310; 30; 20 INJECTION, SOLUTION INTRAVENOUS at 13:56

## 2018-11-07 RX ADMIN — ACETAMINOPHEN 650 MG: 325 TABLET, FILM COATED ORAL at 13:43

## 2018-11-07 RX ADMIN — NIFEDIPINE 10 MG: 10 CAPSULE ORAL at 12:53

## 2018-11-07 RX ADMIN — Medication 25 MCG: at 21:35

## 2018-11-07 RX ADMIN — HYDRALAZINE HYDROCHLORIDE 5 MG: 20 INJECTION INTRAMUSCULAR; INTRAVENOUS at 18:34

## 2018-11-07 RX ADMIN — BETAMETHASONE SODIUM PHOSPHATE AND BETAMETHASONE ACETATE 12 MG: 3; 3 INJECTION, SUSPENSION INTRA-ARTICULAR; INTRALESIONAL; INTRAMUSCULAR at 13:33

## 2018-11-07 RX ADMIN — MAGNESIUM SULFATE IN WATER 4 G: 40 INJECTION, SOLUTION INTRAVENOUS at 14:12

## 2018-11-07 RX ADMIN — HYDROXYZINE HYDROCHLORIDE 100 MG: 50 TABLET, FILM COATED ORAL at 23:05

## 2018-11-07 RX ADMIN — Medication 25 MCG: at 15:35

## 2018-11-07 RX ADMIN — ACETAMINOPHEN 975 MG: 325 TABLET, FILM COATED ORAL at 23:06

## 2018-11-07 RX ADMIN — Medication 25 MCG: at 19:39

## 2018-11-07 RX ADMIN — Medication 25 MCG: at 17:35

## 2018-11-07 RX ADMIN — Medication 25 MCG: at 23:39

## 2018-11-07 RX ADMIN — MAGNESIUM SULFATE IN WATER 2 G/HR: 40 INJECTION, SOLUTION INTRAVENOUS at 14:39

## 2018-11-07 ASSESSMENT — ACTIVITIES OF DAILY LIVING (ADL)
BATHING: 0 - INDEPENDENT
TOILETING: 0 - INDEPENDENT
TRANSFERRING: 0 - INDEPENDENT
FALL_HISTORY_WITHIN_LAST_SIX_MONTHS: NO
AMBULATION: 0 - INDEPENDENT
COGNITION: 0 - NO COGNITION ISSUES REPORTED
RETIRED_COMMUNICATION: 0-->UNDERSTANDS/COMMUNICATES WITHOUT DIFFICULTY
DRESS: 0 - INDEPENDENT
EATING: 0 - INDEPENDENT
DRESS: 0-->INDEPENDENT
SWALLOWING: 0-->SWALLOWS FOODS/LIQUIDS WITHOUT DIFFICULTY
COMMUNICATION: 0 - UNDERSTANDS/COMMUNICATES WITHOUT DIFFICULTY
SWALLOWING: 0 - SWALLOWS FOODS/LIQUIDS WITHOUT DIFFICULTY
AMBULATION: 0-->INDEPENDENT
RETIRED_EATING: 0-->INDEPENDENT
TRANSFERRING: 0-->INDEPENDENT
BATHING: 0-->INDEPENDENT
TOILETING: 0-->INDEPENDENT

## 2018-11-07 NOTE — H&P
[facility]  OB History and Physical      Mary Wray MRN# [id number]   Age: 43 year old YOB: 1975     CC:  Elevated BP    HPI:  Ms. Mary Wray is a 43 year old  at 36w4d by 8w5d US, who presents from clinic with elevated blood pressures.  She denies contractions, vaginal bleeding, and loss of fluid.   + normal fetal movement. Denies headaches, changes in vision, RUQ pain. Has had some ankle swelling over the past two weeks that increased over the weekend but not significantly.     Pregnancy Complications:  Newly diagnosed preeclampsia with severe features  Elevated GCT (132), passed early GTT. Failed 3rd tri GCT (148) and declined GTT - instead checked glucoses at home that were wnl.   Post-ablative hypothyroidism   IVF pregnancy  AMA  GBS bacteruria  Fibroid uterus   NIPT +T21, normal amniocentesis     Prenatal Labs:   Lab Results   Component Value Date    ABO AB 2018    RH Pos 2018    AS Neg 2018    HEPBANG Nonreactive 2018    CHPCRT Negative 2018    GCPCRT Negative 2018    TREPAB Negative 2018    HGB 12.4 2018       GBS Status:   GBS bacteruria     Ultrasounds  Dating - 8w5d  NT - wnl  Anatomy:   1) Intrauterine pregnancy at 19 4/7 weeks gestational age.  2) None of the anomalies commonly detected by ultrasound were evident in the detailed fetal anatomic survey described above, however the cardiac anatomy was limited.  The nose/lips and hands were also suboptimally visualized.  3) Growth parameters and estimated fetal weight were consistent with an appropriate for gestation age pattern of growth.  4) The amniotic fluid volume appeared normal.  5) Posterior placenta no previa.  6) Multiple small uterine myomas with measurements listed above.    Growth:   53%ile at 25 weeks   38%ile 33 weeks    OB History  Obstetric History       T0      L0     SAB1   TAB0   Ectopic0   Multiple0   Live Births0       #  "Outcome Date GA Lbr Arnold/2nd Weight Sex Delivery Anes PTL Lv   2 Current            1 SAB 11/11/17                  PMHx:   Past Medical History:   Diagnosis Date     History of Graves' disease     Graves     Hypovitaminosis D      Mild depression (H)     2014, no meds, resolved with move     Obesity      Postablative hypothyroidism     CCRM     Premenstrual dysphoria      PSHx:   Past Surgical History:   Procedure Laterality Date     LEEP TX, CERVICAL  2002    \"for HPV\"     Meds:   Prescriptions Prior to Admission   Medication Sig Dispense Refill Last Dose     aspirin 81 MG EC tablet Take 1 tablet (81 mg) by mouth daily 30 tablet 2 11/6/2018 at Unknown time     omega 3 1000 MG CAPS    11/6/2018 at Unknown time     Prenatal Vit-Fe Fumarate-FA (PRENATAL VITAMIN PO)    11/6/2018 at Unknown time     SYNTHROID 200 MCG tablet Take 1 tablet (200 mcg) by mouth daily 90 tablet 3 11/7/2018 at Unknown time     VITAMIN D, CHOLECALCIFEROL, PO Take by mouth daily   11/6/2018 at Unknown time     blood glucose monitoring (ACCU-CHEK CYNTHIA PLUS) meter device kit Use to test blood sugar 3-4 times daily or as directed.  Ok to substitute alternative if insurance prefers. 1 kit 0      blood glucose monitoring (ACCU-CHEK CYNTHIA) test strip Use to test blood sugars 3-4 times daily as directed.  OK to substitute per formulary 100 strip PRN      blood glucose monitoring (SOFTCLIX) lancets Use to test blood sugar 3-4 times daily or as directed.  Ok to substitute alternative if insurance prefers. 100 each 3      Misc. Devices (BREAST PUMP) MISC 1 each as needed (prn) 1 each 0      Misc. Devices (BREAST PUMP) MISC 1 each as needed (as needed) 1 each 0      Allergies:  No Known Allergies   FmHx:   Family History   Problem Relation Age of Onset     Obesity Mother      Diabetes Father 68     Obesity Father      Obesity Sister      SocHx: She denies any tobacco, alcohol, or other drug use during this pregnancy.    ROS:   Complete 10-point ROS " "negative except as noted in HPI.    PE:  Vit:   Patient Vitals for the past 4 hrs:   BP Pulse Resp SpO2 Height   18 1430 137/82 - 16 100 % -   18 1407 132/86 68 16 100 % -   18 1300 162/88 - - - -   18 1245 171/87 - - - -   18 1235 - - - - 1.702 m (5' 7\")   18 1208 - - 16 - -      Gen: Well-appearing, NAD, comfortable   CV: rrr, no mrg   Pulm: Ctab, no wheezes or crackles   Abd: Soft, gravid, non-tender, +BS   Ext: +1 LE edema b/l  Reflexes: unable to elicit patellar reflexes; 1+ biceps reflexes bilaterally   Cx: C/L/H, soft, posterior     Pres:  cephalic by clinic BPP today and repeat BSUS  EFW:  7.5# by Leopold's; limited by body habitus   Memb: Intact               FHT: Baseline 120, mod variability, + accelerations, no decelerations   [place]: 0 contractions in 10 minutes      Assessment  Ms. Mary Wray is a 43 year old , at 36w4d by 8w5d US, who presents with elevated blood pressures concerning for severe preeclampsia. Labs show normal HELLP labs but UPC elevated to 0.48. Otherwise asymptomatic.     Plan    Preeclampsia with severe features   - start magnesium 4g LD > 2g continuous infusion  - treat BPs >160/110. Pulse has been in 50s-60s; avoid labetalol. S/p PO nifedipine x1.   - strict I/O  - mag checks q4h  - repeat HELLP labs as clinically indicated     Admit to L&D  Labor: Plan IOL. Unfavorable cervix, will start ripening with PO misoprostol.   FWB: Category I FHT.  Continue EFM and toco. BMZ course for prematurity.   Pain: Discussed options - will see how labor goes   PNC: Rh pos, Rubella immune, GBS pos - pcn ordered,  GTT passed, Placenta posterior   Fen/GI: Regular diet, IVF    The patient was discussed with Dr. Davalos who is in agreement with the treatment plan.    Cookie Francisco MD PGY2  Department of OB/GYN  2018 2:53 PM    Staff MD Note    I appreciate the note by Dr. Francisco.  Any necessary changes have been made by me.  I evaluated the patient " with the resident and agree with the assessment and plan.    Heide Davalos MD

## 2018-11-07 NOTE — IP AVS SNAPSHOT
MRN:[id number]                      After Visit Summary   2018    Mary Wray    MRN: [id number]           Thank you!     Thank you for choosing [facility] for your care. Our goal is always to provide you with excellent care. Hearing back from our patients is one way we can continue to improve our services. Please take a few minutes to complete the written survey that you may receive in the mail after you visit with us. Thank you!        Patient Information     Date Of Birth          1975        Designated Caregiver       Most Recent Value    Caregiver    Will someone help with your care after discharge? no    Name of designated caregiver none    Phone number of caregiver none      About your hospital stay     You were admitted on:  2018 You last received care in the:  [facility]    You were discharged on:  2018        Reason for your hospital stay       Maternity care                  Who to Call     For medical emergencies, please call 911.  For non-urgent questions about your medical care, please call your primary care provider or clinic, [phone]  For questions related to your surgery, please call your surgery clinic        Attending Provider     Provider [facility]    Heide Davalos MD OB/Gyn    Hermilo, Paulette Shelton MD OB/Gyn    Slick, Erica Neves MD OB/Gyn    Fam, Lola Castillo MD OB/Gyn       Primary Care Provider Office Phone # Fax #    Jazlyn Huitron [phone]      After Care Instructions     Activity       Review discharge instructions            Diet       Resume previous diet            Discharge Instructions       Activity Instructions:   -  delivery: No lifting more than 15 pounds for 6 weeks.  Nothing in the vagina for 6 weeks, no intercourse for 6 weeks. No strenuous exercise for 6 weeks. No driving until you have stopped taking your pain medications.      Call your health care provider if you have any of  the following: Fever above 100.4 F; opening or drainage from your incision; soaking a sanitary pad with blood within 1 hour, or you see blood clots larger than a golf ball; malodorous vaginal discharge, severe or worsening pain uncontrolled by your pain medications, nausea and vomiting, severe headaches, changes in vision, calf swelling or pain, shortness of breath, problems coping with sadness, anxiety, or depression.  If you have any concerns about hurting yourself or the baby, call your provider immediately. You are encouraged to call with questions or concerns after you return home.            Discharge Instructions - Hypertensive disorders patients       High Blood pressure patients to follow up in clinic or via home care within one week for a blood pressure check            Discharge Instructions - Postpartum visit       Schedule postpartum visit with your provider and return to clinic in 6 weeks and for 2 hr GTT.                  Follow-up Appointments     Follow Up and recommended labs and tests       Follow up for 1 week blood pressure and incision check as well as routine 6 week postpartum visit. TSH/T4 lab to be drawn at check up.                  Your next 10 appointments already scheduled     2018  4:00 PM CST   Return Visit with Luly Bai MD   [facility] ([facility])    [facility] [address]  [address] [facility] [address] [phone]              Further instructions from your care team       Postop  Birth Instructions    Activity       Do not lift more than 10 pounds for 6 weeks after surgery.  Ask family and friends for help when you need it.    No driving until you have stopped taking your pain medications (usually two weeks after surgery).    No heavy exercise or activity for 6 weeks.  Don't do anything that will put a strain on your surgery site.    Don't strain when using the toilet.  Your care team  may prescribe a stool softener if you have problems with your bowel movements.     To care for your incision:       Keep the incision clean and dry.    Do not soak your incision in water. No swimming or hot tubs until it has fully healed. You may soak in the bathtub if the water level is below your incision.    Do not use peroxide, gel, cream, lotion, or ointment on your incision.    Adjust your clothes to avoid pressure on your surgery site (check the elastic in your underwear for example).     You may see a small amount of clear or pink drainage and this is normal.  Check with your health care provider:       If the drainage increases or has an odor.    If the incision reddens, you have swelling, or develop a rash.    If you have increased pain and the medicine we prescribed doesn't help.    If you have a fever above 100.4 F (38 C) with or without chills when placing thermometer under your tongue.   The area around your incision (surgery wound), will feel numb.  This is normal. The numbness should go away in less than a year.     Keep your hands clean:  Always wash your hands before touching your incision (surgery wound). This helps reduce your risk of infection. If your hands aren't dirty, you may use an alcohol hand-rub to clean your hands. Keep your nails clean and short.    Call your healthcare provider if you have any of these symptoms:       You soak a sanitary pad with blood within 1 hour, or you see blood clots larger than a golf ball.    Bleeding that lasts more than 6 weeks.    Vaginal discharge that smells bad.    Severe pain, cramping or tenderness in your lower belly area.    A need to urinate more frequently (use the toilet more often), more urgently (use the toilet very quickly), or it burns when you urinate.    Nausea and vomiting.    Redness, swelling or pain around a vein in your leg.    Problems breastfeeding or a red or painful area on your breast.    Chest pain and cough or are gasping for  "air.    Problems with coping with sadness, anxiety or depression. If you have concerns about hurting yourself or the baby, call your provider immediately.      You have questions or concerns after you return home.     Your delivery team: [facility] [phone]  Your follow up appointment is scheduled on 11/21/18 at 11:30 AM with Dr. Richardson Kruger MD, FACOG  [facility] Staff  OB/GYN    11/14/2018  9:58 AM    Preeclampsia   Call your doctor right away if you have any of the following:  - Edema (swelling) in your face or hands  - Rapid weight gain-about 1 pound or more in a day  - Headache  - Abdominal pain on your right side  - Vision problems (flashes or spots)  - You have questions or concerns once you return home.             Pending Results     No orders found from 11/5/2018 to 11/8/2018.            Statement of Approval     Ordered          11/14/18 0956  I have reviewed and agree with all the recommendations and orders detailed in this document.  EFFECTIVE NOW     Approved and electronically signed by:  Erica Kruger MD             Admission Information     Date & Time Provider Department Dept. Phone    11/7/2018 Lola Otto MD [facility] [phone]      Your Vitals Were     Blood Pressure Pulse Temperature Respirations Height Weight    159/86 67 98.3  F (36.8  C) (Oral) 16 1.702 m (5' 7\") 101.8 kg (224 lb 8 oz)    Last Period Pulse Oximetry BMI (Body Mass Index)             (LMP Unknown) 99% 35.16 kg/m2         MyChart Information     [facility] gives you secure access to your electronic health record. If you see a primary care provider, you can also send messages to your care team and make appointments. If you have questions, please call your primary care clinic.  If you do not have a primary care provider, please call [phone] and they will assist you.        Care EveryWhere ID     This is your Care EveryWhere ID. This could be used by other " organizations to access your [facility] medical records  [id number]        Equal Access to Services     SUGAR MELISSA : Martha Sandra, lesley saxena, adrianna bansal. So [facility] [phone].    ATENCIÓN: Si habla español, tiene a perdue disposición servicios gratuitos de asistencia lingüística. Llame al [phone].    We comply with applicable federal civil rights laws and [place] laws. We do not discriminate on the basis of race, color, national origin, age, disability, sex, sexual orientation, or gender identity.               Review of your medicines      START taking        Dose / Directions    acetaminophen 325 MG tablet   Commonly known as:  TYLENOL        Dose:  650 mg   Take 2 tablets (650 mg) by mouth every 4 hours as needed for mild pain or fever   Quantity:  100 tablet   Refills:  0       ferrous sulfate 325 (65 Fe) MG tablet   Commonly known as:  IRON        Dose:  325 mg   Take 1 tablet (325 mg) by mouth daily (with breakfast)   Quantity:  90 tablet   Refills:  0       ibuprofen 600 MG tablet   Commonly known as:  ADVIL/MOTRIN        Dose:  600 mg   Take 1 tablet (600 mg) by mouth every 6 hours as needed for other (cramping)   Quantity:  120 tablet   Refills:  0       NIFEdipine ER 90 MG Tb24   Commonly known as:  ADALAT CC        Dose:  90 mg   Take 1 tablet (90 mg) by mouth daily   Quantity:  40 tablet   Refills:  0       oxyCODONE IR 5 MG tablet   Commonly known as:  ROXICODONE        Dose:  5 mg   Take 1 tablet (5 mg) by mouth every 6 hours as needed for severe pain   Quantity:  6 tablet   Refills:  0       senna-docusate 8.6-50 MG per tablet   Commonly known as:  SENOKOT-S;PERICOLACE        Dose:  1 tablet   Take 1 tablet by mouth 2 times daily as needed for constipation   Quantity:  100 tablet   Refills:  0         CONTINUE these medicines which have NOT CHANGED        Dose / Directions    blood glucose monitoring lancets   Used  for:  Pre-diabetes        Use to test blood sugar 3-4 times daily or as directed.  Ok to substitute alternative if insurance prefers.   Quantity:  100 each   Refills:  3       blood glucose monitoring meter device kit   Used for:  Pre-diabetes        Use to test blood sugar 3-4 times daily or as directed.  Ok to substitute alternative if insurance prefers.   Quantity:  1 kit   Refills:  0       blood glucose monitoring test strip   Commonly known as:  ACCU-CHEK CYNTHIA   Used for:  Pre-diabetes        Use to test blood sugars 3-4 times daily as directed.  OK to substitute per formulary   Quantity:  100 strip   Refills:  PRN       * breast pump Misc   Used for:  Postpartum care and examination of lactating mother        Dose:  1 each   1 each as needed (as needed)   Quantity:  1 each   Refills:  0       * breast pump Misc   Used for:  Prenatal care, third trimester        Dose:  1 each   1 each as needed (prn)   Quantity:  1 each   Refills:  0       omega 3 1000 MG Caps        Refills:  0       PRENATAL VITAMIN PO        Refills:  0       SYNTHROID 200 MCG tablet   Used for:  Postablative hypothyroidism   Generic drug:  levothyroxine        Dose:  200 mcg   Take 1 tablet (200 mcg) by mouth daily   Quantity:  90 tablet   Refills:  3       VITAMIN D (CHOLECALCIFEROL) PO        Take by mouth daily   Refills:  0       * Notice:  This list has 2 medication(s) that are the same as other medications prescribed for you. Read the directions carefully, and ask your doctor or other care provider to review them with you.      STOP taking     aspirin 81 MG EC tablet                Where to get your medicines      These medications were sent to [facility] [place], [place] - [address]  [address] [address]     Phone:  [phone]     acetaminophen 325 MG tablet    ferrous sulfate 325 (65 Fe) MG tablet    ibuprofen 600 MG tablet    NIFEdipine ER 90 MG Tb24    senna-docusate 8.6-50 MG per  tablet         Some of these will need a paper prescription and others can be bought over the counter. Ask your nurse if you have questions.     Bring a paper prescription for each of these medications     oxyCODONE IR 5 MG tablet                Protect others around you: Learn how to safely use, store and throw away your medicines at www.disposemymeds.org.        Information about OPIOIDS     PRESCRIPTION OPIOIDS: WHAT YOU NEED TO KNOW   We gave you an opioid (narcotic) pain medicine. It is important to manage your pain, but opioids are not always the best choice. You should first try all the other options your care team gave you. Take this medicine for as short a time (and as few doses) as possible.    Some activities can increase your pain, such as bandage changes or therapy sessions. It may help to take your pain medicine 30 to 60 minutes before these activities. Reduce your stress by getting enough sleep, working on hobbies you enjoy and practicing relaxation or meditation. Talk to your care team about ways to manage your pain beyond prescription opioids.    These medicines have risks:    DO NOT drive when on new or higher doses of pain medicine. These medicines can affect your alertness and reaction times, and you could be arrested for driving under the influence (DUI). If you need to use opioids long-term, talk to your care team about driving.    DO NOT operate heavy machinery    DO NOT do any other dangerous activities while taking these medicines.    DO NOT drink any alcohol while taking these medicines.     If the opioid prescribed includes acetaminophen, DO NOT take with any other medicines that contain acetaminophen. Read all labels carefully. Look for the word  acetaminophen  or  Tylenol.  Ask your pharmacist if you have questions or are unsure.    You can get addicted to pain medicines, especially if you have a history of addiction (chemical, alcohol or substance dependence). Talk to your care team  about ways to reduce this risk.    All opioids tend to cause constipation. Drink plenty of water and eat foods that have a lot of fiber, such as fruits, vegetables, prune juice, apple juice and high-fiber cereal. Take a laxative (Miralax, milk of magnesia, Colace, Senna) if you don t move your bowels at least every other day. Other side effects include upset stomach, sleepiness, dizziness, throwing up, tolerance (needing more of the medicine to have the same effect), physical dependence and slowed breathing.    Store your pills in a secure place, locked if possible. We will not replace any lost or stolen medicine. If you don t finish your medicine, please throw away (dispose) as directed by your pharmacist. The [place] Pollution Control Agency has more information about safe disposal: https://www.pca.[facility].[place]/living-green/managing-unwanted-medications             Medication List: This is a list of all your medications and when to take them. Check marks below indicate your daily home schedule. Keep this list as a reference.      Medications           Morning Afternoon Evening Bedtime As Needed    acetaminophen 325 MG tablet   Commonly known as:  TYLENOL   Take 2 tablets (650 mg) by mouth every 4 hours as needed for mild pain or fever   Last time this was given:  650 mg on 11/14/2018  8:53 AM                                blood glucose monitoring lancets   Use to test blood sugar 3-4 times daily or as directed.  Ok to substitute alternative if insurance prefers.                                blood glucose monitoring meter device kit   Use to test blood sugar 3-4 times daily or as directed.  Ok to substitute alternative if insurance prefers.                                blood glucose monitoring test strip   Commonly known as:  ACCU-CHEK CYNTHIA   Use to test blood sugars 3-4 times daily as directed.  OK to substitute per formulary                                * breast pump Misc   1 each as needed (as needed)                                 * breast pump Misc   1 each as needed (prn)                                ferrous sulfate 325 (65 Fe) MG tablet   Commonly known as:  IRON   Take 1 tablet (325 mg) by mouth daily (with breakfast)                                ibuprofen 600 MG tablet   Commonly known as:  ADVIL/MOTRIN   Take 1 tablet (600 mg) by mouth every 6 hours as needed for other (cramping)   Last time this was given:  600 mg on 11/14/2018  8:53 AM                                NIFEdipine ER 90 MG Tb24   Commonly known as:  ADALAT CC   Take 1 tablet (90 mg) by mouth daily   Last time this was given:  30 mg on 11/14/2018  5:39 PM                                omega 3 1000 MG Caps                                oxyCODONE IR 5 MG tablet   Commonly known as:  ROXICODONE   Take 1 tablet (5 mg) by mouth every 6 hours as needed for severe pain                                PRENATAL VITAMIN PO                                senna-docusate 8.6-50 MG per tablet   Commonly known as:  SENOKOT-S;PERICOLACE   Take 1 tablet by mouth 2 times daily as needed for constipation   Last time this was given:  1 tablet on 11/13/2018  8:57 AM                                SYNTHROID 200 MCG tablet   Take 1 tablet (200 mcg) by mouth daily   Last time this was given:  200 mcg on 11/14/2018  8:54 AM   Generic drug:  levothyroxine                                VITAMIN D (CHOLECALCIFEROL) PO   Take by mouth daily                                * Notice:  This list has 2 medication(s) that are the same as other medications prescribed for you. Read the directions carefully, and ask your doctor or other care provider to review them with you.

## 2018-11-07 NOTE — MR AVS SNAPSHOT
After Visit Summary   11/7/2018    Mary Wray    MRN: [id number]           Patient Information     Date Of Birth          1975        Visit Information        Provider Department      11/7/2018 11:00 AM uLly Bai MD [facility]        Today's Diagnoses     HRP    -  1       Follow-ups after your visit        Your next 10 appointments already scheduled     Nov 09, 2018  9:40 AM CST   Return Visit with Jazlyn Huitron MD   [facility] ([facility])    [address],  [address]  [facility] [address]   [phone]            Nov 16, 2018  3:30 PM CST   (Arrive by 3:15 PM)   US FETAL BIOPHYS PROFILE W/O NON STRESS TEST with URWHSUS1   [facility] ([facility])    [facility]  3rd Floor, [address]  [address] [facility] [id number] [phone]           How do I prepare for my exam? (Food and drink instructions) Drink four 8-ounce glasses of fluid an hour before your exam. If you need to empty your bladder before your exam, try to release only a little urine. Then, drink another glass of fluid.  How do I prepare for my exam? (Other instructions) You may have up to two family members in the exam room. If you bring a small child, an adult must be there to care for him or her. No video or camera photography during the procedure.  What should I wear: Wear comfortable clothes.  How long does the exam take: Most ultrasounds take 30 to 60 minutes.  What should I bring: Bring a list of your medicines, including vitamins, minerals and over-the-counter drugs. It is safest to leave personal items at home.  Do I need a :  No  is needed.  What do I need to tell my doctor: Tell your doctor about any allergies you may have.  What should I do after the exam: No restrictions, You may resume normal activities.  What is this test: An ultrasound uses sound waves to make  pictures of the body. Sound waves do not cause pain. The only discomfort may be the pressure of the wand against your skin or full bladder.  Who should I call with questions: If you have any questions, please call the Imaging Department where you will have your exam. Directions, parking instructions, and other information is available on our website, [email or web address].            Nov 16, 2018  4:00 PM CST   RETURN OB with Luly Bai MD   [facility] ([facility])    [facility]  [address]rd [address]  [address] [facility] [address]   [phone]            Nov 21, 2018  8:00 AM CST   (Arrive by 7:45 AM)   US FETAL BIOPHYS PROFILE W/O NON STRESS TEST with URWHSUS1   [facility] ([facility])    [facility]  3rd Floor, [address]  [address] [place] [id number] [phone]           How do I prepare for my exam? (Food and drink instructions) Drink four 8-ounce glasses of fluid an hour before your exam. If you need to empty your bladder before your exam, try to release only a little urine. Then, drink another glass of fluid.  How do I prepare for my exam? (Other instructions) You may have up to two family members in the exam room. If you bring a small child, an adult must be there to care for him or her. No video or camera photography during the procedure.  What should I wear: Wear comfortable clothes.  How long does the exam take: Most ultrasounds take 30 to 60 minutes.  What should I bring: Bring a list of your medicines, including vitamins, minerals and over-the-counter drugs. It is safest to leave personal items at home.  Do I need a :  No  is needed.  What do I need to tell my doctor: Tell your doctor about any allergies you may have.  What should I do after the exam: No restrictions, You may resume normal activities.  What is this test: An ultrasound uses sound waves  to make pictures of the body. Sound waves do not cause pain. The only discomfort may be the pressure of the wand against your skin or full bladder.  Who should I call with questions: If you have any questions, please call the Imaging Department where you will have your exam. Directions, parking instructions, and other information is available on our website, [email or web address].            Nov 21, 2018  8:15 AM CST   RETURN OB with Luly Bai MD   [facility] ([facility])    [facility]  [address]rd Flr[address]  [address] [facility] [address]   [phone]            Nov 30, 2018  3:30 PM CST   (Arrive by 3:15 PM)   US FETAL BIOPHYS PROFILE W/O NON STRESS TEST with URWHSUS1   [facility] ([facility])    [facility] [facility]  3rd Floor, [address]  [address] [place] [id number] [phone]           How do I prepare for my exam? (Food and drink instructions) Drink four 8-ounce glasses of fluid an hour before your exam. If you need to empty your bladder before your exam, try to release only a little urine. Then, drink another glass of fluid.  How do I prepare for my exam? (Other instructions) You may have up to two family members in the exam room. If you bring a small child, an adult must be there to care for him or her. No video or camera photography during the procedure.  What should I wear: Wear comfortable clothes.  How long does the exam take: Most ultrasounds take 30 to 60 minutes.  What should I bring: Bring a list of your medicines, including vitamins, minerals and over-the-counter drugs. It is safest to leave personal items at home.  Do I need a :  No  is needed.  What do I need to tell my doctor: Tell your doctor about any allergies you may have.  What should I do after the exam: No restrictions, You may resume normal activities.  What is this test: An ultrasound uses  "sound waves to make pictures of the body. Sound waves do not cause pain. The only discomfort may be the pressure of the wand against your skin or full bladder.  Who should I call with questions: If you have any questions, please call the Imaging Department where you will have your exam. Directions, parking instructions, and other information is available on our website, [email or web address].              Future tests that were ordered for you today     Open Standing Orders        Priority Remaining Interval Expires Ordered    Magnesium STAT 1/1 CONDITIONAL X 1 STAT  11/7/2018    Indwelling urinary catheter (Randle) Routine [id number] PRN  11/7/2018            Who to contact     Please call your clinic at [phone] to:    Ask questions about your health    Make or cancel appointments    Discuss your medicines    Learn about your test results    Speak to your doctor            Additional Information About Your Visit        [employer] Information     [employer] gives you secure access to your electronic health record. If you see a primary care provider, you can also send messages to your care team and make appointments. If you have questions, please call your primary care clinic.  If you do not have a primary care provider, please call [phone] and they will assist you.      [employer] is an electronic gateway that provides easy, online access to your medical records. With [employer], you can request a clinic appointment, read your test results, renew a prescription or communicate with your care team.     To access your existing account, please contact your [facility] or call [phone] for assistance.        Care EveryWhere ID     This is your Care EveryWhere ID. This could be used by other organizations to access your [facility] medical records  [id number]        Your Vitals Were     Pulse Height Last Period BMI (Body Mass Index)          54 1.702 m (5' 7\") (LMP Unknown) 39.63 kg/m2      "    Blood Pressure from Last 3 Encounters:   11/07/18 (!) 165/102   11/02/18 132/85   10/19/18 137/87    Weight from Last 3 Encounters:   11/07/18 114.8 kg (253 lb)   11/02/18 114.8 kg (253 lb 1.6 oz)   10/19/18 108.9 kg (240 lb)              Today, you had the following     No orders found for display       Primary Care Provider Office Phone # Fax #    Jazlyn Huitron [phone]       [address] [address]        Equal Access to Services     PATRICIA [facility] Hospital[facility] : Hadii aster Sandra, lesley saxena, gideon nina, adrianna coy . [facility] [phone].    ATENCIÓN: Si habla español, tiene a perdue disposición servicios gratuitos de asistencia lingüística. [facility] [phone].    We comply with applicable federal civil rights laws and [place] laws. We do not discriminate on the basis of race, color, national origin, age, disability, sex, sexual orientation, or gender identity.            Thank you!     Thank you for choosing [facility]  for your care. Our goal is always to provide you with excellent care. Hearing back from our patients is one way we can continue to improve our services. Please take a few minutes to complete the written survey that you may receive in the mail after your visit with us. Thank you!             Your Updated Medication List - Protect others around you: Learn how to safely use, store and throw away your medicines at www.disposemymeds.org.          This list is accurate as of 11/7/18 11:47 AM.  Always use your most recent med list.                   Brand Name Dispense Instructions for use Diagnosis    aspirin 81 MG EC tablet     30 tablet    Take 1 tablet (81 mg) by mouth daily    History of pre-eclampsia in prior pregnancy, currently pregnant, third trimester       blood glucose monitoring lancets     100 each    Use to test blood sugar 3-4 times daily or as directed.  Ok to  substitute alternative if insurance prefers.    Pre-diabetes       blood glucose monitoring meter device kit     1 kit    Use to test blood sugar 3-4 times daily or as directed.  Ok to substitute alternative if insurance prefers.    Pre-diabetes       blood glucose monitoring test strip    ACCU-CHEK CYNTHIA    100 strip    Use to test blood sugars 3-4 times daily as directed.  OK to substitute per formulary    Pre-diabetes       * breast pump Misc     1 each    1 each as needed (as needed)    Postpartum care and examination of lactating mother       * breast pump Misc     1 each    1 each as needed (prn)    Prenatal care, third trimester       omega 3 1000 MG Caps           PRENATAL VITAMIN PO           SYNTHROID 200 MCG tablet   Generic drug:  levothyroxine     90 tablet    Take 1 tablet (200 mcg) by mouth daily    Postablative hypothyroidism       VITAMIN D (CHOLECALCIFEROL) PO      Take by mouth daily        * Notice:  This list has 2 medication(s) that are the same as other medications prescribed for you. Read the directions carefully, and ask your doctor or other care provider to review them with you.

## 2018-11-07 NOTE — IP AVS SNAPSHOT
[facility]    [address] [place] [address] [address] [address]    Phone:  [phone]                                       After Visit Summary   11/7/2018    Mary Wray    MRN: [id number]           After Visit Summary Signature Page     I have received my discharge instructions, and my questions have been answered. I have discussed any challenges I see with this plan with the nurse or doctor.    ..........................................................................................................................................  Patient/Patient Representative Signature      ..........................................................................................................................................  Patient Representative Print Name and Relationship to Patient    ..................................................               ................................................  Date                                   Time    ..........................................................................................................................................  Reviewed by Signature/Title    ...................................................              ..............................................  Date                                               Time          22EPIC Rev 08/18

## 2018-11-07 NOTE — PLAN OF CARE
Problem: Hypertensive Disorders in Pregnancy (Adult,Obstetrics,Pediatric)  Goal: Signs and Symptoms of Listed Potential Problems Will be Absent, Minimized or Managed (Hypertensive Disorders in Pregnancy)  Signs and symptoms of listed potential problems will be absent, minimized or managed by discharge/transition of care (reference Hypertensive Disorders in Pregnancy (Adult,Obstetrics,Pediatric) CPG).  Outcome: Improving  Pt sent from clinic for elevated BPs. Initial BPs here elevated and nifedipine given. Pt endorses dependent edema, denies visual changes, epigastric pain, heartburn, headache. Unexpected admit after scheduled clinic appointment. Discuss admission process, MDs in to discuss induction. Transfer to [facility], discuss IV start, I&O, continuous monitoring, magnesium sulfate load and continuous thereafter, menu and ordering, current diet, call light, use of white board. Enc pt to ask questions. Sister is on a flight from [place], parents coming Sunday. MD in for SVE and discussion of oral miso for induction. Pt in agreement with plan.

## 2018-11-07 NOTE — PLAN OF CARE
Problem: Hypertensive Disorders in Pregnancy (Adult,Obstetrics,Pediatric)  Goal: Signs and Symptoms of Listed Potential Problems Will be Absent, Minimized or Managed (Hypertensive Disorders in Pregnancy)  Signs and symptoms of listed potential problems will be absent, minimized or managed by discharge/transition of care (reference Hypertensive Disorders in Pregnancy (Adult,Obstetrics,Pediatric) CPG).  Outcome: Improving  Magnesium sufate loading dose in. Discussed common pt complaints with magnesium use. Pt reported feeling warm with loading dose but otherwise felt ok. Discussed continuous dose may make her feel weak, tired, general malaise and enc pt to use call light when up to BR so staff can ensure safe ambulation.

## 2018-11-07 NOTE — LETTER
11/7/2018       RE: Mary Wray  [address] [address]     Dear Colleague,    Thank you for referring your patient, Mary Wray, to the [facility] at [facility]. Please see a copy of my visit note below.    BP elevated today despite 5 minute protocol  Vertex by ultrasound and BPP 8/8.  To L and D for eval pre-E.   Luly Bai      Again, thank you for allowing me to participate in the care of your patient.      Sincerely,    Luly Bai MD

## 2018-11-07 NOTE — PROGRESS NOTES
BP elevated today despite 5 minute protocol  Vertex by ultrasound and BPP 8/8.  To L and D for eval pre-E.   Luly Bai

## 2018-11-08 LAB
ALT SERPL W P-5'-P-CCNC: 28 U/L (ref 0–50)
AST SERPL W P-5'-P-CCNC: 25 U/L (ref 0–45)
CREAT SERPL-MCNC: 0.66 MG/DL (ref 0.52–1.04)
ERYTHROCYTE [DISTWIDTH] IN BLOOD BY AUTOMATED COUNT: 13.3 % (ref 10–15)
GFR SERPL CREATININE-BSD FRML MDRD: >90 ML/MIN/1.7M2
GLUCOSE BLDC GLUCOMTR-MCNC: 99 MG/DL (ref 70–99)
GLUCOSE BLDC GLUCOMTR-MCNC: 99 MG/DL (ref 70–99)
HCT VFR BLD AUTO: 35.2 % (ref 35–47)
HGB BLD-MCNC: 12.6 G/DL (ref 11.7–15.7)
MAGNESIUM SERPL-MCNC: 6.7 MG/DL (ref 1.6–2.3)
MCH RBC QN AUTO: 30.1 PG (ref 26.5–33)
MCHC RBC AUTO-ENTMCNC: 35.8 G/DL (ref 31.5–36.5)
MCV RBC AUTO: 84 FL (ref 78–100)
PLATELET # BLD AUTO: 230 10E9/L (ref 150–450)
RBC # BLD AUTO: 4.19 10E12/L (ref 3.8–5.2)
T PALLIDUM AB SER QL: NONREACTIVE
WBC # BLD AUTO: 11.9 10E9/L (ref 4–11)

## 2018-11-08 PROCEDURE — 85027 COMPLETE CBC AUTOMATED: CPT | Performed by: STUDENT IN AN ORGANIZED HEALTH CARE EDUCATION/TRAINING PROGRAM

## 2018-11-08 PROCEDURE — 00000146 ZZHCL STATISTIC GLUCOSE BY METER IP

## 2018-11-08 PROCEDURE — 12000032 ZZH R&B OB CRITICAL UMMC

## 2018-11-08 PROCEDURE — 25000132 ZZH RX MED GY IP 250 OP 250 PS 637: Performed by: STUDENT IN AN ORGANIZED HEALTH CARE EDUCATION/TRAINING PROGRAM

## 2018-11-08 PROCEDURE — 25000128 H RX IP 250 OP 636: Performed by: STUDENT IN AN ORGANIZED HEALTH CARE EDUCATION/TRAINING PROGRAM

## 2018-11-08 PROCEDURE — 25000128 H RX IP 250 OP 636: Performed by: OBSTETRICS & GYNECOLOGY

## 2018-11-08 PROCEDURE — 84450 TRANSFERASE (AST) (SGOT): CPT | Performed by: STUDENT IN AN ORGANIZED HEALTH CARE EDUCATION/TRAINING PROGRAM

## 2018-11-08 PROCEDURE — 36415 COLL VENOUS BLD VENIPUNCTURE: CPT | Performed by: STUDENT IN AN ORGANIZED HEALTH CARE EDUCATION/TRAINING PROGRAM

## 2018-11-08 PROCEDURE — 25000125 ZZHC RX 250: Performed by: STUDENT IN AN ORGANIZED HEALTH CARE EDUCATION/TRAINING PROGRAM

## 2018-11-08 PROCEDURE — 83735 ASSAY OF MAGNESIUM: CPT | Performed by: STUDENT IN AN ORGANIZED HEALTH CARE EDUCATION/TRAINING PROGRAM

## 2018-11-08 PROCEDURE — 82565 ASSAY OF CREATININE: CPT | Performed by: STUDENT IN AN ORGANIZED HEALTH CARE EDUCATION/TRAINING PROGRAM

## 2018-11-08 PROCEDURE — 84460 ALANINE AMINO (ALT) (SGPT): CPT | Performed by: STUDENT IN AN ORGANIZED HEALTH CARE EDUCATION/TRAINING PROGRAM

## 2018-11-08 RX ORDER — LIDOCAINE HYDROCHLORIDE 10 MG/ML
INJECTION, SOLUTION INFILTRATION; PERINEURAL
Status: DISCONTINUED
Start: 2018-11-08 | End: 2018-11-09 | Stop reason: HOSPADM

## 2018-11-08 RX ORDER — OXYTOCIN/0.9 % SODIUM CHLORIDE 30/500 ML
1-30 PLASTIC BAG, INJECTION (ML) INTRAVENOUS CONTINUOUS
Status: DISCONTINUED | OUTPATIENT
Start: 2018-11-08 | End: 2018-11-09

## 2018-11-08 RX ORDER — OXYTOCIN/0.9 % SODIUM CHLORIDE 30/500 ML
PLASTIC BAG, INJECTION (ML) INTRAVENOUS
Status: DISCONTINUED
Start: 2018-11-08 | End: 2018-11-09 | Stop reason: HOSPADM

## 2018-11-08 RX ORDER — OXYTOCIN 10 [USP'U]/ML
INJECTION, SOLUTION INTRAMUSCULAR; INTRAVENOUS
Status: DISCONTINUED
Start: 2018-11-08 | End: 2018-11-09 | Stop reason: HOSPADM

## 2018-11-08 RX ORDER — MISOPROSTOL 200 UG/1
TABLET ORAL
Status: DISCONTINUED
Start: 2018-11-08 | End: 2018-11-09 | Stop reason: HOSPADM

## 2018-11-08 RX ORDER — LIDOCAINE 40 MG/G
CREAM TOPICAL
Status: DISCONTINUED | OUTPATIENT
Start: 2018-11-08 | End: 2018-11-09

## 2018-11-08 RX ADMIN — LEVOTHYROXINE SODIUM 200 MCG: 50 TABLET ORAL at 06:23

## 2018-11-08 RX ADMIN — HYDRALAZINE HYDROCHLORIDE 10 MG: 20 INJECTION INTRAMUSCULAR; INTRAVENOUS at 19:04

## 2018-11-08 RX ADMIN — HYDRALAZINE HYDROCHLORIDE 5 MG: 20 INJECTION INTRAMUSCULAR; INTRAVENOUS at 18:19

## 2018-11-08 RX ADMIN — BETAMETHASONE SODIUM PHOSPHATE AND BETAMETHASONE ACETATE 12 MG: 3; 3 INJECTION, SUSPENSION INTRA-ARTICULAR; INTRALESIONAL; INTRAMUSCULAR at 13:37

## 2018-11-08 RX ADMIN — Medication 25 MCG: at 08:29

## 2018-11-08 RX ADMIN — MAGNESIUM SULFATE IN WATER 2 G/HR: 40 INJECTION, SOLUTION INTRAVENOUS at 21:37

## 2018-11-08 RX ADMIN — OXYTOCIN-SODIUM CHLORIDE 0.9% IV SOLN 30 UNIT/500ML 2 MILLI-UNITS/MIN: 30-0.9/5 SOLUTION at 20:27

## 2018-11-08 RX ADMIN — MAGNESIUM SULFATE IN WATER 2 G/HR: 40 INJECTION, SOLUTION INTRAVENOUS at 10:30

## 2018-11-08 RX ADMIN — SODIUM CHLORIDE, POTASSIUM CHLORIDE, SODIUM LACTATE AND CALCIUM CHLORIDE 75 ML/HR: 600; 310; 30; 20 INJECTION, SOLUTION INTRAVENOUS at 02:57

## 2018-11-08 RX ADMIN — MAGNESIUM SULFATE IN WATER 2 G/HR: 40 INJECTION, SOLUTION INTRAVENOUS at 00:59

## 2018-11-08 RX ADMIN — Medication 25 MCG: at 16:54

## 2018-11-08 RX ADMIN — PENICILLIN G POTASSIUM 5 MILLION UNITS: 5000000 POWDER, FOR SOLUTION INTRAMUSCULAR; INTRAPLEURAL; INTRATHECAL; INTRAVENOUS at 21:41

## 2018-11-08 RX ADMIN — Medication 25 MCG: at 03:43

## 2018-11-08 RX ADMIN — Medication 25 MCG: at 11:05

## 2018-11-08 RX ADMIN — Medication 25 MCG: at 14:37

## 2018-11-08 RX ADMIN — Medication 25 MCG: at 01:46

## 2018-11-08 RX ADMIN — Medication 25 MCG: at 06:18

## 2018-11-08 RX ADMIN — SALINE NASAL SPRAY 1 SPRAY: 1.5 SOLUTION NASAL at 22:39

## 2018-11-08 RX ADMIN — SODIUM CHLORIDE, POTASSIUM CHLORIDE, SODIUM LACTATE AND CALCIUM CHLORIDE 75 ML/HR: 600; 310; 30; 20 INJECTION, SOLUTION INTRAVENOUS at 09:31

## 2018-11-08 NOTE — PROVIDER NOTIFICATION
"   11/08/18 0750   Provider Notification   Provider Name/Title Dr Francisco   Method of Notification In Department   Reviewed FHR tracing.  Accels are present.  Pt reports no headache, vision changes or epigastric pain at this time.  toco adjusted.  Pt not reporting any ctxs at this time.  Per Dr. Francisco, repeat miso po at 0830.  Pt will then be evaluated for a cook placement.  Intact.  No vaginal bleeding.  +GBS.  PCN will be started if ROM or active labor.  Scheduled for 2d BMZ today at 1330.  Per Dr. Francisco, continue with 30\" pre meal BG checks as ordered while in early labor.  Pt is at increased risk for PPH as fibroids and on magnesium sulfate and IOL.  BP reported.  Normal reflexes with no clonus.  SCDs on.  Call light is within reach.  Pt states understanding to put on call light for assistance.  Currently, on regular diet.  Pt updated.  Pt would like to rest.  Pt is agreeable with plan.    "

## 2018-11-08 NOTE — PROVIDER NOTIFICATION
11/08/18 0718   Provider Notification   Provider Name/Title Dr. Schumer/Dr. Francisco   Method of Notification Electronic Page   Request Evaluate - Remote   Notification Reason Fetal Baseline Change   Baseline continues to sit around 100, despite repositioning & bolus. Difficulty getting baby on monitor on left side now. Are there any other interventions you would like for low baseline?

## 2018-11-08 NOTE — PROVIDER NOTIFICATION
11/08/18 0629   Provider Notification   Provider Name/Title Dr. Schumer   Method of Notification Phone   Request Evaluate - Remote   Notification Reason Fetal Baseline Change   Dr. Schumer aware of fetal baseline , good variability with accels - states not concerned with low baseline, but agreeable to 500ml fluid bolus & repositioning, will continue to monitor

## 2018-11-08 NOTE — PROVIDER NOTIFICATION
11/08/18 0627   Provider Notification   Provider Name/Title Dr. Schumer   Method of Notification Electronic Page   Request Evaluate - Remote   Notification Reason Fetal Baseline Change   Baby's back on monitor and baseline is 100s, dipping to 95; will reposition - would you like a fluid bolus?

## 2018-11-08 NOTE — PROGRESS NOTES
[facility]  Magnesium Check Note    S:   Patient is feeling well, states she is tired and swollen. Notices no contractions but experiences some craps. + fetal movement. Denies headaches, vision changes/spots in vision, chest pain, shortness of breath, N/V, RUQ pain. Swelling stable.    O:  Patient Vitals for the past 4 hrs:   BP Temp Temp src Pulse Resp SpO2   18 1342 152/82 - - - - -   18 1228 141/84 97.6  F (36.4  C) Oral - 18 100 %   18 1200 - - - - - 100 %   18 1147 - 97.8  F (36.6  C) Oral - - -   18 1130 154/79 - - 79 16 100 %   18 1100 - - - - - 99 %   18 1056 - - - - - 100 %   18 1040 - - - - - 100 %   18 1029 142/75 - - 76 16 100 %     Gen: Well appearing, resting in bed  CV:  RRR, no murmurs  Pulm:  CTAB, no wheezes or crackles  Ext:  Biceps reflex 2+ b/l, +1 LE edema b/l  FHT: Baseline 110s, moderate variability, present accelerations, no decelerations  Agar: 2 contractions in 10 minutes    Cx: C/L/H, fingertip exterior. Softer and more anterior.     I/O:  I/O last 3 completed shifts:  In: 4197.5 [P.O.:2250; I.V.:1947.5]  Out: 4925 [Urine:4925]  UOP: 250 ml/hr over last 4 hours    A/P:  Mary Wray is a 43 year old  at 36w5d by ETD c/w 8w5d US, admitted for IOL for preeclampsia with severe features by blood pressure criteria.     Preeclampsia with severe features   - BP: Now mild range pressures. One nonsustained severe range pressure this morning. S/p PO nifedipine x1, IV hydralazine x1.    - UOP: adequate diuresis. Will continue strict I/Os   - Symptoms: denies   - HELLP labs nl, UPC 0.48, will repeat if worsening symptoms   - Mag sulfate for seizure prophylaxis: 2g/hr   - Next clinical mag check at 1900  Labor: - cervical ripening with PO misoprostol x 10 (1400). Randle placed at 1400 via SSE. Cx still visually closed or fingertip at the time of placement. GBS bacteriuria: PCN in active labor  Elevated GCT,  declined GTT: AM FBG 99; possibly elevated after BMZ yesterday.   Hypothyroidism: continue home levothyroxine 200 mcg ord'd  FWB: - Category 1 FHT. S/p BMZ x2    Tunde eHlton, MS3    I was present with the medical student who participated in the service and in the documentation of the note. I have verified the history and personally performed the physical exam and medical decision making. I agree with the assessment and plan of care as documented in the note    Cookie Francisco MD  OB/Gyn PGY-2  11/8/2018

## 2018-11-08 NOTE — PLAN OF CARE
Problem: Patient Care Overview  Goal: Plan of Care/Patient Progress Review  Outcome: No Change  Pt stable, VS within parameters, no sever range BPs. Pt denies bleeding, leaking of fluid, contractions. FHR moderate variability with accels, wandering baseline - baseline between 100-115, provider aware. Pt repositioned and 500ml IV fluid bolus for baseline , provider notified. Ctx q4-6 min, oral miso given per protocol. Mag checks appropriate, 2+ brachial reflexes. Pt c/o headache, relieved by 975mg tylenol, starting to come back in am. SCDs on intermittently throughout night due to edema.

## 2018-11-08 NOTE — PROGRESS NOTES
"[facility]  Magnesium Check Note    S:   Patient is feeling well, was able to sleep well overnight.  Reports headache continues to improve. Patient states we seem \"more clear\" to her. Denies vision changes/spots in vision, chest pain, shortness of breath, RUQ pain.     O:  Patient Vitals for the past 4 hrs:   BP Temp Temp src Resp   18 0345 132/73 98.1  F (36.7  C) Oral 16     Gen: Well appearing, resting in bed  CV:  RRR, no murmurs  Pulm:  CTAB, no wheezes or crackles  Ext:  Brachioradialis reflex 2+ b/l, 0 beats of clonus bilaterally, +1 LE edema b/l  FHT: Baseline 105, moderate variability, present accelerations, no decelerations  [place]: 1-2 contractions in 10 minutes    I/O:  I/O last 3 completed shifts:  In: 2547.5 [P.O.:1350; I.V.:1197.5]  Out: 2500 [Urine:2500]  UOP: 225 ml/hr over last 4 hours    A/P:  Mary Wray is a 43 year old  at 36w5d by FET c/w 8w5d US, admitted for IOL for preeclampsia with severe features by blood pressures.    Preeclampsia with severe features   - BP: Mild range pressures   - UOP: adequate diuresis. Will continue I/Os   - Symptoms: mild headache   - HELLP labs nl, UPC 0.48, will repeat if worsening symptoms   - Mag sulfate for seizure prophylaxis: 2g/hr   - Next clinical mag check at 1030  Labor: - cervical ripening with PO misoprostol x 8 (18)  GBS bacteriuria: PCN in active labor  Elevated GCT, declined GTT: QID blood glucoses, sliding scale insulin as needed  Hypothyroidism: continue home levothyroxine 200 mcg ord'd  FWB: - Category 1 FHT      Ronald Alsop - MS3    Addendum     I was present with the medical student who participated in the service and in the documentation of the note. I have verified the history and personally performed the physical exam and medical decision making. I agree with the assessment and plan of care as documented in the note    Amy Schumer, MD  Obstetrics and Gyncology, PGY-2  18 6:21 AM              " (0) content, relaxed/(0) lying quietly, normal position, moves easily/(0) normal position or relaxed/(0) no particular expression or smile/(0) no cry (awake or asleep)

## 2018-11-08 NOTE — PROVIDER NOTIFICATION
11/08/18 0940   Provider Notification   Provider Name/Title Dr Francisco   Method of Notification In Department   reviewed BPs, FHR, Ctxs.  Plan at 1030 for magnesium sulfate level draw and for placement of lee catheter for IOL.

## 2018-11-08 NOTE — PLAN OF CARE
Problem: Patient Care Overview  Goal: Plan of Care/Patient Progress Review  Outcome: Improving  Pt denies leaking bleeding or feeling ctx's. Blood pressures outside parameters hydralazine IV given X1 (SEE MAR).  Fetal assessment Reactive. Sister, who is an eye Doctor arrived this evening. Pt is vegetarian. 4th dose of misol given at 2135. Pt ambulating to bathroom and on birth ball. Pt using SCDs. States having headache, but declines meds; Ice applied and it helps.

## 2018-11-08 NOTE — PROGRESS NOTES
Dr Francisco at bedside.  SVE.  Reviewed tracing.  Discussed plan with pt.  Pt would like to repeat misoprostil po at this time and declines a balloon placement.  miso #10 given at 1105.  Plan to repeat an additional 2 doses.  Per order, at this time, BGs do not need to be checked.  Call light is within reach.

## 2018-11-08 NOTE — PROGRESS NOTES
[facility]  Magnesium Check Note    S:   Patient is feeling well, has been able to sleep.  Reports mild headache, improved since last check. Denies vision changes/spots in vision, chest pain, shortness of breath, RUQ pain.     O:  Patient Vitals for the past 4 hrs:   BP Temp Temp src Resp   18 0140 125/70 - - 16   18 2314 151/76 98.4  F (36.9  C) Oral 18     Gen: Well appearing, resting in bed  CV:  RRR, no murmurs  Pulm:  CTAB, no wheezes or crackles  Ext:  Brachioradialis reflex 2+ b/l, 0 beats of clonus bilaterally, +1 LE edema b/l    FHT: Baseline 105, moderate variability, present accelerations, no decelerations  [place]: 1-2 contractions in 10 minutes    I/O:  I/O last 3 completed shifts:  In: 2547.5 [P.O.:1350; I.V.:1197.5]  Out: 2500 [Urine:2500]  UOP: 150 ml/hr over last 4 hours    A/P:  Mary Wray is a 43 year old  at 36w5d by FET c/w 8w5d US, admitted for IOL for preeclampsia with severe features by blood pressures.    Preeclampsia with severe features   - BP: One non-sustained severe BP repeat 20 min later was <160/90   - UOP: adequate diuresis. Will continue I/Os   - Symptoms: mild headache   - HELLP labs nl, UPC 0.48, will repeat if worsening symptoms   - Mag sulfate for seizure prophylaxis: 2g/hr   - Next clinical mag check at 0700  Labor: - cervical ripening with PO misoprostol x 6 (0146)  GBS bacteriuria: PCN in active labor  Elevated GCT, declined GTT: QID blood glucoses, sliding scale insulin as needed  Hypothyroidism: continue home levothyroxine 125mcg  FWB: - Category 1 FHT      Ronald Alsop - MS3    Addendum     I was present with the medical student who participated in the service and in the documentation of the note. I have verified the history and personally performed the physical exam and medical decision making. I agree with the assessment and plan of care as documented in the note    Amy Schumer, MD  Obstetrics and Gyncology, PGY-2  18 3:07  AM

## 2018-11-08 NOTE — PROVIDER NOTIFICATION
"   11/08/18 0834   Provider Notification   Provider Name/Title Dr Francisco   Method of Notification Electronic Page   BP reading while return from bathroom and sitting in chair.  Plan to recheck in 15\" from previous reading and give hydralazine 5 mg iv if needed.  Pt updated on phone call with provider.  "

## 2018-11-08 NOTE — PROGRESS NOTES
[facility]  Magnesium Check Note    S:   Patient is feeling well.  Reports increased headache. Headache is bilateral, endorses some photophobia, no phonophobia. Declines any treatment at this time.  Denies vision changes/spots in vision, chest pain, shortness of breath, RUQ pain. Denies feeling any contractions at this time.     O:  Patient Vitals for the past 4 hrs:   BP Temp Temp src Heart Rate Resp   18 2100 154/74 - - - 18   18 2030 152/83 98  F (36.7  C) Oral 62 20   18 195 146/81 - - - 20   18 1924 165/84 - - - 20   18 1914 158/80 - - - 18   18 1904 162/76 - - - 18     Gen: Well appearing, resting in bed  CV:  RRR, no murmurs  Pulm:  CTAB, no wheezes or crackles  Abd:  Gravid, soft, non-tender to palpation of RUQ  Ext:  Patellar reflexes 2+ b/l, 1 beats clonus on left, 0 betas on right, +1 LE edema b/l    FHT: Baseline 115, moderate variability, present accelerations, no decelerations  [place]: 0-1 contractions in 10 minutes    I/O:  I/O last 3 completed shifts:  In: 100 [I.V.:100]  Out: -   UOP: 150 ml/hr over last 4 hours    A/P:  Mary Wray is a 43 year old  at 36w4d by FET c/w 8w5d US, admitted for IOL for preeclampsia with severe features by blood pressures.    Preeclampsia with severe features   - BP: mild range blood pressures since IV hydralazine   - UOP: adequate diuresis. Will continue I/Os   - Symptoms: mild headache   - HELLP labs nl, UPC 0.48, will repeat if worsening symptoms   - Mag sulfate for seizure prophylaxis: 2g/hr   - Next clinical mag check at   Labor: - cervical ripening with PO misoprostol x 4 ()  GBS bacteriuria: PCN in active labor  Elevated GCT, declined GTT: QID blood glucoses, sliding scale insulin as needed  Hypothyroidism: continue home levothyroxine 125mcg  FWB: - Category 1 FHT      Amy Schumer, MD  Obstetrics and Gyncology, PGY-2  2018 , 10:56 PM

## 2018-11-08 NOTE — PROGRESS NOTES
[facility]  Magnesium Check Note    S:   Patient is feeling well. Denies headaches, vision changes/spots in vision, chest pain, shortness of breath, RUQ pain. Swelling stable, maybe a little more.     O:  Patient Vitals for the past 4 hrs:   BP Temp Temp src Pulse Resp SpO2   18 1056 - - - - - 100 %   18 1040 - - - - - 100 %   18 1029 142/75 - - 76 16 100 %   18 1000 - - - - - 100 %   18 0933 154/80 - - 76 16 100 %   18 0900 - - - - - 100 %   18 0845 145/89 - - - 16 99 %   18 0829 186/88 - - 96 16 99 %   18 0733 150/83 98.1  F (36.7  C) Oral - 16 100 %     Gen: Well appearing, resting in bed  CV:  RRR, no murmurs  Pulm:  CTAB, no wheezes or crackles  Ext:  Brachioradialis reflex 2+ b/l, +1 LE edema b/l  FHT: Baseline 110s, moderate variability, present accelerations, no decelerations  [place]: 2-3 contractions in 10 minutes    Cx: C/L/H on my exam at 1100. Softer and more anterior.     I/O:  I/O last 3 completed shifts:  In: 4197.5 [P.O.:2250; I.V.:1947.5]  Out: 4925 [Urine:4925]  UOP: 225 ml/hr over last 4 hours    A/P:  Mary Wray is a 43 year old  at 36w5d by FET c/w 8w5d US, admitted for IOL for preeclampsia with severe features by blood pressure criteria.     Preeclampsia with severe features   - BP: Now mild range pressures. One nonsustained severe range pressure this morning. S/p PO nifedipine x1, IV hydralazine x1.    - UOP: adequate diuresis. Will continue I/Os   - Symptoms: denies   - HELLP labs nl, UPC 0.48, will repeat if worsening symptoms   - Mag sulfate for seizure prophylaxis: 2g/hr   - Next clinical mag check at 1030  Labor: - cervical ripening with PO misoprostol x 10 (1100). Discussed rationale for attempting lee balloon placement at some point. Patient would like to continue misoprostol through the 12 doses and avoid lee if possible. Discussed we would recheck after 12 doses of misoprostol and either  attempt for lee at that time or consider cervidil for continued ripening.  GBS bacteriuria: PCN in active labor  Elevated GCT, declined GTT: AM FBG 99; possibly elevated after BMZ yesterday.   Hypothyroidism: continue home levothyroxine 200 mcg ord'd  FWB: - Category 1 FHT. Previously low baseline of 100s overnight but now improved. S/p BMZ x1; 2nd dose today.     Cookie Francisco MD  OB/Gyn PGY-2  11/8/2018    I have seen and examined the patient without the resident. I have reviewed, edited, and agree with the note.   My findings are: appears well.  EFM category I; TOCO q3-4 minutes  LE still markedly edematous  Labs pending    Discussed options for continuing induction  Recommend trial of lee bulb with last 2 doses of misoprostol  Luly Bai MD

## 2018-11-08 NOTE — PLAN OF CARE
Problem: Patient Care Overview  Goal: Plan of Care/Patient Progress Review  Outcome: No Change  See previous notes this shift.  Ongoing FHR baseline 105.  Overall, moderate with accels and no decels.  Pt is comfortable.  Pt is tolerating magnesium sulfate well.  Per note, pt did not complete the GTT but suspected GDMA1.  BGs are checked prior to meals as ordered.  Dr Francisco is fully updated on the tracing, blood pressures, ctxs, BGs and misoprostil po.  Call light is within pt's reach.  Frequent rounding.  Assistance with repositioning and bathroom use.  Pt is currently working on her laptop.

## 2018-11-08 NOTE — PROGRESS NOTES
[facility]  Magnesium Check Note    S:   Patient is feeling well.  Reports mild headache. Denies vision changes/spots in vision, chest pain, shortness of breath, RUQ or epigastric pain. Denies contractions.    O:  Patient Vitals for the past 4 hrs:   BP Temp Temp src Pulse Heart Rate Resp SpO2   18 1722 151/80 - - - 80 20 -   18 1701 160/85 - - - 65 20 -   18 1550 148/82 98.3  F (36.8  C) Oral 56 56 18 99 %   18 1430 137/82 - - - - 16 100 %     Gen: Well appearing, resting in bed  CV:  RRR, no murmurs  Pulm:  CTAB, no wheezes or crackles  Abd:  Gravid, soft, non-tender to palpation of RUQ  Ext:  Patellar reflexes 2+ b/l, 0 beats clonus b/l, trace LE edema b/l    FHT: Baseline 120, moderate variability, present accelerations, no decelerations  [place]: 0-1 contractions in 10 minutes    I/O:  I/O last 3 completed shifts:  In: 100 [I.V.:100]  Out: -   UOP: 275 ml/hr over last 4 hours    A/P:  Mary Wray is a 43 year old  at 36w4d by FET c/w 8w5d US, admitted for IOL for preeclampsia with severe features by blood pressures.    Preeclampsia with severe features   - BP: non-sustained severe ranges, one episode sustained severe range now   - UOP: adequate diuresis. Will continue I/Os   - Symptoms: mild headache   - HELLP labs nl, UPC 0.48   - Mag sulfate for seizure prophylaxis: 2g/hr   - Next clinical mag check at 2230  Labor: - cervical ripening with PO misoprostol x 2 (1735)  GBS bacteriuria: PCN in active labor  Elevated GCT, declined GTT: random , will treat as GDMA1, initiate QID blood glucoses, sliding scale insulin as needed  Hypothyroidism: continue home levothyroxine 125mcg  FWB: - Category 1 FHT      Amy Schumer, MD  Obstetrics and Gyncology, PGY-2  2018 , 6:19 PM

## 2018-11-09 ENCOUNTER — ANESTHESIA EVENT (OUTPATIENT)
Dept: OBGYN | Facility: CLINIC | Age: 43
End: 2018-11-09
Payer: COMMERCIAL

## 2018-11-09 ENCOUNTER — ANESTHESIA (OUTPATIENT)
Dept: OBGYN | Facility: CLINIC | Age: 43
End: 2018-11-09
Payer: COMMERCIAL

## 2018-11-09 PROBLEM — Z98.891 STATUS POST C-SECTION: Status: ACTIVE | Noted: 2018-11-09

## 2018-11-09 LAB
ALT SERPL W P-5'-P-CCNC: 28 U/L (ref 0–50)
AST SERPL W P-5'-P-CCNC: 25 U/L (ref 0–45)
CREAT SERPL-MCNC: 0.75 MG/DL (ref 0.52–1.04)
ERYTHROCYTE [DISTWIDTH] IN BLOOD BY AUTOMATED COUNT: 13.6 % (ref 10–15)
GFR SERPL CREATININE-BSD FRML MDRD: 85 ML/MIN/1.7M2
HCT VFR BLD AUTO: 37.2 % (ref 35–47)
HGB BLD-MCNC: 12.9 G/DL (ref 11.7–15.7)
MCH RBC QN AUTO: 29.4 PG (ref 26.5–33)
MCHC RBC AUTO-ENTMCNC: 34.7 G/DL (ref 31.5–36.5)
MCV RBC AUTO: 85 FL (ref 78–100)
PLATELET # BLD AUTO: 248 10E9/L (ref 150–450)
RBC # BLD AUTO: 4.39 10E12/L (ref 3.8–5.2)
WBC # BLD AUTO: 11.7 10E9/L (ref 4–11)

## 2018-11-09 PROCEDURE — 36000057 ZZH SURGERY LEVEL 3 1ST 30 MIN - UMMC: Performed by: OBSTETRICS & GYNECOLOGY

## 2018-11-09 PROCEDURE — 85027 COMPLETE CBC AUTOMATED: CPT | Performed by: STUDENT IN AN ORGANIZED HEALTH CARE EDUCATION/TRAINING PROGRAM

## 2018-11-09 PROCEDURE — 12000032 ZZH R&B OB CRITICAL UMMC

## 2018-11-09 PROCEDURE — 25000125 ZZHC RX 250: Performed by: ANESTHESIOLOGY

## 2018-11-09 PROCEDURE — 25000128 H RX IP 250 OP 636: Performed by: STUDENT IN AN ORGANIZED HEALTH CARE EDUCATION/TRAINING PROGRAM

## 2018-11-09 PROCEDURE — 84460 ALANINE AMINO (ALT) (SGPT): CPT | Performed by: STUDENT IN AN ORGANIZED HEALTH CARE EDUCATION/TRAINING PROGRAM

## 2018-11-09 PROCEDURE — 72200001 ZZH LABOR CARE VAGINAL DELIVERY SINGLE

## 2018-11-09 PROCEDURE — 37000008 ZZH ANESTHESIA TECHNICAL FEE, 1ST 30 MIN: Performed by: OBSTETRICS & GYNECOLOGY

## 2018-11-09 PROCEDURE — 25000128 H RX IP 250 OP 636: Performed by: OBSTETRICS & GYNECOLOGY

## 2018-11-09 PROCEDURE — 36000059 ZZH SURGERY LEVEL 3 EA 15 ADDTL MIN UMMC: Performed by: OBSTETRICS & GYNECOLOGY

## 2018-11-09 PROCEDURE — 36415 COLL VENOUS BLD VENIPUNCTURE: CPT | Performed by: STUDENT IN AN ORGANIZED HEALTH CARE EDUCATION/TRAINING PROGRAM

## 2018-11-09 PROCEDURE — 10903ZC DRAINAGE OF AMNIOTIC FLUID, THERAPEUTIC FROM PRODUCTS OF CONCEPTION, PERCUTANEOUS APPROACH: ICD-10-PCS | Performed by: OBSTETRICS & GYNECOLOGY

## 2018-11-09 PROCEDURE — 71000015 ZZH RECOVERY PHASE 1 LEVEL 2 EA ADDTL HR: Performed by: OBSTETRICS & GYNECOLOGY

## 2018-11-09 PROCEDURE — 88307 TISSUE EXAM BY PATHOLOGIST: CPT | Mod: 26 | Performed by: STUDENT IN AN ORGANIZED HEALTH CARE EDUCATION/TRAINING PROGRAM

## 2018-11-09 PROCEDURE — 84450 TRANSFERASE (AST) (SGOT): CPT | Performed by: STUDENT IN AN ORGANIZED HEALTH CARE EDUCATION/TRAINING PROGRAM

## 2018-11-09 PROCEDURE — C9290 INJ, BUPIVACAINE LIPOSOME: HCPCS | Performed by: ANESTHESIOLOGY

## 2018-11-09 PROCEDURE — 25000128 H RX IP 250 OP 636: Performed by: ANESTHESIOLOGY

## 2018-11-09 PROCEDURE — 71000014 ZZH RECOVERY PHASE 1 LEVEL 2 FIRST HR: Performed by: OBSTETRICS & GYNECOLOGY

## 2018-11-09 PROCEDURE — 25000125 ZZHC RX 250: Performed by: STUDENT IN AN ORGANIZED HEALTH CARE EDUCATION/TRAINING PROGRAM

## 2018-11-09 PROCEDURE — 27210794 ZZH OR GENERAL SUPPLY STERILE: Performed by: OBSTETRICS & GYNECOLOGY

## 2018-11-09 PROCEDURE — 88307 TISSUE EXAM BY PATHOLOGIST: CPT | Performed by: STUDENT IN AN ORGANIZED HEALTH CARE EDUCATION/TRAINING PROGRAM

## 2018-11-09 PROCEDURE — 25000132 ZZH RX MED GY IP 250 OP 250 PS 637

## 2018-11-09 PROCEDURE — 40000170 ZZH STATISTIC PRE-PROCEDURE ASSESSMENT II: Performed by: OBSTETRICS & GYNECOLOGY

## 2018-11-09 PROCEDURE — 25000132 ZZH RX MED GY IP 250 OP 250 PS 637: Performed by: STUDENT IN AN ORGANIZED HEALTH CARE EDUCATION/TRAINING PROGRAM

## 2018-11-09 PROCEDURE — 82565 ASSAY OF CREATININE: CPT | Performed by: STUDENT IN AN ORGANIZED HEALTH CARE EDUCATION/TRAINING PROGRAM

## 2018-11-09 PROCEDURE — 37000009 ZZH ANESTHESIA TECHNICAL FEE, EACH ADDTL 15 MIN: Performed by: OBSTETRICS & GYNECOLOGY

## 2018-11-09 PROCEDURE — 27110028 ZZH OR GENERAL SUPPLY NON-STERILE: Performed by: OBSTETRICS & GYNECOLOGY

## 2018-11-09 RX ORDER — FENTANYL CITRATE 50 UG/ML
INJECTION, SOLUTION INTRAMUSCULAR; INTRAVENOUS PRN
Status: DISCONTINUED | OUTPATIENT
Start: 2018-11-09 | End: 2018-11-09

## 2018-11-09 RX ORDER — NALOXONE HYDROCHLORIDE 0.4 MG/ML
.1-.4 INJECTION, SOLUTION INTRAMUSCULAR; INTRAVENOUS; SUBCUTANEOUS
Status: DISCONTINUED | OUTPATIENT
Start: 2018-11-09 | End: 2018-11-14 | Stop reason: HOSPADM

## 2018-11-09 RX ORDER — SODIUM CHLORIDE, SODIUM LACTATE, POTASSIUM CHLORIDE, CALCIUM CHLORIDE 600; 310; 30; 20 MG/100ML; MG/100ML; MG/100ML; MG/100ML
INJECTION, SOLUTION INTRAVENOUS CONTINUOUS
Status: DISCONTINUED | OUTPATIENT
Start: 2018-11-09 | End: 2018-11-09 | Stop reason: HOSPADM

## 2018-11-09 RX ORDER — OXYTOCIN 10 [USP'U]/ML
10 INJECTION, SOLUTION INTRAMUSCULAR; INTRAVENOUS
Status: DISCONTINUED | OUTPATIENT
Start: 2018-11-09 | End: 2018-11-14 | Stop reason: HOSPADM

## 2018-11-09 RX ORDER — NALOXONE HYDROCHLORIDE 0.4 MG/ML
.1-.4 INJECTION, SOLUTION INTRAMUSCULAR; INTRAVENOUS; SUBCUTANEOUS
Status: DISCONTINUED | OUTPATIENT
Start: 2018-11-09 | End: 2018-11-09 | Stop reason: HOSPADM

## 2018-11-09 RX ORDER — ONDANSETRON 4 MG/1
4 TABLET, ORALLY DISINTEGRATING ORAL EVERY 30 MIN PRN
Status: DISCONTINUED | OUTPATIENT
Start: 2018-11-09 | End: 2018-11-09 | Stop reason: HOSPADM

## 2018-11-09 RX ORDER — OXYTOCIN/0.9 % SODIUM CHLORIDE 30/500 ML
PLASTIC BAG, INJECTION (ML) INTRAVENOUS
Status: DISCONTINUED
Start: 2018-11-09 | End: 2018-11-09 | Stop reason: HOSPADM

## 2018-11-09 RX ORDER — HYDROCORTISONE 2.5 %
CREAM (GRAM) TOPICAL 3 TIMES DAILY PRN
Status: DISCONTINUED | OUTPATIENT
Start: 2018-11-09 | End: 2018-11-14 | Stop reason: HOSPADM

## 2018-11-09 RX ORDER — CITRIC ACID/SODIUM CITRATE 334-500MG
30 SOLUTION, ORAL ORAL ONCE
Status: DISCONTINUED | OUTPATIENT
Start: 2018-11-09 | End: 2018-11-09

## 2018-11-09 RX ORDER — BISACODYL 10 MG
10 SUPPOSITORY, RECTAL RECTAL DAILY PRN
Status: DISCONTINUED | OUTPATIENT
Start: 2018-11-11 | End: 2018-11-14 | Stop reason: HOSPADM

## 2018-11-09 RX ORDER — DEXTROSE, SODIUM CHLORIDE, SODIUM LACTATE, POTASSIUM CHLORIDE, AND CALCIUM CHLORIDE 5; .6; .31; .03; .02 G/100ML; G/100ML; G/100ML; G/100ML; G/100ML
INJECTION, SOLUTION INTRAVENOUS CONTINUOUS
Status: DISCONTINUED | OUTPATIENT
Start: 2018-11-09 | End: 2018-11-14 | Stop reason: HOSPADM

## 2018-11-09 RX ORDER — ACETAMINOPHEN 325 MG/1
975 TABLET ORAL ONCE
Status: DISCONTINUED | OUTPATIENT
Start: 2018-11-09 | End: 2018-11-09 | Stop reason: HOSPADM

## 2018-11-09 RX ORDER — KETOROLAC TROMETHAMINE 30 MG/ML
INJECTION, SOLUTION INTRAMUSCULAR; INTRAVENOUS PRN
Status: DISCONTINUED | OUTPATIENT
Start: 2018-11-09 | End: 2018-11-09

## 2018-11-09 RX ORDER — CALCIUM CARBONATE 500 MG/1
500 TABLET, CHEWABLE ORAL 3 TIMES DAILY PRN
Status: DISCONTINUED | OUTPATIENT
Start: 2018-11-09 | End: 2018-11-09

## 2018-11-09 RX ORDER — NALOXONE HYDROCHLORIDE 0.4 MG/ML
.1-.4 INJECTION, SOLUTION INTRAMUSCULAR; INTRAVENOUS; SUBCUTANEOUS
Status: CANCELLED | OUTPATIENT
Start: 2018-11-09 | End: 2018-11-10

## 2018-11-09 RX ORDER — NALOXONE HYDROCHLORIDE 0.4 MG/ML
.1-.4 INJECTION, SOLUTION INTRAMUSCULAR; INTRAVENOUS; SUBCUTANEOUS
Status: DISCONTINUED | OUTPATIENT
Start: 2018-11-09 | End: 2018-11-09

## 2018-11-09 RX ORDER — ACETAMINOPHEN 325 MG/1
975 TABLET ORAL EVERY 8 HOURS
Status: DISPENSED | OUTPATIENT
Start: 2018-11-10 | End: 2018-11-13

## 2018-11-09 RX ORDER — MORPHINE SULFATE 1 MG/ML
INJECTION, SOLUTION EPIDURAL; INTRATHECAL; INTRAVENOUS
Status: DISCONTINUED
Start: 2018-11-09 | End: 2018-11-09 | Stop reason: HOSPADM

## 2018-11-09 RX ORDER — POLYETHYLENE GLYCOL 3350 17 G/17G
17 POWDER, FOR SOLUTION ORAL DAILY PRN
Status: DISCONTINUED | OUTPATIENT
Start: 2018-11-09 | End: 2018-11-09

## 2018-11-09 RX ORDER — MISOPROSTOL 200 UG/1
800 TABLET ORAL
Status: DISCONTINUED | OUTPATIENT
Start: 2018-11-09 | End: 2018-11-14 | Stop reason: HOSPADM

## 2018-11-09 RX ORDER — NALBUPHINE HYDROCHLORIDE 10 MG/ML
2.5-5 INJECTION, SOLUTION INTRAMUSCULAR; INTRAVENOUS; SUBCUTANEOUS EVERY 6 HOURS PRN
Status: DISCONTINUED | OUTPATIENT
Start: 2018-11-09 | End: 2018-11-09

## 2018-11-09 RX ORDER — LIDOCAINE 40 MG/G
CREAM TOPICAL
Status: DISCONTINUED | OUTPATIENT
Start: 2018-11-09 | End: 2018-11-09

## 2018-11-09 RX ORDER — OXYTOCIN/0.9 % SODIUM CHLORIDE 30/500 ML
340 PLASTIC BAG, INJECTION (ML) INTRAVENOUS CONTINUOUS PRN
Status: DISCONTINUED | OUTPATIENT
Start: 2018-11-09 | End: 2018-11-14 | Stop reason: HOSPADM

## 2018-11-09 RX ORDER — PROPRANOLOL HYDROCHLORIDE 1 MG/ML
2 INJECTION, SOLUTION INTRAVENOUS ONCE
Status: DISCONTINUED | OUTPATIENT
Start: 2018-11-09 | End: 2018-11-14 | Stop reason: HOSPADM

## 2018-11-09 RX ORDER — EPHEDRINE SULFATE 50 MG/ML
5 INJECTION, SOLUTION INTRAMUSCULAR; INTRAVENOUS; SUBCUTANEOUS
Status: DISCONTINUED | OUTPATIENT
Start: 2018-11-09 | End: 2018-11-09

## 2018-11-09 RX ORDER — ONDANSETRON 2 MG/ML
4 INJECTION INTRAMUSCULAR; INTRAVENOUS EVERY 30 MIN PRN
Status: DISCONTINUED | OUTPATIENT
Start: 2018-11-09 | End: 2018-11-09 | Stop reason: HOSPADM

## 2018-11-09 RX ORDER — CARBOPROST TROMETHAMINE 250 UG/ML
INJECTION, SOLUTION INTRAMUSCULAR PRN
Status: DISCONTINUED | OUTPATIENT
Start: 2018-11-09 | End: 2018-11-09

## 2018-11-09 RX ORDER — ONDANSETRON 2 MG/ML
INJECTION INTRAMUSCULAR; INTRAVENOUS PRN
Status: DISCONTINUED | OUTPATIENT
Start: 2018-11-09 | End: 2018-11-09

## 2018-11-09 RX ORDER — FLUMAZENIL 0.1 MG/ML
0.2 INJECTION, SOLUTION INTRAVENOUS
Status: DISCONTINUED | OUTPATIENT
Start: 2018-11-09 | End: 2018-11-09 | Stop reason: HOSPADM

## 2018-11-09 RX ORDER — CEFAZOLIN SODIUM 1 G/3ML
1 INJECTION, POWDER, FOR SOLUTION INTRAMUSCULAR; INTRAVENOUS SEE ADMIN INSTRUCTIONS
Status: DISCONTINUED | OUTPATIENT
Start: 2018-11-09 | End: 2018-11-09 | Stop reason: HOSPADM

## 2018-11-09 RX ORDER — ACETAMINOPHEN 325 MG/1
650 TABLET ORAL EVERY 4 HOURS PRN
Status: DISCONTINUED | OUTPATIENT
Start: 2018-11-12 | End: 2018-11-14 | Stop reason: HOSPADM

## 2018-11-09 RX ORDER — SODIUM CHLORIDE, SODIUM LACTATE, POTASSIUM CHLORIDE, CALCIUM CHLORIDE 600; 310; 30; 20 MG/100ML; MG/100ML; MG/100ML; MG/100ML
INJECTION, SOLUTION INTRAVENOUS CONTINUOUS PRN
Status: DISCONTINUED | OUTPATIENT
Start: 2018-11-09 | End: 2018-11-09

## 2018-11-09 RX ORDER — AMOXICILLIN 250 MG
1 CAPSULE ORAL 2 TIMES DAILY PRN
Status: DISCONTINUED | OUTPATIENT
Start: 2018-11-09 | End: 2018-11-14 | Stop reason: HOSPADM

## 2018-11-09 RX ORDER — NALOXONE HYDROCHLORIDE 0.4 MG/ML
.1-.4 INJECTION, SOLUTION INTRAMUSCULAR; INTRAVENOUS; SUBCUTANEOUS
Status: DISCONTINUED | OUTPATIENT
Start: 2018-11-09 | End: 2018-11-10

## 2018-11-09 RX ORDER — IBUPROFEN 600 MG/1
600 TABLET, FILM COATED ORAL EVERY 6 HOURS PRN
Status: DISCONTINUED | OUTPATIENT
Start: 2018-11-09 | End: 2018-11-14 | Stop reason: HOSPADM

## 2018-11-09 RX ORDER — BUPIVACAINE HYDROCHLORIDE 2.5 MG/ML
INJECTION, SOLUTION EPIDURAL; INFILTRATION; INTRACAUDAL PRN
Status: DISCONTINUED | OUTPATIENT
Start: 2018-11-09 | End: 2018-11-09

## 2018-11-09 RX ORDER — KETOROLAC TROMETHAMINE 30 MG/ML
30 INJECTION, SOLUTION INTRAMUSCULAR; INTRAVENOUS EVERY 6 HOURS
Status: DISPENSED | OUTPATIENT
Start: 2018-11-09 | End: 2018-11-10

## 2018-11-09 RX ORDER — LANOLIN 100 %
OINTMENT (GRAM) TOPICAL
Status: DISCONTINUED | OUTPATIENT
Start: 2018-11-09 | End: 2018-11-14 | Stop reason: HOSPADM

## 2018-11-09 RX ORDER — AMOXICILLIN 250 MG
2 CAPSULE ORAL 2 TIMES DAILY PRN
Status: DISCONTINUED | OUTPATIENT
Start: 2018-11-09 | End: 2018-11-14 | Stop reason: HOSPADM

## 2018-11-09 RX ORDER — ONDANSETRON 2 MG/ML
4 INJECTION INTRAMUSCULAR; INTRAVENOUS EVERY 6 HOURS PRN
Status: DISCONTINUED | OUTPATIENT
Start: 2018-11-09 | End: 2018-11-14 | Stop reason: HOSPADM

## 2018-11-09 RX ORDER — SENNOSIDES 8.6 MG
8.6 TABLET ORAL 2 TIMES DAILY PRN
Status: DISCONTINUED | OUTPATIENT
Start: 2018-11-09 | End: 2018-11-09

## 2018-11-09 RX ORDER — BUPIVACAINE HYDROCHLORIDE 7.5 MG/ML
INJECTION, SOLUTION INTRASPINAL PRN
Status: DISCONTINUED | OUTPATIENT
Start: 2018-11-09 | End: 2018-11-09

## 2018-11-09 RX ORDER — CITRIC ACID/SODIUM CITRATE 334-500MG
SOLUTION, ORAL ORAL
Status: COMPLETED
Start: 2018-11-09 | End: 2018-11-09

## 2018-11-09 RX ORDER — CEFAZOLIN SODIUM 2 G/100ML
INJECTION, SOLUTION INTRAVENOUS
Status: DISCONTINUED
Start: 2018-11-09 | End: 2018-11-09 | Stop reason: HOSPADM

## 2018-11-09 RX ORDER — MORPHINE SULFATE 1 MG/ML
INJECTION, SOLUTION EPIDURAL; INTRATHECAL; INTRAVENOUS PRN
Status: DISCONTINUED | OUTPATIENT
Start: 2018-11-09 | End: 2018-11-09

## 2018-11-09 RX ORDER — CITRIC ACID/SODIUM CITRATE 334-500MG
30 SOLUTION, ORAL ORAL
Status: DISCONTINUED | OUTPATIENT
Start: 2018-11-09 | End: 2018-11-09 | Stop reason: HOSPADM

## 2018-11-09 RX ORDER — OXYTOCIN/0.9 % SODIUM CHLORIDE 30/500 ML
PLASTIC BAG, INJECTION (ML) INTRAVENOUS CONTINUOUS PRN
Status: DISCONTINUED | OUTPATIENT
Start: 2018-11-09 | End: 2018-11-09

## 2018-11-09 RX ORDER — SIMETHICONE 80 MG
80 TABLET,CHEWABLE ORAL 4 TIMES DAILY PRN
Status: DISCONTINUED | OUTPATIENT
Start: 2018-11-09 | End: 2018-11-14 | Stop reason: HOSPADM

## 2018-11-09 RX ORDER — HYDROXYZINE HYDROCHLORIDE 50 MG/1
100 TABLET, FILM COATED ORAL ONCE
Status: COMPLETED | OUTPATIENT
Start: 2018-11-09 | End: 2018-11-09

## 2018-11-09 RX ORDER — LABETALOL HYDROCHLORIDE 5 MG/ML
10 INJECTION, SOLUTION INTRAVENOUS
Status: DISCONTINUED | OUTPATIENT
Start: 2018-11-09 | End: 2018-11-09 | Stop reason: HOSPADM

## 2018-11-09 RX ORDER — FENTANYL CITRATE 50 UG/ML
25-50 INJECTION, SOLUTION INTRAMUSCULAR; INTRAVENOUS
Status: DISCONTINUED | OUTPATIENT
Start: 2018-11-09 | End: 2018-11-09 | Stop reason: HOSPADM

## 2018-11-09 RX ORDER — OXYCODONE HYDROCHLORIDE 5 MG/1
5-10 TABLET ORAL
Status: DISCONTINUED | OUTPATIENT
Start: 2018-11-09 | End: 2018-11-14 | Stop reason: HOSPADM

## 2018-11-09 RX ORDER — OXYTOCIN/0.9 % SODIUM CHLORIDE 30/500 ML
100 PLASTIC BAG, INJECTION (ML) INTRAVENOUS CONTINUOUS
Status: DISCONTINUED | OUTPATIENT
Start: 2018-11-09 | End: 2018-11-14 | Stop reason: HOSPADM

## 2018-11-09 RX ORDER — CARBOPROST TROMETHAMINE 250 UG/ML
INJECTION, SOLUTION INTRAMUSCULAR
Status: COMPLETED
Start: 2018-11-09 | End: 2018-11-09

## 2018-11-09 RX ORDER — LIDOCAINE 40 MG/G
CREAM TOPICAL
Status: DISCONTINUED | OUTPATIENT
Start: 2018-11-09 | End: 2018-11-14 | Stop reason: HOSPADM

## 2018-11-09 RX ORDER — CEFAZOLIN SODIUM 2 G/100ML
2 INJECTION, SOLUTION INTRAVENOUS
Status: DISCONTINUED | OUTPATIENT
Start: 2018-11-09 | End: 2018-11-09

## 2018-11-09 RX ADMIN — LEVOTHYROXINE SODIUM 200 MCG: 50 TABLET ORAL at 08:03

## 2018-11-09 RX ADMIN — BUPIVACAINE 20 ML: 13.3 INJECTION, SUSPENSION, LIPOSOMAL INFILTRATION at 21:22

## 2018-11-09 RX ADMIN — BUPIVACAINE HYDROCHLORIDE 20 ML: 2.5 INJECTION, SOLUTION EPIDURAL; INFILTRATION; INTRACAUDAL at 21:22

## 2018-11-09 RX ADMIN — SALINE NASAL SPRAY 1 SPRAY: 1.5 SOLUTION NASAL at 19:05

## 2018-11-09 RX ADMIN — CALCIUM CARBONATE (ANTACID) CHEW TAB 500 MG 500 MG: 500 CHEW TAB at 14:06

## 2018-11-09 RX ADMIN — Medication 2.5 MILLION UNITS: at 01:44

## 2018-11-09 RX ADMIN — OXYTOCIN-SODIUM CHLORIDE 0.9% IV SOLN 30 UNIT/500ML 75 ML/HR: 30-0.9/5 SOLUTION at 21:30

## 2018-11-09 RX ADMIN — FENTANYL CITRATE 15 MCG: 50 INJECTION, SOLUTION INTRAMUSCULAR; INTRAVENOUS at 19:51

## 2018-11-09 RX ADMIN — BUPIVACAINE HYDROCHLORIDE IN DEXTROSE 1.8 ML: 7.5 INJECTION, SOLUTION SUBARACHNOID at 19:51

## 2018-11-09 RX ADMIN — Medication 2.5 MILLION UNITS: at 13:40

## 2018-11-09 RX ADMIN — KETOROLAC TROMETHAMINE 30 MG: 30 INJECTION, SOLUTION INTRAMUSCULAR at 20:44

## 2018-11-09 RX ADMIN — SENNOSIDES 8.6 MG: 8.6 TABLET, FILM COATED ORAL at 16:44

## 2018-11-09 RX ADMIN — HYDROXYZINE HYDROCHLORIDE 100 MG: 50 TABLET, FILM COATED ORAL at 05:16

## 2018-11-09 RX ADMIN — SALINE NASAL SPRAY 1 SPRAY: 1.5 SOLUTION NASAL at 00:09

## 2018-11-09 RX ADMIN — PHENYLEPHRINE HYDROCHLORIDE 0.5 MCG/KG/MIN: 10 INJECTION, SOLUTION INTRAMUSCULAR; INTRAVENOUS; SUBCUTANEOUS at 19:51

## 2018-11-09 RX ADMIN — Medication 2.5 MILLION UNITS: at 17:58

## 2018-11-09 RX ADMIN — SODIUM CITRATE AND CITRIC ACID MONOHYDRATE 30 ML: 500; 334 SOLUTION ORAL at 19:19

## 2018-11-09 RX ADMIN — SALINE NASAL SPRAY 1 SPRAY: 1.5 SOLUTION NASAL at 16:10

## 2018-11-09 RX ADMIN — MAGNESIUM SULFATE IN WATER 2 G/HR: 40 INJECTION, SOLUTION INTRAVENOUS at 17:01

## 2018-11-09 RX ADMIN — Medication 2.5 MILLION UNITS: at 05:58

## 2018-11-09 RX ADMIN — OXYTOCIN-SODIUM CHLORIDE 0.9% IV SOLN 30 UNIT/500ML 300 ML/HR: 30-0.9/5 SOLUTION at 20:18

## 2018-11-09 RX ADMIN — MORPHINE SULFATE 0.2 MG: 1 INJECTION, SOLUTION EPIDURAL; INTRATHECAL; INTRAVENOUS at 19:51

## 2018-11-09 RX ADMIN — Medication 10 MG: at 20:04

## 2018-11-09 RX ADMIN — AZITHROMYCIN MONOHYDRATE 250 MG: 500 INJECTION, POWDER, LYOPHILIZED, FOR SOLUTION INTRAVENOUS at 20:09

## 2018-11-09 RX ADMIN — CARBOPROST TROMETHAMINE 250 MCG: 250 INJECTION, SOLUTION INTRAMUSCULAR at 20:24

## 2018-11-09 RX ADMIN — MAGNESIUM SULFATE IN WATER 2 G/HR: 40 INJECTION, SOLUTION INTRAVENOUS at 07:52

## 2018-11-09 RX ADMIN — Medication 5 MG: at 20:01

## 2018-11-09 RX ADMIN — Medication 2.5 MILLION UNITS: at 09:45

## 2018-11-09 RX ADMIN — ONDANSETRON 4 MG: 2 INJECTION INTRAMUSCULAR; INTRAVENOUS at 20:19

## 2018-11-09 RX ADMIN — SODIUM CHLORIDE, POTASSIUM CHLORIDE, SODIUM LACTATE AND CALCIUM CHLORIDE: 600; 310; 30; 20 INJECTION, SOLUTION INTRAVENOUS at 19:40

## 2018-11-09 ASSESSMENT — LIFESTYLE VARIABLES: TOBACCO_USE: 0

## 2018-11-09 ASSESSMENT — COPD QUESTIONNAIRES: COPD: 0

## 2018-11-09 NOTE — PROGRESS NOTES
[facility]  Magnesium Check Note    S:   Patient is feeling well, though has not slept. Starting to feel contractions. Denies headaches, vision changes/spots in vision, chest pain, shortness of breath, or RUQ pain. Some discomfort from swelling in lower extremities.    O:  Patient Vitals for the past 4 hrs:   BP Temp Temp src Resp   18 0415 154/84 98.2  F (36.8  C) Oral 18   18 0315 - 97.9  F (36.6  C) Oral -   18 0200 - 97.7  F (36.5  C) Oral -     Gen: Well appearing, resting in bed  CV:  RRR, no murmurs  Pulm:  CTAB, no wheezes or crackles  Ext:  Brachioradialis reflex 2+ b/l, +2 LE edema b/l  FHT: Baseline 115, moderate variability, present accelerations, no decelerations  [place]: [place] not detecting contractions    Cx: deferred; 2.5/30/-3 medium, anterior ()    I/O:  I/O last 3 completed shifts:  In: 8207.06 [P.O.:5300; I.V.:2907.06]  Out: 6605 [Urine:6605]  UOP: 450 ml/hr over last 4 hours    A/P:  Mary Wray is a 43 year old  at 36w6d by ETD c/w 8w5d US, admitted for IOL for preeclampsia with severe features by blood pressure criteria.     Preeclampsia with severe features      - BP: Mild range overnight. S/p hydralazine 5mg x2, 10 mg x1 since admission      - UOP: adequate diuresis. Encouraged < 3L fluid/day. Will continue strict I/Os      - Symptoms: denies symptoms of pre-e      - HELLP labs nl, UPC 0.48, will repeat if worsening symptoms      - Mag sulfate for seizure prophylaxis: 2g/hr      - Next clinical mag check at 400     Labor:  - s/p PO misoprostol x 12 (). S/p Randle.  Cook cath placed, but was too uncomfortable for patient and subsequently removed. Pitocin started at , currently at 16 mu/min. Continue to titrate as able.  GBS bacteriuria: PCN started at   Elevated GCT, declined GTT: AM FBG 99  Hypothyroidism: continue home levothyroxine 200 mcg ord'd  FWB:  - Category 1 FHT. S/p BMZ x2    Amy Schumer, MD  OB/Gyn  PGY-2  11/09/18

## 2018-11-09 NOTE — PROGRESS NOTES
[facility]  Magnesium Check Note    S:   Patient is feeling well. Contractions stable with minimal pain. No increase in frequency. + fetal movement. Able to ambulate well. Moving well on medicine ball and with walking. No BM since Wednesday. Drinking prune juice currently. Denies headaches, vision changes/spots in vision, chest pain, shortness of breath, N/V, RUQ pain. Swelling stable.    O:  Patient Vitals for the past 4 hrs:   BP Temp Temp src Resp   18 1315 150/87 - - -   18 1305 151/87 - - -   18 1254 162/86 98.5  F (36.9  C) Oral 18   18 1037 137/87 97.5  F (36.4  C) Oral -     Gen: Well appearing, seated on exercise ball  CV:  RRR, no murmurs  Pulm:  CTAB, no wheezes or crackles  Ext:  Biceps reflex 2+ b/l, pitting edema LE edema b/l to knees  FHT: Baseline 120s, moderate variability, present accelerations, no decelerations  [place]: 2-3 contractions in 10 minutes. IUPC placed 0800    Cx: deferred; last check /-3 (0830)    I/O:  I/O last 3 completed shifts:  In: 7749.06 [P.O.:5000; I.V.:2749.06]  Out: 6630 [Urine:6630]  UOP: 250 ml/hr over last 4 hours    A/P:  Mary Wray is a 43 year old  at 36w6d by ETD c/w 8w5d US, admitted for IOL for preeclampsia with severe features by blood pressure criteria.     Preeclampsia with severe features   - BP: Currently mild range pressure. Sustained severe range pressure 11/8 PM 8134-6233. S/p PO nifedipine 10mg x1, IV hydralazine 5mg x2 and 10mg x1.    - UOP: adequate diuresis. Will continue strict I/Os. Goal fluid consumption <3L   - Symptoms: denies preeclamptic symptoms    - HELLP labs nl, UPC 0.48   - Mag sulfate for seizure prophylaxis: 2g/hr   - Next clinical mag check at 1745    Labor: - cervical ripening with PO misoprostol x 12 (1654), s/p lee 30cc. Pitocin started  titrated up to 24mu, but difficult to trace. IUPC placed 0800  and pitocin halved, continue to titrate up. Currently at  19mu.  GBS bacteriuria: PCN in active labor (2141), now adequate  Elevated GCT, declined GTT: AM FBG 99 on 11/8  Hypothyroidism: continue home levothyroxine 200 mcg ord'd  FWB: - Category 1 FHT. S/p BMZ x2  Bowel Regimen: Prune juice. Senna PRN.    Tunde Helton, MS3    Resident/Fellow Attestation   I, Ramón Manriquez, was present with the medical student who participated in the service and in the documentation of the note.  I have verified the history and personally performed the physical exam and medical decision making.  I agree with the assessment and plan of care as documented in the note.  I have reviewed the note and made changes as necessary.    Key findings; One non-sustained severe range pressure, currently mild range. Repeat labs wnl. Good UOP. No s/s mag tox or severe preE    Ramón Manriquez MD  Obstetrics & Gynecology, PGY2

## 2018-11-09 NOTE — PROGRESS NOTES
[facility]  Magnesium Check Note    S:   Patient is feeling well. Had some few hours of good rest last night. SROM last night (2030) and still feels some leakage. Notices regular contractions with minimal pain. No increase in frequency. + fetal movement. Able to ambulate well. No BM yesterday and wants to have one today. Denies headaches, vision changes/spots in vision, chest pain, shortness of breath, N/V, RUQ pain. Swelling stable.    O:  Patient Vitals for the past 4 hrs:   BP Temp Temp src Resp   18 1037 137/87 97.5  F (36.4  C) Oral -   18 0830 - 97.5  F (36.4  C) Oral -   18 0734 156/83 - - -   18 0729 - 98  F (36.7  C) Oral 18     Gen: Well appearing, resting in bed  CV:  RRR, no murmurs  Pulm:  CTAB, no wheezes or crackles  Ext:  Biceps reflex 2+ b/l, +2 LE edema b/l  FHT: Baseline 110s, moderate variability, present accelerations, no decelerations  [place]: 2-3 contractions in 10 minutes. IUPC placed 0800    Cx: 2/30/-3 (0830)    I/O:  I/O last 3 completed shifts:  In: 7749.06 [P.O.:5000; I.V.:2749.06]  Out: 6630 [Urine:6630]  UOP: 250 ml/hr over last 4 hours    A/P:  Mary Wray is a 43 year old  at 36w6d by ETD c/w 8w5d US, admitted for IOL for preeclampsia with severe features by blood pressure criteria.     Preeclampsia with severe features   - BP: Currently normotensive. Overnight mild range pressures. Sustained severe range pressure 11 PM 8408-2142. S/p PO nifedipine 10mg x1, IV hydralazine 5mg x2 and 10mg x1.    - UOP: adequate diuresis. Will continue strict I/Os. Goal fluid consumption <3L   - Symptoms: denies preeclamptic symptoms   - HELLP labs nl, UPC 0.48, repeat pending today    - Mag sulfate for seizure prophylaxis: 2g/hr   - Next clinical mag check at 1400    Labor: - cervical ripening with PO misoprostol x 12 (1204), s/p lee 30cc. Pitocin started  titrated up to 24mu, but difficult to trace. IUPC placed 0800  and  pitocin halved, continue to titrate up.  GBS bacteriuria: PCN in active labor (2141), now adequate  Elevated GCT, declined GTT: AM FBG 99 on 11/8  Hypothyroidism: continue home levothyroxine 200 mcg ord'd  FWB: - Category 1 FHT. S/p BMZ x2  Bowel Regimen: Prune juice. Senna RAJN.    Tunde Helton, MS3    I was present with the medical student who participated in the service and in the documentation of the note. I have verified the history and personally performed the physical exam and medical decision making. I agree with the assessment and plan of care as documented in the note    Cookie Francisco MD  OB/Gyn PGY-2  11/9/2018    [facility] staff:  Appreciate note by Dr. Francisco.  I have seen and examined the patient without the resident. I have reviewed, edited, and agree with the note.      Erica Kruger MD, FACOG  11/9/2018  1:10 PM

## 2018-11-09 NOTE — PLAN OF CARE
Problem: Labor (Cervical Ripen, Induct, Augment) (Adult,Obstetrics,Pediatric)  Goal: Signs and Symptoms of Listed Potential Problems Will be Absent, Minimized or Managed (Labor)  Signs and symptoms of listed potential problems will be absent, minimized or managed by discharge/transition of care (reference Labor (Cervical Ripen, Induct, Augment) (Adult,Obstetrics,Pediatric) CPG).   Outcome: Improving  Pt denies pain, feeling cramps lower back pain; hot packs applied states is helping. BPs outside parameters; Hydralazine 5mg IV given and Bps improved. Pitocin running at 6 millunits. Friend Shaheen at bedside. Sister went home for sleep. Pt denies vision changes, reflexes +2 no clonus.

## 2018-11-09 NOTE — PROGRESS NOTES
[facility]  Magnesium Check Note    S:   Patient is feeling well. Contractions stable and feeling them more, not painful. No increase in frequency. + fetal movement. Able to ambulate well. Np difficulties with walking. No BM since Wednesday. Has been drinking prune juice and took one dose of Senna. Denies headaches, vision changes/spots in vision, chest pain, shortness of breath, N/V, RUQ pain. Swelling stable.    O:  Patient Vitals for the past 4 hrs:   BP Temp Temp src Resp   18 1703 152/86 - - -   18 1542 153/85 - - -   18 1535 - 98.2  F (36.8  C) Oral 20   18 1437 148/83 - - -   18 1426 (!) 162/91 - - -   18 1315 150/87 - - -     Gen: Well appearing, resting in bed  CV:  RRR, no murmurs  Pulm:  CTAB, no wheezes or crackles  Ext:  Biceps reflex 2+ b/l, pitting edema LE edema b/l to knees  FHT: Baseline 120s, moderate variability, present accelerations, no decelerations  [place]: 2-3 contractions in 10 minutes. IUPC placed 0800    Cx: deferred; last check /-3 (830)    I/O:  I/O last 3 completed shifts:  In: 4131.29 [P.O.:2600; I.V.:1531.29]  Out: 5750 [Urine:5750]  UOP: 250 ml/hr over last 4 hours    A/P:  Mary Wray is a 43 year old  at 36w6d by ETD c/w 8w5d US, admitted for IOL for preeclampsia with severe features by blood pressure criteria.     Preeclampsia with severe features   - BP: Currently mild range pressure. Solitary severe range pressure 162/91 @ 1426. Sustained severe range pressure 11/8 PM 7821-2244. S/p PO nifedipine 10mg x1, IV hydralazine 5mg x2 and 10mg x1.    - UOP: adequate diuresis. Will continue strict I/Os. Goal fluid consumption <3L   - Symptoms: denies preeclamptic symptoms    - HELLP labs nl, UPC 0.48   - Mag sulfate for seizure prophylaxis: 2g/hr   - Next clinical mag check at 2100    Labor: - cervical ripening with PO misoprostol x 12 (8160), s/p lee 30cc. Pitocin started  titrated up to 24mu, but  difficult to trace. IUPC placed 0800 11/9 and pitocin halved, continue to titrate up. Currently at 28mu.  GBS bacteriuria: PCN in active labor (2141), now adequate  Elevated GCT, declined GTT: AM FBG 99 on 11/8  Hypothyroidism: continue home levothyroxine 200 mcg ord'd  FWB: - Category 1 FHT. S/p BMZ x2  Bowel Regimen: Prune juice. Senna PRN. Received dose of Senna @ 1644    Tunde Helton MS3    Resident/Fellow Attestation   I, Ramón Manriquez, was present with the medical student who participated in the service and in the documentation of the note.  I have verified the history and personally performed the physical exam and medical decision making.  I agree with the assessment and plan of care as documented in the note.  I have reviewed the note and made changes as necessary.    Key findings; Mild range BPs, good UOP, no s/s    Ramón Manriquez MD  Obstetrics & Gynecology, PGY2

## 2018-11-09 NOTE — PLAN OF CARE
Problem: Patient Care Overview  Goal: Individualization & Mutuality  Outcome: No Change  Labor is not progressing as expected. No cervical change from the last check. See flow sheet for fetal and uterine monitoring.IUPC PLACED.  Patient attested been comfortable. Pitocin at the maximum dose will be stopped and restart in half hour. Will continue to monitor labor and notify the provider with changes.

## 2018-11-09 NOTE — PROGRESS NOTES
[facility]  Magnesium Check Note    S:   Patient is feeling well, she is pretty tired. Notices no contractions but experiences some cramps. + fetal movement. Denies headaches, vision changes/spots in vision, chest pain, shortness of breath, N/V, RUQ pain. Some discomfort with LE swelling but no significant pain.     O:  Patient Vitals for the past 4 hrs:   BP Resp   18 2036 143/70 -   18 1932 (!) 152/96 -   18 1904 (!) 160/92 -   18 1811 (!) 173/91 -   18 1754 (!) 169/93 -   18 1717 (!) 155/93 18   18 1708 (!) 160/97 20     Gen: Well appearing, resting in bed  CV:  RRR, no murmurs  Pulm:  CTAB, no wheezes or crackles  Ext:  Brachioradialis reflex 2+ b/l, +1 LE edema b/l  FHT: Baseline 115, moderate variability, present accelerations, no decelerations  [place]: 0 contractions in 10 minutes    Cx: 2.5/30/-3; medium, anterior ()    I/O:  I/O last 3 completed shifts:  In: 7962.27 [P.O.:4650; I.V.:3312.27]  Out: 6655 [Urine:6655]  UOP: 850 ml/hr over last 4 hours    A/P:  Mary Wray is a 43 year old  at 36w5d by ETD c/w 8w5d US, admitted for IOL for preeclampsia with severe features by blood pressure criteria.     Preeclampsia with severe features   - BP: Sustained severe pressures this evening. 5 mg Hydralazine given, BP 20 mins later was still severe, 10 mg Hydralazine given. Now mild range bps.   - UOP: adequate diuresis. Encouraged < 3L fluid/day. Pt reports >5L PO intake today. Will continue strict I/Os   - Symptoms: denies symptoms of pre-e   - HELLP labs nl, UPC 0.48, will repeat if worsening symptoms   - Mag sulfate for seizure prophylaxis: 2g/hr   - Next clinical mag check at [place]    Labor: - cervical ripening with PO misoprostol x 12 (1700). Randle placed at 1400 via SSE, fell out at 1630. Cx at 2.5/30/-3. Cook cath placed, but was too uncomfortable for patient. Plan to start pitocin at .   GBS bacteriuria: PCN in active  labor  Elevated GCT, declined GTT: AM FBG 99, continue QID monitoring  Hypothyroidism: continue home levothyroxine 200 mcg ord'd  FWB: - Category 1 FHT. S/p BMZ x2    Ronald Alsop - MS3    Attestation    I was present with the medical student who participated in the service and in the documentation of the note. I have verified the history and personally performed the physical exam and medical decision making. I agree with the assessment and plan of care as documented in the note.    Amy Schumer, MD  Ob/Gyn PGY-2  11/08/18 9:02 PM

## 2018-11-09 NOTE — PLAN OF CARE
Problem: Patient Care Overview  Goal: Plan of Care/Patient Progress Review  Outcome: No Change  Pt stable, VS within parameters. Pt denies bleeding, headache, vision changes, epigastric pain. DTR 2+. Edema 3+, SCDs used intermittently, legs elevated. Good urine output, oral hydration minimized, IV fluids titrated to 75ml/hr (between mag, pit, & LR). FHR normal baseline, moderate variability, accels present, no decels. Pitocin titrated to 22mu/min per protocol, pt not feeling ctx initially, able to sleep through cramping.

## 2018-11-09 NOTE — PROGRESS NOTES
[facility]  Magnesium Check Note    S:   Patient is feeling well, able to sleep without issue. Comfortable. Denies headaches, vision changes/spots in vision, chest pain, shortness of breath, N/V, RUQ pain. Some discomfort from swelling in LE    O:  Patient Vitals for the past 4 hrs:   BP Temp Temp src Pulse Resp   18 2350 144/72 - - - -   18 2232 157/82 97.9  F (36.6  C) Oral 79 20   18 2158 160/86 - - - -   18 2036 143/70 97.8  F (36.6  C) Oral 80 20     Gen: Well appearing, resting in bed  CV:  RRR, no murmurs  Pulm:  CTAB, no wheezes or crackles  Ext:  Brachioradialis reflex 2+ b/l, +2 LE edema b/l, 1 beat of clonus b/l LE  FHT: Baseline 115, moderate variability, present accelerations, no decelerations  [place]: [place] not detecting contractions    Cx: deferred; 2.5/30/-3; medium, anterior @ 2000    I/O:  I/O last 3 completed shifts:  In: 8207.06 [P.O.:5300; I.V.:2907.06]  Out: 6605 [Urine:6605]  UOP: 400 ml/hr over last 4 hours    A/P:  Mary Wray is a 43 year old  at 36w6d by ETD c/w 8w5d US, admitted for IOL for preeclampsia with severe features by blood pressure criteria.     Preeclampsia with severe features      - BP: Sustained severe pressures this evening. 5 mg Hydralazine given (), repeat BP was still severe, 10 mg Hydralazine given (). Now mild     range bps.      - UOP: adequate diuresis. Encouraged < 3L fluid/day. Pt reports >5L PO intake today. Will continue strict I/Os      - Symptoms: denies symptoms of pre-e      - HELLP labs nl, UPC 0.48, will repeat if worsening symptoms      - Mag sulfate for seizure prophylaxis: 2g/hr      - Next clinical mag check at 0400     Labor:  - s/p PO misoprostol x 12 (1700). S/p Randle.  Cook cath placed, but was too uncomfortable for patient. Pitocin started at , now 6 ml/hr. Continue to titrate as able.  GBS bacteriuria: PCN in active labor  Elevated GCT, declined GTT: AM FBG 99, continue QID  monitoring  Hypothyroidism: continue home levothyroxine 200 mcg ord'd  FWB:  - Category 1 FHT. S/p BMZ x2       Ronald Alsop- MS3    I was present with the medical student who participated in the service and in the documentation of the note. I have verified the history and personally performed the physical exam and medical decision making. I agree with the assessment and plan of care as documented in the note    Amy Schumer, MD  OB/Gyn PGY-2  11/8/2018

## 2018-11-10 LAB
ALT SERPL W P-5'-P-CCNC: 26 U/L (ref 0–50)
AST SERPL W P-5'-P-CCNC: 32 U/L (ref 0–45)
CREAT SERPL-MCNC: 0.7 MG/DL (ref 0.52–1.04)
ERYTHROCYTE [DISTWIDTH] IN BLOOD BY AUTOMATED COUNT: 13.7 % (ref 10–15)
GFR SERPL CREATININE-BSD FRML MDRD: >90 ML/MIN/1.7M2
GLUCOSE BLDC GLUCOMTR-MCNC: 96 MG/DL (ref 70–99)
HCT VFR BLD AUTO: 31.4 % (ref 35–47)
HGB BLD-MCNC: 10.6 G/DL (ref 11.7–15.7)
MAGNESIUM SERPL-MCNC: 7.6 MG/DL (ref 1.6–2.3)
MCH RBC QN AUTO: 28.8 PG (ref 26.5–33)
MCHC RBC AUTO-ENTMCNC: 33.8 G/DL (ref 31.5–36.5)
MCV RBC AUTO: 85 FL (ref 78–100)
PLATELET # BLD AUTO: 224 10E9/L (ref 150–450)
RBC # BLD AUTO: 3.68 10E12/L (ref 3.8–5.2)
WBC # BLD AUTO: 11.3 10E9/L (ref 4–11)

## 2018-11-10 PROCEDURE — 85027 COMPLETE CBC AUTOMATED: CPT | Performed by: STUDENT IN AN ORGANIZED HEALTH CARE EDUCATION/TRAINING PROGRAM

## 2018-11-10 PROCEDURE — 83735 ASSAY OF MAGNESIUM: CPT | Performed by: STUDENT IN AN ORGANIZED HEALTH CARE EDUCATION/TRAINING PROGRAM

## 2018-11-10 PROCEDURE — 84460 ALANINE AMINO (ALT) (SGPT): CPT | Performed by: STUDENT IN AN ORGANIZED HEALTH CARE EDUCATION/TRAINING PROGRAM

## 2018-11-10 PROCEDURE — 12000032 ZZH R&B OB CRITICAL UMMC

## 2018-11-10 PROCEDURE — 25000128 H RX IP 250 OP 636: Performed by: STUDENT IN AN ORGANIZED HEALTH CARE EDUCATION/TRAINING PROGRAM

## 2018-11-10 PROCEDURE — 25000132 ZZH RX MED GY IP 250 OP 250 PS 637: Performed by: STUDENT IN AN ORGANIZED HEALTH CARE EDUCATION/TRAINING PROGRAM

## 2018-11-10 PROCEDURE — 00000146 ZZHCL STATISTIC GLUCOSE BY METER IP

## 2018-11-10 PROCEDURE — 84450 TRANSFERASE (AST) (SGOT): CPT | Performed by: STUDENT IN AN ORGANIZED HEALTH CARE EDUCATION/TRAINING PROGRAM

## 2018-11-10 PROCEDURE — 25000128 H RX IP 250 OP 636: Performed by: OBSTETRICS & GYNECOLOGY

## 2018-11-10 PROCEDURE — 36415 COLL VENOUS BLD VENIPUNCTURE: CPT | Performed by: STUDENT IN AN ORGANIZED HEALTH CARE EDUCATION/TRAINING PROGRAM

## 2018-11-10 PROCEDURE — 82565 ASSAY OF CREATININE: CPT | Performed by: STUDENT IN AN ORGANIZED HEALTH CARE EDUCATION/TRAINING PROGRAM

## 2018-11-10 RX ORDER — ACETAMINOPHEN 325 MG/1
650 TABLET ORAL EVERY 4 HOURS PRN
Qty: 100 TABLET | Refills: 0 | Status: SHIPPED | OUTPATIENT
Start: 2018-11-12 | End: 2018-12-13

## 2018-11-10 RX ORDER — IBUPROFEN 600 MG/1
600 TABLET, FILM COATED ORAL EVERY 6 HOURS PRN
Qty: 120 TABLET | Refills: 0 | Status: SHIPPED | OUTPATIENT
Start: 2018-11-10 | End: 2018-12-13

## 2018-11-10 RX ORDER — AMOXICILLIN 250 MG
1 CAPSULE ORAL 2 TIMES DAILY PRN
Qty: 100 TABLET | Refills: 0 | Status: SHIPPED | OUTPATIENT
Start: 2018-11-10 | End: 2018-12-13

## 2018-11-10 RX ADMIN — SODIUM CHLORIDE, POTASSIUM CHLORIDE, SODIUM LACTATE AND CALCIUM CHLORIDE 75 ML/HR: 600; 310; 30; 20 INJECTION, SOLUTION INTRAVENOUS at 17:08

## 2018-11-10 RX ADMIN — IBUPROFEN 600 MG: 600 TABLET ORAL at 20:19

## 2018-11-10 RX ADMIN — KETOROLAC TROMETHAMINE 30 MG: 30 INJECTION, SOLUTION INTRAMUSCULAR at 07:46

## 2018-11-10 RX ADMIN — MAGNESIUM SULFATE IN WATER 2 G/HR: 40 INJECTION, SOLUTION INTRAVENOUS at 03:49

## 2018-11-10 RX ADMIN — KETOROLAC TROMETHAMINE 30 MG: 30 INJECTION, SOLUTION INTRAMUSCULAR at 13:54

## 2018-11-10 RX ADMIN — ACETAMINOPHEN 975 MG: 325 TABLET, FILM COATED ORAL at 07:46

## 2018-11-10 RX ADMIN — SENNOSIDES AND DOCUSATE SODIUM 2 TABLET: 8.6; 5 TABLET ORAL at 20:19

## 2018-11-10 RX ADMIN — ACETAMINOPHEN 975 MG: 325 TABLET, FILM COATED ORAL at 16:04

## 2018-11-10 RX ADMIN — LEVOTHYROXINE SODIUM 200 MCG: 50 TABLET ORAL at 07:46

## 2018-11-10 RX ADMIN — SODIUM CHLORIDE, POTASSIUM CHLORIDE, SODIUM LACTATE AND CALCIUM CHLORIDE 75 ML/HR: 600; 310; 30; 20 INJECTION, SOLUTION INTRAVENOUS at 03:48

## 2018-11-10 NOTE — PLAN OF CARE
Problem: Patient Care Overview  Goal: Plan of Care/Patient Progress Review  Outcome: Improving  Data: Vital signs within normal limits. Postpartum checks within normal limits - see flow record. Patient eating and drinking normally. Patient has lee in place, urine output is improved, and is up ambulating. No apparent signs of infection. Incision not visualized. Patient has ambulated in room and is able to care for infant.  Action: Patient medicated during the shift for pain and cramping though patient denies pain. See MAR. Patient reassessed within 1 hour after each medication and pain was the same - patient stated she was comfortable. Patient education done. See flow record.  Response: Positive attachment behaviors observed with infant. Support persons present.   Plan: Daily weights, remove lee and discharge IV fluids at 2000 if vital signs stable. Anticipate discharge per protocol.

## 2018-11-10 NOTE — PROGRESS NOTES
[facility]  Magnesium Check Note    S:   Pt feels very tired, fatigued, like she cannot think straight right now. Double vision. Denies headaches, spots in vision, chest pain, shortness of breath, N/V, RUQ pain. Swelling stable.     O:  Patient Vitals for the past 4 hrs:   BP Temp Temp src Heart Rate Resp SpO2   11/10/18 0830 134/79 97.6  F (36.4  C) Oral 53 16 97 %     Gen: Well appearing, resting in bed  CV:  RRR, no murmurs  Pulm:  CTAB, no wheezes or crackles  Abd:  Soft, nontender, nondistended.   Ext:  Biceps reflex 3+ b/l, 3+ pitting edema LE edema b/l to knees. One beat of clonus bilaterally.     I/O:  I/O last 3 completed shifts:  In: 3367.76 [P.O.:1150; I.V.:2217.76]  Out: 3820 [Urine:2525; Blood:1295]  UOP: 35 ml/hr over last 4 hours    Preeclampsia labs    Recent Labs  Lab 11/10/18  0840 18  1202 18  1239   CR 0.70 0.75 0.66    248 230   AST 32 25 25   ALT 26 28 28       Recent Labs  Lab 11/10/18  0840 18  1239   MAG 7.6* 6.7*       A/P:  Mary Wray is a 43 year old  POD#1 s/p PLTCS at 36w6d for failed IOL after being induced for preeclampsia with severe features. Recovering appropriately.     #Preeclampsia with severe features   - BP: Normal postpartum. Prior to delivery required PO nifedipine 10mg x1, IV hydralazine 5mg x2 and 10mg x1.    - UOP: UOP decreased in past 8 hrs. Pitocin off around 3am, could have contributed. Nausea is improved so now taking in PO, will continue to monitor. Creatinine returned at 0.7 this AM, at her baseline.    - Symptoms: denies preeclamptic symptoms, feeling adverse effects of mag   - HELLP labs nl, UPC 0.48   - Mag sulfate for seizure prophylaxis: s/p 4g loading dose> 2g/hr>6.7 > 7.6. With decreased borderline UOP and patient's complaints of double vision, will discontinue mag and continue to follow clinically for signs of mag toxicity or preeclampsia until she is 24 hours postpartum.      - AM HELLP labs  wnl      Delma Galindo MD  OB/GYN PGY-1  11/10/18 9:42 AM

## 2018-11-10 NOTE — ANESTHESIA PREPROCEDURE EVALUATION
"Anesthesia Pre-Procedure Evaluation    Patient: Mary Wray   MRN:     [id number] Gender:   female   Age:    43 year old :      1975        Preoperative Diagnosis: pregnancy   Procedure(s):   SECTION         HPI: Mary Wray is a  43 year old female with an IUP at 36w6d complicated by a pre-eclampsia with severe features and now a failed 2 day induction of labor with ROM this am.    History and physical reviewed; no interval change.    Procedure: Procedure(s):   SECTION         Past Medical History:   Diagnosis Date     History of Graves' disease     Graves     Hypovitaminosis D      Mild depression (H)     , no meds, resolved with move     Obesity      Postablative hypothyroidism     CCRM     Premenstrual dysphoria       Past Surgical History:   Procedure Laterality Date     LEEP TX, CERVICAL      \"for HPV\"          Anesthesia Evaluation     . Pt has had prior anesthetic. Type: General    No history of anesthetic complications          ROS/MED HX    ENT/Pulmonary:      (-) tobacco use, asthma and COPD   Neurologic:      (-) CVA, TIA and Neuropathy   Cardiovascular:        (-) hypertension, CAD, irregular heartbeat/palpitations and stent   METS/Exercise Tolerance:     Hematologic:        (-) anemia   Musculoskeletal:         GI/Hepatic:        (-) GERD and liver disease   Renal/Genitourinary:      (-) renal disease   Endo:     (+) thyroid problem hypothyroidism, Obesity, .   (-) Type I DM and Type II DM   Psychiatric:         Infectious Disease:  - neg infectious disease ROS       Malignancy:         Other:                         PHYSICAL EXAM:   Mental Status/Neuro: A/A/O   Airway: Facies: Feasible  Mallampati: III  Mouth/Opening: Full  TM distance: > 6 cm  Neck ROM: Full   Respiratory: Auscultation: CTAB     Resp. Rate: Normal     Resp. Effort: Normal      CV: Rhythm: Regular  Rate: Age appropriate  Heart: Normal Sounds   Comments:      Dental: Normal                  Lab " "Results   Component Value Date    WBC 11.7 (H) 11/09/2018    HGB 12.9 11/09/2018    HCT 37.2 11/09/2018     11/09/2018    POTASSIUM 4.1 12/08/2017    CR 0.75 11/09/2018    GLC 92 12/08/2017    CHANDRA 9.0 04/29/2014    MAG 6.7 (H) 11/08/2018    ALT 28 11/09/2018    AST 25 11/09/2018    TSH 0.41 11/02/2018    T4 18.1 (H) 09/14/2018    T3 81 07/08/2017    HCG NEGATIVE 09/27/2016       Preop Vitals  BP Readings from Last 3 Encounters:   11/09/18 152/86   11/07/18 (!) 165/102   11/02/18 132/85    Pulse Readings from Last 3 Encounters:   11/08/18 79   11/07/18 54   11/02/18 60      Resp Readings from Last 3 Encounters:   11/09/18 20   04/14/18 16   04/08/18 20    SpO2 Readings from Last 3 Encounters:   11/08/18 98%   05/11/18 98%   04/14/18 100%      Temp Readings from Last 1 Encounters:   11/09/18 36.8  C (98.2  F) (Oral)    Ht Readings from Last 1 Encounters:   11/07/18 1.702 m (5' 7\")      Wt Readings from Last 1 Encounters:   11/07/18 114.8 kg (253 lb)    Estimated body mass index is 39.63 kg/(m^2) as calculated from the following:    Height as of this encounter: 1.702 m (5' 7\").    Weight as of an earlier encounter on 11/7/18: 114.8 kg (253 lb).     LDA:  Peripheral IV 11/07/18 Left Hand (Active)   Site Assessment WDL 11/9/2018  3:35 PM   Line Status Infusing 11/9/2018  3:35 PM   Phlebitis Scale 0-->no symptoms 11/9/2018  3:35 PM   Infiltration Scale 0 11/9/2018  3:35 PM   Infiltration Site Treatment Method  None 11/9/2018  3:35 PM   Number of days:2            Assessment:   ASA SCORE: 3    NPO Status: Increased aspiration risk   Documentation: H&P complete   Proceeding: Proceed without further delay  Tobacco Use:  NO Active use of Tobacco/UNKNOWN Tobacco use status     Plan:   Anes. Type:  Regional     RA Details:  Labor/OB Procedure; SS     RA-Location/Type: Spinal (TAP block for post op pain control)        Drips/Meds-Preparation: Oxytocin      Airway: Native Airway   Access/Monitoring: PIV         Logistics: " Observation/Admission     Postop Pain/Sedation Strategy:  Standard-Options: Block SS     PONV Management:  Adult Risk Factors: Female, Non-Smoker     CONSENT: Direct conversation   Plan and risks discussed with: Patient   Blood Products: Consented (ALL Blood Products)                         Mingo Herring MD

## 2018-11-10 NOTE — ANESTHESIA CARE TRANSFER NOTE
Patient: Dimple Wray    Procedure(s):   SECTION    Diagnosis: pregnancy  Diagnosis Additional Information: No value filed.    Anesthesia Type:   No value filed.     Note:  Airway :Room Air  Patient transferred to:PACU  Comments: Transferred to OB PACU without oxygen, hemodynamically stable. Upon arrival to PACU, pain is absent due to spinal, no nausea. SpO2 94% on RA.     Ac Doshi  Handoff Report: Identifed the Patient, Identified the Reponsible Provider, Reviewed the pertinent medical history, Discussed the surgical course, Reviewed Intra-OP anesthesia mangement and issues during anesthesia, Set expectations for post-procedure period and Allowed opportunity for questions and acknowledgement of understanding      Vitals: (Last set prior to Anesthesia Care Transfer)    CRNA VITALS  20186 - 20186      2018             Pulse: 65    SpO2: 98 %                Electronically Signed By: Ac Doshi MD  2018  10:53 PM

## 2018-11-10 NOTE — OP NOTE
[facility]  Full Operative Progress Note     Surgery Date:  2018  Surgeon:  Lola Otto MD  Assistants:  Mona Fernandez MD PGY-3      Pre-op Diagnosis:    1. Intrauterine pregnancy at 36w6d  2. Preeclampsia with SF  3. Failed induction of labor    Post-op Diagnosis:    1. Same   2. Liveborn female infant     Procedure:  Primary low-transverse  section with double layer uterine closure via Pfannenstiel incision    Anesthesia: spinal  EBL:  600 mL  IVF:  700 mL crystalloid  UOP:  600 mL clear urine at the end of the case  Drains: Randle Catheter   Specimens:  Placenta, cord segment  Complications:  None     Indications:   Mary Wray is a 43 year old  at 36w6d admitted for preeclampsia with severe features. She underwent IOL beginning . By the evening of  she had been on pitocin since  and ruptured since 0800 that morning. She had made very little progress, still at 2cm dilation and not feeling contractions on 30mu/min pitocin, and had been on pitocin for 22 hours. Discussed risks and benefits vs  vs waiting (in setting of PreE, infection risks) and slim likelihood of progressing through labor. The patient desired a . Consents signed.    Findings:   1. No intra-abdominal adhesions  2. minimal amniotic fluid  3. Liveborn female infant in ROP presentation. Apgars 8 at 1 minute & 9 at 5 minutes. Weight 4lbs 14 oz. Nuchal cord x1  4. Arterial cord pH 7.19, base deficit -11. Venous cord pH 7.3, base deficit -5.2.  5. Moderate uterine atony responding to pitocin, massage, and 250 mcg IM hemabate.   6. Normal uterus, fallopian tubes, and ovaries. Multiple small subserosal fibroids on posterior aspect.     Procedure Details:   The patient was brought to the OR, where adequate spinal anesthesia was administered.  She was placed in the dorsal supine position with a slight leftward tilt. She was prepped and draped in the usual sterile fashion. A  surgical time out was performed. A pfannenstiel skin incision was made with the scalpel, and carried down to the underlying fascia with sharp and blunt dissection. The fascia was incised in the midline, and the incision was extended laterally with the Escalona scissors. The superior aspect of the fascia was grasped with the Kocher clamps and dissected off of the underlying rectus muscles with blunt and sharp dissection. Attention was then turned to the inferior aspect of the fascia, which was similarly dissected off of the underlying pyramidalis muscles. The rectus muscles were  in the midline, and the peritoneum was entered bluntly, and the opening was extended with digital pressure and caughtery. The bladder blade was placed.  A transverse hysterotomy was made with the scalpel in the lower uterine segment, and the incision was extended with digital pressure. The infant was noted to be in the ROP position, and was delivered atraumatically. The shoulders delivered easily.  A nuchal cord was noted and reduced after delivery. Infant cried immediately so delayed cord clamping was done. The cord was doubly clamped and cut after 60 seconds, and the infant was handed off to the awaiting nursery staff. A segment of cord was cut and sent for gases at the request of the NICU staff. The placenta was delivered with gentle traction on the umbilical cord and uterine massage. The uterus was exteriorized and cleared of all clots and debris. Uterine tone was noted to be boggy with 30 units of pitocin given through the running IV and uterine massage so 250mcg hemabate was given.  The hysterotomy was closed with a running locked suture of 0 Monocryl.  The hysterotomy was then imbricated using an 0 Monocryl suture. The hysterotomy was noted to be hemostatic. The posterior cul-de-sac was cleared of all clots and debris. The uterus was returned to the abdomen. The pericolic gutters were cleared of all clots and debris. The  hysterotomy was reexamined and noted to be hemostatic. The fascia and rectus muscles were examined and areas of oozing were controlled with electrocautery. The fascia was closed with a running 0 PDS suture. The subcutaneous tissue was irrigated and areas of oozing were controlled with electrocautery. The subcutaneous tissue was greater than 2 cm in thickness, and was therefore closed. The skin was closed with 4-0 monocryl and covered with a sterile dressing.    All sponge, needle, and instrument counts were correct. The patient tolerated the procedure well, and was transferred to recovery in stable condition. Dr. Otto was present and scrubbed for the entirety of the procedure.     Mona Fernandez MD  OB GYN PGY-3  11/9/2018    Staff MD Note  I was present and scrubbed for the entire procedure noted above.  I agree with the description above and any necessary changes have been made by me.  Lola Otto MD  11/9/2018

## 2018-11-10 NOTE — PLAN OF CARE
Problem: Surgery Nonspecified (Adult)  Goal: Signs and Symptoms of Listed Potential Problems Will be Absent, Minimized or Managed (Surgery Nonspecified)  Signs and symptoms of listed potential problems will be absent, minimized or managed by discharge/transition of care (reference Surgery Nonspecified (Adult) CPG).   Outcome: Improving  Data: Mary Wray transferred to 7122 via cart at 2332. Baby transferred via mother arms. Randle with clear urine. TAP block done in OR.   Action: Receiving unit notified of transfer: Yes. Patient and family notified of room change. Report given to Concha BHATTI. Belongings sent to receiving unit. Accompanied by Registered Nurse. Oriented patient to surroundings. Call light within reach. ID bands double-checked with receiving RN.  Response: Patient tolerated transfer and is stable. Sister at bedside.

## 2018-11-10 NOTE — ANESTHESIA POSTPROCEDURE EVALUATION
Anesthesia POST Procedure Evaluation    Patient: Mary Wray   MRN:     [id number] Gender:   female   Age:    43 year old :      1975        Preoperative Diagnosis: pregnancy   Procedure(s):   SECTION   Postop Comments: No value filed.       Anesthesia Type:  Regional    Reportable Event: NO     PAIN: Uncomplicated   Sign Out status: Comfortable, Well controlled pain     PONV: No PONV   Sign Out status:  No Nausea or Vomiting     Neuro/Psych: Uneventful perioperative course   Sign Out Status: Preoperative baseline; Age appropriate mentation     Airway/Resp.: Uneventful perioperative course   Sign Out Status: Airway Device present     CV: Uneventful perioperative course   Sign Out status: Appropriate BP and perfusion indices; Appropriate HR/Rhythm     Disposition:   Sign Out in:  Labor and Delivery  Disposition:  Labor and Delivery  Recovery Course: Uneventful  Follow-Up: Not required           Last Anesthesia Record Vitals:  CRNA VITALS  2018 2056 - 2018 2156      2018             Pulse: 65    SpO2: 98 %          Last PACU/Preop Vitals:  Vitals:    18 2215 18 2230 18 2245   BP: 125/71 128/80 122/72   Pulse:      Resp: 16 23 12   Temp:      SpO2: 100% 98% 96%         Electronically Signed By: Mingo Herring MD, 2018, 11:23 PM

## 2018-11-10 NOTE — PROGRESS NOTES
[facility]  Magnesium Check Note    S:   Pt is doing okay. Just had 3x emesis after drinking a lot of water. Feels better from a nausea standpoint and doesn't want anti-emetics. Very sleepy from the past few days. Lochia minimal. Lee in place. Has not ambulated. Denies headaches, vision changes/spots in vision, chest pain, shortness of breath, N/V, RUQ pain. Swelling stable.    O:  Patient Vitals for the past 4 hrs:   BP Heart Rate Resp SpO2   11/10/18 0100 118/79 54 16 98 %   11/10/18 0033 118/74 54 16 98 %   11/10/18 0000 133/82 63 16 97 %   18 2330 125/88 63 15 97 %   18 2315 129/80 60 18 97 %   18 2245 122/72 55 12 96 %   18 2230 128/80 68 23 98 %   18 2215 125/71 61 16 100 %   18 2200 113/79 65 19 100 %     Gen: Well appearing, resting in bed  CV:  RRR, no murmurs  Pulm:  CTAB, no wheezes or crackles  Abd:  Soft, nontender, nondistended. Fundus not felt as pt was sitting up. Incision with a small amount of dried blood on the inferior aspect of top bandage.   Ext:  Biceps reflex 2+ b/l, 3+ pitting edema LE edema b/l to knees    I/O:  I/O last 3 completed shifts:  In: 4059.76 [P.O.:1250; I.V.:2809.76]  Out: 5995 [Urine:4700; Blood:1295]  UOP: 200 ml/hr over last 4 hours    A/P:  Mary Wray is a 43 year old  POD#1 s/p PLTCS at 36w6d for failed IOL after being induced for preeclampsia with severe features. Recovering appropriately and tolerating mag.     Preeclampsia with severe features   - BP: Normal postpartum. Prior to delivery required PO nifedipine 10mg x1, IV hydralazine 5mg x2 and 10mg x1.    - UOP: adequate diuresis. Will continue strict I/Os. Goal fluid consumption <3L   - Symptoms: denies preeclamptic symptoms    - HELLP labs nl, UPC 0.48   - Mag sulfate for seizure prophylaxis: 2g/hr, Q4H mag checks    - am HELLP labs and mag level pending    Post-op:   - recovering appropriately. Tylenol/oxy and toradol   - lee in until Mag  off    Rh+, RI, breastfeeding, did not discuss contraception    Elevated GCT, declined GTT: FBG postpartum    Hypothyroidism: continue home levothyroxine 200 mcg ord'd    Mona Fernandez PGY-3

## 2018-11-10 NOTE — PROGRESS NOTES
[facility]  Magnesium Check Note    S:   Pt is feeling better. Still sleepy but nausea resolved and in much better spirits now that she has had a nap. Lochia minimal. She has some incisional pain but has not taken anything for pain yet. Denies headaches, vision changes/spots in vision, chest pain, shortness of breath, N/V, RUQ pain. Swelling stable. UOP has decreased over the past few hours.    O:  Patient Vitals for the past 4 hrs:   BP Heart Rate Resp SpO2   11/10/18 0400 138/75 53 16 96 %     Gen: Well appearing, resting in bed  CV:  RRR, no murmurs  Pulm:  CTAB, no wheezes or crackles  Abd:  Soft, nontender, nondistended. Fundus not felt as pt was sitting up. Incision with a small amount of dried blood on the inferior aspect of top bandage.   Ext:  Biceps reflex 2+ b/l, 3+ pitting edema LE edema b/l to knees    I/O:  I/O last 3 completed shifts:  In: 3367.76 [P.O.:1150; I.V.:2217.76]  Out: 3820 [Urine:2525; Blood:1295]  UOP: 45 ml/hr over last 4 hours    Preeclampsia labs    Recent Labs  Lab 18  1202 18  1239 18  1212   CR 0.75 0.66 0.78    230 210   AST 25 25 24   ALT 28 28 25       Recent Labs  Lab 18  1239   MAG 6.7*       A/P:  Mary Wray is a 43 year old  POD#1 s/p PLTCS at 36w6d for failed IOL after being induced for preeclampsia with severe features. Recovering appropriately and tolerating mag.     Preeclampsia with severe features   - BP: Normal postpartum. Prior to delivery required PO nifedipine 10mg x1, IV hydralazine 5mg x2 and 10mg x1.    - UOP: UOP decreased in past 4 hrs. Pitocin off around 3am, could have contributed. Nausea is improved so now taking in PO, will continue to monitor. Cr and Mag level are pending    - Symptoms: denies preeclamptic symptoms    - HELLP labs nl, UPC 0.48   - Mag sulfate for seizure prophylaxis: 2g/hr, continue until  today Q4H mag checks    - am HELLP labs and mag level pending    Post-op:   -  recovering appropriately. Tylenol/oxy and toradol - hold toradol if Cr increases   - lee in until Mag off    Rh+, RI, breastfeeding, no contraception (IVF preg)    Elevated GCT, declined GTT: FBG postpartum    Hypothyroidism: continue home levothyroxine 200 mcg ord'd    Dispo: inpatient for at least 24 hrs after Mag comes off.     Mona Fernandez PGY-3  Ob/gyn      I have seen and examined the patient without the resident. I have reviewed, edited, and agree with the note.   My findings are:Appears well. Breastfeeding.  Abdomen: soft, NT, ND.   Incision Bandage clean and dry  pneumoboots in place  Lee in place  Hemoglobin   Date Value Ref Range Status   11/09/2018 12.9 11.7 - 15.7 g/dL Final   11/08/2018 12.6 11.7 - 15.7 g/dL Final     Luly Bai MD

## 2018-11-10 NOTE — PLAN OF CARE
Problem: Surgery Nonspecified (Adult)  Goal: Signs and Symptoms of Listed Potential Problems Will be Absent, Minimized or Managed (Surgery Nonspecified)  Signs and symptoms of listed potential problems will be absent, minimized or managed by discharge/transition of care (reference Surgery Nonspecified (Adult) CPG).  Outcome: Improving  OR to PACU Transfer Note  Data: Pt to OB PACU at 2129 via cart. PIV infusing NS with pitocin. Pt without complications, lee with clear urine to gravity, vitals WNL, pt does not complain of pain and/or nausea.   Interventions: IV to pump, monitors and alarms on, warm blankets, SCD on. Comfort, questions answered. TAP block done in OR  Response: stable Sister at bedsiide.  Plan: Patient instructed to notify RN for pain or nausea, routine post op cares.

## 2018-11-10 NOTE — PROVIDER NOTIFICATION
Pt on Magnesium. Notified by lab of increase from 6.7 to 7.6, pt reports dizziness and lightheadedness and feeling warm and very tired. Denies HA, visual disturbances, URQ pain. +3 edema and 1 beat clonus present. Dr Galindo notified, management of magnesium per protocol.

## 2018-11-10 NOTE — PROGRESS NOTES
[facility]  Magnesium Check Note    S:   Pt feels much better. Sitting up in chair, has been able to breast feed. Double vision resolved. Denies headaches, spots in vision, chest pain, shortness of breath, N/V, RUQ pain. Swelling stable.     O:  Patient Vitals for the past 4 hrs:   BP Temp Temp src Pulse Resp SpO2   11/10/18 1215 145/89 98  F (36.7  C) Oral 68 16 97 %     Gen: Well appearing, resting in bed  CV:  RRR, no murmurs  Pulm:  CTAB, no wheezes or crackles  Abd:  Soft, nontender, nondistended.   Ext:  Biceps reflex 2+ b/l, 3+ pitting edema LE edema b/l to knees. One beat of clonus bilaterally.     I/O:  I/O last 3 completed shifts:  In: 3367.76 [P.O.:1150; I.V.:2217.76]  Out: 3820 [Urine:2525; Blood:1295]  UOP: ~100 ml/hr over last 4 hours    Preeclampsia labs    Recent Labs  Lab 11/10/18  0840 18  1202 18  1239   CR 0.70 0.75 0.66    248 230   AST 32 25 25   ALT 26 28 28       Recent Labs  Lab 11/10/18  0840 18  1239   MAG 7.6* 6.7*       A/P:  Mary Wray is a 43 year old  POD#1 s/p PLTCS at 36w6d for failed IOL after being induced for preeclampsia with severe features. Recovering appropriately.     #Preeclampsia with severe features   - BP: Most recent BP is low mild range, 145/89, otherwise normal postpartum. Prior to delivery required PO nifedipine 10mg x1, IV hydralazine 5mg x2 and 10mg x1.    - UOP: Has improved this afternoon. Creatinine returned at 0.7 this AM, at her baseline.    - Symptoms: denies preeclamptic symptoms   - HELLP labs nl, UPC 0.48   - Mag sulfate for seizure prophylaxis: s/p 4g loading dose> 2g/hr>6.7 > 7.6. Mag discontinued this morning due to concern for toxicity, will continue to monitor patient.       - AM HELLP labs wnl      Delma Galindo MD  OB/GYN PGY-1  11/10/18 2:27 PM

## 2018-11-10 NOTE — PLAN OF CARE
Problem: Patient Care Overview  Goal: Plan of Care/Patient Progress Review  Outcome: Improving  Patient vital signs and assessment findings normal. Pressures stable, moderate dependent swelling, 1 beat clonus, denies symptoms. Mag running with LR at this time. Denies pain medication at this time. Experiencing nausea, two emesis occurrences. Denies antiemetic medication. Heat applied to abdomen for nausea and pain.  Patient lee catheter in place patient continued on bedrest. Patient requires staff assistance to breastfeed but able to obtain good latch. Patient output 275mL over shift, running low. Patient fasting blood sugar 96.

## 2018-11-10 NOTE — PROGRESS NOTES
The patient has been undergoing IOL for preeclampsia with SF since . Has been on pitocin since  and ruptured since 0800 this morning. She has made very little progress and not feeling contractions on 30mu/min pitocin. Discussed risks and benefits vs  vs waiting (in setting of PreE, infection risks) and likelihood of progressing through labor. The patient would like a . Consents signed. Pitocin stopped. Ancef/azithro ordered. Anesthesia aware.     Mona Fernandez PGY-3  Ob/gyn     [facility] Staff Note:  I examined Mary Wray on 2018 with Dr. Fernandez and agree with the presentation, exam and plan of care documented in this note with edits by me. On call to OR  Lola Otto MD   2018

## 2018-11-10 NOTE — ANESTHESIA PROCEDURE NOTES
Peripheral Nerve Block Procedure Note    Staff:     Anesthesiologist:  GISEL BARBOSA    Resident/CRNA:  ARAMIS HEARD    Block performed by resident/CRNA in the presence of a teaching physician    Location: OR AFTER induction  Procedure details:     ASA:  2 and Emergent      Patient identified, IV checked, risks and benefits discussed, informed consent, monitors and equipment checked, pre-op evaluation, at physician/surgeon's request and post-op pain management      Correct Patient: Yes      Correct Position: Yes      Correct Site: Yes      Correct Procedure: Yes      Correct Laterality:  N/A    Site Marked:  N/A  Procedure details:     Procedure:  TAP    Diagnosis:  Post-op analgesia    Laterality:  Bilateral    Position:  Supine    Sterile Prep: chloraprep, mask and sterile gloves      Local skin infiltration:  None    Needle:  Pajunk    Needle gauge:  21    Needle length (mm):  110    Ultrasound: Yes      Ultrasound used to identify targeted nerve, plexus, or vascular structure and placed a needle adjacent to it      Permanent Image entered into patiient's record      Abnormal pain on injection: No      Blood Aspirated: No      Paresthesias:  No    Bleeding at site: No      Bolus via:  Needle    Infusion Method:  Single Shot    Complications:  None  Assessment/Narrative:      10mL 0.25% bupivacaine with 1:200k epi and 10mL Exparel diluted in 10mL NS injected into each site

## 2018-11-10 NOTE — DISCHARGE SUMMARY
"[facility] Discharge Summary    Mary Wray MRN# [id number]   Age: 43 year old YOB: 1975     Date of Admission:  2018  Date of Discharge:  18  Admitting Physician:  Lola Otto MD  Discharge Physician:  Erica Kruger MD     Admit Dx:   - IUP at 36w4d   - preeclampsia with severe features  - Elevated GCT  - Post-ablative hypothyroidism   - IVF pregnancy  - AMA  - GBS bacteruria  - Fibroid uterus   - NIPT +T21, normal amniocentesis     Discharge Dx:  - failed induction of labor  - Same as above, s/p primary low transverse  section at 36w6d   - acute on chronic blood loss anemia    Procedures:  - primary low transverse  section with double layer uterine closure via Pfannenstiel incision  - Spinal anesthesia    Admit HPI and hospital course:  \"Ms. Mary Wray is a 43 year old  at 36w4d by 8w5d US, who presents from clinic with elevated blood pressures.  She denies contractions, vaginal bleeding, and loss of fluid.   + normal fetal movement. Denies headaches, changes in vision, RUQ pain. Has had some ankle swelling over the past two weeks that increased over the weekend but not significantly\"    Please see her admit H&P for full details of her PMH, PSH, Meds, Allergies and exam on admit.    She underwent IOL beginning . By the evening of  she had been on pitocin since  and ruptured since 0800 that morning. She has made very little progress, still at 2cm dilation and not feeling contractions on 30mu/min pitocin. Discussed risks and benefits vs  vs waiting (in setting of PreE, infection risks) and likelihood of progressing through labor. The patient desired a . Consents signed. She had been on magnesium during this time and required multiple doses of IV anti-hypertensives throughout her stay. Her labs were normal.     Operative Course:  Surgery was uncomplicated. QBL from the delivery was 1295cc. Please " see her  Section Operative Note for full details regarding her delivery.    Operative Findings:   1. No intra-abdominal adhesions  2. minimal amniotic fluid  3. Liveborn female infant in ROP presentation. Apgars 8 at 1 minute & 9 at 5 minutes. Weight 2200g. Nuchal cord x1  4. Arterial cord pH 7.19, base deficit -11. Venous cord pH 7.3, base deficit -5.2.  5. Moderate uterine atony responding to pitocin, massage, and 250 mcg IM hemabate.   6. Normal uterus, fallopian tubes, and ovaries. Multiple small subserosal fibroids on posterior aspect.        Postoperative Course:    Post-operatively she was continued on her Magnesium for 14 hours. Magnesium was discontinued early because she was symptomatic and had significantly decreased urine output. On POD#2 she had some severe range blood pressures.  She was started on Procardia XL 30mg on POD#1 and titrated up to Procardia XL 60mg daily by day of discharge  Her labs remained normal. Her blood pressures were normotensive to mild range by day of discharge.     On POD#5, she was meeting all of her postpartum goals and deemed stable for discharge. She was voiding without difficulty, tolerating a regular diet without nausea and vomiting, her pain was well controlled on oral pain medicines and her lochia was appropriate. Her hemoglobin prior to delivery was 12.9 and after delivery was 9.6. Her Rh status was positive and Rhogam was not indicated.    Discharge Medications:     Review of your medicines      START taking       Dose / Directions    acetaminophen 325 MG tablet   Commonly known as:  TYLENOL        Dose:  650 mg   Take 2 tablets (650 mg) by mouth every 4 hours as needed for mild pain or fever   Quantity:  100 tablet   Refills:  0       ferrous sulfate 325 (65 Fe) MG tablet   Commonly known as:  IRON        Dose:  325 mg   Take 1 tablet (325 mg) by mouth daily (with breakfast)   Quantity:  90 tablet   Refills:  0       ibuprofen 600 MG tablet   Commonly known  as:  ADVIL/MOTRIN        Dose:  600 mg   Take 1 tablet (600 mg) by mouth every 6 hours as needed for other (cramping)   Quantity:  120 tablet   Refills:  0       NIFEdipine ER 60 MG 24 hr tablet   Commonly known as:  ADALAT CC        Dose:  60 mg   Take 1 tablet (60 mg) by mouth daily   Quantity:  30 tablet   Refills:  1       oxyCODONE IR 5 MG tablet   Commonly known as:  ROXICODONE        Dose:  5 mg   Take 1 tablet (5 mg) by mouth every 6 hours as needed for severe pain   Quantity:  6 tablet   Refills:  0       senna-docusate 8.6-50 MG per tablet   Commonly known as:  SENOKOT-S;PERICOLACE        Dose:  1 tablet   Take 1 tablet by mouth 2 times daily as needed for constipation   Quantity:  100 tablet   Refills:  0         CONTINUE these medicines which have NOT CHANGED       Dose / Directions    blood glucose monitoring lancets   Used for:  Pre-diabetes        Use to test blood sugar 3-4 times daily or as directed.  Ok to substitute alternative if insurance prefers.   Quantity:  100 each   Refills:  3       blood glucose monitoring meter device kit   Used for:  Pre-diabetes        Use to test blood sugar 3-4 times daily or as directed.  Ok to substitute alternative if insurance prefers.   Quantity:  1 kit   Refills:  0       blood glucose monitoring test strip   Commonly known as:  ACCU-CHEK CYNTHIA   Used for:  Pre-diabetes        Use to test blood sugars 3-4 times daily as directed.  OK to substitute per formulary   Quantity:  100 strip   Refills:  PRN       * breast pump Misc   Used for:  Postpartum care and examination of lactating mother        Dose:  1 each   1 each as needed (as needed)   Quantity:  1 each   Refills:  0       * breast pump Misc   Used for:  Prenatal care, third trimester        Dose:  1 each   1 each as needed (prn)   Quantity:  1 each   Refills:  0       omega 3 1000 MG Caps        Refills:  0       PRENATAL VITAMIN PO        Refills:  0       SYNTHROID 200 MCG tablet   Used for:   Postablative hypothyroidism   Generic drug:  levothyroxine        Dose:  200 mcg   Take 1 tablet (200 mcg) by mouth daily   Quantity:  90 tablet   Refills:  3       VITAMIN D (CHOLECALCIFEROL) PO        Take by mouth daily   Refills:  0       * Notice:  This list has 2 medication(s) that are the same as other medications prescribed for you. Read the directions carefully, and ask your doctor or other care provider to review them with you.      STOP taking          aspirin 81 MG EC tablet                Where to get your medicines      These medications were sent to [place], [place] - [address]  [address] [place] [address], [facility] [address]     Phone:  [phone]      acetaminophen 325 MG tablet     ferrous sulfate 325 (65 Fe) MG tablet     ibuprofen 600 MG tablet     NIFEdipine ER 60 MG 24 hr tablet     senna-docusate 8.6-50 MG per tablet         Some of these will need a paper prescription and others can be bought over the counter. Ask your nurse if you have questions.     Bring a paper prescription for each of these medications      oxyCODONE IR 5 MG tablet                 Discharge/Disposition:  Mary Wray was discharged to home in stable condition with the following instructions/medications:  1) Call for temperature > 100.4, bright red vaginal bleeding >1 pad an hour x 2 hours, foul smelling vaginal discharge, pain not controlled by usual oral pain meds, persistent nausea and vomiting not controlled on medications, drainage or redness from incision site  2) She desired abstinence for contraception.  3) For feeding she decided to breastfeed.  4) She was instructed to follow-up with her primary OB in 6 weeks for a routine postpartum visit and a blood pressure check with Dr. Bai on 11/16. .  5) Discharge activity:  No heavy lifting >15 lbs or strenuous activity for 6 weeks, pelvic rest for 6 weeks, no driving or operating machinery while on narcotics.    Cookie Francisco  MD  OB/Gyn PGY-2  11/14/2018      [facility] staff:  Appreciate note by Dr. Francisco.  I have seen and examined the patient without the resident. I have reviewed, edited, and agree with the note.      Erica Kruger MD, FACOG

## 2018-11-10 NOTE — ANESTHESIA PROCEDURE NOTES
Spinal/LP Procedure Note    Spinal Block  Staff:     Anesthesiologist:  GISEL BARBOSA    Resident/CRNA:  ARAMIS HEARD    Spinal/LP performed by resident/CRNA in presence of a teaching physician.    Location: OR  Procedure Start/Stop Times:      11/9/2018 7:47 PM     11/9/2018 7:53 PM    patient identified, IV checked, site marked, risks and benefits discussed, informed consent, monitors and equipment checked, pre-op evaluation, at physician/surgeon's request and post-op pain management      Correct Patient: Yes      Correct Position: Yes      Correct Site: Yes      Correct Procedure: Yes      Correct Laterality:  N/A    Site Marked:  N/A  Procedure:     Procedure:  Intrathecal    ASA:  3 and Emergent    Diagnosis:  Operative anesthesia    Position:  Sitting    Sterile Prep: chloraprep, mask, sterile gloves and patient draped      Insertion site:  L2-3    Approach:  Midline    Needle Type:  Claudia    Needle gauge (G):  25    Local Skin Infiltration:  1% lidocaine    amount (ml):  3    Needle Length (in):  3.5    Introducer used: Yes      Introducer gauge:  20 G    Attempts:  1    Redirects:  1    CSF:  Clear    Paresthesias:  No  Assessment/Narrative:     Sensory Level:  T4

## 2018-11-10 NOTE — LACTATION NOTE
This note was copied from a baby's chart.  Consult for first time breastfeeding and late  infant.    Mary is a 43 year old  who delivered her baby girl at 36.6 weeks via  on 18 at 2017. She has a history of IVF/IUI, AMA, elevated BMI, hypothyroid, vitamin d deficiency and depression. Her pregnancy was complicated by preeclampsia and she is currently on Magnesium Sulfate.    Mary has been breastfeeding with RN assistance and has been attempting hand expression of colostrum after feedings. She has expressed 1-2 ml overnight. She is not using a nipple shield.     Baby has been sleepy (late , < 24 hours old), but has been able to latch and sustain latch. Baby had a low temp x1 this morning, but her blood sugars have been stable     Recent Labs  Lab 11/10/18  0611 11/10/18  0349 11/10/18  0230 18  2215   BGM 59 36* 44 40     Baby last fed at 0830 this morning and was not showing feeding cues despite efforts to wake baby. Baby was disinterested in sucking. Baby was put skin to skin with Dimple. The plan is for the resource RN to check back in 10-15 minutes to re-attempt latch and supplement with donor milk.     Reviewed: early feeding cues, potential feeding challenges of late  infants, benefits of using a nipple shield until infant would be term gestation,benefits of breast massage and hand expression of colostrum, hand expression and pumping recommendations due to infant prematurity, supplementation guidelines for late  infant,   weight loss, benefits of skin to skin, the Second Night,  donor milk, inpatient lactation support and outpatient lactation follow up recommendations (frequent follow up to monitor weight gain, milk supply and weaning from the nipple shield).  Supplement Guidelines for infants <38 weeks gestation or < 6 lbs at birth per the [facility] Fe[facility] Methods for the  Infant (35-42 weeks) Policy ([facility]  Guidelines):  Infants <38 weeks OR <6 pounds   Birth-24 hours of age: 5ml (1 tsp) every 2 - 3 hours, at least 8 times in 24 hours   24-48 hours of age: 10 ml (2 tsp) every 2 - 3 hours, at least 8 times in 24 hours   48-72 hours of age: 15 ml (3 tsp) every 2 - 3 hours, at least 8 times in 24 hours  Plan: Continue breastfeeding  every 2-3 hours with RN support as needed with a goal of 8-12 feedings per day. Encourage frequent skin to skin. At this time, [facility] declines to use a nipple shield.  Encourage hand expression and/or pumping after every feeding due to infant prematurity (pumping goal of at least 6 times per day) and [facility]'s risk factors for delayed/low milk supply (, AMA, hypothyroid). [facility] signed the donor milk agreement and would like to supplement per our guidelines. Continue supplementing with expressed milk and donor milk  per supplement guidelines.

## 2018-11-11 LAB
ALT SERPL W P-5'-P-CCNC: 56 U/L (ref 0–50)
AST SERPL W P-5'-P-CCNC: 61 U/L (ref 0–45)
CREAT SERPL-MCNC: 0.84 MG/DL (ref 0.52–1.04)
ERYTHROCYTE [DISTWIDTH] IN BLOOD BY AUTOMATED COUNT: 13.4 % (ref 10–15)
GFR SERPL CREATININE-BSD FRML MDRD: 74 ML/MIN/1.7M2
HCT VFR BLD AUTO: 28.4 % (ref 35–47)
HGB BLD-MCNC: 9.6 G/DL (ref 11.7–15.7)
MCH RBC QN AUTO: 28.9 PG (ref 26.5–33)
MCHC RBC AUTO-ENTMCNC: 33.8 G/DL (ref 31.5–36.5)
MCV RBC AUTO: 86 FL (ref 78–100)
PLATELET # BLD AUTO: 206 10E9/L (ref 150–450)
RBC # BLD AUTO: 3.32 10E12/L (ref 3.8–5.2)
WBC # BLD AUTO: 8.7 10E9/L (ref 4–11)

## 2018-11-11 PROCEDURE — 36415 COLL VENOUS BLD VENIPUNCTURE: CPT | Performed by: STUDENT IN AN ORGANIZED HEALTH CARE EDUCATION/TRAINING PROGRAM

## 2018-11-11 PROCEDURE — 12000032 ZZH R&B OB CRITICAL UMMC

## 2018-11-11 PROCEDURE — 84450 TRANSFERASE (AST) (SGOT): CPT | Performed by: STUDENT IN AN ORGANIZED HEALTH CARE EDUCATION/TRAINING PROGRAM

## 2018-11-11 PROCEDURE — 84460 ALANINE AMINO (ALT) (SGPT): CPT | Performed by: STUDENT IN AN ORGANIZED HEALTH CARE EDUCATION/TRAINING PROGRAM

## 2018-11-11 PROCEDURE — 25000132 ZZH RX MED GY IP 250 OP 250 PS 637: Performed by: STUDENT IN AN ORGANIZED HEALTH CARE EDUCATION/TRAINING PROGRAM

## 2018-11-11 PROCEDURE — 85027 COMPLETE CBC AUTOMATED: CPT | Performed by: STUDENT IN AN ORGANIZED HEALTH CARE EDUCATION/TRAINING PROGRAM

## 2018-11-11 PROCEDURE — 82565 ASSAY OF CREATININE: CPT | Performed by: STUDENT IN AN ORGANIZED HEALTH CARE EDUCATION/TRAINING PROGRAM

## 2018-11-11 PROCEDURE — 25000128 H RX IP 250 OP 636: Performed by: STUDENT IN AN ORGANIZED HEALTH CARE EDUCATION/TRAINING PROGRAM

## 2018-11-11 PROCEDURE — 25000128 H RX IP 250 OP 636: Performed by: OBSTETRICS & GYNECOLOGY

## 2018-11-11 RX ORDER — NIFEDIPINE 30 MG/1
60 TABLET, EXTENDED RELEASE ORAL DAILY
Status: DISCONTINUED | OUTPATIENT
Start: 2018-11-12 | End: 2018-11-14 | Stop reason: HOSPADM

## 2018-11-11 RX ORDER — FUROSEMIDE 10 MG/ML
20 INJECTION INTRAMUSCULAR; INTRAVENOUS ONCE
Status: COMPLETED | OUTPATIENT
Start: 2018-11-11 | End: 2018-11-11

## 2018-11-11 RX ORDER — NIFEDIPINE 30 MG/1
30 TABLET, EXTENDED RELEASE ORAL DAILY
Status: DISCONTINUED | OUTPATIENT
Start: 2018-11-11 | End: 2018-11-11

## 2018-11-11 RX ORDER — NIFEDIPINE 30 MG/1
30 TABLET, EXTENDED RELEASE ORAL ONCE
Status: COMPLETED | OUTPATIENT
Start: 2018-11-11 | End: 2018-11-11

## 2018-11-11 RX ADMIN — IBUPROFEN 600 MG: 600 TABLET ORAL at 02:05

## 2018-11-11 RX ADMIN — LEVOTHYROXINE SODIUM 200 MCG: 50 TABLET ORAL at 09:54

## 2018-11-11 RX ADMIN — NIFEDIPINE 30 MG: 30 TABLET, FILM COATED, EXTENDED RELEASE ORAL at 13:55

## 2018-11-11 RX ADMIN — ACETAMINOPHEN 975 MG: 325 TABLET, FILM COATED ORAL at 09:53

## 2018-11-11 RX ADMIN — IBUPROFEN 600 MG: 600 TABLET ORAL at 07:54

## 2018-11-11 RX ADMIN — HYDRALAZINE HYDROCHLORIDE 5 MG: 20 INJECTION INTRAMUSCULAR; INTRAVENOUS at 12:15

## 2018-11-11 RX ADMIN — FUROSEMIDE 20 MG: 10 INJECTION, SOLUTION INTRAMUSCULAR; INTRAVENOUS at 08:28

## 2018-11-11 RX ADMIN — IBUPROFEN 600 MG: 600 TABLET ORAL at 20:12

## 2018-11-11 RX ADMIN — IBUPROFEN 600 MG: 600 TABLET ORAL at 13:48

## 2018-11-11 RX ADMIN — NIFEDIPINE 30 MG: 30 TABLET, FILM COATED, EXTENDED RELEASE ORAL at 18:24

## 2018-11-11 RX ADMIN — ACETAMINOPHEN 975 MG: 325 TABLET, FILM COATED ORAL at 18:24

## 2018-11-11 RX ADMIN — HYDRALAZINE HYDROCHLORIDE: 20 INJECTION INTRAMUSCULAR; INTRAVENOUS at 14:10

## 2018-11-11 RX ADMIN — ACETAMINOPHEN 975 MG: 325 TABLET, FILM COATED ORAL at 02:06

## 2018-11-11 NOTE — PROGRESS NOTES
[facility]  Postpartum Progress Note    S:   Pt is feeling well this morning. Baby doing okay, under leticia lights today. Has significant LE edema, causing discomfort. Denies HA, vision changes, N/V, abdominal pain, SOB, chest pain. Tolerating regular diet. Passing gas, feels like she may have BM today. Lochia minimal. Voiding without issue. Pain controlled with PO pain medications. She has some incisional pain but has not taken anything for pain yet. Pumping.     O:  Vitals:    11/10/18 1800 11/10/18 2027 11/10/18 2345 18 0632   BP:  142/81 144/84    Pulse:  61     Resp:  16 16    Temp:  98.3  F (36.8  C) 98.4  F (36.9  C)    TempSrc:  Oral Oral    SpO2:       Weight: 117.9 kg (259 lb 14.8 oz)   116.1 kg (255 lb 15.3 oz)   Height:         Wt: 253lbs ()>259lbs (11/10)> 255lbs ()    Gen: Well appearing, resting in bed  CV:  Regular rate  Pulm:  Breathing comfortably on RA  Abd:  Soft, nontender, nondistended. Fundus firm 2cm below umbilicus. Incision C/D/I with overlying bandage in place.   Ext:  3+ pitting edema LE edema b/l to knees    I/O:  I/O last 3 completed shifts:  In: 4675 [P.O.:4300; I.V.:375]  Out: 3675 [Urine:3675]  UOP: 200 ml/hr over last 3 hours    Preeclampsia labs    Recent Labs  Lab 11/10/18  0840 18  1202 18  1239   CR 0.70 0.75 0.66    248 230   AST 32 25 25   ALT 26 28 28       Recent Labs  Lab 11/10/18  0840 18  1239   MAG 7.6* 6.7*       A/P:  Mary Wray is a 43 year old  POD#2 s/p PLTCS at 36w6d for failed IOL after being induced for preeclampsia with severe features.     Preeclampsia with severe features   - BP:  Prior to delivery required PO nifedipine 10mg x1, IV hydralazine 5mg x2 and 10mg x1.  Discussed possibility of starting oral antihypertensive if BPs increase   - UOP: Adequate. Will give IV lasix x 1 today for LE edema.    - Symptoms: denies preeclamptic symptoms    - HELLP labs nl, UPC 0.48   - Magnesium  for 14 hours postpartum, MD'ed due to mag level 7.6 and symptomatic.        Post-op:   - recovering appropriately. Tylenol/oxy and ibuprofen   - s/p lee    - regular diet    Rh+, RI, breastfeeding, no contraception (IVF preg)    Elevated GCT, declined GTT: FBG postpartum    Hypothyroidism: continue home levothyroxine 200 mcg ord'd    Dispo: Discharge PPD#3-4     Lucille Maradiaga MD, [facility]  Ob/Gyn PGY3  11/11/18         [facility] staff:    Appreciate note by Dr. Maradiaga.  I have seen and examined the patient without the resident. I have reviewed, edited, and agree with the note.    My findings are:  3+ BLE pitting edema, agree with Lasix prn  May need oral medication for BP control. S/p 14 hours of magnesium sulfate postpartum. Currently asymptomatic, will continue to watch pressures and start medication as needed (likely procardia given low HR).    Lola Otto MD  11/11/2018

## 2018-11-11 NOTE — PROVIDER NOTIFICATION
11/11/18 1449   Provider Notification   Provider Name/Title Messelt G1   Method of Notification Electronic Page   Request Evaluate-Remote   Notification Reason Vital Signs Change;Status Update   BPs after 2nd 5mg hydralazine were 141/89, 155/87, 154/85. Not in target range. Would you like me to give another 5 or give 10 or continue to monitor q10m w/o IV med admin?

## 2018-11-11 NOTE — PROVIDER NOTIFICATION
11/11/18 0814   Provider Notification   Provider Name/Title Messlet g1   Method of Notification Electronic Page   Request Evaluate-Remote   Notification Reason Other   Did you mean to order IV Lasix? I have always given oral tablets. 4ml od IV Lasix just got here and wanted to double check prior to admin. Thanks!

## 2018-11-11 NOTE — PROVIDER NOTIFICATION
11/11/18 1405   Provider Notification   Provider Name/Title Mateo G1    Method of Notification Electronic Page   Request Evaluate in Person   Notification Reason Status Update;Vital Signs Change   I will give another dose 5mg hydralazine. last BP was 160/90.

## 2018-11-11 NOTE — PROVIDER NOTIFICATION
11/11/18 1321   Provider Notification   Provider Name/Title Mateo HODGES   Method of Notification Electronic Page   Request Evaluate-Remote   Notification Reason Lab Results  (ALT and AST slightly elevated.)

## 2018-11-11 NOTE — PLAN OF CARE
Problem: Patient Care Overview  Goal: Plan of Care/Patient Progress Review  Outcome: No Change  Patient' BPs were elevated this shift to severe range (>160/110). Two total doses of IV hydralazine (5mg each) administered this shift. 30mg of oral procardia administered this afternoon- plan is to take this pill daily. Continuing to monitor BPs every 10 minutes. Stat labs ordered, ALT and AST elevated. Patient reports no visual disturbances, headache, or epigastric pain. Presents with +3 pitting edema in lower extremities up to hip bilaterally. Reflexes normal, no clonus present. Other vital signs stable. Incision has bruising on top and bottom, steri strips in place. Lochia scant and WDL. Adequate fluid intake and urine output. Reports right-sided incisional pain (describes it as burning)- taking tylenol and ibuprofen, declined roxicodone. Bonding well with infant. Infant breastfeeds every 2-3 hours with nipple shield, missed a breastfeeding session this afternoon due to patient's elevated BPs (infant was finger-fed donor milk by resource RN for that feeding). Skin-to-skin done this shift.

## 2018-11-11 NOTE — PROGRESS NOTES
Brief progress note:  Notified by RN of sustained SR BP, 165/87 and 166/94, given 5 mg IV hydralazine. Patient denies HA, visual changes, RUQ or epigastric pain, SOB, or chest pain.   Patient Vitals for the past 4 hrs:   BP Temp Temp src Pulse Resp   11/11/18 1250 134/74 - - 53 -   11/11/18 1240 160/89 - - 58 -   11/11/18 1230 145/85 - - 61 -   11/11/18 1227 154/87 - - - -   11/11/18 1200 (!) 166/94 - - 60 -   11/11/18 1145 165/87 98.8  F (37.1  C) Oral 60 17     Gen: Patient resting comfortably in bed.   CV: RRR  Resp: CTA, no crackles, no respiratory distress    Ext: 3+ edema to thighs. Reflexes 2+ at biceps. No clonus.       Intake/Output Summary (Last 24 hours) at 11/11/18 1259  Last data filed at 11/11/18 1130   Gross per 24 hour   Intake             4625 ml   Output             5550 ml   Net             -925 ml   UOP over past 4 hours 300-750/hr. She received IV lasix x1 this AM for edema.     A/P:  Pre-eclampsia w/ SF, s/p mag.   Sustained SR BP, given IV hydral 5 mg x1 with improvement in BP.   Will recheck HELLP labs, continue to monitor BP, start procardia XL 30 mg daily.   Consider repeat dosing of     Delma Galindo MD PGY-1   Department of OB/GYN  11/11/2018 12:54 PM

## 2018-11-11 NOTE — PLAN OF CARE
Problem: Patient Care Overview  Goal: Plan of Care/Patient Progress Review  Outcome: Improving  Data: Vital signs within normal limits. Edema 3+ in lower extremities - elevated overnight and encouraged to continue drinking water and walking. Postpartum checks within normal limits - see flow record. Patient eating and drinking normally. Patient able to empty bladder independently and is up ambulating. Incision healing well and open to air, steri strips have scant dried blood on them. Patient performing self cares and is able to care for infant.  Action: Patient medicated during the shift for incisional soreness.   Response: Positive attachment behaviors observed with infant.   Will continue to monitor and provide support.

## 2018-11-11 NOTE — PROVIDER NOTIFICATION
11/11/18 1451   Provider Notification   Provider Name/Title Mateo G1   Method of Notification Phone   Request Evaluate-Remote   Notification Reason Other   Writer spoke with Dr. Galindo on the phone. Ordered to hold off on any more hydralazine unless BPs >160/110.

## 2018-11-11 NOTE — PLAN OF CARE
Problem: Patient Care Overview  Goal: Plan of Care/Patient Progress Review  Outcome: Improving  Diuresing well, and moving around in room and hallway now that she is saline locked and lee is out. Using tylenol and ibuprofen regularly, and is aware that oxicodone is available. She states she is starting to feel a bit of burning at incision site. Showered, beginning to take over care of self and baby. Blood pressures in 140's/80's range, denies symptoms except for edema. Reflexes are normal, clonus remains at one beat bilaterally. Passing flatus but no BM yet. Bonding well with infant, and learning how to latch her and use SNS to supplement with donor milk.

## 2018-11-11 NOTE — PROVIDER NOTIFICATION
11/11/18 1216   Provider Notification   Provider Name/Title Mateo VILLAREAL    Method of Notification Electronic Page   Notification Reason Vital Signs Change  (/87 then 166/94. Giving 5mg IV Hydralazine now.)

## 2018-11-12 LAB
ALT SERPL W P-5'-P-CCNC: 59 U/L (ref 0–50)
AST SERPL W P-5'-P-CCNC: 58 U/L (ref 0–45)
CREAT SERPL-MCNC: 0.8 MG/DL (ref 0.52–1.04)
ERYTHROCYTE [DISTWIDTH] IN BLOOD BY AUTOMATED COUNT: 13.7 % (ref 10–15)
GFR SERPL CREATININE-BSD FRML MDRD: 78 ML/MIN/1.7M2
HCT VFR BLD AUTO: 31.1 % (ref 35–47)
HGB BLD-MCNC: 10.5 G/DL (ref 11.7–15.7)
MCH RBC QN AUTO: 29.2 PG (ref 26.5–33)
MCHC RBC AUTO-ENTMCNC: 33.8 G/DL (ref 31.5–36.5)
MCV RBC AUTO: 86 FL (ref 78–100)
PLATELET # BLD AUTO: 228 10E9/L (ref 150–450)
RBC # BLD AUTO: 3.6 10E12/L (ref 3.8–5.2)
WBC # BLD AUTO: 8.2 10E9/L (ref 4–11)

## 2018-11-12 PROCEDURE — 84450 TRANSFERASE (AST) (SGOT): CPT | Performed by: STUDENT IN AN ORGANIZED HEALTH CARE EDUCATION/TRAINING PROGRAM

## 2018-11-12 PROCEDURE — 25000132 ZZH RX MED GY IP 250 OP 250 PS 637: Performed by: STUDENT IN AN ORGANIZED HEALTH CARE EDUCATION/TRAINING PROGRAM

## 2018-11-12 PROCEDURE — 40000893 ZZH STATISTIC PT IP EVAL DEFER

## 2018-11-12 PROCEDURE — 12000030 ZZH R&B OB INTERMEDIATE UMMC

## 2018-11-12 PROCEDURE — 84460 ALANINE AMINO (ALT) (SGPT): CPT | Performed by: STUDENT IN AN ORGANIZED HEALTH CARE EDUCATION/TRAINING PROGRAM

## 2018-11-12 PROCEDURE — 85027 COMPLETE CBC AUTOMATED: CPT | Performed by: STUDENT IN AN ORGANIZED HEALTH CARE EDUCATION/TRAINING PROGRAM

## 2018-11-12 PROCEDURE — 82565 ASSAY OF CREATININE: CPT | Performed by: STUDENT IN AN ORGANIZED HEALTH CARE EDUCATION/TRAINING PROGRAM

## 2018-11-12 PROCEDURE — 36415 COLL VENOUS BLD VENIPUNCTURE: CPT | Performed by: STUDENT IN AN ORGANIZED HEALTH CARE EDUCATION/TRAINING PROGRAM

## 2018-11-12 RX ORDER — OXYCODONE HYDROCHLORIDE 5 MG/1
5 TABLET ORAL EVERY 6 HOURS PRN
Qty: 6 TABLET | Refills: 0 | Status: SHIPPED | OUTPATIENT
Start: 2018-11-12 | End: 2018-12-13

## 2018-11-12 RX ORDER — FERROUS SULFATE 325(65) MG
325 TABLET ORAL
Qty: 90 TABLET | Refills: 0 | Status: SHIPPED | OUTPATIENT
Start: 2018-11-12 | End: 2018-12-13

## 2018-11-12 RX ADMIN — IBUPROFEN 600 MG: 600 TABLET ORAL at 23:44

## 2018-11-12 RX ADMIN — LEVOTHYROXINE SODIUM 200 MCG: 50 TABLET ORAL at 10:15

## 2018-11-12 RX ADMIN — SENNOSIDES AND DOCUSATE SODIUM 2 TABLET: 8.6; 5 TABLET ORAL at 20:18

## 2018-11-12 RX ADMIN — IBUPROFEN 600 MG: 600 TABLET ORAL at 02:48

## 2018-11-12 RX ADMIN — ACETAMINOPHEN 975 MG: 325 TABLET, FILM COATED ORAL at 11:21

## 2018-11-12 RX ADMIN — ACETAMINOPHEN 975 MG: 325 TABLET, FILM COATED ORAL at 02:48

## 2018-11-12 RX ADMIN — ACETAMINOPHEN 975 MG: 325 TABLET, FILM COATED ORAL at 20:18

## 2018-11-12 RX ADMIN — IBUPROFEN 600 MG: 600 TABLET ORAL at 09:01

## 2018-11-12 RX ADMIN — SENNOSIDES AND DOCUSATE SODIUM 1 TABLET: 8.6; 5 TABLET ORAL at 08:12

## 2018-11-12 RX ADMIN — NIFEDIPINE 60 MG: 30 TABLET, FILM COATED, EXTENDED RELEASE ORAL at 08:22

## 2018-11-12 RX ADMIN — IBUPROFEN 600 MG: 600 TABLET ORAL at 15:05

## 2018-11-12 NOTE — PLAN OF CARE
Problem: Patient Care Overview  Goal: Plan of Care/Patient Progress Review  Outcome: Improving  Data: Vital signs within normal limits. Continued fluid restriction overnight, only ice chips. Postpartum checks within normal limits - see flow record. Edema is improving to 3+, urinating frequently overnight, no weeping from edema and skin is loosening up. Patient able to empty bladder independently and is up ambulating. No apparent signs of infection. Incision healing well and bruising noted above incision. Patient performing self cares and is able to care for infant.  Action: Patient medicated during the shift for generalized soreness, overall feeling okay states patient.   Response: Positive attachment behaviors observed with infant.   Will continue to monitor and provide support.

## 2018-11-12 NOTE — PROVIDER NOTIFICATION
Called G2 Schumer and notified her about Pt having fluid leaking from her very edematous legs. Plan she gave me will be to limit fluid intake to 3000 mls, ambulate, elevate lower extremities when in bed, and use compression stockings. Postpartum nurse carefully watching pt for any changes with vitals. No difficulty with respirations have been noticed.      11/11/18 2037   Provider Notification   Provider Name/Title G2 Schumer   Method of Notification Phone   Request Evaluate-Remote   Notification Reason Status Update  (Informed her about pt's edema change.)

## 2018-11-12 NOTE — PLAN OF CARE
Problem: Patient Care Overview  Goal: Plan of Care/Patient Progress Review  Outcome: Improving  Pt has elevated blood pressure of 155/99 this morning and was given 60 mg of procardia. The resident and the attending rounding were ok with pt having the medication, since the systolic was above the parameters of >150/100. Postpartum assessment within normal limits. There is bruised spot in the middle between the incision.  Pt is up ambulating in the room and on the hallway. Denies pain or any symptoms of preeclampsia. Pt is pumping and getting up to 6 ml. Breastfeeding with minimal assist latching baby deeper and SNS at the breast. Nipples are in tact. Pt medicated with Ibuprofen and Tylenol for pain control. Assisted pt with breastfeeding. Encouraged pt to pump after breastfeeding baby and to feed baby on cues/on demand. Continue to monitor blood pressures and assist with breastfeeding as needed.

## 2018-11-12 NOTE — PROGRESS NOTES
[facility]  Postpartum Progress Note    S:   Pt is feeling well this morning. Has significant LE edema last night which is improved this morning. Denies HA, vision changes, N/V, abdominal pain, SOB, chest pain. Tolerating regular diet. Passing gas, and has had a BM today. Lochia minimal. Voiding without issue. Pain controlled with PO pain medications. She has some incisional pain but has not taken anything for pain yet. Breastfeeding.      O:  Vitals:    18 2011 18 2130 18 0000 18 0400   BP: (!) 147/91  154/83 121/89   Pulse:       Resp:    Temp:  97.4  F (36.3  C) 99  F (37.2  C) 98.9  F (37.2  C)   TempSrc:  Oral Oral Oral   SpO2:   98% 99%   Weight:       Height:         Wt: 253lbs ()>259lbs (11/10)> 255lbs ()    Gen: Well appearing, resting in bed  CV:  Regular rate and rhythm  Pulm:  CTAB  Abd:  Soft, nontender, nondistended. Fundus firm 2cm below umbilicus. Incision C/D/I, steristrips in place.   Ext:  3+ pitting edema b/l LE    I/O:  I/O last 3 completed shifts:  In: 3950 [P.O.:3950]  Out: 7520 [Urine:7520]    Preeclampsia labs    Recent Labs  Lab 18  1240 11/10/18  0840 18  1202   CR 0.84 0.70 0.75    224 248   AST 61* 32 25   ALT 56* 26 28       Recent Labs  Lab 11/10/18  0840 18  1239   MAG 7.6* 6.7*       A/P:  Mary Wray is a 43 year old  POD#3 s/p PLTCS at 36w6d for failed IOL after being induced for pre-eclampsia with severe features.     Preeclampsia with severe features   - BP: normotensive to mild range. Prior to delivery required PO nifedipine 10mg x1, IV hydralazine 5mg x2 and 10mg x1.     - D#2 Procardia XL 60mg   - UOP: adequate   - Symptoms: denies preeclamptic symptoms    - HELLP labs nl, UPC 0.48   - S/p Magnesium for 14 hours postpartum, dc'ed due to mag level 7.6 and symptomatic.    - Plan for 3-5 day blood pressure check     Post-op:   - recovering appropriately. Tylenol/oxy and  ibuprofen   - s/p lee, voiding   - regular diet, return of bowel function    Rh+, RI, breastfeeding, plans abstinence for contraception. Discussed if becomes sexually active to see Ob/Gyn.      Elevated GCT, declined GTT: FBG postpartum 96, 2 hr GTT at 6 week visit    Hypothyroidism: continue home levothyroxine 200 mcg ord'd    Dispo: Discharge PPD#3-4     Amy Schumer, MD  Ob/Gyn PGY2  11/12/18 6:25 AM    The patient was seen and examined by me separately from the team.  I have reviewed and agree with the above note.  She is feeling well today with no concerns.  Plan to watch her blood pressure today with possible discharge later today but most likely tomorrow.    Summer Soliman MD, FACOG

## 2018-11-12 NOTE — PLAN OF CARE
"Problem: Patient Care Overview  Goal: Plan of Care/Patient Progress Review  The patient's blood pressures have remained elevated in the 140-150s/80-90s, but not in the severe range. She was given a second dose of procardia this evening, per orders. She denies headache, vision changes and upper gastric pain. No clonus and reflexes WNL. Her output has been good. At 2030, the patient was standing at baby's crib and noticed that her leg was \"dripping.\" Upon assessment, her edema was more severe in her legs, ankles and feet. Her left upper leg was leaking fluid. Lung sound were clear and equal bilaterally. The resident was notified and ordered compression stockings, fluid restriction to 3000 mL per day, and a lymphedema therapy consult. The patient has been encouraged to ambulate and she walked three laps around the unit. Her compression stockings were placed. A pulse oximeter will be placed once she is back in bed, and she was shown how to use an incentive spirometer. She has ice chips for hydration, but has been encouraged to limit her water intake. She is breastfeeding baby every 3 hours using a nipple shield and is supplementing up to 15 mL of expressed and donor breast milk. She is pumping and doing some hand expression. Monitor lung sounds and any changes in patient's condition. Support as needed.      "

## 2018-11-12 NOTE — PROGRESS NOTES
Data: Mary Wray transferred to 7122 via wheelchair at 0000. Baby transferred via parent's arms.  Action: Receiving unit notified of transfer: Yes. Patient and family notified of room change. Report given to Concha MEDINA RN from [facility] at 0000. Belongings sent to receiving unit. Accompanied by Registered Nurse. Oriented patient to surroundings. Call light within reach. ID bands double-checked with receiving RN.  Response: Patient tolerated transfer and is stable.

## 2018-11-12 NOTE — LACTATION NOTE
This note was copied from a baby's chart.  Follow up     Mary has been breastfeeding Gail every 2-3 hours with RN support as needed using a nipple shield. She is supplementing with 20ml pasteurized donor milk via sns at the breast. Gail is tolerating the supplement volume and is able to take the supplement at the breast.    Mary has resolving severed lower extremity edema, but does not have any symptoms of engorgement/swelling in her breast. Her blood pressure continues to be elevated. She has been hand expressing and pumping after feedings to stimulate her breasts.    Baby Gail has age appropriate output. She received phototherapy treatment and her last bilirubin was low intermediate risk at 48 hours. Her weight loss at 48 hours was -6.6% of her birthweight at 4lb 8.5 oz.     Mary plans to follow up with [facility] after discharge. She has a Spectra breast pump to use at home.    Reviewed: supplement recommendations, supplement methods (SNS at the breast, finger feeding, bottle feeding), recommended supplement volumes, donor milk resources and outpatient follow up.  Supplement Guidelines for infants <38 weeks gestation or < 6 lbs at birth per the [facility]eding Methods for the  Infant (35-42 weeks) Policy ([facility] Guidelines):  Infants <38 weeks OR <6 pounds   Birth-24 hours of age: 5ml (1 tsp) every 2 - 3 hours, at least 8 times in 24 hours   24-48 hours of age: 10 ml (2 tsp) every 2 - 3 hours, at least 8 times in 24 hours   48-72 hours of age: 15 ml (3 tsp) every 2 - 3 hours, at least 8 times in 24 hours    Plan: Continue to breastfeed with a nipple shield every 2-3 hours with RN support as needed. Continue supplementation of donor milk/expressed milk via sns if tolerated. If Gail is unable sustain latch/sucking long enough at the breast to take the supplement at the breast she may benefit from supplementation with aa bottle with a slow flow nipple (or finger feeding if  mother does not want to use bottle). Continue to encourage skin to skin and hand expression/pumping after each feeding. Mary plans to follow up with [facility] after discharge. She was encouraged to follow up with her providers there for continued lactation support or referral to an outpatient lactation consultant.

## 2018-11-12 NOTE — PLAN OF CARE
Problem: Patient Care Overview  Goal: Plan of Care/Patient Progress Review  PT orders received and chart reviewed for edema. Per notes and nurse patient likely discharging tomorrow (this is when the patient would have been placed on the schedule to be seen). At this time do not plan to initiate any edema therapy as we would not have the ability to follow up and continue in case of any adverse side effects or advance as needed. Per nursing edema is improving (patient endorses this as well), anticipate it will likely continue to do so without intervention. If patient does not continue to see further reduction in LEs recommend patient follow up with outpatient edema therapy at discharge, this order could be placed by the primary provider. At this time plan to complete orders but will remain open to re-consult if needed. Please contact [phone] with any further questions.

## 2018-11-13 LAB
ANION GAP SERPL CALCULATED.3IONS-SCNC: 5 MMOL/L (ref 3–14)
BUN SERPL-MCNC: 9 MG/DL (ref 7–30)
CALCIUM SERPL-MCNC: 8.5 MG/DL (ref 8.5–10.1)
CHLORIDE SERPL-SCNC: 108 MMOL/L (ref 94–109)
CO2 SERPL-SCNC: 26 MMOL/L (ref 20–32)
CREAT SERPL-MCNC: 0.71 MG/DL (ref 0.52–1.04)
GFR SERPL CREATININE-BSD FRML MDRD: >90 ML/MIN/1.7M2
GLUCOSE SERPL-MCNC: 75 MG/DL (ref 70–99)
POTASSIUM SERPL-SCNC: 4.3 MMOL/L (ref 3.4–5.3)
SODIUM SERPL-SCNC: 139 MMOL/L (ref 133–144)

## 2018-11-13 PROCEDURE — 25000132 ZZH RX MED GY IP 250 OP 250 PS 637: Performed by: STUDENT IN AN ORGANIZED HEALTH CARE EDUCATION/TRAINING PROGRAM

## 2018-11-13 PROCEDURE — 36415 COLL VENOUS BLD VENIPUNCTURE: CPT | Performed by: STUDENT IN AN ORGANIZED HEALTH CARE EDUCATION/TRAINING PROGRAM

## 2018-11-13 PROCEDURE — 12000030 ZZH R&B OB INTERMEDIATE UMMC

## 2018-11-13 PROCEDURE — 80048 BASIC METABOLIC PNL TOTAL CA: CPT | Performed by: STUDENT IN AN ORGANIZED HEALTH CARE EDUCATION/TRAINING PROGRAM

## 2018-11-13 RX ADMIN — IBUPROFEN 600 MG: 600 TABLET ORAL at 13:38

## 2018-11-13 RX ADMIN — NIFEDIPINE 60 MG: 30 TABLET, FILM COATED, EXTENDED RELEASE ORAL at 08:57

## 2018-11-13 RX ADMIN — ACETAMINOPHEN 650 MG: 325 TABLET, FILM COATED ORAL at 13:38

## 2018-11-13 RX ADMIN — SENNOSIDES AND DOCUSATE SODIUM 1 TABLET: 8.6; 5 TABLET ORAL at 08:57

## 2018-11-13 RX ADMIN — IBUPROFEN 600 MG: 600 TABLET ORAL at 19:18

## 2018-11-13 RX ADMIN — LEVOTHYROXINE SODIUM 200 MCG: 50 TABLET ORAL at 09:58

## 2018-11-13 RX ADMIN — ACETAMINOPHEN 650 MG: 325 TABLET, FILM COATED ORAL at 17:43

## 2018-11-13 NOTE — DISCHARGE INSTRUCTIONS

## 2018-11-13 NOTE — LACTATION NOTE
This note was copied from a baby's chart.  Follow up    Mary continues to breastfeed every 2-3 hours and supplement with her expressed milk and donor milk via sns or finger feeding. She is choosing to breastfeed without a nipple shield at this time. Martin Reynolds is tolerating supplement method and volume.    Mary has noticed an increase in the volume of milk she is pumping today.     Gail's weight has remained stable from yesterday, but she did have a low temp last evening. They will remain inpatient for 1 more night.    Mary plans to use either pasteurized donor milk or formula for supplementation at home. She was encouraged to use breast compressions during feedings to help maximize the amount of milk Gail is getting during the feeding. She plans to continue pumping and will follow up at [facility] as recommended at discharge.

## 2018-11-13 NOTE — PROGRESS NOTES
[facility]  Postpartum Progress Note    S:   Pt is feeling well this morning. Denies HA, vision changes, N/V, abdominal pain, SOB, chest pain. Tolerating regular diet. Passing gas, and has had a BM. Lochia minimal. Voiding without issue. Pain controlled with PO pain medications. Breastfeeding. Reports swelling is significantly improved.      O:  Vitals:    18 1002 18 1232 18 1900 18 2120   BP: (!) 143/92 142/79 (!) 147/93 135/83   BP Location:   Right arm    Pulse: 66 79     Resp:    Temp:  98.9  F (37.2  C) 98.7  F (37.1  C) 98.5  F (36.9  C)   TempSrc:  Oral Oral Oral   SpO2:       Weight:       Height:         Wt: 253lbs ()>259lbs (11/10)> 255lbs ()>237 lbs ()    Gen: Well appearing, resting in bed  CV:  Regular rate  Pulm:  Unlabored breathing  Abd:  Soft, nontender, nondistended. Fundus firm 2cm below umbilicus. Incision C/D/I, steristrips in place.   Ext:  trace pitting edema b/l LE    I/O:  I/O last 3 completed shifts:  In: 1350 [P.O.:1350]  Out: 8050 [Urine:8050]    Preeclampsia labs    Recent Labs  Lab 18  0705 18  1240 11/10/18  0840   CR 0.80 0.84 0.70    206 224   AST 58* 61* 32   ALT 59* 56* 26       Recent Labs  Lab 11/10/18  0840 18  1239   MAG 7.6* 6.7*       A/P:  Mary Wray is a 43 year old  POD#4 s/p PLTCS at 36w6d for failed IOL after being induced for pre-eclampsia with severe features.     Preeclampsia with severe features   - BP: normotensive to mild range. Prior to delivery required PO nifedipine 10mg x1, IV hydralazine 5mg x2 and 10mg x1.     - D#3 Procardia XL 60mg   - UOP: adequate, given significant diuresis will check BMP this morning   - Symptoms: denies preeclamptic symptoms    - HELLP labs mild transaminitis, improving, UPC 0.48   - S/p Magnesium for 14 hours postpartum, dc'ed due to mag level 7.6 and symptomatic.    - Plan for 3-5 day blood pressure check     Post-op:   -  "recovering appropriately. Tylenol and ibuprofen. Oxycodone PRN.   - s/p lee, voiding   - regular diet, return of bowel function    Rh+, RI, breastfeeding, plans abstinence for contraception. Discussed if becomes sexually active to see Ob/Gyn.    Elevated GCT, declined GTT: FBG postpartum 96, 2 hr GTT at 6 week visit  Hypothyroidism: continue home levothyroxine 200 mcg ord'd  Dispo: Discharge PPD#4 if bp ok today    Amy Schumer, MD  Ob/Gyn PGY2  11/13/18 6:33 AM    BP (!) 136/97  Pulse 74  Temp 98.5  F (36.9  C) (Oral)  Resp 14  Ht 1.702 m (5' 7\")  Wt 102.9 kg (226 lb 13.7 oz)  LMP  (LMP Unknown)  SpO2 99%  Breastfeeding? Unknown  BMI 35.53 kg/m2    The patient was seen and examined by me separately from the team.  I have reviewed and agree with the above note.  Pt is feeling well today, no new concerns.  Her baby is having issues with low temperature today and will not be discharged.  Plan to continue to monitor her blood pressure today, if remains stable could be discharged to boarding status this afternoon vs tomorrow morning.    Summer Soliman MD, FACOG              "

## 2018-11-13 NOTE — PLAN OF CARE
Problem: Patient Care Overview  Goal: Plan of Care/Patient Progress Review  Outcome: Improving  Pt is stable. Vital signs stable and postpartum checks within normal limits. Pt is up ambulating in the room, showered and pumping for baby. Denies pain. Pt is breastfeeding well with minimal assist/ SNS. Medicated pt with Tylenol and Ibuprofen for pain control. Assisted with breastfeeding. Continue cares and assist with breastfeeding as needed.

## 2018-11-13 NOTE — PROVIDER NOTIFICATION
11/13/18 0853   Provider Notification   Provider Name/Title Dr Francisco   Method of Notification Electronic Page   Request Evaluate-Remote   Notification Reason Vital Signs Change  (give procardia? )

## 2018-11-13 NOTE — PROVIDER NOTIFICATION
Paged the G2 requesting clarification if the pt is discharging tonight or tomorrow. Doctor returned call and pt will discharge tomorrow morning with baby.

## 2018-11-13 NOTE — PLAN OF CARE
Problem: Patient Care Overview  Goal: Plan of Care/Patient Progress Review  Outcome: Improving  [id number] VSS. PP assessment WNL with exception of bruising midline of incision. Pain managed with tylenol and IB. Breastfeeding with nipple shield and SNS at the breast with supplementation of donor breast milk. No further concerns at this time. Plan is for discharge tomorrow.

## 2018-11-13 NOTE — PLAN OF CARE
Problem: Patient Care Overview  Goal: Plan of Care/Patient Progress Review  Outcome: Improving  Data: Vital signs within normal limits. Elevated BPs, edema improving to 2/3+ overnight. Postpartum checks within normal limits - see flow record. Patient eating and drinking normally. Patient able to empty bladder independently and is up ambulating. No apparent signs of infection. Incision healing well and open to air. Patient performing self cares and is able to care for infant.  Action: Patient medicated during the shift for incisional soreness but overall feeling okay.   Response: Positive attachment behaviors observed with infant.   Will continue to monitor and provide support.

## 2018-11-14 VITALS
HEART RATE: 67 BPM | SYSTOLIC BLOOD PRESSURE: 159 MMHG | OXYGEN SATURATION: 99 % | DIASTOLIC BLOOD PRESSURE: 86 MMHG | BODY MASS INDEX: 35.24 KG/M2 | TEMPERATURE: 98.3 F | HEIGHT: 67 IN | RESPIRATION RATE: 16 BRPM | WEIGHT: 224.5 LBS

## 2018-11-14 LAB — COPATH REPORT: NORMAL

## 2018-11-14 PROCEDURE — 25000132 ZZH RX MED GY IP 250 OP 250 PS 637: Performed by: STUDENT IN AN ORGANIZED HEALTH CARE EDUCATION/TRAINING PROGRAM

## 2018-11-14 RX ORDER — NIFEDIPINE 30 MG/1
30 TABLET, EXTENDED RELEASE ORAL ONCE
Status: COMPLETED | OUTPATIENT
Start: 2018-11-14 | End: 2018-11-14

## 2018-11-14 RX ORDER — NIFEDIPINE 90 MG/1
90 TABLET, FILM COATED, EXTENDED RELEASE ORAL DAILY
Qty: 40 TABLET | Refills: 0 | Status: SHIPPED | OUTPATIENT
Start: 2018-11-14 | End: 2018-11-27

## 2018-11-14 RX ADMIN — IBUPROFEN 600 MG: 600 TABLET ORAL at 08:53

## 2018-11-14 RX ADMIN — ACETAMINOPHEN 650 MG: 325 TABLET, FILM COATED ORAL at 00:58

## 2018-11-14 RX ADMIN — ACETAMINOPHEN 650 MG: 325 TABLET, FILM COATED ORAL at 08:53

## 2018-11-14 RX ADMIN — NIFEDIPINE 60 MG: 30 TABLET, FILM COATED, EXTENDED RELEASE ORAL at 08:54

## 2018-11-14 RX ADMIN — LEVOTHYROXINE SODIUM 200 MCG: 50 TABLET ORAL at 08:54

## 2018-11-14 RX ADMIN — IBUPROFEN 600 MG: 600 TABLET ORAL at 00:58

## 2018-11-14 RX ADMIN — NIFEDIPINE 30 MG: 30 TABLET, FILM COATED, EXTENDED RELEASE ORAL at 17:39

## 2018-11-14 NOTE — PLAN OF CARE
Problem: Patient Care Overview  Goal: Plan of Care/Patient Progress Review  Outcome: Improving  Data: Vital signs within normal limits. Postpartum checks within normal limits - see flow record. Patient eating and drinking normally. Patient able to empty bladder independently and is up ambulating. Incision healing well, feeling a lot of soreness with the bruising surrounding incision. Patient performing self cares and is able to care for infant.  Action: Patient medicated during the shift for some incisional soreness.   Response: Positive attachment behaviors observed with infant.   Will continue to monitor and provide support.

## 2018-11-14 NOTE — PLAN OF CARE
Problem: Patient Care Overview  Goal: Plan of Care/Patient Progress Review  Outcome: Improving  Data: Vital signs within normal limits. Postpartum checks within normal limits - see flow record. Patient eating and drinking normally. Patient able to empty bladder independently and is up ambulating. No apparent signs of infection. Incision healing well. Patient performing self cares and is able to care for infant.  Action: Patient medicated during the shift for pain and cramping. See MAR. Patient reassessed within 1 hour after each medication and pain was improved - patient stated she was comfortable. Patient education done about pain, vital signs,discharge. See flow record.  Response: Positive attachment behaviors observed with infant. Support persons are present.   Plan: Anticipate discharge on Wednesday.

## 2018-11-14 NOTE — PROVIDER NOTIFICATION
11/14/18 1139   Provider Notification   Provider Name/Title G2   Method of Notification Electronic Page   Request Evaluate-Remote   Notification Reason Vital Signs Change  (BP elevated)

## 2018-11-14 NOTE — LACTATION NOTE
"D:  I met with Mary; Wilma is her 1st baby.  She has Graves s/p ablation, takes synthroid and nifedipine (hx pre-e), and has no history of breast/chest surgery or trauma.  She has already started to pump and just got 20ml, but per mom it is her 1st pumping of the day (it was 1pm).   I:  I gave her a folder of introductory materials and went over pumping guidelines.  I reviewed physiology of colostrum and milk production, pumping guidelines, and I gave her a log and encouraged her to use it.   I explained how to access the videos \"Hand Expression\" and \"Maximizing Milk Production\"; as well as other helpful books and websites.   We discussed hands-on pumping techniques and usefulness of a hands-free pumping bra.  We discussed skin to skin holding and how to reach your breastfeeding goals.  We talked about birth control and other medications during breastfeeding.  She verbalized understanding via teachback.  I advised her to call her insurance company about pump coverage (she already has a Spectra, she might want a Symphony).  I helped with a feeding. We discussed supportive hold, positioning, latch, breastfeeding patterns and infant driven feeding, breast support and compressions, use/rationale of the nipple shield, skin to skin benefits, and timing of pumpings around breastfeedings.  Wilma was eager at breast but only stayed awake 10\" and transferred 0 per scale.  We talked about benefit of nipple shield (\"I stopped using it, it seemed like she didn't like it, it was too big\") and I instructed her on its use, to try with her next feeding.  Emphasized need to pump 8x/day while Wilma was needing supplementation, and physiology and challenges of feeding small  babies.  A:  Mom has information she needs to initiate her supply.   P:  Will continue to provide lactation support.  Hannah Paredes, RNC, IBCLC         "

## 2018-11-14 NOTE — PROGRESS NOTES
[facility]  Postpartum Progress Note    S:   Pt is feeling well this morning. Denies HA, vision changes, N/V, abdominal pain, SOB, chest pain. Tolerating regular diet. Passing gas, and has had a BM. Lochia minimal. Voiding without issue. Pain controlled with PO pain medications. Breastfeeding. Reports swelling is significantly improved.      O:  Vitals:    18 1324 18 1600 18 1921 18 2345   BP: 135/89 133/88 (!) 132/91 (!) 141/96   BP Location:       Pulse: 62 72 67    Resp: 16 16 16 16   Temp: 98.8  F (37.1  C) 98  F (36.7  C) 98.3  F (36.8  C) 98.4  F (36.9  C)   TempSrc: Oral Oral Oral Oral   SpO2:       Weight:       Height:         Wt: 253lbs ()>259lbs (11/10)> 255lbs ()>237 lbs ()>226 lbs ()    Gen: Well appearing, resting in bed  CV:  Regular rate  Pulm:  Unlabored breathing  Abd:  Soft, nontender, nondistended. Fundus firm 2cm below umbilicus. Incision C/D/I, steristrips in place.   Ext:  trace pitting edema b/l LE    I/O:  I/O last 3 completed shifts:  In: 1300 [P.O.:1300]  Out: 2800 [Urine:2800]    Preeclampsia labs    Recent Labs  Lab 18  0635 18  0705 18  1240 11/10/18  0840   CR 0.71 0.80 0.84 0.70   PLT  --  228 206 224   AST  --  58* 61* 32   ALT  --  59* 56* 26       Recent Labs  Lab 11/10/18  0840 18  1239   MAG 7.6* 6.7*       A/P:  Mary Wray is a 43 year old  POD#5 s/p PLTCS at 36w6d for failed IOL after being induced for pre-eclampsia with severe features.     Preeclampsia with severe features   - BP: normotensive to mild range. Prior to delivery required PO nifedipine 10mg x1, IV hydralazine 5mg x2 and 10mg x1.     - D#4 Procardia XL 60mg   - UOP: adequate, s/p BMP given large volume diuresis   - Symptoms: denies preeclamptic symptoms    - HELLP labs mild transaminitis, improving, UPC 0.48   - S/p Magnesium for 14 hours postpartum, dc'ed due to mag level 7.6 and symptomatic.    - Plan for 3-5  day blood pressure check     Post-op:   - recovering appropriately. Tylenol and ibuprofen. Oxycodone PRN.   - s/p lee, voiding   - regular diet, return of bowel function    Rh+, RI, breastfeeding, plans abstinence for contraception. Discussed if becomes sexually active to see Ob/Gyn.    Elevated GCT, declined GTT: FBG postpartum 96, 2 hr GTT at 6 week visit  Hypothyroidism: continue pre-pregnancy levothyroxine 200 mcg ord'd  Dispo: Discharge today. To boarding status as baby to NICU    Cookie Francisco MD  OB/Gyn PGY-2  11/14/2018      [facility] staff:  Appreciate note by Dr. Francisco.  I have seen and examined the patient without the resident. I have reviewed, edited, and agree with the note.      Erica Kruger MD, FACOG  11/14/2018  9:54 AM

## 2018-11-14 NOTE — CONSULTS
"[facility]  MATERNAL CHILD HEALTH SOCIAL WORK NICU PROGRESS NOTE    DATA:     SW met briefly with pt at baby's bedside in the NICU to introduce self and Maternal Child Health services. Pt working on breastfeeding, eagerly anticipating her discharge from CC and transition to NICU wing room with pt. Pt expressed feeling well supported by [facility] and NICU multidisciplinary teams, denied immediate / specific SW service needs at this time.    INTERVENTION:       Introduced self and SW role.     Chart review.     SW met with pt at baby's bedside in the NICU to assess for needs, offer support, and assess for coping.     SW provided supportive counseling related to the impact of this hospitalization on pt family system.     Provided \"Meeting Your Basic Needs While Your Child is Hospitalized\" hand out and SW business card.      Reviewed orientation to the NICU, including: parking passes, boarding rooms, rounding, treatment teams, and our welcoming policy of visitation.     SW shared information about hospital amenities.     SW provided emotional support and active listening.     Reassured family of ongoing SW supportive services available to them at the hospital.     Encouraged parents to access this SW for support as needed.    ASSESSMENT:     Pt pleasant and welcoming of SW involvement. Pt observed to have euthymic affect during conversation. She was feeding infant during our meeting at bedside in the NICU, appears to be bonding well. SW allowed space for pt to share thoughts/concerns re: baby's NICU admission. Family appears to be resilient and flexible to navigate unexpected circumstances. They seem to have a good support system. They did not identify any specific needs at this time but seem receptive and appreciative of SW support. Pt is aware of SW availability and how to access this SW.     PLAN:     SW will continue to assess needs and provide ongoing psychosocial support and " access to appropriate resources/referrals. SW will continue to collaborate with the multidisciplinary team.    LIVAN Wilks, [facility]  Clinical   Maternal Child Health  [facility]  Phone:   [phone]  Pager:    [phone]

## 2018-11-14 NOTE — PROGRESS NOTES
Patient's blood pressures today persistently elevated to >150/100, otherwise asymptomatic. Will increase Procardia to 90mg for discharge. Patient has follow up scheduled on Friday with Dr. Bai for bp check so will still plan on discharge today. Discussed with Dr. Kruger.     Cookie Francisco MD  OB/Gyn PGY-2  11/14/2018

## 2018-11-15 NOTE — PLAN OF CARE
Pt to be discharged today, went over discharge instructions, take home medications and instructed pt on when to follow up in the clinic. All questions answered. Pt signed out and discharged at 1805. Walked pt down to her NICU BDR room.

## 2018-11-16 ENCOUNTER — OFFICE VISIT (OUTPATIENT)
Dept: OBGYN | Facility: CLINIC | Age: 43
End: 2018-11-16
Attending: OBSTETRICS & GYNECOLOGY
Payer: COMMERCIAL

## 2018-11-16 VITALS
DIASTOLIC BLOOD PRESSURE: 83 MMHG | SYSTOLIC BLOOD PRESSURE: 122 MMHG | HEART RATE: 90 BPM | HEIGHT: 67 IN | BODY MASS INDEX: 35.16 KG/M2

## 2018-11-16 DIAGNOSIS — O13.9 GESTATIONAL HYPERTENSION, ANTEPARTUM: Primary | ICD-10-CM

## 2018-11-16 RX ORDER — NIFEDIPINE 60 MG/1
60 TABLET, EXTENDED RELEASE ORAL DAILY
Qty: 14 TABLET | Refills: 0 | Status: SHIPPED | OUTPATIENT
Start: 2018-11-16 | End: 2018-11-27

## 2018-11-16 NOTE — MR AVS SNAPSHOT
"              After Visit Summary   11/16/2018    Mary Wray    MRN: [id number]           Patient Information     Date Of Birth          1975        Visit Information        Provider Department      11/16/2018 4:00 PM Luly Bai MD [facility]        Today's Diagnoses     Gestational hypertension, antepartum    -  1       Follow-ups after your visit        Who to contact     Please call your clinic at [phone] to:    Ask questions about your health    Make or cancel appointments    Discuss your medicines    Learn about your test results    Speak to your doctor            Additional Information About Your Visit        MyChart Information     [employer] gives you secure access to your electronic health record. If you see a primary care provider, you can also send messages to your care team and make appointments. If you have questions, please call your primary care clinic.  If you do not have a primary care provider, please call [phone] and they will assist you.      [employer] is an electronic gateway that provides easy, online access to your medical records. With [employer], you can request a clinic appointment, read your test results, renew a prescription or communicate with your care team.     To access your existing account, please contact your [facility] or call [phone] for assistance.        Care EveryWhere ID     This is your Care EveryWhere ID. This could be used by other organizations to access your [facility] medical records  [id number]        Your Vitals Were     Pulse Height Last Period BMI (Body Mass Index)          90 1.702 m (5' 7\") (LMP Unknown) 35.16 kg/m2         Blood Pressure from Last 3 Encounters:   11/19/18 126/75   11/16/18 122/83   11/14/18 159/86    Weight from Last 3 Encounters:   11/14/18 101.8 kg (224 lb 8 oz)   11/07/18 114.8 kg (253 lb)   11/02/18 114.8 kg (253 lb 1.6 oz)              Today, you had the following  "    No orders found for display         Today's Medication Changes          These changes are accurate as of 11/16/18 11:59 PM.  If you have any questions, ask your nurse or doctor.               These medicines have changed or have updated prescriptions.        Dose/Directions    * NIFEdipine ER 90 MG 24 HR ER tablet   Commonly known as:  ADALAT CC   This may have changed:  Another medication with the same name was added. Make sure you understand how and when to take each.   Used for:  Elevated blood pressure affecting pregnancy in third trimester, antepartum   Changed by:  Luly Bai MD        Dose:  90 mg   Take 1 tablet (90 mg) by mouth daily   Quantity:  40 tablet   Refills:  0       * NIFEdipine ER osmotic 60 MG 24 HR ER tablet   Commonly known as:  PROCARDIA XL   This may have changed:  You were already taking a medication with the same name, and this prescription was added. Make sure you understand how and when to take each.   Used for:  Gestational hypertension, antepartum   Changed by:  Luly Bai MD        Dose:  60 mg   Take 1 tablet (60 mg) by mouth daily   Quantity:  14 tablet   Refills:  0       * Notice:  This list has 2 medication(s) that are the same as other medications prescribed for you. Read the directions carefully, and ask your doctor or other care provider to review them with you.         Where to get your medicines      These medications were sent to [facility] [address]  [address] [email or web address] [address], [facility] [address]     Phone:  [phone]     NIFEdipine ER osmotic 60 MG 24 HR ER tablet                Primary Care Provider Office Phone # Fax #    Jazlyn Huitron [phone]       [address] [facility] [address]  [facility] [address]        Equal Access to Services     [facility] SHIN : Martha Sandra, lesley saxena, adrianna bansal. So [facility]  [phone].    ATENCIÓN: Si peter ribera, tiene a perdue disposición servicios gratuitos de asistencia lingüística. Oscar hunt [phone].    We comply with applicable federal civil rights laws and [place] laws. We do not discriminate on the basis of race, color, national origin, age, disability, sex, sexual orientation, or gender identity.            Thank you!     Thank you for choosing [facility]  for your care. Our goal is always to provide you with excellent care. Hearing back from our patients is one way we can continue to improve our services. Please take a few minutes to complete the written survey that you may receive in the mail after your visit with us. Thank you!             Your Updated Medication List - Protect others around you: Learn how to safely use, store and throw away your medicines at www.disposemymeds.org.          This list is accurate as of 18 11:59 PM.  Always use your most recent med list.                   Brand Name Dispense Instructions for use Diagnosis    acetaminophen 325 MG tablet    TYLENOL    100 tablet    Take 2 tablets (650 mg) by mouth every 4 hours as needed for mild pain or fever    Status post        blood glucose monitoring lancets     100 each    Use to test blood sugar 3-4 times daily or as directed.  Ok to substitute alternative if insurance prefers.    Pre-diabetes       blood glucose monitoring meter device kit     1 kit    Use to test blood sugar 3-4 times daily or as directed.  Ok to substitute alternative if insurance prefers.    Pre-diabetes       blood glucose monitoring test strip    ACCU-CHEK CYNTHIA    100 strip    Use to test blood sugars 3-4 times daily as directed.  OK to substitute per formulary    Pre-diabetes       * breast pump Misc     1 each    1 each as needed (as needed)    Postpartum care and examination of lactating mother       * breast pump Misc     1 each    1 each as needed (prn)    Prenatal care, third  trimester       ferrous sulfate 325 (65 Fe) MG tablet    IRON    90 tablet    Take 1 tablet (325 mg) by mouth daily (with breakfast)    Status post        ibuprofen 600 MG tablet    ADVIL/MOTRIN    120 tablet    Take 1 tablet (600 mg) by mouth every 6 hours as needed for other (cramping)    Status post        * NIFEdipine ER 90 MG 24 HR ER tablet    ADALAT CC    40 tablet    Take 1 tablet (90 mg) by mouth daily    Elevated blood pressure affecting pregnancy in third trimester, antepartum       * NIFEdipine ER osmotic 60 MG 24 HR ER tablet    PROCARDIA XL    14 tablet    Take 1 tablet (60 mg) by mouth daily    Gestational hypertension, antepartum       omega 3 1000 MG Caps           oxyCODONE IR 5 MG tablet    ROXICODONE    6 tablet    Take 1 tablet (5 mg) by mouth every 6 hours as needed for severe pain    Status post        PRENATAL VITAMIN PO           senna-docusate 8.6-50 MG per tablet    SENOKOT-S;PERICOLACE    100 tablet    Take 1 tablet by mouth 2 times daily as needed for constipation    Status post        SYNTHROID 200 MCG tablet   Generic drug:  levothyroxine     90 tablet    Take 1 tablet (200 mcg) by mouth daily    Postablative hypothyroidism       VITAMIN D (CHOLECALCIFEROL) PO      Take by mouth daily        * Notice:  This list has 4 medication(s) that are the same as other medications prescribed for you. Read the directions carefully, and ask your doctor or other care provider to review them with you.

## 2018-11-16 NOTE — LETTER
"2018       RE: Mary Wray  [address]  [place] [address]     Dear Colleague,    Thank you for referring your patient, Mary Wray, to the [facility] at [facility]. Please see a copy of my visit note below.    S: 42 yo  currently POD 7 s/p primary low segment transverse  for failed induction in setting of pre-eclampsia here for BP check. Doing well except daughter readmitted to NICU for low temps.   Anticipates her discharge soon.     O: /83  Pulse 90  Ht 1.702 m (5' 7\")  LMP  (LMP Unknown)  BMI 35.16 kg/m2  Appears well.   No si/sx pre-eclampsia    A: POD 7 c/s for pre-E and failed induction  P: decrease to procardia XL 60 mg daily  See Dr. Gonzalez on Monday as scheduled.   Luly Bai    "

## 2018-11-19 ENCOUNTER — ALLIED HEALTH/NURSE VISIT (OUTPATIENT)
Dept: FAMILY MEDICINE | Facility: CLINIC | Age: 43
End: 2018-11-19
Payer: COMMERCIAL

## 2018-11-19 VITALS — SYSTOLIC BLOOD PRESSURE: 126 MMHG | DIASTOLIC BLOOD PRESSURE: 75 MMHG

## 2018-11-19 DIAGNOSIS — O16.3 ELEVATED BLOOD PRESSURE AFFECTING PREGNANCY IN THIRD TRIMESTER, ANTEPARTUM: Primary | ICD-10-CM

## 2018-11-19 NOTE — NURSING NOTE
Pt in with infant today for visit, advised by provider at hospital to have her BP rechecked regarding her preeclampsia, rechecked and later realized we do not do this, going forward pt would need to be seen by provider. Verified with Luis that it is ok this time but sending to PCP. Pt advised also.     SWAPNA Macias 12:07 PM November 19, 2018

## 2018-11-19 NOTE — MR AVS SNAPSHOT
After Visit Summary   11/19/2018    Mary Wray    MRN: [id number]           Patient Information     Date Of Birth          1975        Visit Information        Provider Department      11/19/2018 2:20 PM Nurse, [facility]        Today's Diagnoses     Elevated blood pressure affecting pregnancy in third trimester, antepartum    -  1       Follow-ups after your visit        Your next 10 appointments already scheduled     Nov 19, 2018  2:20 PM CST   [facility]    [address],  [address]  [place] [address]   [phone]              Who to contact     Please call your clinic at [phone] to:    Ask questions about your health    Make or cancel appointments    Discuss your medicines    Learn about your test results    Speak to your doctor            Additional Information About Your Visit        MyChart Information     [employer] gives you secure access to your electronic health record. If you see a primary care provider, you can also send messages to your care team and make appointments. If you have questions, please call your primary care clinic.  If you do not have a primary care provider, please call [phone] and they will assist you.      [employer] is an electronic gateway that provides easy, online access to your medical records. With [employer], you can request a clinic appointment, read your test results, renew a prescription or communicate with your care team.     To access your existing account, please contact your [facility] or call [phone] for assistance.        Care EveryWhere ID     This is your Care EveryWhere ID. This could be used by other organizations to access your [place] medical records  [id number]        Your Vitals Were     Last Period                   (LMP Unknown)            Blood Pressure from Last 3  Encounters:   18 126/75   18 122/83   18 159/86    Weight from Last 3 Encounters:   18 224 lb 8 oz (101.8 kg)   18 253 lb (114.8 kg)   18 253 lb 1.6 oz (114.8 kg)              Today, you had the following     No orders found for display       Primary Care Provider Office Phone # Fax #    Jazlyn Huitron [phone]       [address] [address]        Equal Access to Services     [facility]: Hadii aster durham hadasho [place], waaxda luqadaha, qaybta kaalmada maico, adrianna coy . So [facility] [phone].    ATENCIÓN: Si habla español, tiene a perdue disposición servicios gratuitos de asistencia lingüística. [facility] [phone].    We comply with applicable federal civil rights laws and [place] laws. We do not discriminate on the basis of race, color, national origin, age, disability, sex, sexual orientation, or gender identity.            Thank you!     Thank you for choosing [facility]  for your care. Our goal is always to provide you with excellent care. Hearing back from our patients is one way we can continue to improve our services. Please take a few minutes to complete the written survey that you may receive in the mail after your visit with us. Thank you!             Your Updated Medication List - Protect others around you: Learn how to safely use, store and throw away your medicines at www.disposemymeds.org.          This list is accurate as of 18 12:08 PM.  Always use your most recent med list.                   Brand Name Dispense Instructions for use Diagnosis    acetaminophen 325 MG tablet    TYLENOL    100 tablet    Take 2 tablets (650 mg) by mouth every 4 hours as needed for mild pain or fever    Status post        blood glucose monitoring lancets     100 each    Use to test blood sugar 3-4 times daily or as directed.  Ok to substitute alternative if insurance  prefers.    Pre-diabetes       blood glucose monitoring meter device kit     1 kit    Use to test blood sugar 3-4 times daily or as directed.  Ok to substitute alternative if insurance prefers.    Pre-diabetes       blood glucose monitoring test strip    ACCU-CHEK CYNTHIA    100 strip    Use to test blood sugars 3-4 times daily as directed.  OK to substitute per formulary    Pre-diabetes       * breast pump Misc     1 each    1 each as needed (as needed)    Postpartum care and examination of lactating mother       * breast pump Misc     1 each    1 each as needed (prn)    Prenatal care, third trimester       ferrous sulfate 325 (65 Fe) MG tablet    IRON    90 tablet    Take 1 tablet (325 mg) by mouth daily (with breakfast)    Status post        ibuprofen 600 MG tablet    ADVIL/MOTRIN    120 tablet    Take 1 tablet (600 mg) by mouth every 6 hours as needed for other (cramping)    Status post        * NIFEdipine ER 90 MG 24 HR ER tablet    ADALAT CC    40 tablet    Take 1 tablet (90 mg) by mouth daily    Elevated blood pressure affecting pregnancy in third trimester, antepartum       * NIFEdipine ER osmotic 60 MG 24 HR ER tablet    PROCARDIA XL    14 tablet    Take 1 tablet (60 mg) by mouth daily    Gestational hypertension, antepartum       omega 3 1000 MG Caps           oxyCODONE IR 5 MG tablet    ROXICODONE    6 tablet    Take 1 tablet (5 mg) by mouth every 6 hours as needed for severe pain    Status post        PRENATAL VITAMIN PO           senna-docusate 8.6-50 MG per tablet    SENOKOT-S;PERICOLACE    100 tablet    Take 1 tablet by mouth 2 times daily as needed for constipation    Status post        SYNTHROID 200 MCG tablet   Generic drug:  levothyroxine     90 tablet    Take 1 tablet (200 mcg) by mouth daily    Postablative hypothyroidism       VITAMIN D (CHOLECALCIFEROL) PO      Take by mouth daily        * Notice:  This list has 4 medication(s) that are the same as other  medications prescribed for you. Read the directions carefully, and ask your doctor or other care provider to review them with you.

## 2018-11-20 ENCOUNTER — TELEPHONE (OUTPATIENT)
Dept: OBGYN | Facility: CLINIC | Age: 43
End: 2018-11-20

## 2018-11-20 NOTE — TELEPHONE ENCOUNTER
Spoke with Dr. Bai in clinic. States that there was about a 2-3mm opening when she saw her last. Can stop in if she is at the NICU.    Left message for patient to call back and discuss.

## 2018-11-20 NOTE — TELEPHONE ENCOUNTER
----- Message from Leidy Grupo sent at 11/19/2018  3:29 PM CST -----  Regarding: FW: CALL BACK  Contact: [phone]      ----- Message -----     From: Salvador Gonzales     Sent: 11/19/2018   3:03 PM       To: [facility]  Subject: CALL BACK                                        Pt states that her incision is open a little and continues to drain. Pt would like to know if there's anything that she should be doing or if it needs to be looked at. Pt does not have any addition questions or concerns.     Please f/u with pt.     Thank you!  CH  Please DO NOT send this message and/or reply back to sender. Call Center Representatives DO NOT respond to messages.

## 2018-11-20 NOTE — PROGRESS NOTES
"S: 42 yo  currently POD 7 s/p primary low segment transverse  for failed induction in setting of pre-eclampsia here for BP check. Doing well except daughter readmitted to NICU for low temps.   Anticipates her discharge soon.     O: /83  Pulse 90  Ht 1.702 m (5' 7\")  LMP  (LMP Unknown)  BMI 35.16 kg/m2  Appears well.   No si/sx pre-eclampsia    A: POD 7 c/s for pre-E and failed induction  P: decrease to procardia XL 60 mg daily  See Dr. Gonzalez on Monday as scheduled.   Luly Bai    "

## 2018-11-26 ENCOUNTER — TELEPHONE (OUTPATIENT)
Dept: OBGYN | Facility: CLINIC | Age: 43
End: 2018-11-26

## 2018-11-26 NOTE — TELEPHONE ENCOUNTER
Mary called to report new small area of dehiscence which oozed scant amount of yellow-brown malodorous drainage. Denies fever, redness, swelling, pain at this area of  incision site.     Advised pt be seen in clinic tomorrow. Patient agrees with plan. Scheduled with Dr. Davalos

## 2018-11-27 ENCOUNTER — OFFICE VISIT (OUTPATIENT)
Dept: OBGYN | Facility: CLINIC | Age: 43
End: 2018-11-27
Attending: OBSTETRICS & GYNECOLOGY
Payer: COMMERCIAL

## 2018-11-27 VITALS — SYSTOLIC BLOOD PRESSURE: 114 MMHG | DIASTOLIC BLOOD PRESSURE: 75 MMHG | HEART RATE: 71 BPM

## 2018-11-27 DIAGNOSIS — Z51.89 VISIT FOR WOUND CHECK: Primary | ICD-10-CM

## 2018-11-27 NOTE — MR AVS SNAPSHOT
After Visit Summary   11/27/2018    Mary Wray    MRN: [id number]           Patient Information     Date Of Birth          1975        Visit Information        Provider Department      11/27/2018 1:00 PM Heide Davalos MD [facility]        Today's Diagnoses     Visit for wound check    -  1       Follow-ups after your visit        Your next 10 appointments already scheduled     Dec 06, 2018 10:15 AM CST   Return Visit with Heide Davalos MD   [facility] ([facility])    [facility] [address]  [address] [place] [id number] [phone]            Dec 13, 2018  8:20 AM CST   Return Visit with Jazlyn Huitron MD   [facility] ([facility])    [address] [place] [address],  [address]  [facility] [address]   [phone]            Dec 18, 2018  9:00 AM CST   RETURN EXTENDED with Lola Otto MD   [facility] ([facility])    [facility] [address]  [address] [place] [id number] [phone]              Who to contact     Please call your clinic at [phone] to:    Ask questions about your health    Make or cancel appointments    Discuss your medicines    Learn about your test results    Speak to your doctor            Additional Information About Your Visit        MyChart Information     [employer] gives you secure access to your electronic health record. If you see a primary care provider, you can also send messages to your care team and make appointments. If you have questions, please call your primary care clinic.  If you do not have a primary care provider, please call [phone] and they will assist you.      [employer] is an electronic gateway that provides easy, online access to your medical records. With [employer], you can request a clinic appointment, read your test results, renew a prescription or  communicate with your care team.     To access your existing account, please contact your [facility] or call [phone] for assistance.        Care EveryWhere ID     This is your Care EveryWhere ID. This could be used by other organizations to access your [place] medical records  [id number]        Your Vitals Were     Pulse Last Period                71 (LMP Unknown)           Blood Pressure from Last 3 Encounters:   11/27/18 114/75   11/19/18 126/75   11/16/18 122/83    Weight from Last 3 Encounters:   11/14/18 101.8 kg (224 lb 8 oz)   11/07/18 114.8 kg (253 lb)   11/02/18 114.8 kg (253 lb 1.6 oz)              Today, you had the following     No orders found for display         Today's Medication Changes          These changes are accurate as of 11/27/18  2:58 PM.  If you have any questions, ask your nurse or doctor.               These medicines have changed or have updated prescriptions.        Dose/Directions    breast pump Misc   This may have changed:  Another medication with the same name was removed. Continue taking this medication, and follow the directions you see here.   Used for:  Postpartum care and examination of lactating mother   Changed by:  Heide Davalos MD        Dose:  1 each   1 each as needed (as needed)   Quantity:  1 each   Refills:  0         Stop taking these medicines if you haven't already. Please contact your care team if you have questions.     NIFEdipine ER 90 MG 24 HR ER tablet   Commonly known as:  ADALAT CC   Stopped by:  Heide Davalos MD           NIFEdipine ER osmotic 60 MG 24 HR ER tablet   Commonly known as:  PROCARDIA XL   Stopped by:  Heide Davalos MD                    Primary Care Provider Office Phone # Fax #    Jazlyn Huitron [phone]       [address] [address]        Equal Access to Services     SUGAR MELISSA : lesley Waldron qaybta  adrianna hernándeznava coy ah. So [facility] [phone].    ATENCIÓN: Si peter ribera, tiene a perdue disposición servicios gratuitos de asistencia lingüística. Oscar al [phone].    We comply with applicable federal civil rights laws and [place] laws. We do not discriminate on the basis of race, color, national origin, age, disability, sex, sexual orientation, or gender identity.            Thank you!     Thank you for choosing [facility]  for your care. Our goal is always to provide you with excellent care. Hearing back from our patients is one way we can continue to improve our services. Please take a few minutes to complete the written survey that you may receive in the mail after your visit with us. Thank you!             Your Updated Medication List - Protect others around you: Learn how to safely use, store and throw away your medicines at www.disposemymeds.org.          This list is accurate as of 18  2:58 PM.  Always use your most recent med list.                   Brand Name Dispense Instructions for use Diagnosis    acetaminophen 325 MG tablet    TYLENOL    100 tablet    Take 2 tablets (650 mg) by mouth every 4 hours as needed for mild pain or fever    Status post        blood glucose monitoring lancets     100 each    Use to test blood sugar 3-4 times daily or as directed.  Ok to substitute alternative if insurance prefers.    Pre-diabetes       blood glucose monitoring meter device kit     1 kit    Use to test blood sugar 3-4 times daily or as directed.  Ok to substitute alternative if insurance prefers.    Pre-diabetes       blood glucose monitoring test strip    ACCU-CHEK CYNTHIA    100 strip    Use to test blood sugars 3-4 times daily as directed.  OK to substitute per formulary    Pre-diabetes       breast pump Misc     1 each    1 each as needed (as needed)    Postpartum care and examination of lactating mother       ferrous sulfate  325 (65 Fe) MG tablet    FEROSUL    90 tablet    Take 1 tablet (325 mg) by mouth daily (with breakfast)    Status post        ibuprofen 600 MG tablet    ADVIL/MOTRIN    120 tablet    Take 1 tablet (600 mg) by mouth every 6 hours as needed for other (cramping)    Status post        omega 3 1000 MG Caps           oxyCODONE 5 MG tablet    ROXICODONE    6 tablet    Take 1 tablet (5 mg) by mouth every 6 hours as needed for severe pain    Status post        PRENATAL VITAMIN PO           senna-docusate 8.6-50 MG tablet    SENOKOT-S/PERICOLACE    100 tablet    Take 1 tablet by mouth 2 times daily as needed for constipation    Status post        SYNTHROID 200 MCG tablet   Generic drug:  levothyroxine     90 tablet    Take 1 tablet (200 mcg) by mouth daily    Postablative hypothyroidism       VITAMIN D (CHOLECALCIFEROL) PO      Take by mouth daily

## 2018-11-27 NOTE — LETTER
11/27/2018       RE: Mary Wray  [address]  [facility] [address]     Dear Colleague,    Thank you for referring your patient, Mary Wray, to the [facility] at [facility]. Please see a copy of my visit note below.    Acute Post-partum Visit Note  11/27/18    Reason for visit: Wound drainage    S: Patient is a 44 yo  POD#13 s/p PLTCS for failed IOL in pregnancy complicated by AMA, post-ablative hypothyroidism and preeclampsia with severe features who comes in today for wound concerns.  Patient noticed a small opening in her incision 1 week ago on the right and then over the holiday she noticed increasing amounts of yellow/brown discharge from the left side of her incision.  She is here today to ensure no concerns for infection.  She denies fevers/chills.  Pain well controlled with ibuprofen and tylenol as needed.  Moderate lochia in normal range as to be expected.  Tolerating regular diet.  Urinating without issues.  BM back to normal.  Breastfeeding going well and pumping too.  Will supplement if she needs to.  Parents are here and has good support around her.  Very appreciative of the care she has received through our clinic and our support of her family choices as well.    O:  /75  Pulse 71  LMP  (LMP Unknown)     General: NAD, A&Ox3  Resp: Non-labored breathing  Incision:  Pfannenstiel,.no erythema, right corner of incision without any separation and skin appears intact now at that side.  On the left side there is a 5 mm superificial skin opening that has no concerning drainage or discharge on palpation.  Treated with silver nitrate today.    A/P: 44 yo  POD#13 s/p PLTCS presents with wound concerns  1) Superificial skin separation: Left corner of Pfannenstiel with noted with superficial skin separation, no concerning discharge or signs of infection today, treated with silver nitrate.  Will have patient RTC in 1 week  for incision check, earlier with any concerns of evolving infection.  Patient understands and is agreeaeble with this plan.    Heide Davalos MD

## 2018-12-04 NOTE — PROGRESS NOTES
[facility]   Progress Note    2018    CC: incision check    HPI:  Mary Wray is a 43 year old  POD#26 s/p PLTCS for failed IOL in pregnancy complicated by AMA, post-ablative hypothyroidism and preeclampsia with severe features who presents for incision follow up. She was seen by Dr. Davalos on  for wound discharge and found to have superficial skin separation which was treated with silver nitrate.    Patient reports continued drainage from the incision, not foul smelling. It is not worsening but she is discouraged that it is not healing. Overall feeling general discomfort related to the incision, thinks she would feel better if she knew the incision healing. No fevers or chills. No pre-eclampsia signs or symptoms.    Exam:  /73 (BP Location: Left arm, Patient Position: Sitting, Cuff Size: Adult Regular)  Pulse 60  Wt 100.2 kg (220 lb 14.4 oz)  LMP  (LMP Unknown)  BMI 34.6 kg/m2  Gen: NAD, A&Ox3  Pulm: breathing comfortably on room air  MSK: moving all extremities equally  Incision: Pfannenstiel, no surrounding erythema or significant tenderness. Right corner of incision with 1mm superficial separation and small amount of blood-tinged yellow discharge expressed. Near the center of the incision there is 1mm superficial separation without drainage. On the left side there is a 5 mm superificial skin opening, no evidence of infection.     Procedure: 1% lidocaine injected superficially to skin edge. 5mm opening reapproximated with a  single subcuticular U-stitch using 4-0 vicryl. Covered with steri strips. Patient tolerated the procedure well.    A/P:  Mary Wray is a 43 year old  POD#26 s/p PLTCS for failed IOL in pregnancy complicated by AMA, post-ablative hypothyroidism and preeclampsia with severe features presenting for incision follow-up. Continues to have superficial skin separation, no concerning signs for infection. 5mm area of separation not improved  after silver nitrate 1 week ago and was re-approximated with subcuticular U stitch today. Two other small separations covered with steri strips. Infection precuations discussed. She will return to clinic in 1 week.    Patient seen and discussed with Dr. Hermilo River MD  Ob/Gyn PGY-1  December 5, 2018 3:47 PM    I was present for above procedure.   Paulette Akhtar MD

## 2018-12-05 ENCOUNTER — OFFICE VISIT (OUTPATIENT)
Dept: OBGYN | Facility: CLINIC | Age: 43
End: 2018-12-05
Payer: COMMERCIAL

## 2018-12-05 VITALS
WEIGHT: 220.9 LBS | BODY MASS INDEX: 34.6 KG/M2 | DIASTOLIC BLOOD PRESSURE: 73 MMHG | SYSTOLIC BLOOD PRESSURE: 117 MMHG | HEART RATE: 60 BPM

## 2018-12-05 PROCEDURE — G0463 HOSPITAL OUTPT CLINIC VISIT: HCPCS | Mod: ZF

## 2018-12-05 PROCEDURE — 12020 TX SUPFC WND DEHSN SMPL CLSR: CPT | Mod: ZF | Performed by: STUDENT IN AN ORGANIZED HEALTH CARE EDUCATION/TRAINING PROGRAM

## 2018-12-05 NOTE — MR AVS SNAPSHOT
After Visit Summary   2018    Mary Wray    MRN: [id number]           Patient Information     Date Of Birth          1975        Visit Information        Provider Department      2018 3:15 PM Barbara River MD [facility]        Today's Diagnoses     Separation of  wound with drainage, postpartum    -  1       Follow-ups after your visit        Follow-up notes from your care team     Return in about 1 week (around 2018).      Your next 10 appointments already scheduled     Dec 13, 2018  8:20 AM CST   Return Visit with Jazlyn Huitron MD   [facility] ([facility])    [address],  [address]  [facility] [address]   [phone]            Dec 13, 2018  1:45 PM CST   Return Visit with Paula Stoner MD   [facility] ([facility])    [facility] [address]  [address]  [facility] [id number] [phone]            Dec 18, 2018  9:00 AM CST   RETURN EXTENDED with Lola Otto MD   [facility] ([facility])    [facility] [address]  [address]  [facility] [id number] [phone]              Who to contact     Please call your clinic at [phone] to:    Ask questions about your health    Make or cancel appointments    Discuss your medicines    Learn about your test results    Speak to your doctor            Additional Information About Your Visit        [employer] Information     [employer] gives you secure access to your electronic health record. If you see a primary care provider, you can also send messages to your care team and make appointments. If you have questions, please call your primary care clinic.  If you do not have a primary care provider, please call [phone] and they will assist you.      [employer] is an electronic gateway that provides easy, online access to  your medical records. With [employer], you can request a clinic appointment, read your test results, renew a prescription or communicate with your care team.     To access your existing account, please contact your [facility] or call [phone] for assistance.        Care EveryWhere ID     This is your Care EveryWhere ID. This could be used by other organizations to access your [place] medical records  [id number]        Your Vitals Were     Pulse Last Period BMI (Body Mass Index)             60 (LMP Unknown) 34.6 kg/m2          Blood Pressure from Last 3 Encounters:   12/05/18 117/73   11/27/18 114/75   11/19/18 126/75    Weight from Last 3 Encounters:   12/05/18 100.2 kg (220 lb 14.4 oz)   11/14/18 101.8 kg (224 lb 8 oz)   11/07/18 114.8 kg (253 lb)              We Performed the Following     CLOSURE SUPERFICIAL, WOUND DEHIS SIMPLE        Primary Care Provider Office Phone # Fax #    Jazlyn Huitron [phone]       [address] [address]        Equal Access to Services     [facility] Center[facility]W : Hadii aster Sandra, wasylda odessa, qaybta yvette nina, adrianna coy . So [facility] [place] [phone].    ATENCIÓN: Si habla español, tiene a perdue disposición servicios gratuitos de asistencia lingüística. [facility] [phone].    We comply with applicable federal civil rights laws and [place] laws. We do not discriminate on the basis of race, color, national origin, age, disability, sex, sexual orientation, or gender identity.            Thank you!     Thank you for choosing [facility]  for your care. Our goal is always to provide you with excellent care. Hearing back from our patients is one way we can continue to improve our services. Please take a few minutes to complete the written survey that you may receive in the mail after your visit with us. Thank you!             Your Updated  Medication List - Protect others around you: Learn how to safely use, store and throw away your medicines at www.disposemymeds.org.          This list is accurate as of 18 11:59 PM.  Always use your most recent med list.                   Brand Name Dispense Instructions for use Diagnosis    acetaminophen 325 MG tablet    TYLENOL    100 tablet    Take 2 tablets (650 mg) by mouth every 4 hours as needed for mild pain or fever    Status post        blood glucose monitoring lancets     100 each    Use to test blood sugar 3-4 times daily or as directed.  Ok to substitute alternative if insurance prefers.    Pre-diabetes       blood glucose monitoring meter device kit     1 kit    Use to test blood sugar 3-4 times daily or as directed.  Ok to substitute alternative if insurance prefers.    Pre-diabetes       blood glucose monitoring test strip    ACCU-CHEK CYNTHIA    100 strip    Use to test blood sugars 3-4 times daily as directed.  OK to substitute per formulary    Pre-diabetes       breast pump Misc     1 each    1 each as needed (as needed)    Postpartum care and examination of lactating mother       ferrous sulfate 325 (65 Fe) MG tablet    FEROSUL    90 tablet    Take 1 tablet (325 mg) by mouth daily (with breakfast)    Status post        ibuprofen 600 MG tablet    ADVIL/MOTRIN    120 tablet    Take 1 tablet (600 mg) by mouth every 6 hours as needed for other (cramping)    Status post        omega 3 1000 MG Caps           oxyCODONE 5 MG tablet    ROXICODONE    6 tablet    Take 1 tablet (5 mg) by mouth every 6 hours as needed for severe pain    Status post        PRENATAL VITAMIN PO           senna-docusate 8.6-50 MG tablet    SENOKOT-S/PERICOLACE    100 tablet    Take 1 tablet by mouth 2 times daily as needed for constipation    Status post        SYNTHROID 200 MCG tablet   Generic drug:  levothyroxine     90 tablet    Take 1 tablet (200 mcg) by mouth daily     Postablative hypothyroidism       VITAMIN D (CHOLECALCIFEROL) PO      Take by mouth daily

## 2018-12-06 ENCOUNTER — TELEPHONE (OUTPATIENT)
Dept: ENDOCRINOLOGY | Facility: CLINIC | Age: 43
End: 2018-12-06

## 2018-12-06 NOTE — TELEPHONE ENCOUNTER
[facility] Call Center    Phone Message    May a detailed message be left on voicemail: yes    Reason for Call: Other: Patient would like to schedule a telephone appt with Dr. Del Castillo. Please contact.      Action Taken: Message routed to:  [facility] ([facility]): Endocrinology

## 2018-12-12 NOTE — PROGRESS NOTES
"       HPI       Mary Wray is a 43 year old  who presents for   Chief Complaint   Patient presents with     discuss about cholestrol     and thyroid      Lab follow up  - wondering if TSH should be checked (now 1 month postpartum - no adjustments in thyroid replacement since earlier in pregnancy)    Possible cholesterol issues??  - has some \"growths\" over her eyelids, was seen by dermatology and they recommended a cholesterol check  - they have been present for awhile, but seemed to be more prominent since pregnancy    1 month f/u after pLTCS  - has been monitoring her incision with small opening noted right around Thanksgiving that was treated with silver nitrate at [facility] on 11/27/18, followed by placement of a single subcuticular and steri strips on 12/5 due to persistent drainage  - no further drainage, does still feel some \"discomfort\" in lower abdomen at times  - has f/u with OB clinic this afternoon    F/u BP   - Failed IOL for preE with SF, QBL 1295cc  - stable initially after delivery, but then started on Procardia XL due to recurrence of severe-range BP in the postpartum period, discharged from the hospital on 60mg Procardia daily  - has gradually cut back, and then stopped her meds 2 wks ago, no s/sx of worsening BP issues (no HA, vision changes or CP)      Problem, Medication and Allergy Lists were reviewed and updated if needed..    Patient is an established patient of this clinic..         Review of Systems:   Review of Systems   Constitutional: Positive for fatigue. Negative for activity change (trying to advance back to her pre-pregnancy activity), appetite change and fever.   Respiratory: Negative.    Cardiovascular: Negative.    Skin:        Mild lower abdominal \"discomfort\" below the CS scar - no further oozing   Psychiatric/Behavioral: The patient is nervous/anxious (a tad - mainly just \"new parent worries\").             Physical Exam:     Vitals:    12/13/18 0805   BP: 110/73   BP Location: Left " arm   Patient Position: Chair   Cuff Size: Adult Small   Pulse: 67   Resp: 18   Temp: 98.8  F (37.1  C)   TempSrc: Oral   SpO2: 97%   Weight: 99.8 kg (220 lb)     Body mass index is 34.46 kg/m .  Vitals were reviewed and were normal     Physical Exam   Constitutional: She is oriented to person, place, and time. She appears well-developed and well-nourished.   HENT:   Head: Normocephalic and atraumatic.   Pulmonary/Chest: Effort normal.   Musculoskeletal: Normal range of motion.   Neurological: She is oriented to person, place, and time.   Psychiatric: She has a normal mood and affect. Her behavior is normal. Judgment and thought content normal.         Results:   Results are ordered and pending    Assessment and Plan        1. Postablative hypothyroidism  Follow up on labs today, adjust meds if needed  - TSH    2. Screening for cholesterol level  3. Xanthelasma of eyelid, bilateral  F/u with derm as scheduled  Check cholesterol today  - Lipid Cascade ([place])    4. Elevated blood pressure affecting pregnancy in third trimester, antepartum  Normal BP now  Reassurance, continue healthy diet  Ok to start gently exercising    5. Status post   F/u with OB team as scheduled  Ok to gently start exercise again, listen to body         There are no discontinued medications.    Options for treatment and follow-up care were reviewed with the patient. Mary Wray  engaged in the decision making process and verbalized understanding of the options discussed and agreed with the final plan.    Jazyln Huitron MD

## 2018-12-13 ENCOUNTER — OFFICE VISIT (OUTPATIENT)
Dept: FAMILY MEDICINE | Facility: CLINIC | Age: 43
End: 2018-12-13
Payer: COMMERCIAL

## 2018-12-13 ENCOUNTER — OFFICE VISIT (OUTPATIENT)
Dept: OBGYN | Facility: CLINIC | Age: 43
End: 2018-12-13
Payer: COMMERCIAL

## 2018-12-13 VITALS
BODY MASS INDEX: 34.46 KG/M2 | WEIGHT: 220 LBS | SYSTOLIC BLOOD PRESSURE: 109 MMHG | DIASTOLIC BLOOD PRESSURE: 74 MMHG | HEART RATE: 70 BPM

## 2018-12-13 VITALS
HEART RATE: 67 BPM | WEIGHT: 220 LBS | BODY MASS INDEX: 34.46 KG/M2 | SYSTOLIC BLOOD PRESSURE: 110 MMHG | TEMPERATURE: 98.8 F | DIASTOLIC BLOOD PRESSURE: 73 MMHG | RESPIRATION RATE: 18 BRPM | OXYGEN SATURATION: 97 %

## 2018-12-13 DIAGNOSIS — H02.63 XANTHELASMA OF EYELID, BILATERAL: ICD-10-CM

## 2018-12-13 DIAGNOSIS — E89.0 POSTABLATIVE HYPOTHYROIDISM: Primary | ICD-10-CM

## 2018-12-13 DIAGNOSIS — Z13.220 SCREENING FOR CHOLESTEROL LEVEL: ICD-10-CM

## 2018-12-13 DIAGNOSIS — Z98.891 STATUS POST C-SECTION: ICD-10-CM

## 2018-12-13 DIAGNOSIS — O16.3 ELEVATED BLOOD PRESSURE AFFECTING PREGNANCY IN THIRD TRIMESTER, ANTEPARTUM: ICD-10-CM

## 2018-12-13 DIAGNOSIS — H02.66 XANTHELASMA OF EYELID, BILATERAL: ICD-10-CM

## 2018-12-13 DIAGNOSIS — Z98.890 POSTOPERATIVE STATE: Primary | ICD-10-CM

## 2018-12-13 LAB
CHOLEST SERPL-MCNC: 187.4 MG/DL (ref 0–200)
CHOLEST/HDLC SERPL: 2.7 {RATIO} (ref 0–5)
HDLC SERPL-MCNC: 69.1 MG/DL
LDLC SERPL CALC-MCNC: 109 MG/DL (ref 0–129)
TRIGL SERPL-MCNC: 47 MG/DL (ref 0–150)
TSH SERPL DL<=0.005 MIU/L-ACNC: 0.01 MU/L (ref 0.4–4)
VLDL CHOLESTEROL: 9.4 MG/DL (ref 7–32)

## 2018-12-13 PROCEDURE — G0463 HOSPITAL OUTPT CLINIC VISIT: HCPCS | Mod: ZF

## 2018-12-13 ASSESSMENT — PAIN SCALES - GENERAL: PAINLEVEL: MILD PAIN (2)

## 2018-12-13 NOTE — NURSING NOTE
Chief Complaint   Patient presents with     Wound Check     Incision check       See REAGAN Lanier 12/13/2018

## 2018-12-13 NOTE — PROGRESS NOTES
[facility]  Progress Note    HPI:    Mary Prather is a 43 year old  now POD#34 s/p PLTCS for failed IOL in pregnancy complicated by AMA, post-ablative hypothyroidism, and preeclampsia with severe features here for an incision check. She was seen by Dr. Davalos on  and was found to have a superficial separation that was treated with silver nitrate. She was then seen on , and a 5 mm area of superficial separation was reapproximated with a single subcuticular U-stitch using 4-0 vicryl.     Today, she reports she is doing well overall. She had increased yellow/bloody drainage from the middle aspect of the incision after her last clinic visit, but she has not noticed any drainage in the last 36 hours. Two of the steri strips have come off spontaneously. She still notices an unpleasant odor from the incision. She denies fever, chills, nausea, and vomiting.     Ms. Prather is breastfeeding without difficulty. Her pain is well controlled. She is tolerating PO intake and having normal bowel movement.       OBHx  Obstetric History       T0      L1     SAB1   TAB0   Ectopic0   Multiple0   Live Births1       # Outcome Date GA Lbr Arnold/2nd Weight Sex Delivery Anes PTL Lv   2  18 36w6d  2.2 kg (4 lb 13.6 oz) F CS-LTranv  Y RAJANI      Name: PORFIRIO PRATHER      Complications: Dysfunctional Labor,GBS      Apgar1:  8                Apgar5: 9   1 SAB 17                ROS: 10-Point ROS negative except as noted in HPI    Physical Exam  /74 (BP Location: Right arm, Patient Position: Chair)   Pulse 70   Wt 99.8 kg (220 lb)   LMP  (LMP Unknown)   Breastfeeding? Yes   BMI 34.46 kg/m    Body mass index is 34.46 kg/m .    Gen: alert, oriented, no acute distress  Abd: Pfannenstiel skin incision without surrounding erythema, warmth, or tenderness to palpation. Right lateral aspect with steri strip in place. Incision appears well healing. Left lateral aspect with 5 mm separation of the  skin. Tissue appears healthy and without evidence of infection. Vicryl knot on inferior aspect of the opening, which was cut. Stitch removed, as it was not reapproximating the tissue.    Assessment/Plan:  Mary Wray is a 43 year old  here for an incision check. Overall, the incision appears to be well healing with a very small, superficial separation of the skin on the left-most aspect of the incision. Anticipate this will heal by secondary intention. For the odor, she can use hydrogen peroxide, diluted with water, on a cotton ball. She is comfortable with this plan.     Follow-up with Dr. Otto on 12/18/18    Paula Stoner MD  OB/GYN, PGY3  12/13/18    I have seen, examined, and counseled the patient with the resident.   I have reviewed and agree with the note.   Luly Bai

## 2018-12-17 PROBLEM — H02.66 XANTHELASMA OF EYELID, BILATERAL: Status: ACTIVE | Noted: 2018-12-17

## 2018-12-17 PROBLEM — O16.3 ELEVATED BLOOD PRESSURE AFFECTING PREGNANCY IN THIRD TRIMESTER, ANTEPARTUM: Status: RESOLVED | Noted: 2018-11-07 | Resolved: 2018-12-17

## 2018-12-17 PROBLEM — H02.63 XANTHELASMA OF EYELID, BILATERAL: Status: ACTIVE | Noted: 2018-12-17

## 2018-12-17 ASSESSMENT — ENCOUNTER SYMPTOMS
ACTIVITY CHANGE: 0
APPETITE CHANGE: 0
RESPIRATORY NEGATIVE: 1
FATIGUE: 1
FEVER: 0
CARDIOVASCULAR NEGATIVE: 1
NERVOUS/ANXIOUS: 1

## 2018-12-18 ENCOUNTER — TELEPHONE (OUTPATIENT)
Dept: OBGYN | Facility: CLINIC | Age: 43
End: 2018-12-18

## 2018-12-18 ENCOUNTER — OFFICE VISIT (OUTPATIENT)
Dept: OBGYN | Facility: CLINIC | Age: 43
End: 2018-12-18
Attending: OBSTETRICS & GYNECOLOGY
Payer: COMMERCIAL

## 2018-12-18 VITALS
DIASTOLIC BLOOD PRESSURE: 75 MMHG | WEIGHT: 225 LBS | BODY MASS INDEX: 35.24 KG/M2 | HEART RATE: 75 BPM | SYSTOLIC BLOOD PRESSURE: 125 MMHG

## 2018-12-18 DIAGNOSIS — Z12.4 SCREENING FOR MALIGNANT NEOPLASM OF CERVIX: ICD-10-CM

## 2018-12-18 DIAGNOSIS — T81.31XD SUPERFICIAL DISRUPTION OR DEHISCENCE OF OPERATION WOUND, SUBSEQUENT ENCOUNTER: ICD-10-CM

## 2018-12-18 PROCEDURE — G0145 SCR C/V CYTO,THINLAYER,RESCR: HCPCS | Performed by: OBSTETRICS & GYNECOLOGY

## 2018-12-18 PROCEDURE — 87624 HPV HI-RISK TYP POOLED RSLT: CPT | Performed by: OBSTETRICS & GYNECOLOGY

## 2018-12-18 PROCEDURE — G0463 HOSPITAL OUTPT CLINIC VISIT: HCPCS | Mod: ZF

## 2018-12-18 RX ORDER — SACCHAROMYCES BOULARDII 250 MG
250 CAPSULE ORAL 2 TIMES DAILY
COMMUNITY
End: 2021-08-05

## 2018-12-18 ASSESSMENT — ANXIETY QUESTIONNAIRES
1. FEELING NERVOUS, ANXIOUS, OR ON EDGE: NOT AT ALL
7. FEELING AFRAID AS IF SOMETHING AWFUL MIGHT HAPPEN: NOT AT ALL
3. WORRYING TOO MUCH ABOUT DIFFERENT THINGS: NOT AT ALL
6. BECOMING EASILY ANNOYED OR IRRITABLE: NOT AT ALL
6. BECOMING EASILY ANNOYED OR IRRITABLE: NOT AT ALL
7. FEELING AFRAID AS IF SOMETHING AWFUL MIGHT HAPPEN: NOT AT ALL
5. BEING SO RESTLESS THAT IT IS HARD TO SIT STILL: NOT AT ALL
5. BEING SO RESTLESS THAT IT IS HARD TO SIT STILL: NOT AT ALL
2. NOT BEING ABLE TO STOP OR CONTROL WORRYING: NOT AT ALL
3. WORRYING TOO MUCH ABOUT DIFFERENT THINGS: NOT AT ALL
GAD7 TOTAL SCORE: 0
1. FEELING NERVOUS, ANXIOUS, OR ON EDGE: NOT AT ALL
2. NOT BEING ABLE TO STOP OR CONTROL WORRYING: NOT AT ALL
GAD7 TOTAL SCORE: 0

## 2018-12-18 ASSESSMENT — PATIENT HEALTH QUESTIONNAIRE - PHQ9
5. POOR APPETITE OR OVEREATING: NOT AT ALL
5. POOR APPETITE OR OVEREATING: NOT AT ALL
SUM OF ALL RESPONSES TO PHQ QUESTIONS 1-9: 1

## 2018-12-18 ASSESSMENT — PAIN SCALES - GENERAL: PAINLEVEL: NO PAIN (0)

## 2018-12-18 NOTE — NURSING NOTE
Chief Complaint   Patient presents with     Post Partum Exam     C/S 2018     SUBJECTIVE:   Mary Wray is here for her 6-week postpartum checkup.     PHQ-9 score:   Hx of Abuse:  No    Delivery Date: 2018.    Delivering provider:  Lola Otto MD.    Type of delivery:  .     Delivery complications: None  Infant gender:  girl, weight 4 pounds 13.6  oz.  Feeding Method:  .  Complications reported with feeding:  none, infant thriving .    Bleeding:  None.  Duration:  .  Menses resumed:  No  Bowel/Urinary problems:  No    Contraception Planned:  condoms  She  has not had intercourse since delivery.   .      Magda Saini LPN

## 2018-12-18 NOTE — LETTER
2018     RE: Mary Wray  [address]  [facility] [address]     Dear Colleague,    Thank you for referring your patient, Mary Wray, to the [facility] at [facility]. Please see a copy of my visit note below.    SUBJECTIVE   Mary Wray is a 43 year old  POD#39 here for incision check and postpartum visit.  See op note and discharge summary for further details. Lochia ceased, no menses yet. Is breastfeeding. Mood stable. Denies bowel/bladder or breast problems. No intercourse yet. Desires condoms for contraception.     Pregnancy was complicated by:  Post-ablative hypothyroidism  PreE w/SF  AMA  Superficial wound separation    Allergies   No Known Allergies      Review of Systems   ROS: focused ROS neg other than the symptoms noted above in the HPI.      OBJECTIVE   /75   Pulse 75   Wt 102.1 kg (225 lb)   LMP  (LMP Unknown)   Breastfeeding? Yes   BMI 35.24 kg/m     BMI: Body mass index is 35.24 kg/m .  General:  Alert, no distress   Head:  Normocephalic, without obvious abnormality   Abdomen:  Soft, non-tender, non-distended. Incision mostly well-healed with one midline opening only 2mm (able to be opened with q-tip, 2mm deep and tracks just 3mm to patient's left with approx 2cc of serous fluid drained, silver nitrate applied. Left angle opening unable to be probed open, looks well-healed with just a few mm granulation tissue at surface, silver nitrate applied to this area as well   Pelvic: -nefg  -bladder wnl, well supported  -vagina normal without discharge  -cervix normal in appearance, no masses  -uterus 9 wk size, AV, mobile, NT  -no adnexal masses, NT  -anus wnl, digital exam deferred   Extremities:  normal       ASSESSMENT   Mary Wray is a 43 year old , 6 weeks postpartum from  delivery.    PLAN     -- Return to fertility reviewed as well as recommended pregnancy interval for s/p .  Patient plans condoms for contraception.  -- Pap/HRHPV done today (had +HRHPV other in 2016, repeat 2017 NIL/HRHPV neg, 2nd repeat pap/HRHPV due now). If this is wnl, can go back to routine screening.   -- Continue a multi vitamin supplement, especially if breastfeeding.  -- Hypothyroidism - being followed by Dr. Huitron, TSH just checked 12/13  -- Superficial wound separations - will likely be completely healed after application of silver nitrate given now very small (compared to last notes). However, discussed with patient if still notices drainage in 1-2 weeks, come in for re-application of silver nitrate. Would not be able to accommodate any packing from today's exam. Also discussed if any spreading redness, fever, increasing pain, etc, that this would warrant eval sooner    RTC 1 year or prn    Lola Otto MD  12/18/2018

## 2018-12-18 NOTE — PROGRESS NOTES
SUBJECTIVE   Dimple Wray is a 43 year old  POD#39 here for incision check and postpartum visit.  See op note and discharge summary for further details. Lochia ceased, no menses yet. Is breastfeeding. Mood stable. Denies bowel/bladder or breast problems. No intercourse yet. Desires condoms for contraception.     Pregnancy was complicated by:  Post-ablative hypothyroidism  PreE w/SF  AMA  Superficial wound separation    Allergies   No Known Allergies      Review of Systems   ROS: focused ROS neg other than the symptoms noted above in the HPI.      OBJECTIVE   /75   Pulse 75   Wt 102.1 kg (225 lb)   LMP  (LMP Unknown)   Breastfeeding? Yes   BMI 35.24 kg/m    BMI: Body mass index is 35.24 kg/m .  General:  Alert, no distress   Head:  Normocephalic, without obvious abnormality   Abdomen:  Soft, non-tender, non-distended. Incision mostly well-healed with one midline opening only 2mm (able to be opened with q-tip, 2mm deep and tracks just 3mm to patient's left with approx 2cc of serous fluid drained, silver nitrate applied. Left angle opening unable to be probed open, looks well-healed with just a few mm granulation tissue at surface, silver nitrate applied to this area as well   Pelvic: -nefg  -bladder wnl, well supported  -vagina normal without discharge  -cervix normal in appearance, no masses  -uterus 9 wk size, AV, mobile, NT  -no adnexal masses, NT  -anus wnl, digital exam deferred   Extremities:  normal       ASSESSMENT   Dimple Wray is a 43 year old , 6 weeks postpartum from  delivery.    PLAN     -- Return to fertility reviewed as well as recommended pregnancy interval for s/p . Patient plans condoms for contraception.  -- Pap/HRHPV done today (had +HRHPV other in 2016, repeat 2017 NIL/HRHPV neg, 2nd repeat pap/HRHPV due now). If this is wnl, can go back to routine screening.   -- Continue a multi vitamin supplement, especially if breastfeeding.  -- Hypothyroidism - being  followed by Dr. Huitron, TSH just checked 12/13  -- Superficial wound separations - will likely be completely healed after application of silver nitrate given now very small (compared to last notes). However, discussed with patient if still notices drainage in 1-2 weeks, come in for re-application of silver nitrate. Would not be able to accommodate any packing from today's exam. Also discussed if any spreading redness, fever, increasing pain, etc, that this would warrant eval sooner    RTC 1 year or prn    Lola Otto MD  12/18/2018

## 2018-12-18 NOTE — TELEPHONE ENCOUNTER
Spoke to Mary and let her know she can take some ibuprofen and apply ice to incision for the burning post silver nitrate.Pt indicated understanding and agreed with plan.  .

## 2018-12-19 ASSESSMENT — ANXIETY QUESTIONNAIRES: GAD7 TOTAL SCORE: 0

## 2018-12-21 LAB
COPATH REPORT: NORMAL
PAP: NORMAL

## 2018-12-26 LAB
FINAL DIAGNOSIS: NORMAL
HPV HR 12 DNA CVX QL NAA+PROBE: NEGATIVE
HPV16 DNA SPEC QL NAA+PROBE: NEGATIVE
HPV18 DNA SPEC QL NAA+PROBE: NEGATIVE
SPECIMEN DESCRIPTION: NORMAL
SPECIMEN SOURCE CVX/VAG CYTO: NORMAL

## 2019-01-03 ENCOUNTER — TELEPHONE (OUTPATIENT)
Dept: ENDOCRINOLOGY | Facility: CLINIC | Age: 44
End: 2019-01-03

## 2019-01-03 NOTE — TELEPHONE ENCOUNTER
[facility] Call Center    Phone Message    May a detailed message be left on voicemail: yes    Reason for Call: Other: Pt wants to set up a phone appt with Dr. Del Castillo     Action Taken: Message routed to:  [facility] ([facility] Endo

## 2019-01-04 ENCOUNTER — VIRTUAL VISIT (OUTPATIENT)
Dept: ENDOCRINOLOGY | Facility: CLINIC | Age: 44
End: 2019-01-04
Payer: COMMERCIAL

## 2019-01-04 DIAGNOSIS — Z87.898 HISTORY OF PREDIABETES: ICD-10-CM

## 2019-01-04 DIAGNOSIS — R79.89 LOW TSH LEVEL: ICD-10-CM

## 2019-01-04 DIAGNOSIS — E66.9 OBESITY, UNSPECIFIED CLASSIFICATION, UNSPECIFIED OBESITY TYPE, UNSPECIFIED WHETHER SERIOUS COMORBIDITY PRESENT: ICD-10-CM

## 2019-01-04 DIAGNOSIS — Z83.3 FAMILY HISTORY OF DIABETES MELLITUS: ICD-10-CM

## 2019-01-04 DIAGNOSIS — E89.0 POSTABLATIVE HYPOTHYROIDISM: Primary | ICD-10-CM

## 2019-01-04 RX ORDER — LEVOTHYROXINE SODIUM 200 MCG
TABLET ORAL
Qty: 90 TABLET | Refills: 3 | COMMUNITY
Start: 2019-01-04 | End: 2019-01-11 | Stop reason: DRUGHIGH

## 2019-01-04 NOTE — PROGRESS NOTES
TELEPHONE VISIT    Attending ASSESSMENT/PLAN:     Postablative hypothyroidism. She is currently on Synthroid 200 x 6 /week divided    Low TSH on recent testing - suggests the current Synthroid dose is too high.    Labs now to include T3.      Addendum: results show that the current dose of Synthroid is too high.  She is currently on mean dose 171 mcg/day . Reduce to  150* 7.5/week, divided (mean 161 mcg/day).  Repeat labs in 2- 3 months.     History of Graves'     Post partum, lactating.     Obesity    Prediabetes on past A1cs. Family history of DM;  It is possible she had GDM based on the history she provided today.  I have insufficient data to be able to tell this.      Acanthosis nigricans had been noted in the past.      Phone visit:   Start time 0841  Stop time 0849  Total time  8 minutes    Loli Del Castillo MD      Cc/ HISTORY OF PRESENT ILLNESS 43 yr old woman presents for patient initiated telephone visit follow up.    She was on Synthroid brand 175 mcg/day prior to the pregnancy.  4/18 she reported she was on 200 x 6/week a the beginning of pregnancy.  On 6/27/18 she reported she was on 200 mcg.  She telll me today she did not have GDM but she was testing her BS tid at the end of the pregnancy.  She had preeclampsia . The baby girl was born 11/9/18, 36 weeks gestation, a little underweight at birth.  She is lactating    She is currently on Synthroid 200 x 6 /week - she has been on this dose since throughout the pregnancy .       Her recent course included the following   11/2/18 TSH 0.41  12/13/18: TSH 0.01    In 1997 she was diagnosed with Graves' , treated with radioactive iodine, soon resulting in hypothyroidism. She has been on L-T4 since then. See my 8/14 note for her past history of TFTS dating 6301-8847. See me 2/17 note for TFTS 2/13 - 2015.      ROS:  Feels really good - full of energy   Sleep is good  Losing a little weight   Cardiac: negative  Respiratory: negative; not back to her regular  "exercise routine.  GI: negative      Past Medical History  Past Medical History:   Diagnosis Date     Graves disease      Hypovitaminosis D      Mild depression (H)     , no meds, resolved with move     Miscarriage      Obesity      Postablative hypothyroidism     CCRM     Prediabetes      Premenstrual dysphoria      Past Surgical History:   Procedure Laterality Date      SECTION N/A 2018    Procedure:  SECTION;  Surgeon: Lola Otto MD;  Location: [facility] L+D     LEEP TX, CERVICAL      \"for HPV\"       Medications  Current Outpatient Medications   Medication Sig Dispense Refill     SYNTHROID 200 MCG tablet MON to FRI 1 tablet/day;SAT and SUN 0.5 tablet 90 tablet 3     omega 3 1000 MG CAPS        Prenatal Vit-Fe Fumarate-FA (PRENATAL VITAMIN PO)        saccharomyces boulardii (FLORASTOR) 250 MG capsule Take 250 mg by mouth 2 times daily       VITAMIN D, CHOLECALCIFEROL, PO Take by mouth daily       She doesn't know the vitamin D dose -     Results for ASHLI PRATHER (MRN [id number]) as of 2019 09:52   Ref. Range 2019 14:40   Hemoglobin A1C Latest Ref Range: 0 - 5.6 % 5.6   T4 Free Latest Ref Range: 0.76 - 1.46 ng/dL 1.79 (H)   Triiodothyronine (T3) Latest Ref Range: 60 - 181 ng/dL 105   TSH Latest Ref Range: 0.40 - 4.00 mU/L <0.01 (L)       ENDO THYROID LABS-[facility] Latest Ref Rng & Units 2018   TSH 0.40 - 4.00 mU/L 0.01 (L) 0.41   T4 TOTAL 4.5 - 13.9 ug/dL       ENDO THYROID LABS-[facility] Latest Ref Rng & Units 2018   TSH 0.40 - 4.00 mU/L 0.26 (L) 0.11 (L)   T4 TOTAL 4.5 - 13.9 ug/dL 18.1 (H) 21.3 (H)     ENDO THYROID LABS-[facility] Latest Ref Rng & Units 2018   TSH 0.40 - 4.00 mU/L 0.12 (L)   T4 TOTAL 4.5 - 13.9 ug/dL 20.8 (H)     "

## 2019-01-04 NOTE — PATIENT INSTRUCTIONS
Get labs now.     In general, you should Continue to have yearly thyroid function tests for life, or sooner if a concern arises. The doctor who is ordering the labs and seeing you should be the one who is filling the Rx for levothyroxine.

## 2019-01-09 ENCOUNTER — TELEPHONE (OUTPATIENT)
Dept: FAMILY MEDICINE | Facility: CLINIC | Age: 44
End: 2019-01-09

## 2019-01-09 ENCOUNTER — OFFICE VISIT (OUTPATIENT)
Dept: OBGYN | Facility: CLINIC | Age: 44
End: 2019-01-09
Attending: ADVANCED PRACTICE MIDWIFE
Payer: COMMERCIAL

## 2019-01-09 VITALS
SYSTOLIC BLOOD PRESSURE: 111 MMHG | WEIGHT: 223.9 LBS | DIASTOLIC BLOOD PRESSURE: 72 MMHG | BODY MASS INDEX: 35.07 KG/M2 | HEART RATE: 60 BPM

## 2019-01-09 DIAGNOSIS — E89.0 POSTABLATIVE HYPOTHYROIDISM: ICD-10-CM

## 2019-01-09 DIAGNOSIS — Z01.89 LABORATORY PROCEDURE: Primary | ICD-10-CM

## 2019-01-09 DIAGNOSIS — E66.9 OBESITY, UNSPECIFIED CLASSIFICATION, UNSPECIFIED OBESITY TYPE, UNSPECIFIED WHETHER SERIOUS COMORBIDITY PRESENT: ICD-10-CM

## 2019-01-09 DIAGNOSIS — Z87.898 HISTORY OF PREDIABETES: ICD-10-CM

## 2019-01-09 LAB
HBA1C MFR BLD: 5.6 % (ref 0–5.6)
T3 SERPL-MCNC: 105 NG/DL (ref 60–181)
T4 FREE SERPL-MCNC: 1.79 NG/DL (ref 0.76–1.46)
TSH SERPL DL<=0.005 MIU/L-ACNC: <0.01 MU/L (ref 0.4–4)

## 2019-01-09 PROCEDURE — 83036 HEMOGLOBIN GLYCOSYLATED A1C: CPT

## 2019-01-09 PROCEDURE — 84443 ASSAY THYROID STIM HORMONE: CPT

## 2019-01-09 PROCEDURE — G0463 HOSPITAL OUTPT CLINIC VISIT: HCPCS | Mod: ZF

## 2019-01-09 PROCEDURE — 84480 ASSAY TRIIODOTHYRONINE (T3): CPT

## 2019-01-09 PROCEDURE — 84439 ASSAY OF FREE THYROXINE: CPT

## 2019-01-09 NOTE — TELEPHONE ENCOUNTER
Patient had questions regarding their recent lab results, specifically cholesterol. Patient stated that they received the results but want to know what steps they need to be taking if any. Please call back, okay to leave message.    Madelaine Mcmillan, Patient Representative

## 2019-01-09 NOTE — LETTER
2019       RE: Mary Prather  [address]  [facility] [address]     Dear Colleague,    Thank you for referring your patient, Mary Prather, to the [facility] at [facility]. Please see a copy of my visit note below.    S: doing well. Has been traveling. Currently en route from [place] to [place].   States incision finally stopped draining and pinpoint area closed.   Obstetric History       T0      L1     SAB1   TAB0   Ectopic0   Multiple0   Live Births1       # Outcome Date GA Lbr Arnold/2nd Weight Sex Delivery Anes PTL Lv   2  18 36w6d  2.2 kg (4 lb 13.6 oz) F CS-LTranv  Y RAJANI      Name: PORFIRIO PRATHER      Complications: Dysfunctional Labor,GBS      Apgar1:  8                Apgar5: 9   1 SAB 17                /72 (BP Location: Left arm, Patient Position: Chair)   Pulse 60   Wt 101.6 kg (223 lb 14.4 oz)   LMP  (LMP Unknown)   Breastfeeding? No   BMI 35.07 kg/m     Appears well  Abdomen soft NT ND  Incision CDI well healing    A: normal incision check  P: return to clinic after next trip or prn.   Luly Bai      Again, thank you for allowing me to participate in the care of your patient.      Sincerely,    Luly Bai MD

## 2019-01-09 NOTE — TELEPHONE ENCOUNTER
RN went over the normal lipid levels with her. Patient verbalized understanding. Her thyroid is managed by endocrinology and she has received communication with them about labs and changes.  Lizet Jordan RN

## 2019-01-10 PROBLEM — H02.66 XANTHELASMA OF EYELID, BILATERAL: Status: RESOLVED | Noted: 2018-12-17 | Resolved: 2019-01-10

## 2019-01-10 PROBLEM — O09.521 ELDERLY MULTIGRAVIDA IN FIRST TRIMESTER: Status: RESOLVED | Noted: 2018-04-26 | Resolved: 2019-01-10

## 2019-01-10 PROBLEM — D25.9 FIBROID UTERUS: Status: RESOLVED | Noted: 2018-05-23 | Resolved: 2019-01-10

## 2019-01-10 PROBLEM — H02.63 XANTHELASMA OF EYELID, BILATERAL: Status: RESOLVED | Noted: 2018-12-17 | Resolved: 2019-01-10

## 2019-01-10 PROBLEM — Z98.891 STATUS POST C-SECTION: Status: RESOLVED | Noted: 2018-11-09 | Resolved: 2019-01-10

## 2019-01-10 PROBLEM — O99.810 ABNORMAL GLUCOSE AFFECTING PREGNANCY: Status: RESOLVED | Noted: 2018-10-19 | Resolved: 2019-01-10

## 2019-01-10 PROBLEM — O09.92 HIGH-RISK PREGNANCY IN SECOND TRIMESTER: Status: RESOLVED | Noted: 2018-04-26 | Resolved: 2019-01-10

## 2019-01-10 PROBLEM — R82.71 GBS BACTERIURIA: Status: RESOLVED | Noted: 2018-04-28 | Resolved: 2019-01-10

## 2019-01-10 NOTE — PROGRESS NOTES
S: doing well. Has been traveling. Currently en route from [place] to [place].   States incision finally stopped draining and pinpoint area closed.   Obstetric History       T0      L1     SAB1   TAB0   Ectopic0   Multiple0   Live Births1       # Outcome Date GA Lbr Arnold/2nd Weight Sex Delivery Anes PTL Lv   2  18 36w6d  2.2 kg (4 lb 13.6 oz) F CS-LTranv  Y RAJANI      Name: CRESCENCIO,BABY1 DIMPLE      Complications: Dysfunctional Labor,GBS      Apgar1:  8                Apgar5: 9   1 SAB 17                /72 (BP Location: Left arm, Patient Position: Chair)   Pulse 60   Wt 101.6 kg (223 lb 14.4 oz)   LMP  (LMP Unknown)   Breastfeeding? No   BMI 35.07 kg/m    Appears well  Abdomen soft NT ND  Incision CDI well healing    A: normal incision check  P: return to clinic after next trip or prn.   Luly Bai

## 2019-01-11 RX ORDER — LEVOTHYROXINE SODIUM 150 MCG
TABLET ORAL
Qty: 96 TABLET | Refills: 3 | Status: SHIPPED | OUTPATIENT
Start: 2019-01-11 | End: 2019-10-18

## 2019-02-15 ENCOUNTER — TELEPHONE (OUTPATIENT)
Dept: FAMILY MEDICINE | Facility: CLINIC | Age: 44
End: 2019-02-15

## 2019-02-16 NOTE — TELEPHONE ENCOUNTER
Message Return    2/15/2019  9:56 PM    Message returned by Hakan Strickland    Patient: Mary Wray   Phone number-  [phone] (home)       return their call  Phone conversation with: Patient    Situation: Mary Wray  Is a 43 year old  female who is calling because of concern for shingles.    Background : Mary has a 14 week old daughter and was traveling with her recently to see Mary's mother. After they left from this visit today she received a phone call from her mother saying that she had been diagnosed with shingles. While there Mary did notice a rash on her mother, but it was not oozing. Mary did have the chicken pox as a child, but she is wondering what signs to look out for and what she should be concerned about in her 14 week old daughter.    Assessment: Concern for shingles. Advised that shingles unlikely to be contagious, especially if rash is not oozing, however there is a possibility of spreading the virus. For her, advised to watch for itching, burning pain on one side of the body, and for her daughter advised to look out for rash, fussiness as it would be possible to develop chicken pox if exposed to the virus. She may have some element of passive immunity, though, if mother is breastfeeding and has immunity from previous infection.    Recommendation/Plan:  Advised caller to observe for rash and call clinic with questions or concerns.

## 2019-03-18 DIAGNOSIS — E89.0 POSTABLATIVE HYPOTHYROIDISM: ICD-10-CM

## 2019-03-18 LAB
T4 FREE SERPL-MCNC: 1.49 NG/DL (ref 0.76–1.46)
TSH SERPL DL<=0.005 MIU/L-ACNC: 0.04 MU/L (ref 0.4–4)

## 2019-04-25 ENCOUNTER — TELEPHONE (OUTPATIENT)
Dept: FAMILY MEDICINE | Facility: CLINIC | Age: 44
End: 2019-04-25

## 2019-04-25 NOTE — TELEPHONE ENCOUNTER
[facility] phone call message-patient reporting a symptom:     Symptom: Congestion    When did symptoms begin? Yesterday    Characteristics: (location on body, intensity, what makes it better or worse, associated symptoms )  Feeling congested and some allergy symptoms     Additional Details Patient states she is breastfeeding her baby and wondering if she take OTC medication like Claritin or Benadryl. Please advise       Same Day Visit Offered: We do not have any opening    Additional comments: Please give her a call    OK to leave message on voice mail? Yes    Advised patient that RN would call back within 3 hours, unless emergent   Primary language: English      needed? No    Call taken on April 25, 2019 at 3:18 PM by Edda Bass

## 2019-04-25 NOTE — TELEPHONE ENCOUNTER
RN routing message to PCP and Pharmacy to advise safety of breastfeeding with OTC medication.    Manuela Ridley RN

## 2019-04-25 NOTE — LETTER
2018       RE: Mary Wray  [address] [address]     Dear Colleague,    Thank you for referring your patient, Mary Wray, to the [facility] at [facility]. Please see a copy of my visit note below.    42 year old female, . IVF 03/15/2018, 5 6/7 weeks. Estimated Date of Delivery: 2018,  presents for confirmation of dates and assessment of viability. This study was done transvaginally.    Measurements     GS = 11.1 mm = 5 6/7 weeks  EGA.        Gestational sac and yolk sac identified. Fetal pole not visualized.     Fetal/Fetal Cardiac Activity: Absent.      Implantation: Intrauterine.     Cervix = 3.7 cm      Maternal structures  - multiple myomas, largest appears intramural-->subserosal at anterior fundus - 2.6 x 3.0 x 3.0cm.    Impression: US today with IUP at 5w6d, consider repeat viability ultrasound in 1-2 weeks given no FP.  Myomatous uterus.    JESUSTIA Marti MD MPH   never

## 2019-07-24 ENCOUNTER — TELEPHONE (OUTPATIENT)
Dept: FAMILY MEDICINE | Facility: CLINIC | Age: 44
End: 2019-07-24
Payer: COMMERCIAL

## 2019-07-24 NOTE — TELEPHONE ENCOUNTER
[facility] phone call message- general phone call:    Reason for call: Patient calling to speak SHAMA Haylee regarding a letter she received yesterday for herself and her daughter, Gail. patient would not go into detail of what letter was about. Please advise.     Action Desired: Call back    Return call needed: Yes    OK to leave a message on voice mail? Yes    Advised patient response may take up to 2 business days: Yes    Primary language: English      needed? No    Call taken on July 24, 2019 at 9:09 AM by Elba Huntley [facility] TRIAGE

## 2019-10-17 DIAGNOSIS — E89.0 POSTABLATIVE HYPOTHYROIDISM: ICD-10-CM

## 2019-10-17 LAB
T4 FREE SERPL-MCNC: 1.13 NG/DL (ref 0.76–1.46)
TSH SERPL DL<=0.005 MIU/L-ACNC: 0.62 MU/L (ref 0.4–4)

## 2019-10-18 DIAGNOSIS — E89.0 POSTABLATIVE HYPOTHYROIDISM: ICD-10-CM

## 2019-10-18 RX ORDER — LEVOTHYROXINE SODIUM 150 MCG
TABLET ORAL
Qty: 30 TABLET | Refills: 0 | Status: SHIPPED | OUTPATIENT
Start: 2019-10-18 | End: 2019-11-15

## 2019-10-31 ENCOUNTER — OFFICE VISIT (OUTPATIENT)
Dept: FAMILY MEDICINE | Facility: CLINIC | Age: 44
End: 2019-10-31
Payer: COMMERCIAL

## 2019-10-31 VITALS
BODY MASS INDEX: 38.51 KG/M2 | SYSTOLIC BLOOD PRESSURE: 120 MMHG | OXYGEN SATURATION: 96 % | WEIGHT: 239.6 LBS | HEIGHT: 66 IN | HEART RATE: 73 BPM | TEMPERATURE: 97.9 F | RESPIRATION RATE: 16 BRPM | DIASTOLIC BLOOD PRESSURE: 80 MMHG

## 2019-10-31 DIAGNOSIS — Z00.00 ROUTINE GENERAL MEDICAL EXAMINATION AT A HEALTH CARE FACILITY: ICD-10-CM

## 2019-10-31 DIAGNOSIS — M77.42 METATARSALGIA OF BOTH FEET: ICD-10-CM

## 2019-10-31 DIAGNOSIS — Z12.4 SCREENING FOR MALIGNANT NEOPLASM OF CERVIX: ICD-10-CM

## 2019-10-31 DIAGNOSIS — Z23 NEED FOR PROPHYLACTIC VACCINATION AND INOCULATION AGAINST INFLUENZA: Primary | ICD-10-CM

## 2019-10-31 DIAGNOSIS — M77.41 METATARSALGIA OF BOTH FEET: ICD-10-CM

## 2019-10-31 ASSESSMENT — PAIN SCALES - GENERAL: PAINLEVEL: SEVERE PAIN (6)

## 2019-10-31 ASSESSMENT — MIFFLIN-ST. JEOR: SCORE: 1758.57

## 2019-10-31 NOTE — PROGRESS NOTES
Female Physical Note          HPI         Concerns today:   Foot pain bilateral  Would like to repeat pap today (worried due to past LEEP and HR-HPV - last few years has had NIL, neg HPV testing)  - typically wouldn't be due for 2 more years    Trying to get enough sleep - 1 year old makes it hard!      Patient Active Problem List   Diagnosis     Postablative hypothyroidism     Pre-diabetes     Papanicolaou smear of cervix with low grade squamous intraepithelial lesion (LGSIL)     H/O mammogram       Past Medical History:   Diagnosis Date     Graves disease 1997     Hypovitaminosis D      Mild depression (H)     2014, no meds, resolved with move     Miscarriage 2017     Obesity      Postablative hypothyroidism 1997    CCRM     Prediabetes 2016     Premenstrual dysphoria        Family History   Problem Relation Age of Onset     Obesity Mother      Diabetes Father 68     Obesity Father      Obesity Sister               Review of Systems:     Review of Systems:  CONSTITUTIONAL: NEGATIVE for fever, chills, change in weight  INTEGUMENTARY/SKIN: NEGATIVE for worrisome rashes, moles or lesions  EYES: NEGATIVE for vision changes or irritation  ENT/MOUTH: NEGATIVE for ear, mouth and throat problems  RESP: NEGATIVE for significant cough or SOB  BREAST: NEGATIVE for masses, tenderness or discharge  CV: NEGATIVE for chest pain, palpitations or peripheral edema  GI: NEGATIVE for nausea, abdominal pain, heartburn, or change in bowel habits  : NEGATIVE for frequency, dysuria, or hematuria  MUSCULOSKELETAL: NEGATIVE for significant arthralgias or myalgia - mild bilat foot pain  NEURO: NEGATIVE for weakness, dizziness or paresthesias  ENDOCRINE: NEGATIVE for temperature intolerance, skin/hair changes  HEME/ALLERGY: NEGATIVE for bleeding problems  PSYCHIATRIC: NEGATIVE for changes in mood or affect  Sleep:   Do you snore most or the night (as reported by a family member)? No  Do you feel sleepy or extremely tired during most of  the day? No           Social History     Social History     Socioeconomic History     Marital status: Single     Spouse name: Not on file     Number of children: Not on file     Years of education: 22     Highest education level: Not on file   Occupational History     Occupation:      Employer: [employer]     Comment: [employer]   Social Needs     Financial resource strain: Not on file     Food insecurity:     Worry: Not on file     Inability: Not on file     Transportation needs:     Medical: Not on file     Non-medical: Not on file   Tobacco Use     Smoking status: Never Smoker     Smokeless tobacco: Never Used   Substance and Sexual Activity     Alcohol use: Not Currently     Alcohol/week: 1.7 standard drinks     Types: 2 Standard drinks or equivalent per week     Comment: occasional     Drug use: No     Sexual activity: Not Currently     Partners: Male   Lifestyle     Physical activity:     Days per week: Not on file     Minutes per session: Not on file     Stress: Not on file   Relationships     Social connections:     Talks on phone: Not on file     Gets together: Not on file     Attends Yarsani service: Not on file     Active member of club or organization: Not on file     Attends meetings of clubs or organizations: Not on file     Relationship status: Not on file     Intimate partner violence:     Fear of current or ex partner: Not on file     Emotionally abused: Not on file     Physically abused: Not on file     Forced sexual activity: Not on file   Other Topics Concern     Parent/sibling w/ CABG, MI or angioplasty before 65F 55M? 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 Self, infant    Has anyone hurt you physically, for example by pushing, hitting, slapping or kicking you or forcing you to have sex? Denies  Do you feel threatened or controlled by a partner, ex-partner or anyone in your life? Denies    Sexual Health     Sexual concerns: No   STI History: Neg  Pregnancy History:   LMP Patient's last menstrual period was 10/21/2019.   Last Pap Smear Date:   Lab Results   Component Value Date    PAP NIL 2018    PAP NIL 2017    PAP LSIL 2016     Abnormal Pap History: See problem list    Recommended Screening     Pap/HPV cotest every 5 years for women 30-65   Testing not indicated    HIV screening:  Testing not indicated (already done, neg)   Breast CA Screening (>41 yo or 10 y before 1st degree relative diagnosis): discussed, last done in , is low-risk but getting them every 2-3 yrs           Physical Exam:     Vitals: /80   Pulse 73   Temp 97.9  F (36.6  C) (Oral)   Resp 16   Ht 1.676 m (5' 6\")   Wt 108.7 kg (239 lb 9.6 oz)   LMP 10/21/2019   SpO2 96%   Breastfeeding? Yes   BMI 38.67 kg/m    BMI= Body mass index is 38.67 kg/m .   GENERAL: healthy, alert and no distress  EYES: Eyes grossly normal to inspection, extraocular movements - intact, and PERRL  HENT: ear canals- normal; TMs- normal; Nose- normal; Mouth- no ulcers, no lesions  NECK: no tenderness, no adenopathy, no asymmetry, no masses, no stiffness; thyroid- normal to palpation  RESP: lungs clear to auscultation - no rales, no rhonchi, no wheezes  BREAST: no masses, no tenderness, no nipple discharge, no palpable axillary masses or adenopathy  CV: regular rates and rhythm, normal S1 S2, no S3 or S4 and no murmur, no click or rub -  ABDOMEN: soft, no tenderness, no  " hepatosplenomegaly, no masses, normal bowel sounds  MS: extremities- no gross deformities noted, no edema  - tenderness over the head of the 4th/5th metatarsals bilat, normal ankle strength, some flattening of arch with ambulation  SKIN: no suspicious lesions, no rashes  NEURO: strength and tone- normal, sensory exam- grossly normal, mentation- intact, speech- normal, reflexes- symmetric  BACK: no CVA tenderness, no paralumbar tenderness  - female: cervix- normal, no discharge  PSYCH: Alert and oriented times 3; speech- coherent , normal rate and volume; able to articulate logical thoughts, able to abstract reason, no tangential thoughts, no hallucinations or delusions, affect- normal  LYMPHATICS: ant. cervical- normal, post. cervical- normal, axillary- normal, supraclavicular- normal    Assessment and Plan      Mary was seen today for physical and imm/inj.    Diagnoses and all orders for this visit:    Need for prophylactic vaccination and inoculation against influenza  -     Fluzone quad, preserve-free/prefilled  0.5ml, 6+ months [80223]    Routine general medical examination at a health care facility    Screening for malignant neoplasm of cervix  -     Pap imaged thin layer screen with HPV - recommended age 30 - 65 years (select HPV order below)  -     HPV High Risk Types DNA Cervical    Metatarsalgia of both feet  - discussed intrinsic foot exercises (towel roll, alphabet), encouraged some walking barefoot at home  - Super feet shoe inserts for arch support  - reassess if not improving, consider PT     Plan:  1) updated pap/HPV testing - if neg, should be on q 5 yr schedule at this point  2) See above re: foot pain  3) Updated flu shot      Options for treatment and follow-up care were reviewed with the patient . Mary Rway and/or guardian engaged in the decision making process and verbalized understanding of the options discussed and agreed with the final plan.    Jazlyn Huitron MD

## 2019-11-06 LAB
COPATH REPORT: NORMAL
PAP: NORMAL

## 2019-11-13 PROBLEM — M77.41 METATARSALGIA OF BOTH FEET: Status: ACTIVE | Noted: 2019-11-13

## 2019-11-13 PROBLEM — M77.42 METATARSALGIA OF BOTH FEET: Status: ACTIVE | Noted: 2019-11-13

## 2019-11-15 ENCOUNTER — VIRTUAL VISIT (OUTPATIENT)
Dept: ENDOCRINOLOGY | Facility: CLINIC | Age: 44
End: 2019-11-15
Payer: COMMERCIAL

## 2019-11-15 DIAGNOSIS — E89.0 POSTABLATIVE HYPOTHYROIDISM: ICD-10-CM

## 2019-11-15 DIAGNOSIS — Z86.39 HISTORY OF GRAVES' DISEASE: Primary | ICD-10-CM

## 2019-11-15 DIAGNOSIS — E66.812 CLASS 2 OBESITY WITH BODY MASS INDEX (BMI) OF 38.0 TO 38.9 IN ADULT, UNSPECIFIED OBESITY TYPE, UNSPECIFIED WHETHER SERIOUS COMORBIDITY PRESENT: ICD-10-CM

## 2019-11-15 RX ORDER — LEVOTHYROXINE SODIUM 150 MCG
TABLET ORAL
Qty: 90 TABLET | Refills: 4 | Status: SHIPPED | OUTPATIENT
Start: 2019-11-15 | End: 2020-12-30

## 2019-11-15 NOTE — PROGRESS NOTES
TELEPHONE VISIT    Attending ASSESSMENT/PLAN:     Postablative hypothyroidism. She is currently on Synthroid 150* 7.5/week. Recent TFTS were at normal target.   She will continue to require yearly TSH reflex for life, sooner if concern.   This may be turned back over to her PCP, reserving follow up for for us to PRN  Rx refilled as Synthroid MADI 90 days with 4 refills.     History of Graves'     Obesity- BMI 38 yesterday.  I have counseled her today on healthy lifestyle recommendation as a means to try to prevent diabetes    Prediabetes on past A1cs. Family history of DM; Most recent A1c was 5.6.      Acanthosis nigricans had been noted in the past.      Start time 0932 AM  Stop time 0939  Total time 7 minutes    Loli Del Castillo MD      Cc/ HISTORY OF PRESENT ILLNESS 44 yr old woman presents for follow up of postablative hypothyroidism, history of Graves'.  I last saw her 1/4/19 .  At that time she was on LT4 171  Mcg/day.  I reduced the LT4 to 150* 7.5/week, divided (mean 161 mcg/day).  She confirms this is Synthroid brand.     Her recent course included the following   11/2/18 TSH 0.41  12/13/18: TSH 0.01  10/17/19 TSH 0.62, free t4 1.13  On 150 & 7.5/week.     In 1997 she was diagnosed with Graves' , treated with radioactive iodine, soon resulting in hypothyroidism. She has been on L-T4 since then. See my 8/14 note for her past history of TFTS dating [id number]. See me 2/17 note for TFTS 2/13 - 2015.      ROS:  Feels normal   Sleep as best as possible with infant  Eyes: negative; has been advised to get cataract surgery by optometrist; ophthalmologist said no   Cardiac: negative  Respiratory: negative; winded with working  GI: negative  Menses monthly - returned 6 months ago.  Weaned off lactation one month ago    Past Medical History  Past Medical History:   Diagnosis Date     Graves disease 1997     Hypovitaminosis D      Mild depression (H)     2014, no meds, resolved with move     Miscarriage 2017      "Obesity      Postablative hypothyroidism     CCRM     Prediabetes      Premenstrual dysphoria      No GDM per history she provided today    Past Surgical History:   Procedure Laterality Date      SECTION N/A 2018    Procedure:  SECTION;  Surgeon: Lola Otto MD;  Location: [facility]     LEEP TX, CERVICAL      \"for HPV\"       Medications  Current Outpatient Medications   Medication Sig Dispense Refill     SYNTHROID 150 MCG tablet MON to SAT 1 tablet/day;SUN 1.5 tablet 90 tablet 4     omega 3 1000 MG CAPS        Prenatal Vit-Fe Fumarate-FA (PRENATAL VITAMIN PO)        saccharomyces boulardii (FLORASTOR) 250 MG capsule Take 250 mg by mouth 2 times daily       VITAMIN D, CHOLECALCIFEROL, PO Take by mouth daily       Baby is 1 year; started working out within the last week      ENDO THYROID [facility] Latest Ref Rng & Units 10/17/2019 3/18/2019   TSH 0.40 - 4.00 mU/L 0.62 0.04 (L)   T4 TOTAL 4.5 - 13.9 ug/dL     T4 FREE 0.76 - 1.46 ng/dL 1.13 1.49 (H)   TRIIODOTHYRONINE(T3) 60 - 181 ng/dL       ENDO THYROID [facility] Latest Ref Rng & Units 2019   TSH 0.40 - 4.00 mU/L <0.01 (L)   T4 TOTAL 4.5 - 13.9 ug/dL    T4 FREE 0.76 - 1.46 ng/dL 1.79 (H)   TRIIODOTHYRONINE(T3) 60 - 181 ng/dL 105     "

## 2019-11-15 NOTE — PATIENT INSTRUCTIONS
In general, you should Continue to have yearly thyroid function tests for life, or sooner if a concern arises. The doctor who is ordering the labs and seeing you should be the one who is filling the Rx for levothyroxine.  You could simplify and reduce your care costs by turning this stable issue  back over to the primary care physician, reserving follow up with us for if a new concern arises.   We also offer telephone visits for medication refill follows up and other matters not requiring a routine physical examination. I am attaching information on this below.       Normal thyroid levels are important for pregnancy.  You should confirm normal thyroid levels immediately upon diagnosis of pregnancy.  Levothyroxine dose requirements often increase with pregnancy.  It is routine to check thyroid levels frequently during pregnancy.      Information on telephone visits     In some cases, a telephone visit can be an effective and convenient way to manage your follow-up care. Choosing a telephone visit rather than a face to face visit for your follow-up care is a decision that you and your physician can make together to ensure it meets all of your needs.  A face to face visit is always an available option, if you choose to do so.     We want to make sure you have all of the information you need about the telephone visit option and answer all of your questions before you decide to schedule a telephone follow-up visit. If you have any questions, you may talk to a staff member or our financial counselor at [phone].    1.  General overview  Our clinic sees patients for a variety of conditions and concerns. A face to face visit with your doctor is required for any new concerns or for your initial visit. If you and your doctor decide that a follow up visit by telephone is appropriate, you may decide to opt for a telephone visit.     2.  Billing and insurance coverage  There is a charge for telephone visits, similar to the  charge for an in-person visit. Your bill is based on the amount of time you and your physician are on the phone. We will bill each visit to your insurance company (just like your other medical visits), and you will be responsible for any costs not paid by your insurance company. The charge may be denied by your insurance company, in which case you will be responsible for the entire amount billed. The decision to cover the cost is determined by your insurance company. If you want to know what your insurance company will cover, we encourage you to contact them to determine your coverage. The codes below are the codes we use when billing for telephone visits and the associated charges. This may help you work with your insurance company to determine your benefits.   Billing CPT codes for Telephone visits    00181 ($30), 08177 ($35), 04163 ($40)     3. Updating your consent form  You may get contacted by a staff member about updating your consent form. If it needs updating prior to your telephone visit, please visit the link below, print it and fill it out and return it to us at [facility], Mail Code [id number], [address] [place] [address].   [email or web address]

## 2020-06-22 ENCOUNTER — ANCILLARY PROCEDURE (OUTPATIENT)
Dept: MAMMOGRAPHY | Facility: CLINIC | Age: 45
End: 2020-06-22
Attending: FAMILY MEDICINE
Payer: COMMERCIAL

## 2020-06-22 DIAGNOSIS — Z12.31 SCREENING MAMMOGRAM, ENCOUNTER FOR: ICD-10-CM

## 2020-07-14 ENCOUNTER — NURSE TRIAGE (OUTPATIENT)
Dept: NURSING | Facility: CLINIC | Age: 45
End: 2020-07-14

## 2020-07-14 DIAGNOSIS — Z11.59 SCREENING FOR VIRAL DISEASE: Primary | ICD-10-CM

## 2020-07-14 NOTE — TELEPHONE ENCOUNTER
"Patient is calling requesting COVID serologic antibody testing.  NOTE: Serologic testing is a blood test for 'antibodies' which are made at 10-14 days after you have had symptoms of COVID or were exposed and had an asymptomatic infection.  This does NOT test you for 'active' infection or tell you if you are contagious.    Are you a healthcare worker?  No  Do you currently have a cough, fever, body aches, shortness of breath, or difficulty breathing?  No  Did you previously have cough, fever, body aches, shortness of breath, or difficulty breathing that have now resolved? No previous covid symptoms.   Have you been exposed to (or come into close contact with) someone who tested POSITIVE for COVID-19 or someone who had a possible case of COVID-19?  No exposure. Lab order placed per SARS-CoV-2 Serology test Standing Order using indication \"No exposure or symptoms\" and diagnosis code \"Screening for viral disease\" (Z11.59)      The patient was informed: \"Testing is limited each day and it may take time for testing to be available to everyone who has called. You will receive a call within 48-72 hours to schedule the serology testing. Please confirm the best number to reach you is [phone]. If you have any questions about scheduling, call [phone].\"     "

## 2020-07-14 NOTE — TELEPHONE ENCOUNTER
"Patient is calling requesting COVID serologic antibody testing.  NOTE: Serologic testing is a blood test for 'antibodies' which are made at 10-14 days after you have had symptoms of COVID or were exposed and had an asymptomatic infection.  This does NOT test you for 'active' infection or tell you if you are contagious.    Are you a healthcare worker?  No  Do you currently have a cough, fever, body aches, shortness of breath, or difficulty breathing?  No  Did you previously have cough, fever, body aches, shortness of breath, or difficulty breathing that have now resolved? No previous covid symptoms.   Have you been exposed to (or come into close contact with) someone who tested POSITIVE for COVID-19 or someone who had a possible case of COVID-19?  { :[id number]}      The patient was informed: \"Testing is limited each day and it may take time for testing to be available to everyone who has called. You will receive a call within 48-72 hours to schedule the serology testing. Please confirm the best number to reach you is [phone]. If you have any questions about scheduling, call [phone].\"       "

## 2020-07-31 DIAGNOSIS — Z11.59 SCREENING FOR VIRAL DISEASE: ICD-10-CM

## 2020-08-02 LAB
COVID-19 SPIKE RBD ABY TITER: NORMAL
COVID-19 SPIKE RBD ABY: NEGATIVE

## 2020-10-14 ENCOUNTER — OFFICE VISIT (OUTPATIENT)
Dept: FAMILY MEDICINE | Facility: CLINIC | Age: 45
End: 2020-10-14
Payer: COMMERCIAL

## 2020-10-14 VITALS
RESPIRATION RATE: 16 BRPM | DIASTOLIC BLOOD PRESSURE: 75 MMHG | HEART RATE: 85 BPM | HEIGHT: 67 IN | WEIGHT: 246 LBS | OXYGEN SATURATION: 97 % | TEMPERATURE: 98.4 F | SYSTOLIC BLOOD PRESSURE: 112 MMHG | BODY MASS INDEX: 38.61 KG/M2

## 2020-10-14 DIAGNOSIS — Z00.00 ROUTINE GENERAL MEDICAL EXAMINATION AT A HEALTH CARE FACILITY: Primary | ICD-10-CM

## 2020-10-14 DIAGNOSIS — Z13.1 SCREENING FOR DIABETES MELLITUS: ICD-10-CM

## 2020-10-14 DIAGNOSIS — Z11.52 ENCOUNTER FOR SCREENING LABORATORY TESTING FOR COVID-19 VIRUS: ICD-10-CM

## 2020-10-14 PROCEDURE — U0003 INFECTIOUS AGENT DETECTION BY NUCLEIC ACID (DNA OR RNA); SEVERE ACUTE RESPIRATORY SYNDROME CORONAVIRUS 2 (SARS-COV-2) (CORONAVIRUS DISEASE [COVID-19]), AMPLIFIED PROBE TECHNIQUE, MAKING USE OF HIGH THROUGHPUT TECHNOLOGIES AS DESCRIBED BY CMS-2020-01-R: HCPCS | Mod: 90 | Performed by: FAMILY MEDICINE

## 2020-10-14 PROCEDURE — 99396 PREV VISIT EST AGE 40-64: CPT | Performed by: FAMILY MEDICINE

## 2020-10-14 ASSESSMENT — MIFFLIN-ST. JEOR: SCORE: 1794.22

## 2020-10-14 NOTE — PROGRESS NOTES
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), knows this will be hard, too    Working from home - starting virtual interview season now    Trying to conceive again  Starting IVF, had to put a hold on it for several months due to COVID, knows she is older    Weight gain - mild  Staying active  Vegan diet    Patient Active Problem List   Diagnosis     Postablative hypothyroidism     Pre-diabetes     Papanicolaou smear of cervix with low grade squamous intraepithelial lesion (LGSIL)     H/O mammogram     Metatarsalgia of both feet     History of Graves' disease     Class 2 obesity with body mass index (BMI) of 38.0 to 38.9 in adult, unspecified obesity type, unspecified whether serious comorbidity present       Past Medical History:   Diagnosis Date     Graves disease 1997     Hypovitaminosis D      Mild depression (H)     2014, no meds, resolved with move     Miscarriage 2017     Obesity      Postablative hypothyroidism 1997    CCRM     Prediabetes 2016     Premenstrual dysphoria         Family History   Problem Relation Age of Onset     Obesity Mother      Diabetes Father 68     Obesity Father      Obesity Sister             Review of Systems:     Review of Systems:  CONSTITUTIONAL: NEGATIVE for fever, chills, change in weight  INTEGUMENTARY/SKIN: NEGATIVE for worrisome rashes, moles or lesions  EYES: NEGATIVE for vision changes or irritation  ENT/MOUTH: NEGATIVE for ear, mouth and throat problems  RESP: NEGATIVE for SOB - does report a cough  BREAST: NEGATIVE for masses, " tenderness or discharge  CV: NEGATIVE for chest pain, palpitations or peripheral edema  GI: NEGATIVE for nausea, abdominal pain, heartburn, or change in bowel habits  : NEGATIVE for frequency, dysuria, or hematuria  MUSCULOSKELETAL: NEGATIVE for significant arthralgias or myalgia  NEURO: NEGATIVE for weakness, dizziness or paresthesias  ENDOCRINE: NEGATIVE for temperature intolerance, skin/hair changes  HEME/ALLERGY: NEGATIVE for bleeding problems  PSYCHIATRIC: NEGATIVE for changes in mood or affect  Sleep:   Do you snore most or the night (as reported by a family member)? No  Do you feel sleepy or extremely tired during most of the day? No    Still - not waking up rested, thinks this is because she is worn out by everything going on           Social History     Social History     Socioeconomic History     Marital status: Single     Spouse name: Not on file     Number of children: Not on file     Years of education: 22     Highest education level: Not on file   Occupational History     Occupation:      Employer: [employer]     Comment: School [employer]   Social Needs     Financial resource strain: Not on file     Food insecurity     Worry: Not on file     Inability: Not on file     Transportation needs     Medical: Not on file     Non-medical: Not on file   Tobacco Use     Smoking status: Never Smoker     Smokeless tobacco: Never Used   Substance and Sexual Activity     Alcohol use: Not Currently     Alcohol/week: 1.7 standard drinks     Types: 2 Standard drinks or equivalent per week     Comment: occasional     Drug use: No     Sexual activity: Not Currently     Partners: Male   Lifestyle     Physical activity     Days per week: Not on file     Minutes per session: Not on file     Stress: Not on file   Relationships     Social connections     Talks on phone: Not on file     Gets together: Not on file     Attends Confucianism service: Not on file     Active member of club or  "organization: Not on file     Attends meetings of clubs or organizations: Not on file     Relationship status: Not on file     Intimate partner violence     Fear of current or ex partner: Not on file     Emotionally abused: Not on file     Physically abused: Not on file     Forced sexual activity: Not on file   Other Topics Concern     Parent/sibling w/ CABG, MI or angioplasty before 65F 55M? 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 Self and daughter    Has anyone hurt you physically, for example by pushing, hitting, slapping or kicking you or forcing you to have sex? Denies  Do you feel threatened or controlled by a partner, ex-partner or anyone in your life? Denies    Sexual Health     Sexual concerns: No   STI History: Neg  Pregnancy History:   LMP Patient's last menstrual period was 10/05/2020.   Last Pap Smear Date:   Lab Results   Component Value Date    PAP NIL 10/31/2019    PAP NIL 2018    PAP NIL 2017     Abnormal Pap History: See problem list    Recommended Screening     Cholesterol Level (>46 yo or at risk):  Testing not indicated (UTD)  Pap/HPV cotest every 5 years for women 30-65   Testing not indicated  (UTD)  HIV screening:  Testing not indicated (UTD)         Physical Exam:     Vitals: /75   Pulse 85   Temp 98.4  F (36.9  C) (Oral)   Resp 16   Ht 1.695 m (5' 6.73\")   Wt 111.6 kg (246 lb)   LMP 10/05/2020   SpO2 97%   Breastfeeding No   BMI 38.84 kg/m    BMI= Body mass " index is 38.84 kg/m .   GENERAL: healthy, alert and no distress  EYES: Eyes grossly normal to inspection, extraocular movements - intact, and PERRL  HENT: ear canals- normal; TMs- normal; Nose- normal; Mouth- no ulcers, no lesions, mild erythema  NECK: no tenderness, no adenopathy, no asymmetry, no masses, no stiffness; thyroid- normal to palpation  RESP: lungs clear to auscultation - no rales, no rhonchi, no wheezes  BREAST: deferred today (recent neg mammo)  CV: regular rates and rhythm, normal S1 S2, no S3 or S4 and no murmur, no click or rub -  ABDOMEN: soft, no tenderness, no  hepatosplenomegaly, no masses, normal bowel sounds  MS: extremities- no gross deformities noted, no edema  SKIN: no suspicious lesions, no rashes  NEURO: strength and tone- normal, sensory exam- grossly normal, mentation- intact, speech- normal, reflexes- symmetric  BACK: no CVA tenderness, no paralumbar tenderness  - deferred  PSYCH: Alert and oriented times 3; speech- coherent , normal rate and volume; able to articulate logical thoughts, able to abstract reason, no tangential thoughts, no hallucinations or delusions, affect- normal  LYMPHATICS: ant. cervical- normal, post. cervical- normal      Assessment and Plan      Mary was seen today for physical.    Diagnoses and all orders for this visit:    Routine general medical examination at a health care facility    Screening for diabetes mellitus  -     Hemoglobin A1c ([facility]); Future    Encounter for screening laboratory testing for COVID-19 virus  -     Symptomatic COVID-19 Virus (Coronavirus) by PCR; Future  -     Symptomatic COVID-19 Virus (Coronavirus) by PCR      Plan:  1) UTD with pap, lipids, mammogram  2) Will order A1c for a future date (hasn't been checked recently, planning for IVF)  3) Already received flu shot  4) Discussed current events; ways to reframe and refocus, acknowledged the reality of our current state as it relates to the lived experiences of racism and  white supremacy; also talked about Mary's previous birth experience and how that has affected her thinking about new pregnancy, another birth      Options for treatment and follow-up care were reviewed with the patient . Mary Wray and/or guardian engaged in the decision making process and verbalized understanding of the options discussed and agreed with the final plan.    Jazlyn Huitron MD

## 2020-10-15 LAB
SARS-COV-2 RNA SPEC QL NAA+PROBE: NOT DETECTED
SPECIMEN SOURCE: NORMAL

## 2020-11-11 ENCOUNTER — MYC MEDICAL ADVICE (OUTPATIENT)
Dept: FAMILY MEDICINE | Facility: CLINIC | Age: 45
End: 2020-11-11

## 2020-11-11 DIAGNOSIS — Z20.822 COVID-19 RULED OUT: Primary | ICD-10-CM

## 2020-12-20 ENCOUNTER — HEALTH MAINTENANCE LETTER (OUTPATIENT)
Age: 45
End: 2020-12-20

## 2020-12-30 ENCOUNTER — OFFICE VISIT (OUTPATIENT)
Dept: FAMILY MEDICINE | Facility: CLINIC | Age: 45
End: 2020-12-30
Payer: COMMERCIAL

## 2020-12-30 VITALS
RESPIRATION RATE: 18 BRPM | WEIGHT: 254 LBS | TEMPERATURE: 98 F | SYSTOLIC BLOOD PRESSURE: 116 MMHG | HEART RATE: 84 BPM | DIASTOLIC BLOOD PRESSURE: 82 MMHG | OXYGEN SATURATION: 100 % | BODY MASS INDEX: 40.1 KG/M2

## 2020-12-30 DIAGNOSIS — M99.04 SOMATIC DYSFUNCTION OF SACRAL REGION: ICD-10-CM

## 2020-12-30 DIAGNOSIS — E89.0 POSTABLATIVE HYPOTHYROIDISM: ICD-10-CM

## 2020-12-30 DIAGNOSIS — S39.012A ACUTE MYOFASCIAL STRAIN OF LUMBAR REGION, INITIAL ENCOUNTER: Primary | ICD-10-CM

## 2020-12-30 DIAGNOSIS — M99.05 SOMATIC DYSFUNCTION OF PELVIS REGION: ICD-10-CM

## 2020-12-30 DIAGNOSIS — M99.03 SOMATIC DYSFUNCTION OF LUMBAR REGION: ICD-10-CM

## 2020-12-30 LAB — TSH SERPL DL<=0.005 MIU/L-ACNC: 0.13 MU/L (ref 0.4–4)

## 2020-12-30 PROCEDURE — 99213 OFFICE O/P EST LOW 20 MIN: CPT | Mod: 25 | Performed by: STUDENT IN AN ORGANIZED HEALTH CARE EDUCATION/TRAINING PROGRAM

## 2020-12-30 PROCEDURE — 98926 OSTEOPATH MANJ 3-4 REGIONS: CPT | Mod: GC | Performed by: STUDENT IN AN ORGANIZED HEALTH CARE EDUCATION/TRAINING PROGRAM

## 2020-12-30 PROCEDURE — 84443 ASSAY THYROID STIM HORMONE: CPT | Performed by: FAMILY MEDICINE

## 2020-12-30 PROCEDURE — 36415 COLL VENOUS BLD VENIPUNCTURE: CPT | Performed by: FAMILY MEDICINE

## 2020-12-30 RX ORDER — LEVOTHYROXINE SODIUM 150 MCG
TABLET ORAL
Qty: 90 TABLET | Refills: 4 | Status: SHIPPED | OUTPATIENT
Start: 2020-12-30 | End: 2022-02-01

## 2020-12-30 NOTE — PATIENT INSTRUCTIONS
"Here is the plan from today's visit    1. Acute myofascial strain of lumbar region, initial encounter  What Is OMT (Osteopathic Manipulative Treatment)?    As part of their education, DOs (doctor of osteopathy) receive special training in the musculoskeletal system (your nerves, bones and muscles).     OMT involves using the hands to diagnose, treat and prevent illness or injury.  Using OMT, your osteopathic physician will move your muscles and joints using techniques including stretching, gentle pressure and resistance.     OMT can help people of all ages and backgrounds. In addition to muscle or joint pain, it can be used to treat a variety of conditions such as asthma, sinus pain, migraines and carpal tunnel syndrome.     For more information, please visit [email or web address]    How to Schedule OMT :  Please make an appointment for \"OMT\" with the .  Please inform them you are scheduling for OMT with any DO provider. A list is provided below:     Nicki Mendoza, DO  Barrie Gonzales, DO  Kaia Cook, DO  Hillary Omer, DO  Tone Macario, DO  Al Silva, DO  Kristan Ramsey, DO  Heide Cisneros, DO      2. Postablative hypothyroidism    - SYNTHROID 150 MCG tablet; MON to SAT 1 tablet/day;SUN 1.5 tablet  Dispense: 90 tablet; Refill: 4  - TSH      Please call or return to clinic if your symptoms don't go away.    Follow up plan  Come back if not improving in the next 4-6 weeks    Thank you for coming to [facility] today.  Lab Testing:  **If you had lab testing today and your results are reassuring or normal they will be mailed to you or sent through [employer] within 7 days.   **If the lab tests need quick action we will call you with the results.  The phone number we will call with results is # [phone] (home) . If this is not the best number please call our clinic and change the number.  Medication Refills:  If you need any refills please call your pharmacy and they will contact us.   If you need to pick " up your refill at a new pharmacy, please contact the new pharmacy directly. The new pharmacy will help you get your medications transferred faster.   Scheduling:  If you have any concerns about today's visit or wish to schedule another appointment please call our office during normal business hours [phone] (8-5:00 M-F)   eferrals to [facility] please call [phone].   Mammogram Scheduling [phone]     XRay/CT/Ultrasound/MRI Scheduling [phone]    Medical Concerns:  If you have urgent medical concerns please call [phone] at any time of the day.    Tone Macario, DO

## 2020-12-30 NOTE — RESULT ENCOUNTER NOTE
Dear Mary  Here are your labs. It looks like your thyroid dose is a bit too high for you. I think just go to taking the 150 mcg tablet every day without the extra half-tab on Sunday, and let's plan on getting your thyroid level again in 6 weeks.  Tone Macario, DO

## 2020-12-30 NOTE — PROGRESS NOTES
BECKY Wray is a 45 year old  who presents for   Chief Complaint   Patient presents with     Back Pain     Patient pulled a muscle in the low back on 12/27-painful to sit, lay down, bend over. Pt has broken tailbone in the past.     1. Thyroid  15 lbs gained since Mar  Some hair falling out  More out of shape with current pandemic conditions. Unsure if these are due to inactivity from pandemic or if her thyroid is out of range.  Needs refills and lab recheck    2. Back pain  Lasted since inciting incident 3 days prior  Bent down weird picking up her daughter - flexed and to the side - sharp pain in her lower back  2d prior sharp pain in upper L shoulder. Also had that one 1 mo ago. Both of those went away quickly  No paresthesias  No radiation down her legs  No h/o trauma or back surgery  No problems with voiding or defecating  No saddle anesthesia  Has been using heat with some relief  Wants to make sure it's nothing worse      Problem, Medication and Allergy Lists were reviewed and updated if needed..    Patient is an established patient of this clinic..         Review of Systems:   Review of Systems         Physical Exam:     Vitals:    12/30/20 1106   BP: 116/82   BP Location: Right arm   Patient Position: Standing   Cuff Size: Adult Large   Pulse: 84   Resp: 18   Temp: 98  F (36.7  C)   TempSrc: Oral   SpO2: 100%   Weight: 115.2 kg (254 lb)     Body mass index is 40.1 kg/m .  Vitals were reviewed and were normal     Physical Exam   General: Alert and oriented, in no acute distress.   Skin: Warm and dry, no abnormalities noted.   Eyes: Extra-ocular muscles intact, pupils equal and reactive.   ENT: Speech intact, nasal passages open, no hearing impairment noted.   CV: No cyanosis or pallor, warm and well perfused.   Respiratory: No respiratory distress, no accessory muscle use.   Neuro: Gait and station normal, comprehension intact. Gross and fine motor skills intact.   Psychiatric: Mood and  affect appear normal.   Extremities: Warm, able to move all four extremities at will.     Back:  Tender:  left para lumbar muscles, right para lumbar muscles, left SI joint, right SI joint, left sciatic notch  Non-tender:  thoracic spinous processes, lumbar spinous processes  Range of Motion: reduced flexion, extension, sidebending and painful  Strength:  able to heel walk, able to toe walk.   Special tests: No pain with loading facet joints.            Results:   Results are ordered and pending    Assessment and Plan        Mary was seen today for back pain.    Diagnoses and all orders for this visit:    Postablative hypothyroidism  -     SYNTHROID 150 MCG tablet; MON to SAT 1 tablet/day;SUN 1.5 tablet  -     TSH    Acute myofascial strain of lumbar region, initial encounter  Somatic dysfunction of lumbar region  Somatic dysfunction of sacral region  Somatic dysfunction of pelvis region  Acute low back strain. Also has somatic dysfunction contributing to pain. No signs of spinal cord or nerve impingement, spinal fracture, disc herniation, etc.  Given that this has been interfering with the patient's quality of life and ADLs, after discussion, informed consent, and medical assessment for safety, we have together decided to address this concern with Osteopathic Manipulative Treatment. Please see OMT Procedure Note below for the specifics of treatment.  -     OSTEOPATHIC MANIP,3-4 BODY REGN  Advised heat, tylenol and ibuprofen prn, return if not improved in 4-6 weeks.  Ok to return for prn OMT or chiropracty       Medications Discontinued During This Encounter   Medication Reason     SYNTHROID 150 MCG tablet Reorder       Options for treatment and follow-up care were reviewed with the patient. Mary Wray  engaged in the decision making process and verbalized understanding of the options discussed and agreed with the final plan.    DO [facility] Resident PGY-3  [phone]            OMT PROCEDURE NOTE  Body Region:  L-spine, Sacrum, and Pelvis/ Innominates  Somatic Dysfunction: L5 FRSl, R SI tenderpoint, sacral respiratory motion restriction, R anterior innominate rotation  Treatment: Direct Muscle Energy, Counterstrain and Facilitated Positional Release Techniques.   Outcome: Improved    The patient actively participated in OMT and was able to communicate both positive and negative feedback throughout. OMT completed without incident. Patient tolerated treatment well. Patient reported that ROM, function, and/or pain level were improved. Advised that pain is occasionally worse during the first 24 hours after treatment and that drinking more water and taking Tylenol or Ibuprofen often help. Patient to return in 2 week/s or as needed for repeat osteopathic evaluation.     Tone Macario, DO

## 2020-12-30 NOTE — PROGRESS NOTES
Preceptor Attestation:    Patient seen and evaluated in person. I discussed the patient with the resident. I have verified the content of the note, which accurately reflects my assessment of the patient and the plan of care.   Supervising Physician:  Jona Orr MD.

## 2021-02-03 ENCOUNTER — OFFICE VISIT (OUTPATIENT)
Dept: FAMILY MEDICINE | Facility: CLINIC | Age: 46
End: 2021-02-03
Payer: COMMERCIAL

## 2021-02-03 VITALS
BODY MASS INDEX: 39.79 KG/M2 | OXYGEN SATURATION: 99 % | RESPIRATION RATE: 16 BRPM | DIASTOLIC BLOOD PRESSURE: 77 MMHG | WEIGHT: 252 LBS | HEART RATE: 65 BPM | SYSTOLIC BLOOD PRESSURE: 113 MMHG | TEMPERATURE: 98.2 F

## 2021-02-03 DIAGNOSIS — F32.1 CURRENT MODERATE EPISODE OF MAJOR DEPRESSIVE DISORDER WITHOUT PRIOR EPISODE (H): Primary | ICD-10-CM

## 2021-02-03 DIAGNOSIS — E89.0 POSTABLATIVE HYPOTHYROIDISM: ICD-10-CM

## 2021-02-03 LAB
T4 FREE SERPL-MCNC: 1.51 NG/DL (ref 0.76–1.46)
TSH SERPL DL<=0.005 MIU/L-ACNC: 0.28 MU/L (ref 0.4–4)

## 2021-02-03 PROCEDURE — 84443 ASSAY THYROID STIM HORMONE: CPT | Performed by: FAMILY MEDICINE

## 2021-02-03 PROCEDURE — 84439 ASSAY OF FREE THYROXINE: CPT | Performed by: FAMILY MEDICINE

## 2021-02-03 PROCEDURE — 99214 OFFICE O/P EST MOD 30 MIN: CPT | Performed by: FAMILY MEDICINE

## 2021-02-03 PROCEDURE — 36415 COLL VENOUS BLD VENIPUNCTURE: CPT | Performed by: FAMILY MEDICINE

## 2021-02-03 RX ORDER — ESCITALOPRAM OXALATE 5 MG/1
5 TABLET ORAL DAILY
Qty: 30 TABLET | Refills: 1 | Status: SHIPPED | OUTPATIENT
Start: 2021-02-03 | End: 2021-04-27

## 2021-02-03 ASSESSMENT — ANXIETY QUESTIONNAIRES
GAD7 TOTAL SCORE: 5
6. BECOMING EASILY ANNOYED OR IRRITABLE: SEVERAL DAYS
3. WORRYING TOO MUCH ABOUT DIFFERENT THINGS: SEVERAL DAYS
IF YOU CHECKED OFF ANY PROBLEMS ON THIS QUESTIONNAIRE, HOW DIFFICULT HAVE THESE PROBLEMS MADE IT FOR YOU TO DO YOUR WORK, TAKE CARE OF THINGS AT HOME, OR GET ALONG WITH OTHER PEOPLE: SOMEWHAT DIFFICULT
7. FEELING AFRAID AS IF SOMETHING AWFUL MIGHT HAPPEN: NOT AT ALL
5. BEING SO RESTLESS THAT IT IS HARD TO SIT STILL: NOT AT ALL
1. FEELING NERVOUS, ANXIOUS, OR ON EDGE: SEVERAL DAYS
2. NOT BEING ABLE TO STOP OR CONTROL WORRYING: SEVERAL DAYS

## 2021-02-03 ASSESSMENT — PATIENT HEALTH QUESTIONNAIRE - PHQ9
SUM OF ALL RESPONSES TO PHQ QUESTIONS 1-9: 9
5. POOR APPETITE OR OVEREATING: SEVERAL DAYS
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
## 2021-02-04 ASSESSMENT — ANXIETY QUESTIONNAIRES: GAD7 TOTAL SCORE: 5

## 2021-02-18 PROBLEM — F32.1 CURRENT MODERATE EPISODE OF MAJOR DEPRESSIVE DISORDER WITHOUT PRIOR EPISODE (H): Status: ACTIVE | Noted: 2021-02-18

## 2021-02-18 ASSESSMENT — ENCOUNTER SYMPTOMS
CARDIOVASCULAR NEGATIVE: 1
AGITATION: 0
DYSPHORIC MOOD: 1
DECREASED CONCENTRATION: 1
NERVOUS/ANXIOUS: 0
NEUROLOGICAL NEGATIVE: 1
ACTIVITY CHANGE: 0
APPETITE CHANGE: 0
GASTROINTESTINAL NEGATIVE: 1
FATIGUE: 1

## 2021-02-18 NOTE — PATIENT INSTRUCTIONS
Here is the plan from today's visit    1. Current moderate episode of major depressive disorder without prior episode (H)  Start lexapro, follow up in 1 month (once on 10mg dose)  Check back by Janusz with any questions  Recommend individual therapy  Support your efforts to increase activity and work on nutrition in a healthy way  - escitalopram (LEXAPRO) 5 MG tablet; Take 1 tablet (5 mg) by mouth daily May increase to 2 tabs daily in 7-10 days  Dispense: 30 tablet; Refill: 1    2. Postablative hypothyroidism  - TSH  - T4 free    The Stress Response and How It Can Affect You  The stress response, or  fight or flight  response is the emergency reaction system of the body. It is there to keep you safe in emergencies. The stress response includes physical and thought responses to your perception of various situations. When the stress response is turned on, your body may release  substances like adrenaline and cortisol. Your organs are programmed to respond in certain ways to situations that are viewed as challenging or threatening.     The stress response can work against you. You can turn it on when you don t really need it and, as a result, perceive something as an emergency when it s really not. It can turn on when you are just thinking about past or future events. Harmless, chronic conditions can be intensified by the stress response activating too often, with too much intensity, or for too long. Stress responses can be different for different individuals. Below is a list of some common stress related responses people have. ([place] the responses you have had in the last 2 weeks.)  Physical Responses  Muscle aches  Insomnia    Heart rate  Headache   Weight gain  Nausea   Constipation  Dry mouth  Muscle twitching Weight loss  Low energy  Weakness  Tight chest  Diarrhea  Dizziness  Trembling  Stomach cramps Chills   Hot flashes  Sweating  Pounding heart  Choking feeling Chest pain  Leg cramps  Numb hands/feet Dry  throat  Appetite change Face flushing    Blood pressure Light-headedness Feeling faint  Trouble swallowing  Rash     Urination  Neck pain  Tingling hands/feet  Emotional and Thought Responses  Restlessness  Agitation  Insecurity  Worthlessness  Anxiety  Stress   Depression  Hopelessness  Guilt   Defensiveness  Anger   Racing thoughts  Nightmares  Intense thinking Sensitivity  Expecting the worst  Numbness  Lack of motivation Mood swings  Forgetfulness    Concentration Rigidity  Preoccupation  Intolerance  Behavioral Responses  Avoidance  Withdrawal  Neglect    Alcohol use   Smoking    Eating  Arguing  Poor appearance    Spending  Poor hygiene    Eating  Seeking reassurance  Nail biting  Skin picking    Talking    Body checking  Sexual problems Foot tapping  Fidgeting  Rapid walking     Exercise  Teeth clenching Multitasking  Aggressive speaking     Fun activities   Sleeping    Relaxing activities Seeking information    The parasympathetic nervous system in your body is designed to turn on your body s relaxation response. Your behaviors and thinking can keep your body s natural relaxation response from operating at its best.  Getting your body to relax on a daily basis for at least brief periods can help decrease unpleasant stress responses. Learning to relax your body, through specific breathing and relaxation exercises as well as by minimizing stressful thinking, can help your body s natural relaxation system be more effective.   KEVIN Thomas., KACIE Rogel., Jama, M. S., & RJ Bowles (2009). Integrated Behavioral [place] [facility] in Primary Care: Step-by-Step Guidance for Assessment and Intervention. American Psychological Association.     INSTRUCTIONS FOR USE OF selective serotonin reuptake inhibitor ANTIDEPRESSANTS    DO:    - call clinic if you, or another provider, makes any medication changes  - call clinic if your use of Ibuprofen (or similar) increases significantly  - call clinic if you experience any symptom  listed below    DO NOT:  stop this med abruptly       TRY TO AVOID:  excessive use of ibuprofen or similar pain medication    FOOD: take with or without food     COMMON ADVERSE EFFECTS:  headache, dizziness, nausea, diarrhea, fatigue, sleepiness, sexual problems, weight gain, anxiety, a need to pace or restlessness     CALL CLINIC URGENTLY or EMERGENCY ROOM:  if you have any concerns, especially the following...  - abnormal bleeding  or  easy bruising (steff if increase use of ibuprofen etc)  - significant pacing, restlessness or muscle spasm  - thoughts of death or hurting yourself    - suspect Serotonin Syndrome:   confusion   tremor  severe headache  sweats  fever   funny feeling in muscles  need to pace / restlessness   severe nausea, diarrhea      Please call or return to clinic if your symptoms don't go away.    Follow up in 1 month - ok by [facility] or virtual    Thank you for coming to [facility] today.  Lab Testing:  **If you had lab testing today and your results are reassuring or normal they will be mailed to you or sent through [facility] within 7 days.   **If the lab tests need quick action we will call you with the results.  The phone number we will call with results is # [phone] (home) . If this is not the best number please call our clinic and change the number.  Medication Refills:  If you need any refills please call your pharmacy and they will contact us.   If you need to  your refill at a new pharmacy, please contact the new pharmacy directly. The new pharmacy will help you get your medications transferred faster.   Scheduling:  If you have any concerns about today's visit or wish to schedule another appointment please call our office during normal business hours [phone] (8-5:00 M-F)  If a referral was made to a [facility] and you don't get a call from central scheduling please call [phone].  If a Mammogram was ordered for you at [facility] call  [phone] to schedule or change your appointment.  If you had an XRay/CT/Ultrasound/MRI ordered the number is [phone] to schedule or change your radiology appointment.   Medical Concerns:  If you have urgent medical concerns please call [phone] at any time of the day.  If you have a medical emergency please call [phone].

## 2021-04-23 ENCOUNTER — TELEPHONE (OUTPATIENT)
Dept: FAMILY MEDICINE | Facility: CLINIC | Age: 46
End: 2021-04-23

## 2021-04-23 NOTE — TELEPHONE ENCOUNTER
Mary has tapered up to a 10mg dosage on her escitalopram and is requesting a new prescription. Last refilled 03/22/2021 for a 30 day supply Gumaro Lloyd RPh.  [facility]   [phone]  Sp@[facility]

## 2021-06-29 ENCOUNTER — ANCILLARY PROCEDURE (OUTPATIENT)
Dept: MAMMOGRAPHY | Facility: CLINIC | Age: 46
End: 2021-06-29
Payer: COMMERCIAL

## 2021-06-29 DIAGNOSIS — Z12.31 VISIT FOR SCREENING MAMMOGRAM: ICD-10-CM

## 2021-06-29 PROCEDURE — 77067 SCR MAMMO BI INCL CAD: CPT | Performed by: RADIOLOGY

## 2021-06-29 PROCEDURE — 77063 BREAST TOMOSYNTHESIS BI: CPT | Performed by: RADIOLOGY

## 2021-07-07 ENCOUNTER — ANCILLARY PROCEDURE (OUTPATIENT)
Dept: MAMMOGRAPHY | Facility: CLINIC | Age: 46
End: 2021-07-07
Attending: FAMILY MEDICINE
Payer: COMMERCIAL

## 2021-07-07 DIAGNOSIS — R92.8 ABNORMAL MAMMOGRAM OF LEFT BREAST: ICD-10-CM

## 2021-07-07 PROCEDURE — 77065 DX MAMMO INCL CAD UNI: CPT | Performed by: RADIOLOGY

## 2021-07-07 PROCEDURE — 76642 ULTRASOUND BREAST LIMITED: CPT | Mod: LT | Performed by: RADIOLOGY

## 2021-07-07 PROCEDURE — 77061 BREAST TOMOSYNTHESIS UNI: CPT | Performed by: RADIOLOGY

## 2021-07-13 ENCOUNTER — ANCILLARY PROCEDURE (OUTPATIENT)
Dept: MAMMOGRAPHY | Facility: CLINIC | Age: 46
End: 2021-07-13
Attending: FAMILY MEDICINE
Payer: COMMERCIAL

## 2021-07-13 ENCOUNTER — ANCILLARY PROCEDURE (OUTPATIENT)
Dept: ULTRASOUND IMAGING | Facility: CLINIC | Age: 46
End: 2021-07-13
Attending: FAMILY MEDICINE
Payer: COMMERCIAL

## 2021-07-13 DIAGNOSIS — R92.8 ABNORMAL MAMMOGRAM OF LEFT BREAST: ICD-10-CM

## 2021-07-13 PROCEDURE — 88305 TISSUE EXAM BY PATHOLOGIST: CPT | Performed by: RADIOLOGY

## 2021-07-13 PROCEDURE — 88342 IMHCHEM/IMCYTCHM 1ST ANTB: CPT | Mod: 59 | Performed by: RADIOLOGY

## 2021-07-13 PROCEDURE — 19083 BX BREAST 1ST LESION US IMAG: CPT | Mod: LT | Performed by: RADIOLOGY

## 2021-07-13 PROCEDURE — 88360 TUMOR IMMUNOHISTOCHEM/MANUAL: CPT | Performed by: RADIOLOGY

## 2021-07-13 RX ORDER — LIDOCAINE HYDROCHLORIDE 10 MG/ML
9 INJECTION, SOLUTION EPIDURAL; INFILTRATION; INTRACAUDAL; PERINEURAL ONCE
Status: COMPLETED | OUTPATIENT
Start: 2021-07-13 | End: 2021-07-13

## 2021-07-13 RX ADMIN — LIDOCAINE HYDROCHLORIDE 9 ML: 10 INJECTION, SOLUTION EPIDURAL; INFILTRATION; INTRACAUDAL; PERINEURAL at 12:12

## 2021-07-15 ENCOUNTER — TELEPHONE (OUTPATIENT)
Dept: GENERAL RADIOLOGY | Facility: CLINIC | Age: 46
End: 2021-07-15

## 2021-07-15 DIAGNOSIS — D24.2 INTRADUCTAL PAPILLOMA OF BREAST, LEFT: Primary | ICD-10-CM

## 2021-07-15 LAB
PATH REPORT.COMMENTS IMP SPEC: NORMAL
PATH REPORT.COMMENTS IMP SPEC: NORMAL
PATH REPORT.FINAL DX SPEC: NORMAL
PATH REPORT.GROSS SPEC: NORMAL
PATH REPORT.MICROSCOPIC SPEC OTHER STN: NORMAL
PATH REPORT.RELEVANT HX SPEC: NORMAL
PHOTO IMAGE: NORMAL

## 2021-07-15 NOTE — TELEPHONE ENCOUNTER
Spoke to patient regarding Left breast biopsy done on 7/13/21 with finding of intraductal papilloma with usual ductal hyperplasia; Fibrocystic change (including microcysts with apocrine metaplasia) with usual ductal hyperplasia.  Notified patient that the Radiologist recommendation is Surgical consultation and consider contrast mammograms. Referral placed and surgery scheduler will call patient to schedule.  Patient verbalized understanding and all questions and concerns answered to patients satisfaction.

## 2021-07-27 NOTE — TELEPHONE ENCOUNTER
RECORDS STATUS - BREAST    RECORDS REQUESTED FROM: Epic   DATE REQUESTED: 7/27   NOTES DETAILS STATUS   OFFICE NOTE from referring provider [facility] Jazlyn Huitron MD in [facility] XRAY: 2/3/21   OFFICE NOTE from medical oncologist     OFFICE NOTE from surgeon     OFFICE NOTE from radiation oncologist     DISCHARGE SUMMARY from hospital     DISCHARGE REPORT from the      OPERATIVE REPORT     MEDICATION LIST [facility] 4/27/21   CLINICAL TRIAL TREATMENTS TO DATE     LABS     PATHOLOGY REPORTS  (Tissue diagnosis, Stage, ER/GA percentage positive and intensity of staining, HER2 IHC, FISH, and all biopsies from breast and any distant metastasis)                 [facility] 7/13/21: Surg Path   GENONOMIC TESTING     TYPE:   (Next Generation Sequencing, including Foundation One testing, and Oncotype score)     IMAGING (NEED IMAGES & REPORT)     CT SCANS     MRI     MAMMO     ULTRASOUND     PET     BONE SCAN     BRAIN MRI

## 2021-07-29 ENCOUNTER — TELEPHONE (OUTPATIENT)
Dept: ONCOLOGY | Facility: CLINIC | Age: 46
End: 2021-07-29

## 2021-07-29 ENCOUNTER — ONCOLOGY VISIT (OUTPATIENT)
Dept: ONCOLOGY | Facility: CLINIC | Age: 46
End: 2021-07-29
Attending: FAMILY MEDICINE
Payer: COMMERCIAL

## 2021-07-29 ENCOUNTER — PRE VISIT (OUTPATIENT)
Dept: ONCOLOGY | Facility: CLINIC | Age: 46
End: 2021-07-29

## 2021-07-29 VITALS
HEART RATE: 59 BPM | WEIGHT: 256.9 LBS | OXYGEN SATURATION: 99 % | TEMPERATURE: 98.4 F | DIASTOLIC BLOOD PRESSURE: 72 MMHG | SYSTOLIC BLOOD PRESSURE: 107 MMHG | BODY MASS INDEX: 40.56 KG/M2 | RESPIRATION RATE: 18 BRPM

## 2021-07-29 DIAGNOSIS — D24.2 INTRADUCTAL PAPILLOMA OF BREAST, LEFT: ICD-10-CM

## 2021-07-29 DIAGNOSIS — Z11.59 ENCOUNTER FOR SCREENING FOR OTHER VIRAL DISEASES: ICD-10-CM

## 2021-07-29 PROCEDURE — G0463 HOSPITAL OUTPT CLINIC VISIT: HCPCS

## 2021-07-29 PROCEDURE — 99203 OFFICE O/P NEW LOW 30 MIN: CPT | Performed by: SURGERY

## 2021-07-29 RX ORDER — ERGOCALCIFEROL 1.25 MG/1
CAPSULE, LIQUID FILLED ORAL
COMMUNITY
Start: 2020-09-14 | End: 2021-08-05

## 2021-07-29 RX ORDER — CEFAZOLIN SODIUM 2 G/50ML
2 SOLUTION INTRAVENOUS
Status: CANCELLED | OUTPATIENT
Start: 2021-07-29

## 2021-07-29 RX ORDER — ACETAMINOPHEN 325 MG/1
975 TABLET ORAL ONCE
Status: CANCELLED | OUTPATIENT
Start: 2021-07-29 | End: 2021-07-29

## 2021-07-29 RX ORDER — CEFAZOLIN SODIUM 2 G/50ML
2 SOLUTION INTRAVENOUS SEE ADMIN INSTRUCTIONS
Status: CANCELLED | OUTPATIENT
Start: 2021-07-29

## 2021-07-29 ASSESSMENT — PAIN SCALES - GENERAL: PAINLEVEL: NO PAIN (0)

## 2021-07-29 NOTE — NURSING NOTE
"Oncology Rooming Note    July 29, 2021 9:00 AM   Mary Wray is a 45 year old female who presents for:    Chief Complaint   Patient presents with     Oncology Clinic Visit     intraductal papilloma of breast, left      Initial Vitals: /72 (BP Location: Right arm, Patient Position: Sitting, Cuff Size: Adult Large)   Pulse 59   Temp 98.4  F (36.9  C) (Oral)   Resp 18   Wt 116.5 kg (256 lb 14.4 oz)   LMP 06/24/2021 (Approximate)   SpO2 99%   BMI 40.56 kg/m   Estimated body mass index is 40.56 kg/m  as calculated from the following:    Height as of 10/14/20: 1.695 m (5' 6.73\").    Weight as of this encounter: 116.5 kg (256 lb 14.4 oz). Body surface area is 2.34 meters squared.  No Pain (0) Comment: Data Unavailable   Patient's last menstrual period was 06/24/2021 (approximate).  Allergies reviewed: Yes  Medications reviewed: Yes    Medications: Medication refills not needed today.  Pharmacy name entered into [employer]:    [facility] - [place], [place] - [address]  [facility] [place], [place] - [address]    Clinical concerns: New patient.       Sonia Almanza LPN July 29, 2021 9:00 AM                "

## 2021-07-29 NOTE — LETTER
"    2021         RE: Mary Wray  [address]  [place] [address]        Dear Colleague,    Thank you for referring your patient, Mary Wray, to the [facility]. Please see a copy of my visit note below.    NEW SURGICAL CONSULTATION  2021    Mary Wray is a 45 year old woman who presents with a left  breast complaint.  She was referred by Jazlyn Huitron MD.    HPI:    She noted no symptoms. She continues to be able to express some milk bilaterally up until 4 months ago. Stopped breastfeeding in 2019.    Imaging showed asymmetry in the LEFT lateral breast.  Ultrasound showed a 1.4 cm cluster of cysts with an intracystic component.    A biopsy was performed and a globe clip was placed.  It showed an intraductal papilloma and fibrocystic change.      BREAST-SPECIFIC HISTORY:  Prior breast biopsies: No  Prior breast surgeries: No  Prior radiation history: No - [facility] for thyroid, late .  Hormone replacement therapy: Yes - IVF and IUI (last 2021)  Bra size: 38 DD  Dominant hand: Right    FAMILY HISTORY:  Breast ca: Yes - mat cousin (dx late )  Ovarian ca: No  Pancreatic ca: No  Melanoma: No  Gastric ca: No  Colon ca: No  Other cancer: No    Past Medical History:   Diagnosis Date     Graves disease      Hypovitaminosis D      Mild depression (H)     , no meds, resolved with move     Miscarriage      Obesity      Postablative hypothyroidism 1997    CCRM     Prediabetes      Premenstrual dysphoria    No HTN, MI, CVA, DM    Past Surgical History:   Procedure Laterality Date      SECTION N/A 2018    Procedure:  SECTION;  Surgeon: Lola Otto MD;  Location: [facility] L[facility]D     LEEP TX, CERVICAL      \"for HPV\"   No GA issues    Current Outpatient Medications   Medication Sig Dispense Refill     escitalopram (LEXAPRO) 10 MG tablet Take 1 tablet (10 mg) by mouth daily 90 tablet 2     omega 3 1000 MG " CAPS        Prenatal Vit-Fe Fumarate-FA (PRENATAL VITAMIN PO)        saccharomyces boulardii (FLORASTOR) 250 MG capsule Take 250 mg by mouth 2 times daily       SYNTHROID 150 MCG tablet MON to SAT 1 tablet/day;SUN 1.5 tablet 90 tablet 4     VITAMIN D, CHOLECALCIFEROL, PO Take by mouth daily       vitamin D2 (ERGOCALCIFEROL) 59053 units (1250 mcg) capsule            No Known Allergies     SOCIAL HISTORY:  Smokes: No    She works as the  for the medical school at [employer].    She is fully vaccinated against COVID-19.    ROS:  Easy bruising/bleeding: Yes    /72 (BP Location: Right arm, Patient Position: Sitting, Cuff Size: Adult Large)   Pulse 59   Temp 98.4  F (36.9  C) (Oral)   Resp 18   Wt 116.5 kg (256 lb 14.4 oz)   LMP 06/24/2021 (Approximate)   SpO2 99%   BMI 40.56 kg/m     Physical Exam  Constitutional:       Appearance: She is well-developed.   Chest:      Breasts: Breasts are symmetrical.         Right: No inverted nipple, mass, nipple discharge, skin change or tenderness.         Left: No inverted nipple, mass, nipple discharge, skin change or tenderness.      Comments: Patient was examined in both supine and upright positions.   Lymphadenopathy:      Cervical: No cervical adenopathy.      Right cervical: No superficial, deep or posterior cervical adenopathy.     Left cervical: No superficial, deep or posterior cervical adenopathy.      Upper Body:      Right upper body: No supraclavicular, axillary or pectoral adenopathy.      Left upper body: No supraclavicular, axillary or pectoral adenopathy.      Comments: No lymphedema in bilateral upper extremities.   Skin:     General: Skin is warm and dry.        INVESTIGATIONS:    Diagnostic Mammogram & Ultrasound (7/7/2021) showed:  FINDINGS:  MAMMOGRAM:  TECHNIQUE: Standard mammographic views were performed with tomosynthesis and synthetic mammogram.  BREAST DENSITY: Scattered fibroglandular densities.  In the left lateral  breast at middle to posterior depth, there is a developing focal asymmetry.  ULTRASOUND:  Targeted, real-time ultrasound evaluation was performed by the technologist and radiologist.  In the left breast at 4:00, 7 cm from the nipple, there is a cluster of cysts measuring about 1.4 cm. At the lateral aspect of this group, within the largest cyst, there is a possible intracystic component versus volume averaging.  IMPRESSION: BI-RADS CATEGORY: 4 - Suspicious.    Screening Mammogram (6/29/2021) showed:  BREAST DENSITY: Scattered fibroglandular densities.  COMMENTS: Possible developing asymmetry upper outer left breast at mid depth. No suspicious findings right breast.  IMPRESSION: BI-RADS CATEGORY: 0 - Incomplete - Need Additional Imaging Evaluation and/or Prior Mammograms for Comparison.    Biopsy (7/13/2021) showed:  Final Diagnosis   LEFT breast, 4:00, 7 cm from nipple, ultrasound guided core biopsy:  -Intraductal papilloma with usual ductal hyperplasia  -Fibrocystic change (including microcysts with apocrine metaplasia) with usual ductal hyperplasia  -Negative for atypia or malignancy     ASSESSMENT:  Mary Wray is a 45 year old woman with a LEFT intraductal papilloma.    I personally reviewed the imaging above.    I reviewed the imaging and diagnosis with Mary Wray.  The natural history of intraductal papilloma was discussed with the patient.  Intraductal papilloma is a benign proliferative lesion of the breast that can cause nipple discharge.  An excision following the core needle biopsy is typically recommended for diagnostic purposes because there are a small percentage of intraductal papilloma that are upgraded to non-invasive or invasive cancer following excision. Because the lesion is not palpable, a wire-localized approach would be taken if she elected for excision .  She would present on the day of surgery for an image-guided wire placement, followed by a surgical excision in the operating room.   The risks of a wire-localized lumpectomy were discussed with the patient, including the risks of bleeding, wound infection, wound dehiscence, and post-operative contour change to the breast.  We discussed that surgical pathology results will be reviewed in person in clinic, at the postoperative visit. Because pathology reports are often complicated, results are reviewed at the postoperative visit to allow for careful discussion of next steps and for answering questions.     We also discussed the alternative of watchful waiting, including interval imaging in 6 months.      All questions were answered.  She did seem to understand the above.  Mary Wray elected to proceed with LEFT wire-localized lumpectomy.     Total time spent with the patient was 30 minutes, of which 75% was counseling.     PLAN:  1. LEFT wire-localized lumpectomy  2. Patient to report to her PCP for preoperative H&P and testing.    Marta Frost MD MSc [facility] FACS  Assistant Professor of Surgery  Division of Surgical Oncology  [facility]     40 minutes spent on the date of the encounter doing chart review, review of test results, interpretation of tests, patient visit and documentation.      Again, thank you for allowing me to participate in the care of your patient.        Sincerely,        Marta Frost MD

## 2021-07-29 NOTE — PROGRESS NOTES
I contacted the patient via phone and left a voicemail to confirm the scheduled dates and provide the following information:     Surgeon/surgery date/location:  Dr. Frost on 8/10 at [facility].  Arrival:   9:00 AM   Pre-op physical with:   PCP. Patient is aware this needs to be completed within 30 days of surgery.  COVID-19 test:   8/7.  Post-op:   8/26.    The surgery packet was provided via [employer] - patient responded on [employer].

## 2021-08-05 ENCOUNTER — OFFICE VISIT (OUTPATIENT)
Dept: FAMILY MEDICINE | Facility: CLINIC | Age: 46
End: 2021-08-05
Payer: COMMERCIAL

## 2021-08-05 VITALS
OXYGEN SATURATION: 97 % | BODY MASS INDEX: 40.73 KG/M2 | TEMPERATURE: 98.2 F | DIASTOLIC BLOOD PRESSURE: 74 MMHG | WEIGHT: 258 LBS | HEART RATE: 63 BPM | SYSTOLIC BLOOD PRESSURE: 109 MMHG | RESPIRATION RATE: 16 BRPM

## 2021-08-05 DIAGNOSIS — E66.813 CLASS 3 SEVERE OBESITY WITHOUT SERIOUS COMORBIDITY WITH BODY MASS INDEX (BMI) OF 40.0 TO 44.9 IN ADULT, UNSPECIFIED OBESITY TYPE (H): ICD-10-CM

## 2021-08-05 DIAGNOSIS — E66.01 CLASS 3 SEVERE OBESITY WITHOUT SERIOUS COMORBIDITY WITH BODY MASS INDEX (BMI) OF 40.0 TO 44.9 IN ADULT, UNSPECIFIED OBESITY TYPE (H): ICD-10-CM

## 2021-08-05 DIAGNOSIS — R73.03 PRE-DIABETES: ICD-10-CM

## 2021-08-05 DIAGNOSIS — E89.0 POSTABLATIVE HYPOTHYROIDISM: Primary | ICD-10-CM

## 2021-08-05 LAB
HBA1C MFR BLD: 6.1 % (ref 0–5.6)
TSH SERPL DL<=0.005 MIU/L-ACNC: 1.13 MU/L (ref 0.4–4)

## 2021-08-05 PROCEDURE — 84443 ASSAY THYROID STIM HORMONE: CPT | Performed by: FAMILY MEDICINE

## 2021-08-05 PROCEDURE — 83036 HEMOGLOBIN GLYCOSYLATED A1C: CPT | Performed by: FAMILY MEDICINE

## 2021-08-05 PROCEDURE — 36415 COLL VENOUS BLD VENIPUNCTURE: CPT | Performed by: FAMILY MEDICINE

## 2021-08-05 PROCEDURE — 99214 OFFICE O/P EST MOD 30 MIN: CPT | Performed by: FAMILY MEDICINE

## 2021-08-05 NOTE — PROGRESS NOTES
[address] [address]  Phone: [phone]  Fax: [phone]  Primary Provider: Jazlyn Huitron  Pre-op Performing Provider: DARON JOSUE      PREOPERATIVE EVALUATION:  Today's date: 8/5/2021    Mary Wray is a 45 year old female who presents for a preoperative evaluation.    Surgical Information:  Surgery/Procedure: Left Wire-localized lumpectomy  Surgery Location: [facility] [place]  Surgeon: Dr. Frost  Surgery Date: 8/10/21  Time of Surgery: 9:00 am check in  Where patient plans to recover: At home with family  Fax number for surgical facility: Note does not need to be faxed, will be available electronically in Epic.    Type of Anesthesia Anticipated: General    Plans for COVID test on 8/7 at 9:45 AM     Assessment & Plan     The proposed surgical procedure is considered LOW risk.    Postablative hypothyroidism - stable, no dose change  - TSH    Pre-diabetes - stable, no progression to diabetes  - Hemoglobin A1c    Abnormal menses  Patient reports having more uncomfortable menses for the last few years, especially her most recent (7/29/21) which was 2 weeks late. We discussed how stress, diet, medications, and medical conditions can affect menses timing. This could be the beginning of perimenopause, menopause defined as 12 months of amenorrhea without another cause. Since she is <45, the recommendation is a TSH and serum prolactin. Pregnancy is not possible. TSH today anyway for her hypothyroidism.   - If irregular menses persist, consider a prolactin level    Risks and Recommendations:  The patient has the following additional risks and recommendations for perioperative complications:   - Morbid obesity (BMI >40)    Medication Instructions:  Patient is to take all scheduled medications on the day of surgery    RECOMMENDATION:  APPROVAL GIVEN to proceed with proposed procedure, without further diagnostic evaluation.    30 minutes spent on the date  of the encounter doing chart review, history and exam, documentation and further activities per the note        Subjective     HPI related to upcoming procedure: Intraductal papilloma of left breast  Imaging showed asymmetry in the LEFT lateral breast.  Ultrasound showed a 1.4 cm cluster of cysts with an intracystic component.   A biopsy was performed and a globe clip was placed.  It showed an intraductal papilloma and fibrocystic change.      Preop Questions 8/3/2021   1. Have you ever had a heart attack or stroke? No   2. Have you ever had surgery on your heart or blood vessels, such as a stent placement, a coronary artery bypass, or surgery on an artery in your head, neck, heart, or legs? No   3. Do you have chest pain with activity? No   4. Do you have a history of  heart failure? No   5. Do you currently have a cold, bronchitis or symptoms of other infection? No   6. Do you have a cough, shortness of breath, or wheezing? No   7. Do you or anyone in your family have previous history of blood clots? No   8. Do you or does anyone in your family have a serious bleeding problem such as prolonged bleeding following surgeries or cuts? No   9. Have you ever had problems with anemia or been told to take iron pills? No   10. Have you had any abnormal blood loss such as black, tarry or bloody stools, or abnormal vaginal bleeding? No   11. Have you ever had a blood transfusion? No   12. Are you willing to have a blood transfusion if it is medically needed before, during, or after your surgery? Yes   13. Have you or any of your relatives ever had problems with anesthesia? No   14. Do you have sleep apnea, excessive snoring or daytime drowsiness? No   15. Do you have any artifical heart valves or other implanted medical devices like a pacemaker, defibrillator, or continuous glucose monitor? No   16. Do you have artificial joints? No   17. Are you allergic to latex? No   18. Is there any chance that you may be pregnant? No        Health Care Directive:  Patient does not have a Health Care Directive or Living Will: Patient states has Advance Directive and will bring in a copy to clinic.    Preoperative Review of :   reviewed - controlled substances prescribed by other outside provider(s).  - topical testosterone 1 year ago        Status of Chronic Conditions:  See problem list for active medical problems.  Problems all longstanding and stable, except as noted/documented.  See ROS for pertinent symptoms related to these conditions.      Had general anesthesia for egg retreival at [facility] - no complications with anesthesia.     Review of Systems  CONSTITUTIONAL: NEGATIVE for fever, chills, change in weight (explained)  INTEGUMENTARY/SKIN: NEGATIVE for worrisome rashes, moles or lesions  EYES: NEGATIVE for vision changes or irritation  ENT/MOUTH: NEGATIVE for ear, mouth and throat problems  RESP: NEGATIVE for significant cough or SOB  CV: NEGATIVE for chest pain, palpitations or peripheral edema  GI: NEGATIVE for nausea, abdominal pain, heartburn, or change in bowel habits  : NEGATIVE for frequency, dysuria, or hematuria  MUSCULOSKELETAL: NEGATIVE for significant arthralgias or myalgia  NEURO: NEGATIVE for weakness, dizziness or paresthesias  ENDOCRINE: NEGATIVE for temperature intolerance, skin/hair changes  HEME: NEGATIVE for bleeding problems  PSYCHIATRIC: NEGATIVE for changes in mood or affect    Patient Active Problem List    Diagnosis Date Noted     Intraductal papilloma of breast, left 07/29/2021     Priority: Medium     Added automatically from request for surgery [id number]       Current moderate episode of major depressive disorder without prior episode (H) 02/18/2021     Priority: Medium     History of Graves' disease 11/15/2019     Priority: Medium     Morbid obesity (H) 11/15/2019     Priority: Medium     Metatarsalgia of both feet 11/13/2019     Priority: Medium     H/O mammogram 12/11/2017     Priority: Medium     Class:  "Chronic     : considered \"high risk\" by pre-screening questionnaire; normal mammogram noted, but offered high-risk mgmt       Pre-diabetes 12/10/2015     Priority: Medium     Postablative hypothyroidism 2014     Priority: Medium     Class: Chronic     : pt currently taking synthroid 6 days a week, one day off  : Elevated T4, consulted with Dr. Otto, advised reducing synthroid dose to 75 mcg, re-evaluate in 4-6 weeks + appointment with endocrinologist. Magdalene drawing labs, will direct med mgmt       Papanicolaou smear of cervix with low grade squamous intraepithelial lesion (LGSIL) 2011     Priority: Medium     Overview: 2002, LEEP - in [place], followed by normal paps  : Pap NIL, HPV-HR neg  : Pap LSIL, HPV-HR pos (16, 18 neg)  2016: Colposcopy - no lesions, ECC negative - recommend co-testing in 1 year  2017: pap NIL, HPV-HR neg - recommend co-testing in 3 yrs.  2018: pap NIL, HR-HPV neg  10/2019: pap NIL, HR-HPV NEG - recommend co-testing in 5 years        Past Medical History:   Diagnosis Date     Graves disease      Hypovitaminosis D      Mild depression (H)     , no meds, resolved with move     Miscarriage      Obesity      Postablative hypothyroidism     CCRM     Prediabetes      Premenstrual dysphoria      Past Surgical History:   Procedure Laterality Date      SECTION N/A 2018    Procedure:  SECTION;  Surgeon: Lola Otto MD;  Location:  L+D     LEEP TX, CERVICAL      \"for HPV\"     Current Outpatient Medications   Medication Sig Dispense Refill     escitalopram (LEXAPRO) 10 MG tablet Take 1 tablet (10 mg) by mouth daily 90 tablet 2     SYNTHROID 150 MCG tablet MON to SAT 1 tablet/day;SUN 1.5 tablet 90 tablet 4       No Known Allergies     Social History     Tobacco Use     Smoking status: Never Smoker     Smokeless tobacco: Never Used   Substance Use Topics     Alcohol use: Not Currently     Alcohol/week: 1.7 " standard drinks     Types: 2 Standard drinks or equivalent per week     Comment: occasional       History   Drug Use No         Objective     /74   Pulse 63   Temp 98.2  F (36.8  C) (Oral)   Resp 16   Wt 117 kg (258 lb)   LMP 07/29/2021 (Approximate)   SpO2 97%   BMI 40.73 kg/m      Physical Exam    GENERAL APPEARANCE: healthy, alert and no distress     EYES: EOMI, PERRL     HENT: ear canals and TM's normal and nose and mouth without ulcers or lesions     NECK: no adenopathy, no asymmetry, masses, or scars and thyroid normal to palpation     RESP: lungs clear to auscultation - no rales, rhonchi or wheezes     CV: regular rates and rhythm, normal S1 S2, no S3 or S4 and no murmur, click or rub     ABDOMEN:  soft, nontender, no HSM or masses and bowel sounds normal     MS: extremities normal- no gross deformities noted, no evidence of inflammation in joints, FROM in all extremities.     SKIN: no suspicious lesions or rashes     NEURO: Normal strength and tone, sensory exam grossly normal, mentation intact and speech normal     PSYCH: mentation appears normal. and affect normal/bright     LYMPHATICS: No cervical adenopathy    No results for input(s): HGB, PLT, INR, NA, POTASSIUM, CR, A1C in the last 20836 hours.     Diagnostics:  No labs were ordered during this visit.   No EKG required for low risk surgery (cataract, skin procedure, breast biopsy, etc).    Revised Cardiac Risk Index (RCRI):  The patient has the following serious cardiovascular risks for perioperative complications:   - No serious cardiac risks = 0 points     RCRI Interpretation: 0 points: Class I (very low risk - 0.4% complication rate)           Signed Electronically by: Yessy Arreguin MD  Copy of this evaluation report is provided to requesting physician.

## 2021-08-05 NOTE — H&P (VIEW-ONLY)
[address] [address]  Phone: [phone]  Fax: [phone]  Primary Provider: Jazlyn Huitron  Pre-op Performing Provider: DARON JOSUE      PREOPERATIVE EVALUATION:  Today's date: 8/5/2021    Mary Wray is a 45 year old female who presents for a preoperative evaluation.    Surgical Information:  Surgery/Procedure: Left Wire-localized lumpectomy  Surgery Location: [facility] [place]  Surgeon: Dr. Frost  Surgery Date: 8/10/21  Time of Surgery: 9:00 am check in  Where patient plans to recover: At home with family  Fax number for surgical facility: Note does not need to be faxed, will be available electronically in Epic.    Type of Anesthesia Anticipated: General    Plans for COVID test on 8/7 at 9:45 AM     Assessment & Plan     The proposed surgical procedure is considered LOW risk.    Postablative hypothyroidism - stable, no dose change  - TSH    Pre-diabetes - stable, no progression to diabetes  - Hemoglobin A1c    Abnormal menses  Patient reports having more uncomfortable menses for the last few years, especially her most recent (7/29/21) which was 2 weeks late. We discussed how stress, diet, medications, and medical conditions can affect menses timing. This could be the beginning of perimenopause, menopause defined as 12 months of amenorrhea without another cause. Since she is <45, the recommendation is a TSH and serum prolactin. Pregnancy is not possible. TSH today anyway for her hypothyroidism.   - If irregular menses persist, consider a prolactin level    Risks and Recommendations:  The patient has the following additional risks and recommendations for perioperative complications:   - Morbid obesity (BMI >40)    Medication Instructions:  Patient is to take all scheduled medications on the day of surgery    RECOMMENDATION:  APPROVAL GIVEN to proceed with proposed procedure, without further diagnostic evaluation.    30 minutes spent on the date  of the encounter doing chart review, history and exam, documentation and further activities per the note        Subjective     HPI related to upcoming procedure: Intraductal papilloma of left breast  Imaging showed asymmetry in the LEFT lateral breast.  Ultrasound showed a 1.4 cm cluster of cysts with an intracystic component.   A biopsy was performed and a globe clip was placed.  It showed an intraductal papilloma and fibrocystic change.      Preop Questions 8/3/2021   1. Have you ever had a heart attack or stroke? No   2. Have you ever had surgery on your heart or blood vessels, such as a stent placement, a coronary artery bypass, or surgery on an artery in your head, neck, heart, or legs? No   3. Do you have chest pain with activity? No   4. Do you have a history of  heart failure? No   5. Do you currently have a cold, bronchitis or symptoms of other infection? No   6. Do you have a cough, shortness of breath, or wheezing? No   7. Do you or anyone in your family have previous history of blood clots? No   8. Do you or does anyone in your family have a serious bleeding problem such as prolonged bleeding following surgeries or cuts? No   9. Have you ever had problems with anemia or been told to take iron pills? No   10. Have you had any abnormal blood loss such as black, tarry or bloody stools, or abnormal vaginal bleeding? No   11. Have you ever had a blood transfusion? No   12. Are you willing to have a blood transfusion if it is medically needed before, during, or after your surgery? Yes   13. Have you or any of your relatives ever had problems with anesthesia? No   14. Do you have sleep apnea, excessive snoring or daytime drowsiness? No   15. Do you have any artifical heart valves or other implanted medical devices like a pacemaker, defibrillator, or continuous glucose monitor? No   16. Do you have artificial joints? No   17. Are you allergic to latex? No   18. Is there any chance that you may be pregnant? No        Health Care Directive:  Patient does not have a Health Care Directive or Living Will: Patient states has Advance Directive and will bring in a copy to clinic.    Preoperative Review of :   reviewed - controlled substances prescribed by other outside provider(s).  - topical testosterone 1 year ago        Status of Chronic Conditions:  See problem list for active medical problems.  Problems all longstanding and stable, except as noted/documented.  See ROS for pertinent symptoms related to these conditions.      Had general anesthesia for egg retreival at [facility] - no complications with anesthesia.     Review of Systems  CONSTITUTIONAL: NEGATIVE for fever, chills, change in weight (explained)  INTEGUMENTARY/SKIN: NEGATIVE for worrisome rashes, moles or lesions  EYES: NEGATIVE for vision changes or irritation  ENT/MOUTH: NEGATIVE for ear, mouth and throat problems  RESP: NEGATIVE for significant cough or SOB  CV: NEGATIVE for chest pain, palpitations or peripheral edema  GI: NEGATIVE for nausea, abdominal pain, heartburn, or change in bowel habits  : NEGATIVE for frequency, dysuria, or hematuria  MUSCULOSKELETAL: NEGATIVE for significant arthralgias or myalgia  NEURO: NEGATIVE for weakness, dizziness or paresthesias  ENDOCRINE: NEGATIVE for temperature intolerance, skin/hair changes  HEME: NEGATIVE for bleeding problems  PSYCHIATRIC: NEGATIVE for changes in mood or affect    Patient Active Problem List    Diagnosis Date Noted     Intraductal papilloma of breast, left 07/29/2021     Priority: Medium     Added automatically from request for surgery [id number]       Current moderate episode of major depressive disorder without prior episode (H) 02/18/2021     Priority: Medium     History of Graves' disease 11/15/2019     Priority: Medium     Morbid obesity (H) 11/15/2019     Priority: Medium     Metatarsalgia of both feet 11/13/2019     Priority: Medium     H/O mammogram 12/11/2017     Priority: Medium     Class:  "Chronic     : considered \"high risk\" by pre-screening questionnaire; normal mammogram noted, but offered high-risk mgmt       Pre-diabetes 12/10/2015     Priority: Medium     Postablative hypothyroidism 2014     Priority: Medium     Class: Chronic     : pt currently taking synthroid 6 days a week, one day off  : Elevated T4, consulted with Dr. Otto, advised reducing synthroid dose to 75 mcg, re-evaluate in 4-6 weeks + appointment with endocrinologist. Magdalene drawing labs, will direct med mgmt       Papanicolaou smear of cervix with low grade squamous intraepithelial lesion (LGSIL) 2011     Priority: Medium     Overview: 2002, LEEP - in [place], followed by normal paps  : Pap NIL, HPV-HR neg  : Pap LSIL, HPV-HR pos (16, 18 neg)  2016: Colposcopy - no lesions, ECC negative - recommend co-testing in 1 year  2017: pap NIL, HPV-HR neg - recommend co-testing in 3 yrs.  2018: pap NIL, HR-HPV neg  10/2019: pap NIL, HR-HPV NEG - recommend co-testing in 5 years        Past Medical History:   Diagnosis Date     Graves disease      Hypovitaminosis D      Mild depression (H)     , no meds, resolved with move     Miscarriage      Obesity      Postablative hypothyroidism     CCRM     Prediabetes      Premenstrual dysphoria      Past Surgical History:   Procedure Laterality Date      SECTION N/A 2018    Procedure:  SECTION;  Surgeon: Lola Otto MD;  Location:  L+D     LEEP TX, CERVICAL      \"for HPV\"     Current Outpatient Medications   Medication Sig Dispense Refill     escitalopram (LEXAPRO) 10 MG tablet Take 1 tablet (10 mg) by mouth daily 90 tablet 2     SYNTHROID 150 MCG tablet MON to SAT 1 tablet/day;SUN 1.5 tablet 90 tablet 4       No Known Allergies     Social History     Tobacco Use     Smoking status: Never Smoker     Smokeless tobacco: Never Used   Substance Use Topics     Alcohol use: Not Currently     Alcohol/week: 1.7 " standard drinks     Types: 2 Standard drinks or equivalent per week     Comment: occasional       History   Drug Use No         Objective     /74   Pulse 63   Temp 98.2  F (36.8  C) (Oral)   Resp 16   Wt 117 kg (258 lb)   LMP 07/29/2021 (Approximate)   SpO2 97%   BMI 40.73 kg/m      Physical Exam    GENERAL APPEARANCE: healthy, alert and no distress     EYES: EOMI, PERRL     HENT: ear canals and TM's normal and nose and mouth without ulcers or lesions     NECK: no adenopathy, no asymmetry, masses, or scars and thyroid normal to palpation     RESP: lungs clear to auscultation - no rales, rhonchi or wheezes     CV: regular rates and rhythm, normal S1 S2, no S3 or S4 and no murmur, click or rub     ABDOMEN:  soft, nontender, no HSM or masses and bowel sounds normal     MS: extremities normal- no gross deformities noted, no evidence of inflammation in joints, FROM in all extremities.     SKIN: no suspicious lesions or rashes     NEURO: Normal strength and tone, sensory exam grossly normal, mentation intact and speech normal     PSYCH: mentation appears normal. and affect normal/bright     LYMPHATICS: No cervical adenopathy    No results for input(s): HGB, PLT, INR, NA, POTASSIUM, CR, A1C in the last 75174 hours.     Diagnostics:  No labs were ordered during this visit.   No EKG required for low risk surgery (cataract, skin procedure, breast biopsy, etc).    Revised Cardiac Risk Index (RCRI):  The patient has the following serious cardiovascular risks for perioperative complications:   - No serious cardiac risks = 0 points     RCRI Interpretation: 0 points: Class I (very low risk - 0.4% complication rate)           Signed Electronically by: Yessy Arreguin MD  Copy of this evaluation report is provided to requesting physician.

## 2021-08-05 NOTE — Clinical Note
SVETLANA Hernandez's Pre-op  Had some general questions about icky periods and now cycle length irregularity. A little early for perimenopause, she will be 46 in 2 months. TSH was normal. If persists could consider a prolactin level  -Yessy

## 2021-08-05 NOTE — RESULT ENCOUNTER NOTE
Dimple,   Still just pre-diabetes. Yes it's a little higher than previous, but your A1c is still below 6.5 which is our diagnostic threshold for diabete.     Sincerely,   Yessy Arreguin MD

## 2021-08-07 ENCOUNTER — LAB (OUTPATIENT)
Dept: LAB | Facility: CLINIC | Age: 46
End: 2021-08-07
Attending: SURGERY
Payer: COMMERCIAL

## 2021-08-07 DIAGNOSIS — Z11.59 ENCOUNTER FOR SCREENING FOR OTHER VIRAL DISEASES: ICD-10-CM

## 2021-08-07 LAB — SARS-COV-2 RNA RESP QL NAA+PROBE: NEGATIVE

## 2021-08-07 PROCEDURE — U0003 INFECTIOUS AGENT DETECTION BY NUCLEIC ACID (DNA OR RNA); SEVERE ACUTE RESPIRATORY SYNDROME CORONAVIRUS 2 (SARS-COV-2) (CORONAVIRUS DISEASE [COVID-19]), AMPLIFIED PROBE TECHNIQUE, MAKING USE OF HIGH THROUGHPUT TECHNOLOGIES AS DESCRIBED BY CMS-2020-01-R: HCPCS | Mod: 90 | Performed by: PATHOLOGY

## 2021-08-07 PROCEDURE — U0005 INFEC AGEN DETEC AMPLI PROBE: HCPCS | Mod: 90 | Performed by: PATHOLOGY

## 2021-08-08 PROBLEM — E66.01 MORBID OBESITY (H): Status: ACTIVE | Noted: 2019-11-15

## 2021-08-08 NOTE — RESULT ENCOUNTER NOTE
Dimple,   Your Thyroid Stimulating Hormone level is completely normal. This is reassuring. No synthroid dose changes. If you have any further questions feel free to contact me via Chaint message.     Sincerely,   Yessy Arreguin MD

## 2021-08-09 ENCOUNTER — ANESTHESIA EVENT (OUTPATIENT)
Dept: SURGERY | Facility: AMBULATORY SURGERY CENTER | Age: 46
End: 2021-08-09
Payer: COMMERCIAL

## 2021-08-09 RX ORDER — NALOXONE HYDROCHLORIDE 0.4 MG/ML
0.2 INJECTION, SOLUTION INTRAMUSCULAR; INTRAVENOUS; SUBCUTANEOUS
Status: DISCONTINUED | OUTPATIENT
Start: 2021-08-09 | End: 2021-08-11 | Stop reason: HOSPADM

## 2021-08-09 RX ORDER — NALOXONE HYDROCHLORIDE 0.4 MG/ML
0.4 INJECTION, SOLUTION INTRAMUSCULAR; INTRAVENOUS; SUBCUTANEOUS
Status: DISCONTINUED | OUTPATIENT
Start: 2021-08-09 | End: 2021-08-11 | Stop reason: HOSPADM

## 2021-08-10 ENCOUNTER — HOSPITAL ENCOUNTER (OUTPATIENT)
Facility: AMBULATORY SURGERY CENTER | Age: 46
End: 2021-08-10
Attending: SURGERY
Payer: COMMERCIAL

## 2021-08-10 ENCOUNTER — ANCILLARY PROCEDURE (OUTPATIENT)
Dept: MAMMOGRAPHY | Facility: CLINIC | Age: 46
End: 2021-08-10
Attending: SURGERY
Payer: COMMERCIAL

## 2021-08-10 ENCOUNTER — ANESTHESIA (OUTPATIENT)
Dept: SURGERY | Facility: AMBULATORY SURGERY CENTER | Age: 46
End: 2021-08-10
Payer: COMMERCIAL

## 2021-08-10 VITALS
TEMPERATURE: 97.6 F | RESPIRATION RATE: 15 BRPM | DIASTOLIC BLOOD PRESSURE: 64 MMHG | HEART RATE: 51 BPM | SYSTOLIC BLOOD PRESSURE: 110 MMHG | OXYGEN SATURATION: 98 %

## 2021-08-10 DIAGNOSIS — D24.2 INTRADUCTAL PAPILLOMA OF BREAST, LEFT: ICD-10-CM

## 2021-08-10 LAB
HCG UR QL: NEGATIVE
INTERNAL QC OK POCT: NORMAL

## 2021-08-10 PROCEDURE — 88307 TISSUE EXAM BY PATHOLOGIST: CPT | Performed by: PATHOLOGY

## 2021-08-10 PROCEDURE — 77065 DX MAMMO INCL CAD UNI: CPT | Mod: LT | Performed by: STUDENT IN AN ORGANIZED HEALTH CARE EDUCATION/TRAINING PROGRAM

## 2021-08-10 PROCEDURE — 19125 EXCISION BREAST LESION: CPT | Mod: LT

## 2021-08-10 PROCEDURE — 19285 PERQ DEV BREAST 1ST US IMAG: CPT | Mod: LT | Performed by: STUDENT IN AN ORGANIZED HEALTH CARE EDUCATION/TRAINING PROGRAM

## 2021-08-10 PROCEDURE — 81025 URINE PREGNANCY TEST: CPT | Performed by: PATHOLOGY

## 2021-08-10 PROCEDURE — 19125 EXCISION BREAST LESION: CPT | Mod: LT | Performed by: SURGERY

## 2021-08-10 PROCEDURE — 76098 X-RAY EXAM SURGICAL SPECIMEN: CPT | Performed by: STUDENT IN AN ORGANIZED HEALTH CARE EDUCATION/TRAINING PROGRAM

## 2021-08-10 RX ORDER — NALOXONE HYDROCHLORIDE 0.4 MG/ML
0.4 INJECTION, SOLUTION INTRAMUSCULAR; INTRAVENOUS; SUBCUTANEOUS
Status: DISCONTINUED | OUTPATIENT
Start: 2021-08-10 | End: 2021-08-11 | Stop reason: HOSPADM

## 2021-08-10 RX ORDER — NALOXONE HYDROCHLORIDE 0.4 MG/ML
0.2 INJECTION, SOLUTION INTRAMUSCULAR; INTRAVENOUS; SUBCUTANEOUS
Status: DISCONTINUED | OUTPATIENT
Start: 2021-08-10 | End: 2021-08-11 | Stop reason: HOSPADM

## 2021-08-10 RX ORDER — ONDANSETRON 2 MG/ML
4 INJECTION INTRAMUSCULAR; INTRAVENOUS EVERY 30 MIN PRN
Status: DISCONTINUED | OUTPATIENT
Start: 2021-08-10 | End: 2021-08-11 | Stop reason: HOSPADM

## 2021-08-10 RX ORDER — CEFAZOLIN SODIUM 2 G/50ML
2 SOLUTION INTRAVENOUS
Status: COMPLETED | OUTPATIENT
Start: 2021-08-10 | End: 2021-08-10

## 2021-08-10 RX ORDER — ONDANSETRON 4 MG/1
4 TABLET, ORALLY DISINTEGRATING ORAL EVERY 30 MIN PRN
Status: DISCONTINUED | OUTPATIENT
Start: 2021-08-10 | End: 2021-08-11 | Stop reason: HOSPADM

## 2021-08-10 RX ORDER — PROPOFOL 10 MG/ML
INJECTION, EMULSION INTRAVENOUS CONTINUOUS PRN
Status: DISCONTINUED | OUTPATIENT
Start: 2021-08-10 | End: 2021-08-10

## 2021-08-10 RX ORDER — DEXAMETHASONE SODIUM PHOSPHATE 4 MG/ML
INJECTION, SOLUTION INTRA-ARTICULAR; INTRALESIONAL; INTRAMUSCULAR; INTRAVENOUS; SOFT TISSUE PRN
Status: DISCONTINUED | OUTPATIENT
Start: 2021-08-10 | End: 2021-08-10

## 2021-08-10 RX ORDER — FLUMAZENIL 0.1 MG/ML
0.2 INJECTION, SOLUTION INTRAVENOUS
Status: DISCONTINUED | OUTPATIENT
Start: 2021-08-10 | End: 2021-08-10 | Stop reason: HOSPADM

## 2021-08-10 RX ORDER — LIDOCAINE HYDROCHLORIDE 10 MG/ML
10 INJECTION, SOLUTION EPIDURAL; INFILTRATION; INTRACAUDAL; PERINEURAL ONCE
Status: COMPLETED | OUTPATIENT
Start: 2021-08-10 | End: 2021-08-10

## 2021-08-10 RX ORDER — FENTANYL CITRATE 50 UG/ML
25-50 INJECTION, SOLUTION INTRAMUSCULAR; INTRAVENOUS
Status: DISCONTINUED | OUTPATIENT
Start: 2021-08-10 | End: 2021-08-10 | Stop reason: HOSPADM

## 2021-08-10 RX ORDER — GLYCOPYRROLATE 0.2 MG/ML
INJECTION, SOLUTION INTRAMUSCULAR; INTRAVENOUS PRN
Status: DISCONTINUED | OUTPATIENT
Start: 2021-08-10 | End: 2021-08-10

## 2021-08-10 RX ORDER — CEFAZOLIN SODIUM 2 G/50ML
2 SOLUTION INTRAVENOUS SEE ADMIN INSTRUCTIONS
Status: DISCONTINUED | OUTPATIENT
Start: 2021-08-10 | End: 2021-08-10 | Stop reason: HOSPADM

## 2021-08-10 RX ORDER — BUPIVACAINE HYDROCHLORIDE 2.5 MG/ML
INJECTION, SOLUTION EPIDURAL; INFILTRATION; INTRACAUDAL PRN
Status: DISCONTINUED | OUTPATIENT
Start: 2021-08-10 | End: 2021-08-10

## 2021-08-10 RX ORDER — LIDOCAINE 40 MG/G
CREAM TOPICAL
Status: DISCONTINUED | OUTPATIENT
Start: 2021-08-10 | End: 2021-08-10 | Stop reason: HOSPADM

## 2021-08-10 RX ORDER — MEPERIDINE HYDROCHLORIDE 25 MG/ML
12.5 INJECTION INTRAMUSCULAR; INTRAVENOUS; SUBCUTANEOUS
Status: DISCONTINUED | OUTPATIENT
Start: 2021-08-10 | End: 2021-08-11 | Stop reason: HOSPADM

## 2021-08-10 RX ORDER — SODIUM CHLORIDE, SODIUM LACTATE, POTASSIUM CHLORIDE, CALCIUM CHLORIDE 600; 310; 30; 20 MG/100ML; MG/100ML; MG/100ML; MG/100ML
INJECTION, SOLUTION INTRAVENOUS CONTINUOUS
Status: DISCONTINUED | OUTPATIENT
Start: 2021-08-10 | End: 2021-08-10 | Stop reason: HOSPADM

## 2021-08-10 RX ORDER — ACETAMINOPHEN 325 MG/1
975 TABLET ORAL ONCE
Status: COMPLETED | OUTPATIENT
Start: 2021-08-10 | End: 2021-08-10

## 2021-08-10 RX ORDER — FENTANYL CITRATE 50 UG/ML
50 INJECTION, SOLUTION INTRAMUSCULAR; INTRAVENOUS EVERY 5 MIN PRN
Status: DISCONTINUED | OUTPATIENT
Start: 2021-08-10 | End: 2021-08-10 | Stop reason: HOSPADM

## 2021-08-10 RX ORDER — OXYCODONE HYDROCHLORIDE 5 MG/1
5 TABLET ORAL EVERY 4 HOURS PRN
Status: DISCONTINUED | OUTPATIENT
Start: 2021-08-10 | End: 2021-08-11 | Stop reason: HOSPADM

## 2021-08-10 RX ORDER — KETAMINE HYDROCHLORIDE 10 MG/ML
INJECTION, SOLUTION INTRAMUSCULAR; INTRAVENOUS PRN
Status: DISCONTINUED | OUTPATIENT
Start: 2021-08-10 | End: 2021-08-10

## 2021-08-10 RX ORDER — SODIUM CHLORIDE, SODIUM LACTATE, POTASSIUM CHLORIDE, CALCIUM CHLORIDE 600; 310; 30; 20 MG/100ML; MG/100ML; MG/100ML; MG/100ML
INJECTION, SOLUTION INTRAVENOUS CONTINUOUS
Status: DISCONTINUED | OUTPATIENT
Start: 2021-08-10 | End: 2021-08-11 | Stop reason: HOSPADM

## 2021-08-10 RX ORDER — LIDOCAINE HYDROCHLORIDE 20 MG/ML
INJECTION, SOLUTION INFILTRATION; PERINEURAL PRN
Status: DISCONTINUED | OUTPATIENT
Start: 2021-08-10 | End: 2021-08-10

## 2021-08-10 RX ORDER — PROPOFOL 10 MG/ML
INJECTION, EMULSION INTRAVENOUS PRN
Status: DISCONTINUED | OUTPATIENT
Start: 2021-08-10 | End: 2021-08-10

## 2021-08-10 RX ORDER — ONDANSETRON 2 MG/ML
INJECTION INTRAMUSCULAR; INTRAVENOUS PRN
Status: DISCONTINUED | OUTPATIENT
Start: 2021-08-10 | End: 2021-08-10

## 2021-08-10 RX ADMIN — ONDANSETRON 4 MG: 2 INJECTION INTRAMUSCULAR; INTRAVENOUS at 12:16

## 2021-08-10 RX ADMIN — FENTANYL CITRATE 50 MCG: 50 INJECTION, SOLUTION INTRAMUSCULAR; INTRAVENOUS at 11:35

## 2021-08-10 RX ADMIN — PROPOFOL 200 MCG/KG/MIN: 10 INJECTION, EMULSION INTRAVENOUS at 12:21

## 2021-08-10 RX ADMIN — SODIUM CHLORIDE, SODIUM LACTATE, POTASSIUM CHLORIDE, CALCIUM CHLORIDE: 600; 310; 30; 20 INJECTION, SOLUTION INTRAVENOUS at 12:12

## 2021-08-10 RX ADMIN — KETAMINE HYDROCHLORIDE 30 MG: 10 INJECTION, SOLUTION INTRAMUSCULAR; INTRAVENOUS at 12:24

## 2021-08-10 RX ADMIN — LIDOCAINE HYDROCHLORIDE 40 MG: 20 INJECTION, SOLUTION INFILTRATION; PERINEURAL at 12:21

## 2021-08-10 RX ADMIN — BUPIVACAINE HYDROCHLORIDE 20 ML: 2.5 INJECTION, SOLUTION EPIDURAL; INFILTRATION; INTRACAUDAL at 11:40

## 2021-08-10 RX ADMIN — Medication 0.5 MG: at 12:37

## 2021-08-10 RX ADMIN — PROPOFOL 300 MG: 10 INJECTION, EMULSION INTRAVENOUS at 12:21

## 2021-08-10 RX ADMIN — LIDOCAINE HYDROCHLORIDE 10 ML: 10 INJECTION, SOLUTION EPIDURAL; INFILTRATION; INTRACAUDAL; PERINEURAL at 10:22

## 2021-08-10 RX ADMIN — ACETAMINOPHEN 975 MG: 325 TABLET ORAL at 10:13

## 2021-08-10 RX ADMIN — DEXAMETHASONE SODIUM PHOSPHATE 4 MG: 4 INJECTION, SOLUTION INTRA-ARTICULAR; INTRALESIONAL; INTRAMUSCULAR; INTRAVENOUS; SOFT TISSUE at 12:16

## 2021-08-10 RX ADMIN — CEFAZOLIN SODIUM 2 G: 2 SOLUTION INTRAVENOUS at 12:12

## 2021-08-10 RX ADMIN — GLYCOPYRROLATE 0.2 MG: 0.2 INJECTION, SOLUTION INTRAMUSCULAR; INTRAVENOUS at 12:16

## 2021-08-10 NOTE — ANESTHESIA CARE TRANSFER NOTE
Patient: Dimple A Wray    Procedure(s):  LEFT Wire-Localized Lumpectomy    Diagnosis: Intraductal papilloma of breast, left [D24.2]  Diagnosis Additional Information: No value filed.    Anesthesia Type:   General, Peripheral Nerve Block     Note:    Oropharynx: oropharynx clear of all foreign objects and spontaneously breathing  Level of Consciousness: awake  Oxygen Supplementation: room air    Independent Airway: airway patency satisfactory and stable  Dentition: dentition unchanged  Vital Signs Stable: post-procedure vital signs reviewed and stable  Report to RN Given: handoff report given  Patient transferred to: PACU  Comments: Uneventful transport   Report to RN - Due R  Exchanging well; color natl  Pt responds appropriately to command  IV patent  Lips/teeth/dentition as preop status  Questions answered  Handoff Report: Identifed the Patient, Identified the Reponsible Provider, Reviewed the pertinent medical history, Discussed the surgical course, Reviewed Intra-OP anesthesia mangement and issues during anesthesia, Set expectations for post-procedure period and Allowed opportunity for questions and acknowledgement of understanding      Vitals:  Vitals Value Taken Time   /70 08/10/21 1328   Temp 97.3    Pulse 67 08/10/21 1330   Resp 7 08/10/21 1330   SpO2 95 % 08/10/21 1330   Vitals shown include unvalidated device data.    Electronically Signed By: COLE CHASE CRNA  August 10, 2021  1:31 PM

## 2021-08-10 NOTE — DISCHARGE INSTRUCTIONS
"[facility]  Home Care Following Anesthesia  For 24 hours after surgery:  1. Get plenty of rest.  A responsible adult must stay with you for at least 24 hours after you leave the surgery center.  2. Do not drive or use heavy equipment.  If you have weakness or tingling, don't drive or use heavy equipment until this feeling goes away.   3. Do not drink alcohol.   4. Avoid strenuous or risky activities.  Ask for help when climbing stairs.  5. You may feel lightheaded.  IF so, sit for a few minutes before standing.  Have someone help you get up.   6. If you have nausea (feel sick to your stomach): Drink only clear liquids such as apple juice, ginger ale, broth or 7-Up.  Rest may also help.  Be sure to drink enough fluids.  Move to a regular diet as you feel able.   7. You may have a slight fever.  Call the doctor if your fever is over 100 F (37.7 C) (taken under the tongue) or lasts longer than 24 hours.  8. You may have a dry mouth, a sore throat, muscle aches or trouble sleeping. These should go away after 24 hours.  9. Do not make important or legal decisions.   10. It is recommended to avoid smoking.        Today you received an Exparel block to numb the nerves near your surgery site.  This is a block using local anesthetic or \"numbing\" medication injected around the nerves to anesthetize or \"numb\" the area supplied by those nerves.  This block is injected into the muscle layer near your surgical site.  This medication may numb the location where you had surgery up to 72 hours.  If your surgical site is an arm or leg you should be careful with your affected limb, since it is possible to injure your limb without being aware of it due to the numbing.  Until full feeling returns, you should guard against bumping or hitting your limb, and avoid extreme hot or cold temperatures on the skin.  As the block wears off, the feeling will return as a tingling or prickly sensation near your " surgical site.  You will experince more discomfort from your incision as the feeling returns.  You may want to take a pain pill (a narcotic or Tylenol if this was prescribed by your surgeon) when you start to experience mild pain before the pain beomes more severe.  If your pain medications do not control your pain, you should notify your surgeon.    Tips for taking pain medications  To get the best pain relief possible, remember these points:    Take pain medications as directed, before pain becomes severe.    Pain medication can upset your stomach: taking it with food may help.    Constipation is a common side effect of pain medication. Drink plenty of  fluids.    Eat foods high in fiber. Take a stool softener if recommended by your doctor or pharmacist.    Do not drink alcohol, drive or operate machinery while taking pain medications.    Ask about other ways to control pain, such as with heat, ice or relaxation.    Tylenol/Acetaminophen Consumption  To help encourage the safe use of acetaminophen, the makers of TYLENOL  have lowered the maximum daily dose for single-ingredient Extra Strength TYLENOL  (acetaminophen) products sold in the [place] from 8 pills per day (4,000 mg) to 6 pills per day (3,000 mg). The dosing interval has also changed from 2 pills every 4-6 hours to 2 pills every 6 hours.    If you feel your pain relief is insufficient, you may take Tylenol/Acetaminophen in addition to your narcotic pain medication.     Be careful not to exceed 3,000 mg of Tylenol/Acetaminophen in a 24 hour period from all sources.    If you are taking extra strength Tylenol/acetaminophen (500 mg), the maximum dose is 6 tablets in 24 hours.    If you are taking regular strength acetaminophen (325 mg), the maximum dose is 9 tablets in 24 hours.        Tylenol 975 mg given at 10:13AM.   Next dose available at 4:13PM, then follow package directions.    Call a doctor for any of the followin. Signs of infection (fever,  "growing tenderness at the surgery site, a large amount of drainage or bleeding, severe pain, foul-smelling drainage, redness, swelling).  2. It has been over 8 to 10 hours since surgery and you are still not able to urinate (pass water).  3. Headache for over 24 hours.  4. Signs of Covid-19 infection (temperature over 100 degrees, shortness of breath, cough, loss of taste/smell, generalized body aches, persistent headache, chills, sore throat, nausea/vomiting/diarrhea)  Your doctor is:  Dr. Marta Frost, [facility]: [phone]                 Or dial [phone] and ask for the resident on call for:  [facility]  For emergency care, call the:  [facility]:  [phone] (TTY for hearing impaired: [phone])  Information about liposomal bupivacaine (Exparel)    What is Liposomal Bupivacaine?    Liposomal Bupivacaine is a numbing medication that can help you manage your pain after surgery.  This medication is similar to \"novacaine,\" which is often used by the dentist.  Liposomal bupivacaine is released slowly and can help control pain for up to 72 hours.    What is the purpose of Liposomal Bupivacaine?    To manage your pain after surgery    To help you sleep better, take deep breaths, walk more comfortable, and feel up to visiting with others    How is the procedure done?    Liposomal bupivacaine is a medication given by an injection.    It is usually given right before your surgery.  If this is the case, you will be awake or sedated, but you should experience minimal pain during the procedure.    For some people, the injection may be given at the very end of your surgery.  It all depends on the type of surgery and your situation.    The procedure usually takes about 5-15 minutes.  An ultrasound machine will help the anesthesiologist insert it in the right place or the surgeon will inject it under direct vision.     A needle is used to place the numbing medication under your skin.  It " provides pain relief by numbing the tissue in the area where your surgeon will make the incision.    What can I expect?    You may experience numbness, tingling, or a feeling of heaviness around the area that was injected.    If you experience any of the follow symptoms IMMEDIATELY CALL THE REGIONAL ANESTHESIA PAIN SERVICE:    Numbness or tingling occurs in areas other than around the injection site    Blurry vision    Ringing in your ears    A metallic taste in your mouth    PAGE: Dial [phone].  When prompted, enter the following 4-digit ID number:  [id number].  You will be prompted to enter your phone number; and then enter the # sign.  The clinician on call will call you back.    OR  CALL: Dial [phone].  Let the hospital  know that you are having a problem with a nerve block and that you would like to speak to the regional anesthesia pain service right away.    You should not receive any other type of numbing medication within 4 days after receiving liposomal bupivacaine unless your anesthesiologist approves.    Post Operative Instructions: Regional Anesthetic with Liposomal Bupivacaine for Chest and Abdominal Surgery  General Information:   Regional anesthesia is when local anesthetic or  numbing  medication is injected around the nerves to anesthetize or  numb  the area supplied by that set of nerves.     Types of Regional Blocks:  Transversus Abdominis Plane (TAP): A block injected beneath the covering of a muscle layer of the abdomen for abdominal surgery  Pectoral: A block injected near the breast for surgery on the breast and armpit  Paravertebral: A block injected in the back for surgery on the chest, ribs, and breast    Procedure:  The type of anesthesia your doctor used to numb your chest or abdomen will usually not wear off for 24-48 hours, but may last as long as 72 hours.     Diet:  There are no restrictions on your diet. You should drink plenty of fluids.     Discomfort:  You will have a  tingling and prickly sensation in your chest or abdomen as the feeling begins to return. You can also expect some discomfort. The amount of discomfort is unpredictable, but if you have more pain than can be controlled with pain medication you should notify your physician.     Pain Medicine:   Begin taking your oral pain pills before bedtime and during the night to avoid a sudden onset of pain as part of the block wears off.  Do not engage in drinking, driving, or hazardous occupations while taking pain medication.     Stitches:   You may have stitches or special skin closures. You doctor will inform you when to return to the office to have them removed.

## 2021-08-10 NOTE — PROGRESS NOTES
SBAR Wire Localization     SITUATION:  Patient to breast imaging center for imaging guided wire localizations before breast lumpectomy or excision biopsy without sentinel node injection.    BACKGROUND:  Breast imaging cancer, breast abnormality  Ordered procedure completed: Yes  Special needs identified: Yes     ASSESSMENT:  SBAR report called to patient care unit because of unexpected event in radiology: No  Allergies and medication list reviewed prior to procedure. Yes  Skin cleansed with ChloraPrep One-Step.  Anesthesia: approximately 9ml of 1% Lidocaine injection subcutaneous before wire insertion administered by the radiologist.   Gauze dressing over insertion site(s).  Post procedure mammogram completed: Yes    Patient tolerance:Well.    RECOMMENDATIONS:  Patient transferred to Same Day Surgery in stable condition via wheelchair with Breast Imaging Staff.    Please call Breast Center at [facility] [phone] if there are any questions.

## 2021-08-10 NOTE — ANESTHESIA POSTPROCEDURE EVALUATION
Patient: Dimple A Wray    Procedure(s):  LEFT Wire-Localized Lumpectomy    Diagnosis:Intraductal papilloma of breast, left [D24.2]  Diagnosis Additional Information: No value filed.    Anesthesia Type:  General, Peripheral Nerve Block    Note:  Disposition: Outpatient   Postop Pain Control: Uneventful            Sign Out: Well controlled pain   PONV: No   Neuro/Psych: Uneventful            Sign Out: Acceptable/Baseline neuro status   Airway/Respiratory: Uneventful            Sign Out: Acceptable/Baseline resp. status   CV/Hemodynamics: Uneventful            Sign Out: Acceptable CV status; No obvious hypovolemia; No obvious fluid overload   Other NRE: NONE   DID A NON-ROUTINE EVENT OCCUR? No           Last vitals:  Vitals Value Taken Time   /60 08/10/21 1345   Temp 36.2  C (97.1  F) 08/10/21 1345   Pulse 57 08/10/21 1346   Resp 15 08/10/21 1346   SpO2 95 % 08/10/21 1346   Vitals shown include unvalidated device data.    Electronically Signed By: Suma Ohara MD  August 10, 2021  4:47 PM

## 2021-08-10 NOTE — ANESTHESIA PREPROCEDURE EVALUATION
"Anesthesia Pre-Procedure Evaluation    Patient: Mary Wray   MRN: [id number] : 1975        Preoperative Diagnosis: Intraductal papilloma of breast, left [D24.2]   Procedure : Procedure(s):  LEFT Wire-Localized Lumpectomy     Past Medical History:   Diagnosis Date     Graves disease      Hypovitaminosis D      Mild depression (H)     , no meds, resolved with move     Miscarriage      Obesity      Postablative hypothyroidism     CCRM     Prediabetes      Premenstrual dysphoria       Past Surgical History:   Procedure Laterality Date      SECTION N/A 2018    Procedure:  SECTION;  Surgeon: Lola Otto MD;  Location: [facility] L+D     LEEP TX, CERVICAL      \"for HPV\"      No Known Allergies   Social History     Tobacco Use     Smoking status: Never Smoker     Smokeless tobacco: Never Used   Substance Use Topics     Alcohol use: Not Currently     Alcohol/week: 1.7 standard drinks     Types: 2 Standard drinks or equivalent per week     Comment: occasional      Wt Readings from Last 1 Encounters:   21 117 kg (258 lb)           Physical Exam    Airway        Mallampati: II   TM distance: > 3 FB   Neck ROM: full   Mouth opening: > 3 cm    Respiratory Devices and Support         Dental  no notable dental history         Cardiovascular   cardiovascular exam normal          Pulmonary   pulmonary exam normal                OUTSIDE LABS:  CBC:   Lab Results   Component Value Date    WBC 8.2 2018    WBC 8.7 2018    HGB 10.5 (L) 2018    HGB 9.6 (L) 2018    HCT 31.1 (L) 2018    HCT 28.4 (L) 2018     2018     2018     BMP:   Lab Results   Component Value Date     2018    POTASSIUM 4.3 2018    POTASSIUM 4.1 2017    CHLORIDE 108 2018    CO2 26 2018    BUN 9 2018    CR 0.71 2018    CR 0.80 2018    GLC 75 2018    GLC 92 2017     COAGS: No results " found for: PTT, INR, FIBR  POC:   Lab Results   Component Value Date    BGM 96 11/10/2018    HCG NEGATIVE 09/27/2016     HEPATIC:   Lab Results   Component Value Date    ALT 59 (H) 11/12/2018    AST 58 (H) 11/12/2018     OTHER:   Lab Results   Component Value Date    A1C 6.1 (H) 08/05/2021    CHANDRA 8.5 11/13/2018    MAG 7.6 (HH) 11/10/2018    TSH 1.13 08/05/2021    T4 1.51 (H) 02/03/2021    T3 105 01/09/2019       Anesthesia Plan    ASA Status:  3   NPO Status:  NPO Appropriate    Anesthesia Type: General.     - Airway: LMA   Induction: Intravenous, Propofol.   Maintenance: Balanced.        Consents    Anesthesia Plan(s) and associated risks, benefits, and realistic alternatives discussed. Questions answered and patient/representative(s) expressed understanding.     - Discussed with:  Patient      - Extended Intubation/Ventilatory Support Discussed: No.      - Patient is DNR/DNI Status: No    Use of blood products discussed: No .     Postoperative Care    Pain management: IV analgesics, Oral pain medications, Multi-modal analgesia, Peripheral nerve block (Single Shot).   PONV prophylaxis: Dexamethasone or Solumedrol, Ondansetron (or other 5HT-3), Background Propofol Infusion     Comments:    Left Pectoralis Blocks            Avel Garrett MD

## 2021-08-10 NOTE — OP NOTE
DATE OF SURGERY: August 10, 2021     SURGEON: Marta Frost MD  ASSISTANT(S): Melissa Loaiza PA-C, no qualified resident was available, thus Ms Fadia's assistance was required     PREOPERATIVE DIAGNOSIS: Left breast intraductal papilloma, lower outer quadrant  POSTOPERATIVE DIAGNOSIS: same    PROCEDURE(S): Left wire-localized lumpectomy    ANESTHESIA: General + Block    CLINICAL NOTE:  Mary Wray is a 45 year old woman with an intraductal papilloma.  The risks and benefits of lumpectomy were discussed with the patient and she elected to proceed with informed consent.    OPERATIVE FINDINGS:  1. Specimen radiograph confirmed presence of the wire and biopsy clip within the specimen.     OPERATIVE PROCEDURE:  The patient first presented to the Breast Center for the wire-localization by the radiologist.  The wire-localization images were personally reviewed by me prior to the start of the procedure.   The patient was brought to the operating room and placed in the supine position with appropriate padding for all of the pressure points. A general anesthetic was administered by the Anesthesia team.   A surgical safety checklist was performed to confirm the patient's identity, the site and laterality of the procedure. Perioperative antibiotics (Ancef) was provided.  VTE prophylaxis was provided with serial compression devices. The left breast was then prepped and draped in the usual sterile fashion using Chloraprep.     A curvilinear incision was made in the lower outer quadrant of the left breast at the wire entry site on the skin.  The breast tissue denoted by the localizing wire was dissected out.  At the deep aspect of resection, there was a large vessel. Hemostasis was achieved with electrocautery and a hemoclip.  The specimen was inked and radiographed.  It was found to contain the biopsy clip and wire.  The specimen was then sent to pathology. The wound was irrigated and hemostasis was achieved.  The incision was  closed in two layers with interrupted 3-0 vicryl and a running subcuticular 4-0 monocryl. Steristrips and dressings were applied.  The patient tolerated the procedure well. The sponge, needle, instrument counts were correct.  The patient was then awakened and taken to recovery in stable fashion.     I was present and scrubbed for the entire above procedure.    EBL: 50 mL    SPECIMEN(S):  A. Left breast mass    POSTOPERATIVE PLANS:  Mary IRLANDA Wrya will be discharged home today with wound care instructions and no prescription for analgesics.  She will follow-up with me in 2 weeks.     Marta Frost MD MSc FRCSC FACS    Division of Surgical Oncology  [facility]

## 2021-08-10 NOTE — OR NURSING
Patient received left side Pectoralis nerve block  with Exparel.  Fentanyl 50mcg and Versed 1mg given. Tolerated procedure well.

## 2021-08-11 ENCOUNTER — PATIENT OUTREACH (OUTPATIENT)
Dept: ONCOLOGY | Facility: CLINIC | Age: 46
End: 2021-08-11

## 2021-08-11 NOTE — PROGRESS NOTES
POST-OP CALL  Aug 11, 2021    Mary Wray is a 45 year old female s/p left lumpectomy     She was called to see how she is doing post operatively. There was no answer and a message was left.   Follow up appointment scheduled on 8/26 with Dr. Frost.    Melissa Loaiza PA-C

## 2021-08-25 NOTE — PROGRESS NOTES
FOLLOW-UP  Aug 26, 2021    Mary Wray is a 45 year old female who returns for her 1st post-operative follow-up visit.    HPI:    She underwent a left wire-localized lumpectomy on 8/10/2021.  She is currently 2 week(s) post-op.  Final surgical pathology showed an intraductal papilloma without atypia or malignancy.    Since the procedure, she has been doing well. She denies any redness or swelling, or drainage from the incision, or fever/chills.  She has good range of motion and denies any upper extremity swelling.  She has some discomfort in the breast.    /77 (BP Location: Right arm, Patient Position: Sitting, Cuff Size: Adult Regular)   Pulse 63   Temp 98.9  F (37.2  C) (Oral)   Wt 116.3 kg (256 lb 6.4 oz)   LMP 07/29/2021 (Approximate)   SpO2 100%   BMI 40.48 kg/m     Physical Exam  Constitutional:       Appearance: She is well-developed.   Pulmonary:      Effort: No respiratory distress.   Chest:      Comments: Breast incision healing well.  No seroma. No cellulitis or abscess. No breast contour abnormality or nipple-areolar complex distortion.    Skin:     General: Skin is warm and dry.         INVESTIGATIONS:    Surgical Pathology (8/10/2021):  Final Diagnosis   LEFT breast, lower outer quadrant, wire localized lumpectomy:  - Intraductal papilloma (size 8 mm) with usual ductal hyperplasia and apocrine metaplasia, completely excised  - Incidental microscopic intraductal papilloma (size 1.5 mm)  - Other findings: Fibrocystic change (including microcysts with apocrine metaplasia), sclerosing adenosis, and florid usual ductal hyperplasia  - Calcifications in benign ducts and acini  - Prior core biopsy site changes  - Negative for atypia or malignancy     ASSESSMENT:    Mary Wray is a 45 year old female with an intraductal papilloma of the left breast, s/p resection.    She is healing well.    We reviewed the pathology today and a copy of the report was provided. No further surgery or other  intervention is indicated. She may return to routine screening.  I have not made specific follow-up appointments with me at this time and will defer ongoing care to her PCP.  She knows to call if she has concerns.     All of the above was discussed with the patient and all questions were answered.     PLAN:  1. Defer ongoing care to PCP    Marta Frost MD MSc [facility] FACS  Assistant Professor of Surgery  Division of Surgical Oncology  [facility]

## 2021-08-26 ENCOUNTER — ONCOLOGY VISIT (OUTPATIENT)
Dept: ONCOLOGY | Facility: CLINIC | Age: 46
End: 2021-08-26
Attending: SURGERY
Payer: COMMERCIAL

## 2021-08-26 VITALS
SYSTOLIC BLOOD PRESSURE: 115 MMHG | WEIGHT: 256.4 LBS | HEART RATE: 63 BPM | BODY MASS INDEX: 40.48 KG/M2 | DIASTOLIC BLOOD PRESSURE: 77 MMHG | OXYGEN SATURATION: 100 % | TEMPERATURE: 98.9 F

## 2021-08-26 DIAGNOSIS — D24.2 INTRADUCTAL PAPILLOMA OF BREAST, LEFT: Primary | ICD-10-CM

## 2021-08-26 PROCEDURE — G0463 HOSPITAL OUTPT CLINIC VISIT: HCPCS

## 2021-08-26 PROCEDURE — 99024 POSTOP FOLLOW-UP VISIT: CPT | Performed by: SURGERY

## 2021-08-26 ASSESSMENT — PAIN SCALES - GENERAL: PAINLEVEL: NO PAIN (0)

## 2021-08-26 NOTE — NURSING NOTE
"Oncology Rooming Note    August 26, 2021 7:51 AM   Mary Wray is a 45 year old female who presents for:    Chief Complaint   Patient presents with     Oncology Clinic Visit     Intraductal papilloma of breast, left     Initial Vitals: /77 (BP Location: Right arm, Patient Position: Sitting, Cuff Size: Adult Regular)   Pulse 63   Temp 98.9  F (37.2  C) (Oral)   Wt 116.3 kg (256 lb 6.4 oz)   LMP 07/29/2021 (Approximate)   SpO2 100%   BMI 40.48 kg/m   Estimated body mass index is 40.48 kg/m  as calculated from the following:    Height as of 10/14/20: 1.695 m (5' 6.73\").    Weight as of this encounter: 116.3 kg (256 lb 6.4 oz). Body surface area is 2.34 meters squared.  No Pain (0) Comment: Data Unavailable   Patient's last menstrual period was 07/29/2021 (approximate).  Allergies reviewed: Yes  Medications reviewed: Yes    Medications: Medication refills not needed today.  Pharmacy name entered into Evri:    [facility] - [place], [place] - [address]  [facility] [place], [place] - [address]    Clinical concerns: No new concerns.        Sonia Almanza LPN August 26, 2021 7:51 AM                "

## 2021-08-26 NOTE — LETTER
8/26/2021         RE: Mary Wray  [address]  [place] [facility] [address]        Dear Colleague,    Thank you for referring your patient, Mary Wray, to the [facility]. Please see a copy of my visit note below.    FOLLOW-UP  Aug 26, 2021    Mary Wray is a 45 year old female who returns for her 1st post-operative follow-up visit.    HPI:    She underwent a left wire-localized lumpectomy on 8/10/2021.  She is currently 2 week(s) post-op.  Final surgical pathology showed an intraductal papilloma without atypia or malignancy.    Since the procedure, she has been doing well. She denies any redness or swelling, or drainage from the incision, or fever/chills.  She has good range of motion and denies any upper extremity swelling.  She has some discomfort in the breast.    /77 (BP Location: Right arm, Patient Position: Sitting, Cuff Size: Adult Regular)   Pulse 63   Temp 98.9  F (37.2  C) (Oral)   Wt 116.3 kg (256 lb 6.4 oz)   LMP 07/29/2021 (Approximate)   SpO2 100%   BMI 40.48 kg/m     Physical Exam  Constitutional:       Appearance: She is well-developed.   Pulmonary:      Effort: No respiratory distress.   Chest:      Comments: Breast incision healing well.  No seroma. No cellulitis or abscess. No breast contour abnormality or nipple-areolar complex distortion.    Skin:     General: Skin is warm and dry.         INVESTIGATIONS:    Surgical Pathology (8/10/2021):  Final Diagnosis   LEFT breast, lower outer quadrant, wire localized lumpectomy:  - Intraductal papilloma (size 8 mm) with usual ductal hyperplasia and apocrine metaplasia, completely excised  - Incidental microscopic intraductal papilloma (size 1.5 mm)  - Other findings: Fibrocystic change (including microcysts with apocrine metaplasia), sclerosing adenosis, and florid usual ductal hyperplasia  - Calcifications in benign ducts and acini  - Prior core biopsy site changes  - Negative for atypia or  malignancy     ASSESSMENT:    Mary Wray is a 45 year old female with an intraductal papilloma of the left breast, s/p resection.    She is healing well.    We reviewed the pathology today and a copy of the report was provided. No further surgery or other intervention is indicated. She may return to routine screening.  I have not made specific follow-up appointments with me at this time and will defer ongoing care to her PCP.  She knows to call if she has concerns.     All of the above was discussed with the patient and all questions were answered.     PLAN:  1. Defer ongoing care to PCP    Marta Frost MD MSc [facility] FACS  Assistant Professor of Surgery  Division of Surgical Oncology  [facility]       Again, thank you for allowing me to participate in the care of your patient.        Sincerely,        Marta Frost MD

## 2021-09-29 ENCOUNTER — DOCUMENTATION ONLY (OUTPATIENT)
Dept: OTHER | Facility: CLINIC | Age: 46
End: 2021-09-29

## 2021-10-03 ENCOUNTER — HEALTH MAINTENANCE LETTER (OUTPATIENT)
Age: 46
End: 2021-10-03

## 2021-10-30 ENCOUNTER — IMMUNIZATION (OUTPATIENT)
Dept: FAMILY MEDICINE | Facility: CLINIC | Age: 46
End: 2021-10-30
Payer: COMMERCIAL

## 2021-10-30 VITALS — TEMPERATURE: 97.3 F

## 2021-10-30 DIAGNOSIS — Z23 NEED FOR PROPHYLACTIC VACCINATION AND INOCULATION AGAINST INFLUENZA: Primary | ICD-10-CM

## 2021-10-30 PROCEDURE — 90686 IIV4 VACC NO PRSV 0.5 ML IM: CPT

## 2021-10-30 PROCEDURE — 99207 PR NO CHARGE NURSE ONLY: CPT

## 2021-10-30 PROCEDURE — 90471 IMMUNIZATION ADMIN: CPT

## 2021-11-11 NOTE — TELEPHONE ENCOUNTER
FUTURE VISIT INFORMATION      SURGERY INFORMATION:    11/22/21 11/30/21-  ALEX- Dr. Brewster [facility]- fax [phone].    RECORDS REQUESTED FROM:       Primary Care Provider:    Jazlyn Huitron [facility]      Most recent Sleep Study:  6/9/15

## 2021-11-19 ENCOUNTER — ANESTHESIA EVENT (OUTPATIENT)
Dept: SURGERY | Facility: CLINIC | Age: 46
End: 2021-11-19

## 2021-11-19 ENCOUNTER — OFFICE VISIT (OUTPATIENT)
Dept: SURGERY | Facility: CLINIC | Age: 46
End: 2021-11-19
Payer: COMMERCIAL

## 2021-11-19 ENCOUNTER — PRE VISIT (OUTPATIENT)
Dept: SURGERY | Facility: CLINIC | Age: 46
End: 2021-11-19

## 2021-11-19 VITALS
SYSTOLIC BLOOD PRESSURE: 107 MMHG | HEIGHT: 67 IN | OXYGEN SATURATION: 99 % | TEMPERATURE: 98.3 F | HEART RATE: 72 BPM | WEIGHT: 260 LBS | DIASTOLIC BLOOD PRESSURE: 71 MMHG | BODY MASS INDEX: 40.81 KG/M2

## 2021-11-19 DIAGNOSIS — Z01.818 PRE-OP EVALUATION: Primary | ICD-10-CM

## 2021-11-19 PROCEDURE — 99204 OFFICE O/P NEW MOD 45 MIN: CPT | Performed by: NURSE PRACTITIONER

## 2021-11-19 ASSESSMENT — MIFFLIN-ST. JEOR: SCORE: 1856.98

## 2021-11-19 ASSESSMENT — PAIN SCALES - GENERAL: PAINLEVEL: NO PAIN (0)

## 2021-11-19 ASSESSMENT — LIFESTYLE VARIABLES: TOBACCO_USE: 0

## 2021-11-19 NOTE — ANESTHESIA PREPROCEDURE EVALUATION
"Anesthesia Pre-Procedure Evaluation    Patient: Mary Wray   MRN: [id number] : 1975        Preoperative Diagnosis: * No surgery found *    Procedure :   PAC EVALUATION       Past Medical History:   Diagnosis Date     Graves disease      Hypovitaminosis D      Mild depression (H)     , no meds, resolved with move     Miscarriage      Obesity      Postablative hypothyroidism     CCRM     Prediabetes 2016     Premenstrual dysphoria       Past Surgical History:   Procedure Laterality Date      SECTION N/A 2018    Procedure:  SECTION;  Surgeon: Lola Otto MD;  [facility]     LEEP TX, CERVICAL      \"for HPV\"     LUMPECTOMY BREAST Left 8/10/2021    Procedure: LEFT Wire-Localized Lumpectomy;  Surgeon: Marta Frost MD;  Location: [facility] OR      No Known Allergies   Social History     Tobacco Use     Smoking status: Never Smoker     Smokeless tobacco: Never Used   Substance Use Topics     Alcohol use: Not Currently     Alcohol/week: 1.7 standard drinks     Types: 2 Standard drinks or equivalent per week     Comment: occasional      Wt Readings from Last 1 Encounters:   21 117.9 kg (260 lb)        Anesthesia Evaluation   Pt has had prior anesthetic. Type: General.    No history of anesthetic complications       ROS/MED HX  ENT/Pulmonary: Comment: Tested for KHURRAM previously, Results negative for KHURRAM    (+) KHURRAM risk factors, obese,  (-) tobacco use and asthma   Neurologic:  - neg neurologic ROS     Cardiovascular:     (+) -----No previous cardiac testing  (-) GORDON and irregular heartbeat/palpitations   METS/Exercise Tolerance: >4 METS    Hematologic:  - neg hematologic  ROS     Musculoskeletal:  - neg musculoskeletal ROS     GI/Hepatic:  - neg GI/hepatic ROS     Renal/Genitourinary:  - neg Renal ROS     Endo:     (+) thyroid problem, hypothyroidism, Obesity,     Psychiatric/Substance Use:  - neg psychiatric ROS     Infectious Disease:  - neg " infectious disease ROS     Malignancy:  - neg malignancy ROS     Other:  - neg other ROS          Physical Exam    Airway        Mallampati: II   TM distance: > 3 FB   Neck ROM: full   Mouth opening: > 3 cm    Respiratory Devices and Support         Dental  no notable dental history         Cardiovascular   cardiovascular exam normal          Pulmonary   pulmonary exam normal                OUTSIDE LABS:  CBC:   Lab Results   Component Value Date    WBC 8.2 11/12/2018    WBC 8.7 11/11/2018    HGB 10.5 (L) 11/12/2018    HGB 9.6 (L) 11/11/2018    HCT 31.1 (L) 11/12/2018    HCT 28.4 (L) 11/11/2018     11/12/2018     11/11/2018     BMP:   Lab Results   Component Value Date     11/13/2018    POTASSIUM 4.3 11/13/2018    POTASSIUM 4.1 12/08/2017    CHLORIDE 108 11/13/2018    CO2 26 11/13/2018    BUN 9 11/13/2018    CR 0.71 11/13/2018    CR 0.80 11/12/2018    GLC 75 11/13/2018    GLC 92 12/08/2017     COAGS: No results found for: PTT, INR, FIBR  POC:   Lab Results   Component Value Date    BGM 96 11/10/2018    HCG Negative 08/10/2021     HEPATIC:   Lab Results   Component Value Date    ALT 59 (H) 11/12/2018    AST 58 (H) 11/12/2018     OTHER:   Lab Results   Component Value Date    A1C 6.1 (H) 08/05/2021    CHANDRA 8.5 11/13/2018    MAG 7.6 (HH) 11/10/2018    TSH 1.13 08/05/2021    T4 1.51 (H) 02/03/2021    T3 105 01/09/2019             PAC Discussion and Assessment    ASA Classification: 2                      PAC Resident/NP Anesthesia Assessment: Mary Wray is a 46 year old female scheduled for cataract surgery on 11/22/2021 by Dr. Brewster at an outside facility.  PAC referral for risk assessment and optimization for anesthesia with comorbid conditions of the following.  Pre-operative considerations:  1. Cardiac: Functional status- METS > 4. low risk surgery with RCRI: 0.4% risk of major adverse cardiac event. no known cardiac history or symptoms reported  2. Pulm: Airway feasible. never smoked. No  pulmonary symptoms or history reported  3. GI: no GERD  4. ENDO: + obesity  BMI: 40. Pre diabetic A1c 6.1%.  Hypothyroidism, on levothyroxine  5. Psych. depression managed with lexopros  The following specific operative considerations:  # KHURRAM 1/8 = low risk. Has been evaluated in the past was not diagnosed with KHURRAM  # VTE risk: 0.5%   # Risk of PONV score = 2 If > 2, anti-emetic intervention recommended.  Patient is optimized and is acceptable candidate for the proposed procedure. No further diagnostic evaluation is needed.  For further details of assessment, testing, and physical exam please see H and P completed on same date.    Reviewed and Signed by PAC Mid-Level Provider/Resident  Mid-Level Provider/Resident: Lyndsay GALVAN CNP  Date: 11/19/2021                                 COLE Roldan CNP

## 2021-11-19 NOTE — H&P
Pre-Operative H & P     CC:  Preoperative exam to assess for increased cardiopulmonary risk while undergoing surgery and anesthesia.    Date of Encounter: 11/19/2021  Primary Care Physician:  Jazlyn Huitron     Reason for visit: cataracts  HPI  Mary Wray is a 46 year old female who presents for pre-operative H & P in preparation for cataract surgery  with Dr. Brewster on 11/22/21 at [facility] in [place].     Mary Wray is a 46 year old female with history of issues with cataracts and increasing vision issues. She has consulted with Dr. Brewster and the above procedure has been scheduled for treatment.   PMHX also includes, hypothyroidism, obesity, pre-diabetic and depression    History is obtained from the patient and chart review      Hx of abnormal bleeding or anti-platelet use: no    Menstrual history: Patient's last menstrual period was 11/07/2021 (approximate).:     Prior to Admission Medications  Current Outpatient Medications   Medication Sig Dispense Refill     escitalopram (LEXAPRO) 10 MG tablet Take 1 tablet (10 mg) by mouth daily (Patient taking differently: Take 10 mg by mouth every evening ) 90 tablet 2     SYNTHROID 150 MCG tablet MON to SAT 1 tablet/day;SUN 1.5 tablet (Patient taking differently: every morning MON to SAT 1 tablet/day;SUN 1.5 tablet) 90 tablet 4       Family History  Family History   Problem Relation Age of Onset     Obesity Mother      Diabetes Father 68     Obesity Father      Obesity Sister            ROS/MED HX    The complete review of systems is negative other than noted in the HPI or here.     ENT/Pulmonary: Comment: Tested for KHURRAM previously, Results negative for KHURRAM    (+) KHURRAM risk factors, obese,  (-) tobacco use and asthma   Neurologic:  - neg neurologic ROS     Cardiovascular:     (+) -----No previous cardiac testing  (-) GORDON and irregular heartbeat/palpitations   METS/Exercise Tolerance: >4 METS    Hematologic:  - neg hematologic  ROS     Musculoskeletal:   "- neg musculoskeletal ROS     GI/Hepatic:  - neg GI/hepatic ROS     Renal/Genitourinary:  - neg Renal ROS     Endo:     (+) thyroid problem, hypothyroidism, Obesity,     Psychiatric/Substance Use:  - neg psychiatric ROS     Infectious Disease:  - neg infectious disease ROS     Malignancy:  - neg malignancy ROS     Other:  - neg other ROS                  OUTSIDE LABS:  CBC:   Lab Results   Component Value Date    WBC 8.2 11/12/2018    WBC 8.7 11/11/2018    HGB 10.5 (L) 11/12/2018    HGB 9.6 (L) 11/11/2018    HCT 31.1 (L) 11/12/2018    HCT 28.4 (L) 11/11/2018     11/12/2018     11/11/2018     BMP:   Lab Results   Component Value Date     11/13/2018    POTASSIUM 4.3 11/13/2018    POTASSIUM 4.1 12/08/2017    CHLORIDE 108 11/13/2018    CO2 26 11/13/2018    BUN 9 11/13/2018    CR 0.71 11/13/2018    CR 0.80 11/12/2018    GLC 75 11/13/2018    GLC 92 12/08/2017     COAGS: No results found for: PTT, INR, FIBR  POC:   Lab Results   Component Value Date    BGM 96 11/10/2018    HCG Negative 08/10/2021     HEPATIC:   Lab Results   Component Value Date    ALT 59 (H) 11/12/2018    AST 58 (H) 11/12/2018     OTHER:   Lab Results   Component Value Date    A1C 6.1 (H) 08/05/2021    CHANDRA 8.5 11/13/2018    MAG 7.6 (HH) 11/10/2018    TSH 1.13 08/05/2021    T4 1.51 (H) 02/03/2021    T3 105 01/09/2019                /71 (BP Location: Right arm, Patient Position: Chair, Cuff Size: Adult Large)   Pulse 72   Temp 98.3  F (36.8  C) (Oral)   Ht 1.702 m (5' 7\")   Wt 117.9 kg (260 lb)   LMP 11/07/2021 (Approximate)   SpO2 99%   Breastfeeding No   BMI 40.72 kg/m           Physical Exam  Constitutional: Awake, alert, cooperative, no apparent distress, and appears stated age.  Eyes: Pupils equal, round and reactive to light, extra ocular muscles intact, sclera clear, conjunctiva normal.  HENT: Normocephalic, oral pharynx with moist mucus membranes, good dentition. No goiter appreciated.   Respiratory: Clear to " auscultation bilaterally, no crackles or wheezing.  Cardiovascular: Regular rate and rhythm, normal S1 and S2, and no murmur noted.  Carotids +2, no bruits. No edema. Palpable pulses to radial  DP and PT arteries.   GI: Normal bowel sounds, soft, non-distended, non-tender, no masses palpated, no hepatosplenomegaly.    Lymph/Hematologic: No cervical lymphadenopathy and no supraclavicular lymphadenopathy.  Genitourinary:  deferred  Skin: Warm and dry.  No rashes at anticipated surgical site.   Musculoskeletal: Full ROM of neck. There is no redness, warmth, or swelling of the joints. Gross motor strength is normal.    Neurologic: Awake, alert, oriented to name, place and time. Cranial nerves II-XII are grossly intact. Gait is normal.   Neuropsychiatric: Calm, cooperative. Normal affect.     PRIOR LABS/DIAGNOSTIC STUDIES:   All labs and imaging personally reviewed           The patient's records and results personally reviewed by this provider.     Outside records reviewed from: care everywhere    LAB/DIAGNOSTIC STUDIES TODAY:      ASSESSMENT and PLAN    Mary Wray is a 46 year old female scheduled for cataract surgery on 11/22/2021 by Dr. Brewster at an outside facility.  PAC referral for risk assessment and optimization for anesthesia with comorbid conditions of the following.  Pre-operative considerations:  1. Cardiac: Functional status- METS > 4. low risk surgery with RCRI: 0.4% risk of major adverse cardiac event. no known cardiac history or symptoms reported  2. Pulm: Airway feasible. never smoked. No pulmonary symptoms or history reported  3. GI: no GERD  4. ENDO: + obesity  BMI: 40. Pre diabetic A1c 6.1%.  Hypothyroidism, on levothyroxine  5. Psych. depression managed with lexopros  The following specific operative considerations:  # KHURRAM 1/8 = low risk. Has been evaluated in the past was not diagnosed with KHURRAM  # VTE risk: 0.5%   # Risk of PONV score = 2 If > 2, anti-emetic intervention recommended.  Patient is  optimized and is acceptable candidate for the proposed procedure. No further diagnostic evaluation is needed.      The patient is optimized and acceptable candidate for proposed procedure. Arrival time, NPO, shower and medication instructions provided by nursing staff today.      On the day of service:     Prep time: 15 minutes  Visit time: 15 minutes  Documentation time: 15 minutes  ------------------------------------------  Total time: 45 minutes      COLE Roldan CNP  Preoperative Assessment Center  [facility]  [facility]  Phone: [phone]  Fax: [phone]

## 2021-11-28 ENCOUNTER — HEALTH MAINTENANCE LETTER (OUTPATIENT)
Age: 46
End: 2021-11-28

## 2022-01-31 ENCOUNTER — MYC MEDICAL ADVICE (OUTPATIENT)
Dept: FAMILY MEDICINE | Facility: CLINIC | Age: 47
End: 2022-01-31
Payer: COMMERCIAL

## 2022-01-31 DIAGNOSIS — E89.0 POSTABLATIVE HYPOTHYROIDISM: ICD-10-CM

## 2022-02-01 NOTE — TELEPHONE ENCOUNTER

## 2022-02-07 ENCOUNTER — OFFICE VISIT (OUTPATIENT)
Dept: FAMILY MEDICINE | Facility: CLINIC | Age: 47
End: 2022-02-07
Payer: COMMERCIAL

## 2022-02-07 VITALS
RESPIRATION RATE: 16 BRPM | HEART RATE: 81 BPM | TEMPERATURE: 98.1 F | WEIGHT: 256 LBS | DIASTOLIC BLOOD PRESSURE: 79 MMHG | SYSTOLIC BLOOD PRESSURE: 122 MMHG | BODY MASS INDEX: 40.1 KG/M2 | OXYGEN SATURATION: 99 %

## 2022-02-07 DIAGNOSIS — Z12.11 SCREENING FOR COLON CANCER: ICD-10-CM

## 2022-02-07 DIAGNOSIS — R63.5 WEIGHT GAIN: ICD-10-CM

## 2022-02-07 DIAGNOSIS — Z00.00 ROUTINE GENERAL MEDICAL EXAMINATION AT A HEALTH CARE FACILITY: Primary | ICD-10-CM

## 2022-02-07 PROCEDURE — 99396 PREV VISIT EST AGE 40-64: CPT | Performed by: FAMILY MEDICINE

## 2022-02-07 RX ORDER — LEVOTHYROXINE SODIUM 150 MCG
150 TABLET ORAL DAILY
Qty: 90 TABLET | Refills: 4 | Status: SHIPPED | OUTPATIENT
Start: 2022-02-07 | End: 2023-03-09

## 2022-02-07 NOTE — PATIENT INSTRUCTIONS
Preventive Health Recommendations  Female Ages 40 to 49    Yearly exam:     See your health care provider every year in order to  1. Review health changes.   2. Discuss preventive care.    3. Review your medicines if your doctor prescribed any.      Get a Pap test every three years (unless you have an abnormal result and your provider advises testing more often).      If you get Pap tests with HPV test, you only need to test every 5 years, unless you have an abnormal result. You do not need a Pap test if your uterus was removed (hysterectomy) and you have not had cancer.      You should be tested each year for STDs (sexually transmitted diseases), if you're at risk.     Ask your doctor if you should have a mammogram.      Have a colonoscopy (test for colon cancer) if someone in your family has had colon cancer or polyps before age 50.       Have a cholesterol test every 5 years.       Have a diabetes test (fasting glucose) after age 45. If you are at risk for diabetes, you should have this test every 3 years.    Shots: Get a flu shot each year. Get a tetanus shot every 10 years.     Nutrition:     Eat at least 5 servings of fruits and vegetables each day.    Eat whole-grain bread, whole-wheat pasta and brown rice instead of white grains and rice.    Get adequate Calcium and Vitamin D.      Lifestyle    Exercise at least 150 minutes a week (an average of 30 minutes a day, 5 days a week). This will help you control your weight and prevent disease.    Limit alcohol to one drink per day.    No smoking.     Wear sunscreen to prevent skin cancer.    See your dentist every six months for an exam and cleaning.      For medical management of weight loss, here's a table that describes the medications that are currently FDA approved.

## 2022-02-07 NOTE — PROGRESS NOTES
SUBJECTIVE:   CC: Mary Wray is an 46 year old woman who presents for preventive health visit.     Patient has been advised of split billing requirements and indicates understanding: Yes  HPI    Doing well overall  Struggling with Misbah Jackson's death - has been watching last week's press conference over and over; realizes that's not making her feel better but she can't help it  - good supports, will be going to family [place] [facility] with a few friends and their kids, looking forward to that    Daughter is busy (3 yrs old) and super [employer]    Work going well - busy right now as the [employer] is working through acceptance letters, etc.    Not happy with 20 lb wt gain during COVID  - focusing on journalling, healthy diet, mindful eating, increasing activity    Today's PHQ-2 Score:   PHQ-2 ( 1999 Pfizer) 2/7/2022   Q1: Little interest or pleasure in doing things 0   Q2: Feeling down, depressed or hopeless 0   PHQ-2 Score 0   PHQ-2 Total Score (12-17 Years)- Positive if 3 or more points; Administer PHQ-A if positive -     Abuse: Current or Past (Physical, Sexual or Emotional) - No  Do you feel safe in your environment? Yes    Social History     Tobacco Use     Smoking status: Never Smoker     Smokeless tobacco: Never Used   Substance Use Topics     Alcohol use: Not Currently     Alcohol/week: 1.7 standard drinks     Types: 2 Standard drinks or equivalent per week     Comment: occasional     Reviewed orders with patient.  Reviewed health maintenance and updated orders accordingly - Yes  Lab work is in process  BP Readings from Last 3 Encounters:   02/07/22 122/79   11/19/21 107/71   08/26/21 115/77    Wt Readings from Last 3 Encounters:   02/07/22 116.1 kg (256 lb)   11/19/21 117.9 kg (260 lb)   08/26/21 116.3 kg (256 lb 6.4 oz)            Patient Active Problem List   Diagnosis     Postablative hypothyroidism     Pre-diabetes     Papanicolaou smear of cervix with low grade squamous intraepithelial  "lesion (LGSIL)     H/O mammogram     Metatarsalgia of both feet     History of Graves' disease     Current moderate episode of major depressive disorder without prior episode (H)     Intraductal papilloma of breast, left     Past Surgical History:   Procedure Laterality Date     CATARACT EXTRACTION, BILATERAL      November and 2021      SECTION N/A 2018    Procedure:  SECTION;  Surgeon: Lola Otto MD;  [facility]     LEEP TX, CERVICAL      \"for HPV\"     LUMPECTOMY BREAST Left 8/10/2021    Procedure: LEFT Wire-Localized Lumpectomy;  Surgeon: Marta Frost MD;  Location: [facility] OR       Social History     Tobacco Use     Smoking status: Never Smoker     Smokeless tobacco: Never Used   Substance Use Topics     Alcohol use: Not Currently     Alcohol/week: 1.7 standard drinks     Types: 2 Standard drinks or equivalent per week     Comment: occasional     Family History   Problem Relation Age of Onset     Obesity Mother      Diabetes Father 68     Obesity Father      Obesity Sister          Current Outpatient Medications   Medication Sig Dispense Refill     escitalopram (LEXAPRO) 10 MG tablet Take 1 tablet (10 mg) by mouth daily (Patient not taking: Reported on 2022) 90 tablet 2     SYNTHROID 150 MCG tablet Take 1 tablet (150 mcg) by mouth daily MON to SAT 1 tablet/day;SUN 1.5 tablet 90 tablet 4       Breast Cancer Screening:  Any new diagnosis of family breast, ovarian, or bowel cancer? No   - did personally undergo breast biopsy last year, benign    FHS-7:   Breast CA Risk Assessment (FHS-7) 2021   Did any of your first-degree relatives have breast or ovarian cancer? No   Did any of your relatives have bilateral breast cancer? No   Did any man in your family have breast cancer? No   Did any woman in your family have breast and ovarian cancer? No   Did any woman in your family have breast cancer before age 50 y? No   Do you have 2 or more relatives with " breast and/or ovarian cancer? No   Do you have 2 or more relatives with breast and/or bowel cancer? No     click delete button to remove this line now  Mammogram Screening: Recommended annual mammography  Pertinent mammograms are reviewed under the imaging tab.    History of abnormal Pap smear: YES - updated in Problem List and Health Maintenance accordingly  PAP / HPV Latest Ref Rng & Units 10/31/2019 12/18/2018 9/7/2017   PAP (Historical) - NIL NIL NIL   HPV16 NEG:Negative Negative Negative Negative   HPV18 NEG:Negative Negative Negative Negative   HRHPV NEG:Negative Negative Negative Negative     Reviewed and updated as needed this visit by clinical staff  Tobacco  Allergies  Meds   Med Hx  Surg Hx  Fam Hx  Soc Hx       Reviewed and updated as needed this visit by Provider                   Review of Systems  CONSTITUTIONAL: NEGATIVE for fever, chills, change in weight  INTEGUMENTARU/SKIN: NEGATIVE for worrisome rashes, moles or lesions  EYES: NEGATIVE for vision changes or irritation  ENT: NEGATIVE for ear, mouth and throat problems  RESP: NEGATIVE for significant cough or SOB  BREAST: NEGATIVE for masses, tenderness or discharge  CV: NEGATIVE for chest pain, palpitations or peripheral edema  GI: NEGATIVE for nausea, abdominal pain, heartburn, or change in bowel habits  : NEGATIVE for unusual urinary or vaginal symptoms. Periods are regular.  MUSCULOSKELETAL: NEGATIVE for significant arthralgias or myalgia  NEURO: NEGATIVE for weakness, dizziness or paresthesias  PSYCHIATRIC: NEGATIVE for changes in mood or affect     OBJECTIVE:   /79   Pulse 81   Temp 98.1  F (36.7  C) (Oral)   Resp 16   Wt 116.1 kg (256 lb)   LMP 01/30/2022 (Exact Date)   SpO2 99%   Breastfeeding No   BMI 40.10 kg/m    Physical Exam  GENERAL: healthy, alert and no distress  EYES: Eyes grossly normal to inspection, PERRL and conjunctivae and sclerae normal  HENT: ear canals and TM's normal, nose and mouth without ulcers or  lesions  NECK: no adenopathy, no asymmetry, masses, or scars and thyroid normal to palpation  RESP: lungs clear to auscultation - no rales, rhonchi or wheezes  BREAST: normal without masses, tenderness or nipple discharge and no palpable axillary masses or adenopathy  CV: regular rate and rhythm, normal S1 S2, no S3 or S4, no murmur, click or rub, no peripheral edema and peripheral pulses strong  ABDOMEN: soft, nontender, no hepatosplenomegaly, no masses and bowel sounds normal  MS: no gross musculoskeletal defects noted, no edema  SKIN: no suspicious lesions or rashes  NEURO: Normal strength and tone, mentation intact and speech normal  PSYCH: mentation appears normal, affect normal/bright    Diagnostic Test Results:  Labs reviewed in Epic from last summer      ASSESSMENT/PLAN:   (Z00.00) Routine general medical examination at a health care facility  (primary encounter diagnosis)  Comment: UTD with pap screening   Plan: discussed colon and breast CA screening, colonoscopy ordered    (Z12.11) Screening for colon cancer  Comment:   Plan: Adult Gastro Ref - Procedure Only    (R63.5) Weight gain  (E66.01) BMI >35  Comment: working on mindful eating, exercise, knows some of this is related to decreased activity during Covid, goal wt is back to where she was before the pandemic; felt more comfortable in her body at that time  Plan: continue current goals; will send info re: medication mgmt for weight via MyChart    Patient has been advised of split billing requirements and indicates understanding: Yes    COUNSELING:  Reviewed preventive health counseling, as reflected in patient instructions  Special attention given to:        Regular exercise       Healthy diet/nutrition       Vision screening       Colorectal Cancer Screening       Consider Hep C screening for all patients one time for ages 18-79 years       Advance Care Planning    Estimated body mass index is 40.1 kg/m  as calculated from the following:    Height as  "of 11/19/21: 1.702 m (5' 7\").    Weight as of this encounter: 116.1 kg (256 lb).    Weight management plan: Discussed healthy diet and exercise guidelines    She reports that she has never smoked. She has never used smokeless tobacco.      Counseling Resources:  ATP IV Guidelines  Pooled Cohorts Equation Calculator  Breast Cancer Risk Calculator  BRCA-Related Cancer Risk Assessment: FHS-7 Tool  FRAX Risk Assessment  ICSI Preventive Guidelines  Dietary Guidelines for Americans, 2010  USDA's MyPlate  ASA Prophylaxis  Lung CA Screening    Jazlyn Huitron MD  [facility]  "

## 2022-02-09 PROBLEM — E66.01 MORBID OBESITY (H): Status: ACTIVE | Noted: 2022-02-09

## 2022-02-11 ENCOUNTER — TELEPHONE (OUTPATIENT)
Dept: GASTROENTEROLOGY | Facility: CLINIC | Age: 47
End: 2022-02-11
Payer: COMMERCIAL

## 2022-02-11 NOTE — TELEPHONE ENCOUNTER
Screening Questions  Blue=prep questions Red=location Green=sedation   1. Are you active on mychart? Y    2. What insurance is in the chart? [employer]     3.  Ordering/Referring Provider: Jazlyn Huitron MD     4. BMI 40.1, If greater than 40 review exclusion criteria also will need EXTENDED PREP    5.  Respiratory Screening (If yes to any of the following HOSPITAL setting only):     Do you use daily home oxygen? N  Do you have mod to severe Obstructive Sleep Apnea? N (can be seen at [facility] or hospital setting)    Do you have Pulmonary Hypertension? N   Do you have UNCONTROLLED asthma? N    6. Have you had a heart or lung transplant? N  (If yes, please review exclusion criteria)    7. Are you currently on dialysis? N  (If yes, schedule in HOSPITAL setting only)(If yes, please send Golytely prep)    8. Do you have chronic kidney disease? N (If yes, please send Golytely prep)    9. Have you had a stroke or Transient ischemic attack (TIA) within 6 months? N (If yes, do not schedule at [facility])    10. In the past 6 months, have you had any heart related issues including cardiomyopathy or heart attack? N (If yes, please review exclusion criteria)           If yes, did it require cardiac stenting or other implantable device?  (If yes, please review exclusion criteria)      11. Do you have any implantable devices in your body (pacemaker, defib, LVAD)? N (If yes, schedule at [facility])    12. Do you take nitroglycerin? If yes, how often? N (if yes, schedule at HOSPITAL setting)    13. Are you currently taking any blood thinners? N (If yes- inform patient to follow up with PCP or provider for follow up instructions)     14. Are you a diabetic? N (If yes, please send Golytely prep)    15. (Females) Are you currently pregnant? N  If yes, how many weeks?      16. Are you taking any prescription pain medications on a routine schedule? N If yes, MAC sedation and patient will need EXTENDED PREP.    17. Do you have any chemical dependencies such  as alcohol, street drugs, or methadone? N If yes, MAC sedation     18. Do you have any history of post-traumatic stress syndrome, severe anxiety or history of psychosis? N  If yes, MAC sedation.     19. Do you transfer independently? Y    20.  Do you have any issues with constipation? N   If yes, pt will need EXTENDED PREP     21. Preferred Pharmacy for Pre Prescription [facility] [address] [place]    Scheduling Details    Which Colonoscopy Prep was Sent?: Extended  Type of Procedure Scheduled: Colon  Surgeon: Duarte  Date of Procedure: 4/14  Location: [facility]  Caller (Please ask for phone number if not scheduled by patient): Mary Wray      Sedation Type: MAC  Conscious Sedation- Needs  for 6 hours after the procedure  MAC/General-Needs  for 24 hours after procedure    Pre-op Required at [facility] [place] and OR for MAC sedation:   (if yes advise patient they will need a pre-op prior to procedure)      Informed patient they will need an adult  Y    Cannot take any type of public or medical transportation alone    Pre-Procedure Covid test to be completed at [facility] or Externally: Yes at [facility] [place] [facility]    Confirmed Nurse will call to complete assessment Y    Additional comments:  (DE SANDRA'S PATIENTS NEED EXTENDED PREP)

## 2022-03-08 DIAGNOSIS — Z11.59 ENCOUNTER FOR SCREENING FOR OTHER VIRAL DISEASES: Primary | ICD-10-CM

## 2022-04-08 ENCOUNTER — TELEPHONE (OUTPATIENT)
Dept: GASTROENTEROLOGY | Facility: CLINIC | Age: 47
End: 2022-04-08
Payer: COMMERCIAL

## 2022-04-08 DIAGNOSIS — Z12.11 ENCOUNTER FOR SCREENING COLONOSCOPY: Primary | ICD-10-CM

## 2022-04-08 RX ORDER — BISACODYL 5 MG/1
TABLET, DELAYED RELEASE ORAL
Qty: 2 TABLET | Refills: 0 | Status: SHIPPED | OUTPATIENT
Start: 2022-04-08 | End: 2022-11-27

## 2022-04-08 NOTE — TELEPHONE ENCOUNTER
Attempted to contact patient regarding upcoming colonoscopy procedure on 4.14.2022 for pre assessment questions. No answer.     Left message to return call to [phone] #2    Covid test scheduled: 4.11.2022    Arrival time: 0930    Facility location: [facility]    Sedation type: MAC    Indication for procedure: screening colonoscopy    Referring provider: Jazlyn Huitron MD    Bowel prep recommendation: extended prep d/t BMI    Prep instructions sent via [employer].  Prep prescription sent to    [facility] [address] RN

## 2022-04-11 ENCOUNTER — LAB (OUTPATIENT)
Dept: LAB | Facility: CLINIC | Age: 47
End: 2022-04-11
Payer: COMMERCIAL

## 2022-04-11 DIAGNOSIS — Z11.59 ENCOUNTER FOR SCREENING FOR OTHER VIRAL DISEASES: ICD-10-CM

## 2022-04-11 PROCEDURE — U0005 INFEC AGEN DETEC AMPLI PROBE: HCPCS | Mod: 90 | Performed by: PATHOLOGY

## 2022-04-11 PROCEDURE — U0003 INFECTIOUS AGENT DETECTION BY NUCLEIC ACID (DNA OR RNA); SEVERE ACUTE RESPIRATORY SYNDROME CORONAVIRUS 2 (SARS-COV-2) (CORONAVIRUS DISEASE [COVID-19]), AMPLIFIED PROBE TECHNIQUE, MAKING USE OF HIGH THROUGHPUT TECHNOLOGIES AS DESCRIBED BY CMS-2020-01-R: HCPCS | Mod: 90 | Performed by: PATHOLOGY

## 2022-04-11 PROCEDURE — 99000 SPECIMEN HANDLING OFFICE-LAB: CPT | Performed by: PATHOLOGY

## 2022-04-11 NOTE — TELEPHONE ENCOUNTER
Returning pt's call.    Pre assessment questions completed for upcoming colonoscopy procedure scheduled on 4.14.2022    COVID test scheduled 4.11.2022    Reviewed procedural arrival time 0930 and facility location [facility].    Designated  policy reviewed. Instructed to have someone stay 24 hours post procedure.     Anticoagulation/blood thinners? no    Electronic implanted devices? no    Reviewed extended prep instructions with patient. No fiber/iron supplements or foods that contain nuts/seeds prior to procedure.     Patient verbalized understanding and had no questions or concerns at this time.    Paulette Velasco RN

## 2022-04-12 LAB — SARS-COV-2 RNA RESP QL NAA+PROBE: NEGATIVE

## 2022-06-30 ENCOUNTER — TELEPHONE (OUTPATIENT)
Dept: GASTROENTEROLOGY | Facility: CLINIC | Age: 47
End: 2022-06-30

## 2022-06-30 NOTE — TELEPHONE ENCOUNTER
Caller: Mary Wray     Procedure: colonoscopy     Date, Location, and Surgeon of Procedure Cancelled: 04/14/2022 - [facility] - brenda     Ordering Provider:pushpa     Reason for cancel (please be detailed, any staff messages or encounters to note?):     Per pt         Rescheduled: yes      If rescheduled:    Date: 08/30/2022   [place] [facility]    Prep Resent: yes/ext (changes to prep?)   Covid Test Rescheduled: **INFORMED OF HOME TESTING & LAB OPTION**     Note any change or update to original order/sedation:n/a

## 2022-08-17 ENCOUNTER — TELEPHONE (OUTPATIENT)
Dept: GASTROENTEROLOGY | Facility: CLINIC | Age: 47
End: 2022-08-17

## 2022-08-17 DIAGNOSIS — Z12.11 ENCOUNTER FOR SCREENING COLONOSCOPY: Primary | ICD-10-CM

## 2022-08-17 RX ORDER — BISACODYL 5 MG/1
TABLET, DELAYED RELEASE ORAL
Qty: 4 TABLET | Refills: 0 | Status: SHIPPED | OUTPATIENT
Start: 2022-08-17 | End: 2022-11-27

## 2022-08-17 NOTE — TELEPHONE ENCOUNTER
Patient calling wanting to complete pre assessment for upcoming procedure.     Pre assessment questions completed for upcoming colonoscopy  procedure scheduled on 8/30/22    COVID policy reviewed. Patient to complete rapid antigen test one to two days before their scheduled procedure. Patient to bring photo of the results when they come in for their procedure.    Reviewed procedural arrival time 0700 and facility location [facility].    Designated  policy reviewed. Instructed to have someone stay 24 hours post procedure.     Procedure indication: screening     Bowel prep recommendation: Extended prep d/t BMI 40 (rescheudled procedure)    Extended prep script sent to [facility] [address] [facility] [address] pharmacy. Prep instructions sent via [employer].    Reviewed Extended prep instructions with patient. No fiber/iron supplements or foods that contain nuts/seeds 7 days prior to procedure.     Anticoagulation/blood thinners? No    Patient verbalized understanding and had no questions or concerns at this time.    Barbara Gonsalez RN

## 2022-08-23 ENCOUNTER — TELEPHONE (OUTPATIENT)
Dept: FAMILY MEDICINE | Facility: CLINIC | Age: 47
End: 2022-08-23

## 2022-08-23 NOTE — TELEPHONE ENCOUNTER
Reason for call: Reschedule appointment     Attempt to reach: 1st    Outcome:Left voicemail    Detailed message left? Yes    Please return call to [facility]     Clinic phone number [phone]

## 2022-08-30 ENCOUNTER — ANESTHESIA EVENT (OUTPATIENT)
Dept: SURGERY | Facility: AMBULATORY SURGERY CENTER | Age: 47
End: 2022-08-30
Payer: COMMERCIAL

## 2022-08-30 ENCOUNTER — HOSPITAL ENCOUNTER (OUTPATIENT)
Facility: AMBULATORY SURGERY CENTER | Age: 47
Discharge: HOME | End: 2022-08-30
Attending: INTERNAL MEDICINE
Payer: COMMERCIAL

## 2022-08-30 ENCOUNTER — ANESTHESIA (OUTPATIENT)
Dept: SURGERY | Facility: AMBULATORY SURGERY CENTER | Age: 47
End: 2022-08-30
Payer: COMMERCIAL

## 2022-08-30 VITALS — HEART RATE: 69 BPM

## 2022-08-30 VITALS
RESPIRATION RATE: 16 BRPM | DIASTOLIC BLOOD PRESSURE: 50 MMHG | TEMPERATURE: 98 F | OXYGEN SATURATION: 100 % | WEIGHT: 253 LBS | BODY MASS INDEX: 39.71 KG/M2 | HEART RATE: 64 BPM | HEIGHT: 67 IN | SYSTOLIC BLOOD PRESSURE: 101 MMHG

## 2022-08-30 LAB
COLONOSCOPY: NORMAL
HCG UR QL: NEGATIVE
INTERNAL QC OK POCT: NORMAL
POCT KIT EXPIRATION DATE: NORMAL
POCT KIT LOT NUMBER: NORMAL

## 2022-08-30 PROCEDURE — 45378 DIAGNOSTIC COLONOSCOPY: CPT | Mod: 33

## 2022-08-30 PROCEDURE — 81025 URINE PREGNANCY TEST: CPT | Performed by: PATHOLOGY

## 2022-08-30 RX ORDER — ONDANSETRON 2 MG/ML
4 INJECTION INTRAMUSCULAR; INTRAVENOUS EVERY 6 HOURS PRN
Status: DISCONTINUED | OUTPATIENT
Start: 2022-08-30 | End: 2022-08-31 | Stop reason: HOSPADM

## 2022-08-30 RX ORDER — ONDANSETRON 2 MG/ML
4 INJECTION INTRAMUSCULAR; INTRAVENOUS
Status: DISCONTINUED | OUTPATIENT
Start: 2022-08-30 | End: 2022-08-30 | Stop reason: HOSPADM

## 2022-08-30 RX ORDER — ONDANSETRON 4 MG/1
4 TABLET, ORALLY DISINTEGRATING ORAL EVERY 6 HOURS PRN
Status: DISCONTINUED | OUTPATIENT
Start: 2022-08-30 | End: 2022-08-31 | Stop reason: HOSPADM

## 2022-08-30 RX ORDER — EPHEDRINE SULFATE 50 MG/ML
INJECTION, SOLUTION INTRAVENOUS PRN
Status: DISCONTINUED | OUTPATIENT
Start: 2022-08-30 | End: 2022-08-30

## 2022-08-30 RX ORDER — NALOXONE HYDROCHLORIDE 0.4 MG/ML
0.2 INJECTION, SOLUTION INTRAMUSCULAR; INTRAVENOUS; SUBCUTANEOUS
Status: DISCONTINUED | OUTPATIENT
Start: 2022-08-30 | End: 2022-08-31 | Stop reason: HOSPADM

## 2022-08-30 RX ORDER — SODIUM CHLORIDE, SODIUM LACTATE, POTASSIUM CHLORIDE, CALCIUM CHLORIDE 600; 310; 30; 20 MG/100ML; MG/100ML; MG/100ML; MG/100ML
INJECTION, SOLUTION INTRAVENOUS CONTINUOUS PRN
Status: DISCONTINUED | OUTPATIENT
Start: 2022-08-30 | End: 2022-08-30

## 2022-08-30 RX ORDER — LIDOCAINE 40 MG/G
CREAM TOPICAL
Status: DISCONTINUED | OUTPATIENT
Start: 2022-08-30 | End: 2022-08-30 | Stop reason: HOSPADM

## 2022-08-30 RX ORDER — PROPOFOL 10 MG/ML
INJECTION, EMULSION INTRAVENOUS CONTINUOUS PRN
Status: DISCONTINUED | OUTPATIENT
Start: 2022-08-30 | End: 2022-08-30

## 2022-08-30 RX ORDER — PROCHLORPERAZINE MALEATE 10 MG
10 TABLET ORAL EVERY 6 HOURS PRN
Status: DISCONTINUED | OUTPATIENT
Start: 2022-08-30 | End: 2022-08-31 | Stop reason: HOSPADM

## 2022-08-30 RX ORDER — FLUMAZENIL 0.1 MG/ML
0.2 INJECTION, SOLUTION INTRAVENOUS
Status: ACTIVE | OUTPATIENT
Start: 2022-08-30 | End: 2022-08-30

## 2022-08-30 RX ORDER — NALOXONE HYDROCHLORIDE 0.4 MG/ML
0.4 INJECTION, SOLUTION INTRAMUSCULAR; INTRAVENOUS; SUBCUTANEOUS
Status: DISCONTINUED | OUTPATIENT
Start: 2022-08-30 | End: 2022-08-31 | Stop reason: HOSPADM

## 2022-08-30 RX ORDER — LIDOCAINE HYDROCHLORIDE 20 MG/ML
INJECTION, SOLUTION INFILTRATION; PERINEURAL PRN
Status: DISCONTINUED | OUTPATIENT
Start: 2022-08-30 | End: 2022-08-30

## 2022-08-30 RX ORDER — PROPOFOL 10 MG/ML
INJECTION, EMULSION INTRAVENOUS PRN
Status: DISCONTINUED | OUTPATIENT
Start: 2022-08-30 | End: 2022-08-30

## 2022-08-30 RX ADMIN — EPHEDRINE SULFATE 5 MG: 50 INJECTION, SOLUTION INTRAVENOUS at 08:09

## 2022-08-30 RX ADMIN — SODIUM CHLORIDE, SODIUM LACTATE, POTASSIUM CHLORIDE, CALCIUM CHLORIDE: 600; 310; 30; 20 INJECTION, SOLUTION INTRAVENOUS at 07:46

## 2022-08-30 RX ADMIN — PROPOFOL 50 MG: 10 INJECTION, EMULSION INTRAVENOUS at 07:58

## 2022-08-30 RX ADMIN — LIDOCAINE HYDROCHLORIDE 50 MG: 20 INJECTION, SOLUTION INFILTRATION; PERINEURAL at 07:57

## 2022-08-30 RX ADMIN — PROPOFOL 180 MCG/KG/MIN: 10 INJECTION, EMULSION INTRAVENOUS at 07:58

## 2022-08-30 NOTE — ANESTHESIA POSTPROCEDURE EVALUATION
Patient: Mary Wray    Procedure: Procedure(s):  COLONOSCOPY       Anesthesia Type:  MAC    Note:  Disposition: Outpatient   Postop Pain Control: Uneventful            Sign Out: Well controlled pain   PONV: No   Neuro/Psych: Uneventful            Sign Out: Acceptable/Baseline neuro status   Airway/Respiratory: Uneventful            Sign Out: Acceptable/Baseline resp. status   CV/Hemodynamics: Uneventful            Sign Out: Acceptable CV status; No obvious hypovolemia; No obvious fluid overload   Other NRE: NONE   DID A NON-ROUTINE EVENT OCCUR? No           Last vitals:  Vitals Value Taken Time   /50 08/30/22 0850   Temp 36.7  C (98  F) 08/30/22 0850   Pulse 64 08/30/22 0850   Resp 16 08/30/22 0850   SpO2 100 % 08/30/22 0850       Electronically Signed By: Slava Sagastume MD, MD  August 30, 2022  2:10 PM

## 2022-08-30 NOTE — H&P
"Mary FOURNIER Wray  [id number]  female  46 year old      Reason for procedure/surgery: screening    Patient Active Problem List   Diagnosis     Postablative hypothyroidism     Pre-diabetes     Papanicolaou smear of cervix with low grade squamous intraepithelial lesion (LGSIL)     H/O mammogram     Metatarsalgia of both feet     History of Graves' disease     Current moderate episode of major depressive disorder without prior episode (H)     Intraductal papilloma of breast, left     BMI >35       Past Surgical History:    Past Surgical History:   Procedure Laterality Date     CATARACT EXTRACTION, BILATERAL      November and 2021      SECTION N/A 2018    Procedure:  SECTION;  Surgeon: Lola Otto MD;  Location: [facility] L[facility]     LEEP TX, CERVICAL      \"for HPV\"     LUMPECTOMY BREAST Left 8/10/2021    Procedure: LEFT Wire-Localized Lumpectomy;  Surgeon: Marta Frost MD;  Location: [facility] OR       Past Medical History:   Past Medical History:   Diagnosis Date     Graves disease      Hypovitaminosis D      Mild depression (H)     , no meds, resolved with move     Miscarriage      Obesity      Postablative hypothyroidism     CCRM     Prediabetes 2016     Premenstrual dysphoria        Social History:   Social History     Tobacco Use     Smoking status: Never Smoker     Smokeless tobacco: Never Used   Substance Use Topics     Alcohol use: Not Currently     Alcohol/week: 1.7 standard drinks     Types: 2 Standard drinks or equivalent per week     Comment: occasional       Family History:   Family History   Problem Relation Age of Onset     Obesity Mother      Diabetes Father 68     Obesity Father      Obesity Sister        Allergies: No Known Allergies    Active Medications:   Current Outpatient Medications   Medication Sig Dispense Refill     bisacodyl (DULCOLAX) 5 MG EC tablet Two days prior to exam take two (2) tablets at 4pm. One day prior to exam take two (2) " "tablets at 4pm 4 tablet 0     bisacodyl (DULCOLAX) 5 MG EC tablet Take as directed. One day prior to exam at 10:00am take 2 tablets 2 tablet 0     magnesium citrate solution Take as directed. Two days prior to exam drink 10oz bottle of magnesium citrate at 4:00pm 296 mL 0     polyethylene glycol (GOLYTELY) 236 g suspension Take as directed. Two days before your exam fill the first container with water. Cover and shake until mixed well. At 5:00pm drink one 8oz glass every 10-15 minutes until half (1/2) of the first container is empty. Store the remainder in the refrigerator. One day before your exam at 5:00pm drink the second half of the first container until it is gone. Before you go to bed mix the second container with water and put in refrigerator. Six hours before your check in time drink one 8oz glass every 10-15 minutes until half of container is empty. Discard the remainder of solution. 8000 mL 0     polyethylene glycol (GOLYTELY) 236 g suspension Take as directed. One day before your exam fill the first container with water. Cover and shake until mixed well. At 3:00pm drink one 8oz glass every 10-15 minutes until half of the first container is empty. Store the remainder in the refrigerator. At 8:00pm drink the second half of the first container until it is gone. Before you go to bed mix the second container with water and put in refrigerator. Six hours before your check in time drink one 8oz glass every 10-15 minutes until half of container is empty. Discard the remainder of solution. 8000 mL 0     SYNTHROID 150 MCG tablet Take 1 tablet (150 mcg) by mouth daily MON to SAT 1 tablet/day;SUN 1.5 tablet 90 tablet 4       Systemic Review:   ROS otherwise negative    Physical Examination:   Vital Signs: /62 (BP Location: Right arm)   Pulse 63   Temp 97  F (36.1  C) (Temporal)   Resp 18   Ht 1.702 m (5' 7\")   Wt 114.8 kg (253 lb)   SpO2 100%   BMI 39.63 kg/m    GENERAL: healthy, alert and no " distress  HENT: oropharynx clear and oral mucous membranes moist, Mallampati II  NECK: Normal ROM  RESP: lungs clear to auscultation - no rales, rhonchi or wheezes  CV: regular rate and rhythm, normal S1 S2,   ABDOMEN: soft, nontender, and bowel sounds normal  MS: no gross musculoskeletal defects noted, no edema      Plan: Appropriate to proceed as scheduled.      Sharee Ramachandran MD  8/30/2022    PCP:  Jazlyn Huitron

## 2022-08-30 NOTE — ANESTHESIA CARE TRANSFER NOTE
Patient: Mary Wray    Procedure: Procedure(s):  COLONOSCOPY       Diagnosis: Screening for colon cancer [Z12.11]  Diagnosis Additional Information: No value filed.    Anesthesia Type:   MAC     Note:    Oropharynx: oropharynx clear of all foreign objects and spontaneously breathing  Level of Consciousness: awake  Oxygen Supplementation: blow-by O2    Independent Airway: airway patency satisfactory and stable    Vital Signs Stable: post-procedure vital signs reviewed and stable  Report to RN Given: handoff report given  Patient transferred to: Phase II    Handoff Report: Identifed the Patient, Identified the Reponsible Provider, Reviewed the pertinent medical history, Discussed the surgical course, Reviewed Intra-OP anesthesia mangement and issues during anesthesia, Set expectations for post-procedure period and Allowed opportunity for questions and acknowledgement of understanding      Vitals:  Vitals Value Taken Time   BP 95/62    Temp 97    Pulse 59    Resp 18    SpO2 97        Electronically Signed By: COLE Pillai CRNA  August 30, 2022  8:30 AM

## 2022-08-30 NOTE — ANESTHESIA PREPROCEDURE EVALUATION
"Anesthesia Pre-Procedure Evaluation    Patient: Mary Wray   MRN: [id number] : 1975        Procedure : Procedure(s):  COLONOSCOPY          Past Medical History:   Diagnosis Date     Graves disease      Hypovitaminosis D      Mild depression (H)     , no meds, resolved with move     Miscarriage      Obesity      Postablative hypothyroidism     CCRM     Prediabetes 2016     Premenstrual dysphoria       Past Surgical History:   Procedure Laterality Date     CATARACT EXTRACTION, BILATERAL      November and 2021      SECTION N/A 2018    Procedure:  SECTION;  Surgeon: Lola Otto MD;  [facility]: [facility]     LEEP TX, CERVICAL  2002    \"for HPV\"     LUMPECTOMY BREAST Left 8/10/2021    Procedure: LEFT Wire-Localized Lumpectomy;  Surgeon: Marta Frost MD;  Location: [facility] [place]      No Known Allergies   Social History     Tobacco Use     Smoking status: Never Smoker     Smokeless tobacco: Never Used   Substance Use Topics     Alcohol use: Not Currently     Alcohol/week: 1.7 standard drinks     Types: 2 Standard drinks or equivalent per week     Comment: occasional      Wt Readings from Last 1 Encounters:   22 114.8 kg (253 lb)        Anesthesia Evaluation            ROS/MED HX  ENT/Pulmonary:  - neg pulmonary ROS     Neurologic:       Cardiovascular:  - neg cardiovascular ROS     METS/Exercise Tolerance:     Hematologic:  - neg hematologic  ROS     Musculoskeletal:  - neg musculoskeletal ROS     GI/Hepatic:     (+) bowel prep,     Renal/Genitourinary:       Endo:     (+) thyroid problem, hypothyroidism hyperthyroidism, Obesity,     Psychiatric/Substance Use:       Infectious Disease:       Malignancy:       Other:               OUTSIDE LABS:  CBC:   Lab Results   Component Value Date    WBC 8.2 2018    WBC 8.7 2018    HGB 10.5 (L) 2018    HGB 9.6 (L) 2018    HCT 31.1 (L) 2018    HCT 28.4 (L) 2018     " 11/12/2018     11/11/2018     BMP:   Lab Results   Component Value Date     11/13/2018    POTASSIUM 4.3 11/13/2018    POTASSIUM 4.1 12/08/2017    CHLORIDE 108 11/13/2018    CO2 26 11/13/2018    BUN 9 11/13/2018    CR 0.71 11/13/2018    CR 0.80 11/12/2018    GLC 75 11/13/2018    GLC 92 12/08/2017     COAGS: No results found for: PTT, INR, FIBR  POC:   Lab Results   Component Value Date    BGM 96 11/10/2018    HCG Negative 08/30/2022     HEPATIC:   Lab Results   Component Value Date    ALT 59 (H) 11/12/2018    AST 58 (H) 11/12/2018     OTHER:   Lab Results   Component Value Date    A1C 6.1 (H) 08/05/2021    CHANDRA 8.5 11/13/2018    MAG 7.6 (HH) 11/10/2018    TSH 1.13 08/05/2021    T4 1.51 (H) 02/03/2021    T3 105 01/09/2019       Anesthesia Plan    ASA Status:  2      Anesthesia Type: MAC.     - Reason for MAC: immobility needed              Consents    Anesthesia Plan(s) and associated risks, benefits, and realistic alternatives discussed. Questions answered and patient/representative(s) expressed understanding.     - Discussed: Risks, Benefits and Alternatives for BOTH SEDATION and the PROCEDURE were discussed     - Discussed with:  Patient      - Extended Intubation/Ventilatory Support Discussed: No.      - Patient is DNR/DNI Status: No    Use of blood products discussed: No .     Postoperative Care    Pain management: Oral pain medications.        Comments:           H&P reviewed: Unable to attach H&P to encounter due to EHR limitations. H&P Update: appropriate H&P reviewed, patient examined. No interval changes since H&P (within 30 days).         Slava Sagastume MD, MD

## 2022-09-10 ENCOUNTER — HEALTH MAINTENANCE LETTER (OUTPATIENT)
Age: 47
End: 2022-09-10

## 2022-10-07 ENCOUNTER — ANCILLARY PROCEDURE (OUTPATIENT)
Dept: MAMMOGRAPHY | Facility: CLINIC | Age: 47
End: 2022-10-07
Attending: FAMILY MEDICINE
Payer: COMMERCIAL

## 2022-10-07 DIAGNOSIS — Z12.31 VISIT FOR SCREENING MAMMOGRAM: ICD-10-CM

## 2022-10-07 PROCEDURE — 77063 BREAST TOMOSYNTHESIS BI: CPT | Mod: TC | Performed by: RADIOLOGY

## 2022-10-07 PROCEDURE — 77067 SCR MAMMO BI INCL CAD: CPT | Mod: TC | Performed by: RADIOLOGY

## 2022-11-09 ENCOUNTER — OFFICE VISIT (OUTPATIENT)
Dept: FAMILY MEDICINE | Facility: CLINIC | Age: 47
End: 2022-11-09
Payer: COMMERCIAL

## 2022-11-09 VITALS — DIASTOLIC BLOOD PRESSURE: 74 MMHG | OXYGEN SATURATION: 97 % | HEART RATE: 71 BPM | SYSTOLIC BLOOD PRESSURE: 106 MMHG

## 2022-11-09 DIAGNOSIS — E89.0 POSTABLATIVE HYPOTHYROIDISM: ICD-10-CM

## 2022-11-09 DIAGNOSIS — F32.1 CURRENT MODERATE EPISODE OF MAJOR DEPRESSIVE DISORDER WITHOUT PRIOR EPISODE (H): ICD-10-CM

## 2022-11-09 DIAGNOSIS — M79.10 MYALGIA: ICD-10-CM

## 2022-11-09 DIAGNOSIS — R53.83 FATIGUE, UNSPECIFIED TYPE: ICD-10-CM

## 2022-11-09 DIAGNOSIS — E66.01 MORBID OBESITY (H): Primary | ICD-10-CM

## 2022-11-09 DIAGNOSIS — G47.19 EXCESSIVE DAYTIME SLEEPINESS: ICD-10-CM

## 2022-11-09 LAB
ALBUMIN SERPL BCG-MCNC: 4.1 G/DL (ref 3.5–5.2)
ALP SERPL-CCNC: 87 U/L (ref 35–104)
ALT SERPL W P-5'-P-CCNC: 15 U/L (ref 10–35)
ANION GAP SERPL CALCULATED.3IONS-SCNC: 11 MMOL/L (ref 7–15)
AST SERPL W P-5'-P-CCNC: 22 U/L (ref 10–35)
BILIRUB SERPL-MCNC: 0.6 MG/DL
BUN SERPL-MCNC: 15.3 MG/DL (ref 6–20)
CALCIUM SERPL-MCNC: 9.3 MG/DL (ref 8.6–10)
CHLORIDE SERPL-SCNC: 102 MMOL/L (ref 98–107)
CREAT SERPL-MCNC: 0.74 MG/DL (ref 0.51–0.95)
DEPRECATED HCO3 PLAS-SCNC: 23 MMOL/L (ref 22–29)
ERYTHROCYTE [DISTWIDTH] IN BLOOD BY AUTOMATED COUNT: 15.5 % (ref 10–15)
GFR SERPL CREATININE-BSD FRML MDRD: >90 ML/MIN/1.73M2
GLUCOSE SERPL-MCNC: 111 MG/DL (ref 70–99)
HBA1C MFR BLD: 6.7 % (ref 0–5.6)
HCT VFR BLD AUTO: 35.8 % (ref 35–47)
HGB BLD-MCNC: 11.3 G/DL (ref 11.7–15.7)
HOLD SPECIMEN: NORMAL
MCH RBC QN AUTO: 24.1 PG (ref 26.5–33)
MCHC RBC AUTO-ENTMCNC: 31.6 G/DL (ref 31.5–36.5)
MCV RBC AUTO: 76 FL (ref 78–100)
PLATELET # BLD AUTO: 408 10E3/UL (ref 150–450)
POTASSIUM SERPL-SCNC: 4.3 MMOL/L (ref 3.4–5.3)
PROT SERPL-MCNC: 7.3 G/DL (ref 6.4–8.3)
RBC # BLD AUTO: 4.69 10E6/UL (ref 3.8–5.2)
SODIUM SERPL-SCNC: 136 MMOL/L (ref 136–145)
TSH SERPL DL<=0.005 MIU/L-ACNC: 3.31 UIU/ML (ref 0.3–4.2)
WBC # BLD AUTO: 10.4 10E3/UL (ref 4–11)

## 2022-11-09 PROCEDURE — 83540 ASSAY OF IRON: CPT | Performed by: FAMILY MEDICINE

## 2022-11-09 PROCEDURE — 84443 ASSAY THYROID STIM HORMONE: CPT | Performed by: FAMILY MEDICINE

## 2022-11-09 PROCEDURE — 36415 COLL VENOUS BLD VENIPUNCTURE: CPT | Performed by: FAMILY MEDICINE

## 2022-11-09 PROCEDURE — 83550 IRON BINDING TEST: CPT | Performed by: FAMILY MEDICINE

## 2022-11-09 PROCEDURE — 99214 OFFICE O/P EST MOD 30 MIN: CPT | Performed by: FAMILY MEDICINE

## 2022-11-09 PROCEDURE — 83036 HEMOGLOBIN GLYCOSYLATED A1C: CPT | Performed by: FAMILY MEDICINE

## 2022-11-09 PROCEDURE — 80053 COMPREHEN METABOLIC PANEL: CPT | Performed by: FAMILY MEDICINE

## 2022-11-09 PROCEDURE — 82728 ASSAY OF FERRITIN: CPT | Performed by: FAMILY MEDICINE

## 2022-11-09 PROCEDURE — 85027 COMPLETE CBC AUTOMATED: CPT | Performed by: FAMILY MEDICINE

## 2022-11-09 NOTE — PROGRESS NOTES
"  Assessment & Plan     Current moderate episode of major depressive disorder without prior episode (H)  Willing to trial meds again, did not like the lexapro, concerned about weight gain  Due to increased body aches, cyclical symptoms and overall mood, will trial cymbalta  Continue with therapist  Continue regular exercise  - DULoxetine (CYMBALTA) 30 MG capsule; Take 1 capsule (30 mg) by mouth daily May increase to two tablets daily in 1-2 weeks    BMI >35  Weight gain noted during COVID  Wants to lose some weight, at least enough to feel healthy again  Refer to weight mgmt clinic, consider medical mgmt  Consider OA group  Check A1c today  - Comprehensive Weight Management; Future  - Hemoglobin A1c; Future  - Hemoglobin A1c    Excessive daytime sleepiness  Fatigue, unspecified type  Labs today  Reconnect with sleep clinic, consider repeating the sleep study  - Comprehensive metabolic panel; Future  - CBC with Platelets and Reflex to Iron Studies; Future  - Comprehensive metabolic panel  - CBC with Platelets and Reflex to Iron Studies  - Iron & Iron Binding Capacity  - Ferritin  - Adult Sleep Eval & Management Referral; Future    Postablative hypothyroidism  Update labs, may be contributing to fatigue  - TSH; Future  - TSH    Myalgia  - DULoxetine (CYMBALTA) 30 MG capsule; Take 1 capsule (30 mg) by mouth daily May increase to two tablets daily in 1-2 weeks    Ordering of each unique test  Prescription drug management         BMI:   Estimated body mass index is 39.63 kg/m  as calculated from the following:    Height as of 8/30/22: 1.702 m (5' 7\").    Weight as of 8/30/22: 114.8 kg (253 lb).   Weight management plan: Patient referred to endocrine and/or weight management specialty    Follow up after sleep study    Jazlyn Huitron MD  [facility] SHALOM Hernandez is a 47 year old presenting for the following health issues:  No chief complaint on file.      HPI     Here for general follow up " "- initially concerned about a rash on her leg, but that seems to have cleared up since making the appointment (was roomed in the acute care room due to concern for MPX eval - not necessary today)    Concerned about fatigue, mood, energy level overall  High emotions, hard to feel she is parenting well  - has had a therapist involved for her daughter, also helping with her as well    Cyclical pain and discharge continues - no better, no worse    Tired all the time  Not sleeping well at all, ruminating  Wakes up from snoring  Worried she's not breathing well at night  - prior sleep study from 2015 noted \"mild\" apnea and she did not start CPAP at that time  - working on weight loss, but does not feel successful despite her attempts at staying active, eating healthy (vegetarian)    Exhausted  - no energy to exerciise  - lower back pain seems to be worsening      Review of Systems   Constitutional, HEENT, cardiovascular, pulmonary, gi and gu systems are negative, except as otherwise noted.      Objective    /74 (BP Location: Right arm, Patient Position: Sitting, Cuff Size: Adult Large)   Pulse 71   SpO2 97%   There is no height or weight on file to calculate BMI.  Physical Exam  Constitutional:       Appearance: Normal appearance.   Cardiovascular:      Rate and Rhythm: Normal rate and regular rhythm.   Pulmonary:      Effort: Pulmonary effort is normal.   Skin:     Comments: Resolving rash on upper thigh - appears to have slight resolving scale, small oval patch, mild hyperpigmentation   Neurological:      General: No focal deficit present.      Mental Status: She is alert and oriented to person, place, and time.   Psychiatric:         Mood and Affect: Mood is depressed.         Speech: Speech normal.         Behavior: Behavior normal.         Thought Content: Thought content normal.         Cognition and Memory: Cognition normal.         Judgment: Judgment normal.                            "

## 2022-11-10 LAB
FERRITIN SERPL-MCNC: 14 NG/ML (ref 6–175)
IRON BINDING CAPACITY (ROCHE): 458 UG/DL (ref 240–430)
IRON SATN MFR SERPL: 11 % (ref 15–46)
IRON SERPL-MCNC: 50 UG/DL (ref 37–145)

## 2022-11-17 RX ORDER — DULOXETIN HYDROCHLORIDE 30 MG/1
30 CAPSULE, DELAYED RELEASE ORAL DAILY
Qty: 60 CAPSULE | Refills: 3 | Status: SHIPPED | OUTPATIENT
Start: 2022-11-17 | End: 2022-11-17

## 2022-11-17 RX ORDER — DULOXETIN HYDROCHLORIDE 30 MG/1
30 CAPSULE, DELAYED RELEASE ORAL DAILY
Qty: 60 CAPSULE | Refills: 3 | Status: SHIPPED | OUTPATIENT
Start: 2022-11-17 | End: 2023-01-17

## 2023-01-13 ENCOUNTER — LAB (OUTPATIENT)
Dept: LAB | Facility: CLINIC | Age: 48
End: 2023-01-13
Payer: COMMERCIAL

## 2023-01-13 DIAGNOSIS — R73.09 ELEVATED GLUCOSE: ICD-10-CM

## 2023-01-13 DIAGNOSIS — R79.0 LOW IRON STORES: ICD-10-CM

## 2023-01-13 LAB
ERYTHROCYTE [DISTWIDTH] IN BLOOD BY AUTOMATED COUNT: 16.7 % (ref 10–15)
FERRITIN SERPL-MCNC: 49 NG/ML (ref 6–175)
HBA1C MFR BLD: 6.3 % (ref 0–5.6)
HCT VFR BLD AUTO: 36 % (ref 35–47)
HGB BLD-MCNC: 11.6 G/DL (ref 11.7–15.7)
MCH RBC QN AUTO: 25.6 PG (ref 26.5–33)
MCHC RBC AUTO-ENTMCNC: 32.2 G/DL (ref 31.5–36.5)
MCV RBC AUTO: 79 FL (ref 78–100)
PLATELET # BLD AUTO: 313 10E3/UL (ref 150–450)
RBC # BLD AUTO: 4.54 10E6/UL (ref 3.8–5.2)
WBC # BLD AUTO: 9 10E3/UL (ref 4–11)

## 2023-01-13 PROCEDURE — 83036 HEMOGLOBIN GLYCOSYLATED A1C: CPT

## 2023-01-13 PROCEDURE — 85027 COMPLETE CBC AUTOMATED: CPT

## 2023-01-13 PROCEDURE — 82728 ASSAY OF FERRITIN: CPT

## 2023-01-13 PROCEDURE — 36415 COLL VENOUS BLD VENIPUNCTURE: CPT

## 2023-01-17 ENCOUNTER — VIRTUAL VISIT (OUTPATIENT)
Dept: ENDOCRINOLOGY | Facility: CLINIC | Age: 48
End: 2023-01-17
Payer: COMMERCIAL

## 2023-01-17 VITALS — HEIGHT: 67 IN | BODY MASS INDEX: 41.28 KG/M2 | WEIGHT: 263 LBS

## 2023-01-17 DIAGNOSIS — E11.9 TYPE 2 DIABETES MELLITUS WITHOUT COMPLICATION, WITHOUT LONG-TERM CURRENT USE OF INSULIN (H): ICD-10-CM

## 2023-01-17 DIAGNOSIS — E66.01 CLASS 3 SEVERE OBESITY DUE TO EXCESS CALORIES WITH SERIOUS COMORBIDITY AND BODY MASS INDEX (BMI) OF 40.0 TO 44.9 IN ADULT (H): Primary | ICD-10-CM

## 2023-01-17 DIAGNOSIS — E66.813 CLASS 3 SEVERE OBESITY DUE TO EXCESS CALORIES WITH SERIOUS COMORBIDITY AND BODY MASS INDEX (BMI) OF 40.0 TO 44.9 IN ADULT (H): Primary | ICD-10-CM

## 2023-01-17 PROCEDURE — 99205 OFFICE O/P NEW HI 60 MIN: CPT | Mod: 95 | Performed by: INTERNAL MEDICINE

## 2023-01-17 NOTE — PROGRESS NOTES
"    New Medical Weight Management Consult    PATIENT:  Mary Wray  MRN:         [id number]  :         1975  JENNIFER:         2023    I had the pleasure of seeing Mary Wray. Full intake/assessment was done to determine barriers to weight loss success and develop a treatment plan. Mary Wray is a 47 year old female interested in treatment of medical problems associated with excess weight. She has a height of 5' 7\", a weight of 263 lbs 0 oz, and the calculated Body mass index is 41.19 kg/m .    She has the following co-morbidities: DM II, hypothyroidism (h/o Graves disease s/p ablation), MDD    Weight history  - here on PCP recommended  - Goals: weight loss, to get better understanding of food consumption and \"food addiction\"    - Since child, has been overweight/obese. both parents are obese and one of her siblings  - Some weight loss during pregnancy early and then postpartum but regained back  - slow weight gain since high school, more weight gain for past 10 years (from [place], [place]). Thinks primarily thinks due to mental health (stress, work)     - Never dieted, has tried food tracking but not recently (within past few years)  - Exercise (consistently until ~2-3 years ago and stopped with COVID, worsening MDD, single parenting  (1 child, age 5))  - Currently, no regimen     Diet   - eating for long periods of time due to interruptions - basically grazing all day  Breakfast/Lunch/Dinner: Cheese, toast, rice, tofu, eggs, chips or tortialls, rupal, sometimes will include veggies    Eating out ~3x/week  Emotional eater - around menstrual cycle, coping with emotion, \"never really eating out of hunger\"  Doesn't eat when feeling full  Cravings - crave to eat moreso, but if craving foods will crave cheese/salty   Not met with dietician    Exercise  - 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 When I'm low, it picks me up. When I'm feeling high, it keeps me high\"    Diabetes - A1c decreased to 6.3,  more aware and adjusted food intake       1/12/2023   I have the following health issues associated with obesity: Type II Diabetes, Pre-Diabetes, Hypothyroidism   I have the following symptoms associated with obesity: Knee Pain, Depression, Lower Extremity Swelling, Back Pain, Fatigue       Patient Goals 1/12/2023   I am interested in having a healthier weight to diminish current health problems: Yes   I am interested in having a healthier weight in order to prevent future health problems: Yes   I am interested in having a healthier weight in order to have a future surgery: No       Referring Provider 1/12/2023   Please name the provider who referred you to Medical Weight Management.  If you do not know, please answer: \"I Don't Know\". Jazlyn Huitron       Weight History 1/12/2023   How concerned are you about your weight? Very Concerned   Would you describe your weight gain as gradual? Yes   I became overweight: As a Child   The following factors have contributed to my weight gain:  Mental Health Issues, Eating Too Much, Lack of Exercise, Genetic (Runs in the Family), Stress   I have tried the following methods to lose weight: Watching Portions or Calories, Exercise   My lowest weight since age 19 was: 175   My highest weight since age 19 was: 263   The most weight I have ever lost was: (lbs) 20   I have the following family history of obesity/being overweight:  My mother is overweight, My father is overweight, One or more of my siblings are overweight, Many of my relatives are overweight "   Has anyone in your family had weight loss surgery? Yes   How has your weight changed over the last year?  Gained   How many pounds? 100       Diet Recall Review with Patient 1/12/2023   Do you typically eat breakfast? Yes   If you do eat breakfast, what types of food do you eat? Chees, toast, rice, tofu, eggs, chips or tortialls, rupal, sometimes will include veggies   Do you typically eat lunch? Yes   If you do eat lunch, what types of food do you typically eat?  Same as above   Do you typically eat supper? Yes   If you do eat supper, what types of food do you typically eat? Same as above   Do you typically eat snacks? Yes   If you do snack, what types of food do you typically eat? Cheese   Do you like vegetables?  Yes   Do you drink water? Yes   How many glasses of juice do you drink in a typical day? 0   How many of glasses of milk do you drink in a typical day? 0   If you do drink milk, what type? N/A   How many 8oz glasses of sugar containing drinks such as Alphonso-Aid/sweet tea do you drink in a day? 0   How many cans/bottles of sugar pop/soda/tea/sports drinks do you drink in a day? 0   How many cans/bottles of diet pop/soda/tea or sports drink do you drink in a day? 0   How often do you have a drink of alcohol? 2-4 Times a Month   If you do drink, how many drinks might you have in a day? 1 or 2       Eating Habits 1/12/2023   Generally, my meals include foods like these: bread, pasta, rice, potatoes, corn, crackers, sweet dessert, pop, or juice. A Few Times a Week   Generally, my meals include foods like these: fried meats, brats, burgers, french fries, pizza, cheese, chips, or ice cream. A Few Times a Week   Eat fast food (like [employer], [employer], [employer]aco [employer]). Never   Eat at a buffet or sit-down restaurant. Once a Week   Eat most of my meals in front of the TV or computer. A Few Times a Week   Often skip meals, eat at random times, have no regular eating times. Almost Everyday   Rarely sit down for a meal  but snack or graze throughout.  A Few Times a Week   Eat extra snacks between meals. A Few Times a Week   Eat most of my food at the end of the day. Once a Week   Eat in the middle of the night or wake up at night to eat. Never   Eat extra snacks to prevent or correct low blood sugar. A Few Times a Week   Eat to prevent acid reflux or stomach pain. Never   Worry about not having enough food to eat. Never   Have you been to the food shelf at least a few times this year? No   I eat when I am depressed. A Few Times a Week   I eat when I am stressed. Almost Everyday   I eat when I am bored. Almost Everyday   I eat when I am anxious. Almost Everyday   I eat when I am happy or as a reward. Almost Everyday   I feel hungry all the time even if I just have eaten. Almost Everyday   Feeling full is important to me. Never   I finish all the food on my plate even if I am already full. Never   I can't resist eating delicious food or walk past the good food/smell. Once a Week   I eat/snack without noticing that I am eating. A Few Times a Week   I eat when I am preparing the meal. Once a Week   I eat more than usual when I see others eating. A Few Times a Week   I have trouble not eating sweets, ice cream, cookies, or chips if they are around the house. A Few Times a Week   I think about food all day. A Few Times a Week   What foods, if any, do you crave? Cheese   Please list any other foods you crave? Salty snacks, during my  monthly cycle I also crave sweets       Amount of Food 1/12/2023   I make myself vomit what I have eaten or use laxatives to get rid of food. Never   I eat a large amount of food, like a loaf of bread, a box of cookies, a pint/quart of ice cream, all at once. Monthly   I eat a large amount of food even when I am not hungry. Never   I eat rapidly. Weekly   I eat alone because I feel embarrassed and do not want others to see how much I have eaten. Weekly   I eat until I am uncomfortably full. Never   I feel  bad, disgusted, or guilty after I overeat. Everyday   I make myself vomit what I have eaten or use laxatives to get rid of food. Never       Activity/Exercise History 1/12/2023   How much of a typical 12 hour day do you spend sitting? Half the Day   How much of a typical 12 hour day do you spend lying down? Less Than Half the Day   How much of a typical day do you spend walking/standing? Less Than Half the Day   How many hours (not including work) do you spend on the TV/Video Games/Computer/Tablet/Phone? 4-5 Hours   How many times a week are you active for the purpose of exercise? Once a Week   What keeps you from being more active? Other   How many total minutes do you spend doing some activity for the purpose of exercising when you exercise? Less Than 15 Minutes       PAST MEDICAL HISTORY:  Past Medical History:   Diagnosis Date     Graves disease 1997     Hypovitaminosis D      Mild depression     2014, no meds, resolved with move     Miscarriage 2017     Obesity      Postablative hypothyroidism 1997    CCRM     Prediabetes 2016     Premenstrual dysphoria        Work/Social History Reviewed With Patient 1/12/2023   My employment status is: Full-Time   My job is: administrative leadership   How much of your job is spent on the computer or phone? 100%   How many hours do you spend commuting to work daily?  0   What is your marital status? Single   If in a relationship, is your significant other overweight? N/A   Do you have children? Yes   If you have children, are they overweight? No   Who do you live with?  my child   Are they supportive of your health goals? Yes   Who does the food shopping?  Me       Mental Health History Reviewed With Patient 1/12/2023   Have you ever been physically or sexually abused? No   If yes, do you feel that the abuse is affecting your weight? N/A   If yes, would you like to talk to a counselor about the abuse? N/A   How often in the past 2 weeks have you felt little interest or  "pleasure in doing things? For Several Days   Over the past 2 weeks how often have you felt down, depressed, or hopeless? For Several Days       Sleep History Reviewed With Patient 1/12/2023   How many hours do you sleep at night? 6   Do you think that you snore loudly or has anybody ever heard you snore loudly (louder than talking or so loud it can be heard behind a shut door)? Yes   Has anyone seen or heard you stop breathing during your sleep? No   Do you often feel tired, fatigued, or sleepy during the day? Yes   Do you have a TV/Computer in your bedroom? No       MEDICATIONS:   Current Outpatient Medications   Medication Sig Dispense Refill     ferrous sulfate (FEROSUL) 325 (65 Fe) MG tablet Take 1 tablet (325 mg) by mouth daily (with breakfast) 90 tablet 1     SYNTHROID 150 MCG tablet Take 1 tablet (150 mcg) by mouth daily MON to SAT 1 tablet/day;SUN 1.5 tablet 90 tablet 4     SENNA-docusate sodium (SENNA S) 8.6-50 MG tablet Take 1 tablet by mouth nightly as needed (constipation) 90 tablet 1       ALLERGIES:   No Known Allergies    PHYSICAL EXAM:  Ht 1.702 m (5' 7\")   Wt 119.3 kg (263 lb)   BMI 41.19 kg/m      Waist circumference:      Wt Readings from Last 4 Encounters:   01/17/23 119.3 kg (263 lb)   08/30/22 114.8 kg (253 lb)   02/07/22 116.1 kg (256 lb)   11/19/21 117.9 kg (260 lb)     A & O x 3  HEENT: NCAT, mucous membranes moist  Respirations unlabored    ASSESSMENT/PLAN:  Mary is a patient with early onset obesity with significant element of familial/genetic influence and with current health consequences (diabetes mellitus). She does not need aggressive weight loss plan.  Mary A Wray eats a high carb diet, eats fast food once or more per week, uses food as a reward, uses food as mood management, tends to snack/graze throughout day, rarely sitting to eat a true meal and has a disorganized meal pattern.    Her problem is complicated by strong craving/reward pathways, mental " health/psychopharmacological barriers and poor lifestyle choices    Her ability to lose weight is impacted by lack of confidence and lack of motivation.    She identifies mental health as primary barrier and etiology of weight and inability to change diet. She has strong cravings and emotional regulation with food. She is most interested in trying to understand her coping mechanism with food and strategies to help her modify her lifestyle habits in response.     PLAN:    Decrease eating out  Keep food diary for 4 weeks  Dietician visit of education  Meal planning    Depressed/Emotional eaters  Ancillary testing:  Sleep Study.  Food Plan:  Food journal to record eating triggers and mood.   Activity Plan:   N/a currently.  Supplementary:  Referral to psychology for counseling.   Medication: Discussed semaglutide and liraglutide as possible medications in the future, reviewed the side effect profile of each. Briefly touched upon naltrexone as a medication that may affect reward pathway given her feelings of food addiction. At this point, patient would like to consider and am in agreement while she continues to prioritize mental health     FOLLOW-UP: in 2 months with Dr. Nava.    TIME: 75 min spent on evaluation, management, counseling, education, & motivational interviewing with greater than 50 % of the total time was spent on counseling and coordinating care    This patient was seen and discussed with the attending, Dr. Fairchild.    Daniel Wood MD  Internal Medicine Resident    I was present with the resident who participated in the service and in the documentation of the note. I have verified the history and personally performed the physical exam and medical decision making. I agree with the assessment and plan of care as documented in the note.       Joined the call at 1/17/2023, 2:16:19 pm.  Left the call at 1/17/2023, 3:09:45 pm.  You were on the call for 53 minutes 26 seconds .      External  notes/medical records independently reviewed, labs and imaging independently reviewed, medical management and tests to be discussed/communicated to patient.    Time: I spent 73 minutes spent on the date of the encounter preparing to see patient (including chart review and preparation), obtaining and or reviewing additional medical history, performing a physical exam and evaluation, documenting clinical information in the electronic health record, independently interpreting results, communicating results to the patient and coordinating care.    Brandy Fairchild MD  Division of Diabetes and Endocrinology  Department of Medicine  [phone]

## 2023-01-17 NOTE — PROGRESS NOTES
Virtual Visit Check-In    During this virtual visit the patient is located in [place], patient verifies this as the location during the entirety of this visit.     Mary is a 47 year old who is being evaluated via a billable video visit.      How would you like to obtain your AVS? MyChart  If the video visit is dropped, the invitation should be resent by: Text to cell phone: [phone]  Will anyone else be joining your video visit? No        Video-Visit Details    Type of service:  Video Visit     Originating Location (pt. Location): office    Distant Location (provider location):  Off-site  Platform used for Video Visit: [employer] NREMT

## 2023-01-17 NOTE — LETTER
"2023       RE: Mary Wray  [address]  [facility] [address]     Dear Colleague,    Thank you for referring your patient, Mary Wray, to the [facility] WEIGHT MANAGEMENT CLINIC [place] at [facility]. Please see a copy of my visit note below.    New Medical Weight Management Consult    PATIENT:  Mary Wray  MRN:         [id number]  :         1975  JENNIFER:         2023    I had the pleasure of seeing Mary Wray. Full intake/assessment was done to determine barriers to weight loss success and develop a treatment plan. Mary Wray is a 47 year old female interested in treatment of medical problems associated with excess weight. She has a height of 5' 7\", a weight of 263 lbs 0 oz, and the calculated Body mass index is 41.19 kg/m .    She has the following co-morbidities: DM II, hypothyroidism (h/o Graves disease s/p ablation), MDD    Weight history  - here on PCP recommended  - Goals: weight loss, to get better understanding of food consumption and \"food addiction\"    - Since child, has been overweight/obese. both parents are obese and one of her siblings  - Some weight loss during pregnancy early and then postpartum but regained back  - slow weight gain since high school, more weight gain for past 10 years (from [place], [place]). Thinks primarily thinks due to mental health (stress, work)     - Never dieted, has tried food tracking but not recently (within past few years)  - Exercise (consistently until ~2-3 years ago and stopped with COVID, worsening MDD, single parenting  (1 child, age 5))  - Currently, no regimen     Diet   - eating for long periods of time due to interruptions - basically grazing all day  Breakfast/Lunch/Dinner: Cheese, toast, rice, tofu, eggs, chips or tortialls, rupal, sometimes will include veggies    Eating out ~3x/week  Emotional eater - around menstrual cycle, coping with emotion, \"never really " "eating out of hunger\"  Doesn't eat when feeling full  Cravings - crave to eat moreso, but if craving foods will crave cheese/salty   Not met with dietician    Exercise  - 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 When I'm low, it picks me up. When I'm feeling high, it keeps me high\"    Diabetes - A1c decreased to 6.3,  more aware and adjusted food intake       1/12/2023   I have the following health issues associated with obesity: Type II Diabetes, Pre-Diabetes, Hypothyroidism   I have the following symptoms associated with obesity: Knee Pain, Depression, Lower Extremity Swelling, Back Pain, Fatigue       Patient Goals 1/12/2023   I am interested in having a healthier weight to diminish current health problems: Yes   I am interested in having a healthier weight in order to prevent future health problems: Yes   I am interested in having a healthier weight in order to have a future surgery: No       Referring Provider 1/12/2023   Please name the provider who referred you to Medical Weight Management.  If you do not know, please answer: \"I Don't Know\". Jazlyn Huitron       Weight History 1/12/2023   How concerned are you about your weight? Very Concerned   Would you describe your weight gain as gradual? Yes   I became overweight: As a Child   The following factors have contributed to my weight gain:  Mental Health Issues, Eating Too Much, Lack of Exercise, Genetic (Runs in the Family), Stress   I have tried the following methods to lose weight: Watching Portions or Calories, Exercise   My lowest weight " since age 19 was: 175   My highest weight since age 19 was: 263   The most weight I have ever lost was: (lbs) 20   I have the following family history of obesity/being overweight:  My mother is overweight, My father is overweight, One or more of my siblings are overweight, Many of my relatives are overweight   Has anyone in your family had weight loss surgery? Yes   How has your weight changed over the last year?  Gained   How many pounds? 100       Diet Recall Review with Patient 1/12/2023   Do you typically eat breakfast? Yes   If you do eat breakfast, what types of food do you eat? Chees, toast, rice, tofu, eggs, chips or tortialls, rupal, sometimes will include veggies   Do you typically eat lunch? Yes   If you do eat lunch, what types of food do you typically eat?  Same as above   Do you typically eat supper? Yes   If you do eat supper, what types of food do you typically eat? Same as above   Do you typically eat snacks? Yes   If you do snack, what types of food do you typically eat? Cheese   Do you like vegetables?  Yes   Do you drink water? Yes   How many glasses of juice do you drink in a typical day? 0   How many of glasses of milk do you drink in a typical day? 0   If you do drink milk, what type? N/A   How many 8oz glasses of sugar containing drinks such as Alphonso-Aid/sweet tea do you drink in a day? 0   How many cans/bottles of sugar pop/soda/tea/sports drinks do you drink in a day? 0   How many cans/bottles of diet pop/soda/tea or sports drink do you drink in a day? 0   How often do you have a drink of alcohol? 2-4 Times a Month   If you do drink, how many drinks might you have in a day? 1 or 2       Eating Habits 1/12/2023   Generally, my meals include foods like these: bread, pasta, rice, potatoes, corn, crackers, sweet dessert, pop, or juice. A Few Times a Week   Generally, my meals include foods like these: fried meats, brats, burgers, french fries, pizza, cheese, chips, or ice cream. A Few Times a Week    Eat fast food (like [employer], [employer], Taco Bell). Never   Eat at a buffet or sit-down restaurant. Once a Week   Eat most of my meals in front of the TV or computer. A Few Times a Week   Often skip meals, eat at random times, have no regular eating times. Almost Everyday   Rarely sit down for a meal but snack or graze throughout.  A Few Times a Week   Eat extra snacks between meals. A Few Times a Week   Eat most of my food at the end of the day. Once a Week   Eat in the middle of the night or wake up at night to eat. Never   Eat extra snacks to prevent or correct low blood sugar. A Few Times a Week   Eat to prevent acid reflux or stomach pain. Never   Worry about not having enough food to eat. Never   Have you been to the food shelf at least a few times this year? No   I eat when I am depressed. A Few Times a Week   I eat when I am stressed. Almost Everyday   I eat when I am bored. Almost Everyday   I eat when I am anxious. Almost Everyday   I eat when I am happy or as a reward. Almost Everyday   I feel hungry all the time even if I just have eaten. Almost Everyday   Feeling full is important to me. Never   I finish all the food on my plate even if I am already full. Never   I can't resist eating delicious food or walk past the good food/smell. Once a Week   I eat/snack without noticing that I am eating. A Few Times a Week   I eat when I am preparing the meal. Once a Week   I eat more than usual when I see others eating. A Few Times a Week   I have trouble not eating sweets, ice cream, cookies, or chips if they are around the house. A Few Times a Week   I think about food all day. A Few Times a Week   What foods, if any, do you crave? Cheese   Please list any other foods you crave? Salty snacks, during my  monthly cycle I also crave sweets       Amount of Food 1/12/2023   I make myself vomit what I have eaten or use laxatives to get rid of food. Never   I eat a large amount of food, like a loaf of bread, a  box of cookies, a pint/quart of ice cream, all at once. Monthly   I eat a large amount of food even when I am not hungry. Never   I eat rapidly. Weekly   I eat alone because I feel embarrassed and do not want others to see how much I have eaten. Weekly   I eat until I am uncomfortably full. Never   I feel bad, disgusted, or guilty after I overeat. Everyday   I make myself vomit what I have eaten or use laxatives to get rid of food. Never       Activity/Exercise History 1/12/2023   How much of a typical 12 hour day do you spend sitting? Half the Day   How much of a typical 12 hour day do you spend lying down? Less Than Half the Day   How much of a typical day do you spend walking/standing? Less Than Half the Day   How many hours (not including work) do you spend on the TV/Video Games/Computer/Tablet/Phone? 4-5 Hours   How many times a week are you active for the purpose of exercise? Once a Week   What keeps you from being more active? Other   How many total minutes do you spend doing some activity for the purpose of exercising when you exercise? Less Than 15 Minutes       PAST MEDICAL HISTORY:  Past Medical History:   Diagnosis Date     Graves disease 1997     Hypovitaminosis D      Mild depression     2014, no meds, resolved with move     Miscarriage 2017     Obesity      Postablative hypothyroidism 1997    CCRM     Prediabetes 2016     Premenstrual dysphoria        Work/Social History Reviewed With Patient 1/12/2023   My employment status is: Full-Time   My job is: administrative leadership   How much of your job is spent on the computer or phone? 100%   How many hours do you spend commuting to work daily?  0   What is your marital status? Single   If in a relationship, is your significant other overweight? N/A   Do you have children? Yes   If you have children, are they overweight? No   Who do you live with?  my child   Are they supportive of your health goals? Yes   Who does the food shopping?  Me       Mental  "Health History Reviewed With Patient 1/12/2023   Have you ever been physically or sexually abused? No   If yes, do you feel that the abuse is affecting your weight? N/A   If yes, would you like to talk to a counselor about the abuse? N/A   How often in the past 2 weeks have you felt little interest or pleasure in doing things? For Several Days   Over the past 2 weeks how often have you felt down, depressed, or hopeless? For Several Days       Sleep History Reviewed With Patient 1/12/2023   How many hours do you sleep at night? 6   Do you think that you snore loudly or has anybody ever heard you snore loudly (louder than talking or so loud it can be heard behind a shut door)? Yes   Has anyone seen or heard you stop breathing during your sleep? No   Do you often feel tired, fatigued, or sleepy during the day? Yes   Do you have a TV/Computer in your bedroom? No       MEDICATIONS:   Current Outpatient Medications   Medication Sig Dispense Refill     ferrous sulfate (FEROSUL) 325 (65 Fe) MG tablet Take 1 tablet (325 mg) by mouth daily (with breakfast) 90 tablet 1     SYNTHROID 150 MCG tablet Take 1 tablet (150 mcg) by mouth daily MON to SAT 1 tablet/day;SUN 1.5 tablet 90 tablet 4     SENNA-docusate sodium (SENNA S) 8.6-50 MG tablet Take 1 tablet by mouth nightly as needed (constipation) 90 tablet 1       ALLERGIES:   No Known Allergies    PHYSICAL EXAM:  Ht 1.702 m (5' 7\")   Wt 119.3 kg (263 lb)   BMI 41.19 kg/m      Waist circumference:      Wt Readings from Last 4 Encounters:   01/17/23 119.3 kg (263 lb)   08/30/22 114.8 kg (253 lb)   02/07/22 116.1 kg (256 lb)   11/19/21 117.9 kg (260 lb)     A & O x 3  HEENT: NCAT, mucous membranes moist  Respirations unlabored    ASSESSMENT/PLAN:  Mary is a patient with early onset obesity with significant element of familial/genetic influence and with current health consequences (diabetes mellitus). She does not need aggressive weight loss plan.  Mary A Wray eats a high carb " diet, eats fast food once or more per week, uses food as a reward, uses food as mood management, tends to snack/graze throughout day, rarely sitting to eat a true meal and has a disorganized meal pattern.    Her problem is complicated by strong craving/reward pathways, mental health/psychopharmacological barriers and poor lifestyle choices    Her ability to lose weight is impacted by lack of confidence and lack of motivation.    She identifies mental health as primary barrier and etiology of weight and inability to change diet. She has strong cravings and emotional regulation with food. She is most interested in trying to understand her coping mechanism with food and strategies to help her modify her lifestyle habits in response.     PLAN:    Decrease eating out  Keep food diary for 4 weeks  Dietician visit of education  Meal planning    Depressed/Emotional eaters  Ancillary testing:  Sleep Study.  Food Plan:  Food journal to record eating triggers and mood.   Activity Plan:   N/a currently.  Supplementary:  Referral to psychology for counseling.   Medication: Discussed semaglutide and liraglutide as possible medications in the future, reviewed the side effect profile of each. Briefly touched upon naltrexone as a medication that may affect reward pathway given her feelings of food addiction. At this point, patient would like to consider and am in agreement while she continues to prioritize mental health     FOLLOW-UP: in 2 months with Dr. Nava.    TIME: 75 min spent on evaluation, management, counseling, education, & motivational interviewing with greater than 50 % of the total time was spent on counseling and coordinating care    This patient was seen and discussed with the attending, Dr. Fairchild.    Daniel Wood MD  Internal Medicine Resident    I was present with the resident who participated in the service and in the documentation of the note. I have verified the history and personally performed the  physical exam and medical decision making. I agree with the assessment and plan of care as documented in the note.       Joined the call at 1/17/2023, 2:16:19 pm.  Left the call at 1/17/2023, 3:09:45 pm.  You were on the call for 53 minutes 26 seconds .      External notes/medical records independently reviewed, labs and imaging independently reviewed, medical management and tests to be discussed/communicated to patient.    Time: I spent 73 minutes spent on the date of the encounter preparing to see patient (including chart review and preparation), obtaining and or reviewing additional medical history, performing a physical exam and evaluation, documenting clinical information in the electronic health record, independently interpreting results, communicating results to the patient and coordinating care.    Brandy Fairchild MD  Division of Diabetes and Endocrinology  Department of Medicine  [phone]

## 2023-01-17 NOTE — NURSING NOTE
"Chief Complaint   Patient presents with     Consult     New consultation for weight management.       Vitals:    01/17/23 1404   Weight: 263 lb   Height: 5' 7\"       Body mass index is 41.19 kg/m .      Nico Gibson, EMT  [facility]                      "

## 2023-01-18 ENCOUNTER — VIRTUAL VISIT (OUTPATIENT)
Dept: ENDOCRINOLOGY | Facility: CLINIC | Age: 48
End: 2023-01-18
Payer: COMMERCIAL

## 2023-01-18 DIAGNOSIS — E11.9 TYPE 2 DIABETES MELLITUS WITHOUT COMPLICATION, WITHOUT LONG-TERM CURRENT USE OF INSULIN (H): ICD-10-CM

## 2023-01-18 DIAGNOSIS — Z71.3 NUTRITIONAL COUNSELING: Primary | ICD-10-CM

## 2023-01-18 DIAGNOSIS — E66.9 OBESITY: ICD-10-CM

## 2023-01-18 PROCEDURE — 97802 MEDICAL NUTRITION INDIV IN: CPT | Mod: GT | Performed by: DIETITIAN, REGISTERED

## 2023-01-18 NOTE — LETTER
"1/18/2023       RE: Mary Wray  [address]  [facility] [address]     Dear Colleague,    Thank you for referring your patient, Mary Wray, to the [facility] WEIGHT MANAGEMENT CLINIC [place] at [facility]. Please see a copy of my visit note below.    Mary Wray is a 47 year old female who is being evaluated via a billable video visit.      The patient has been notified of following:     \"This video visit will be conducted via a call between you and your physician/provider. We have found that certain health care needs can be provided without the need for an in-person physical exam.  This service lets us provide the care you need with a video conversation.  If a prescription is necessary we can send it directly to your pharmacy.  If lab work is needed we can place an order for that and you can then stop by our lab to have the test done at a later time.    Video visits are billed at different rates depending on your insurance coverage.  Please reach out to your insurance provider with any questions.    If during the course of the call the physician/provider feels a video visit is not appropriate, you will not be charged for this service.\"    Patient has given verbal consent for Video visit? Yes  How would you like to obtain your AVS? MyChart  If you are dropped from the video visit, the video invite should be resent to: Text to cell phone: [phone]  Will anyone else be joining your video visit? No  {If patient encounters technical issues they should call [phone]      Video-Visit Details    Type of service:  Video Visit    Video Start Time: 10:30 AM  Video End Time: 10:46 AM    Originating Location (pt. Location): Home    Distant Location (provider location):  Offsite (providers home)      Platform used for Video Visit: [employer]    During this virtual visit the patient is located in [place], patient verifies this as the location during the " "entirety of this visit.       New Weight Management Nutrition Consultation    Mary Wray is a 47 year old female presents today for new weight management nutrition consultation.  Patient referred by Dr. Nava on January 18, 2023.    Patient with Co-morbidities of obesity including:  Type II Diabetes, Pre-Diabetes, Knee Pain, Back Pain     Anthropometrics:  Estimated body mass index is 41.19 kg/m  as calculated from the following:    Height as of 1/17/23: 1.702 m (5' 7\").    Weight as of 1/17/23: 119.3 kg (263 lb).     Current weight: 263 lbs    Medications for Weight Loss:  None    NUTRITION HISTORY  Food allergies: none   Food intolerances: none  Supplements: synthroid, iron   Previous methods of diet modification for weight loss: Watching Portions or Calories, Exercise    Pt states she often struggles with emotional eating and boredom eating. Feels \"food addicted\". Last three years has made it worse. Has struggled with body image since she was a child. Feeling more motivated d/t current A1c being in prediabetic range.     Physical Activity:  No specific routine     Nutrition Prescription  Recommended energy/nutrient modification.    Nutrition Diagnosis  Obesity r/t long history of positive energy balance aeb BMI >30.    Nutrition Intervention  Materials/education provided on hypocaloric diet for weight loss. Discussed Volumetric eating to help satiety level on fewer calories; portion control and healthy food choices (Plate Method and Volumetrics handouts), 100 calorie snack choices, meal and snack planning and websites, sample meal plans     Patient demonstrates understanding.    Expected Engagement: good     Nutrition Goals  1) Mindful Eating  [email or web address]   2) Eat three meals per day   3) 9\" Plate method (1/2 plate non-starchy vegetables/fruit, 1/4 plate lean protein, 1/4 plate whole grain starch - no more than 1/2 cup carb/meal)  4) Avoid snacking as able. If snack is needed use lean " protein and/or fruit/vegetable. Examples:   - 2 cup popcorn   - 1 cup mixed berries   - 15 almonds, walnuts, cashews   - carrot/celery sticks and 2 tbsp low-fat ranch   - 1 hard boiled egg   - Part-skim mozzarella cheese stick   - Low-fat, low-sugar greek yogurt with 1/2 cup berries   - Med apple or pear   - sliced bell peppers with 1/2 cup salsa   - 1/2 cup roasted chickpeas   - sliced cucumbers with vinegar      The Plate Method  [email or web address]    Protein Sources   [email or web address]     Carbohydrates  [email or web address]     Summary of Volumetrics Eating Plan  [email or web address]     Follow-Up  PRN    Time spent with patient: 16 minutes.  CASSIDY VINCENT RD, LD

## 2023-01-18 NOTE — PROGRESS NOTES
"Mary Wray is a 47 year old female who is being evaluated via a billable video visit.      The patient has been notified of following:     \"This video visit will be conducted via a call between you and your physician/provider. We have found that certain health care needs can be provided without the need for an in-person physical exam.  This service lets us provide the care you need with a video conversation.  If a prescription is necessary we can send it directly to your pharmacy.  If lab work is needed we can place an order for that and you can then stop by our lab to have the test done at a later time.    Video visits are billed at different rates depending on your insurance coverage.  Please reach out to your insurance provider with any questions.    If during the course of the call the physician/provider feels a video visit is not appropriate, you will not be charged for this service.\"    Patient has given verbal consent for Video visit? Yes  How would you like to obtain your AVS? MyChart  If you are dropped from the video visit, the video invite should be resent to: Text to cell phone: [phone]  Will anyone else be joining your video visit? No  {If patient encounters technical issues they should call [phone]      Video-Visit Details    Type of service:  Video Visit    Video Start Time: 10:30 AM  Video End Time: 10:46 AM    Originating Location (pt. Location): Home    Distant Location (provider location):  Offsite (providers home)      Platform used for Video Visit: [employer]    During this virtual visit the patient is located in [place], patient verifies this as the location during the entirety of this visit.       New Weight Management Nutrition Consultation    Mary Wray is a 47 year old female presents today for new weight management nutrition consultation.  Patient referred by Dr. Nava on January 18, 2023.    Patient with Co-morbidities of obesity including:  Type II Diabetes, Pre-Diabetes, Knee " "Pain, Back Pain     Anthropometrics:  Estimated body mass index is 41.19 kg/m  as calculated from the following:    Height as of 1/17/23: 1.702 m (5' 7\").    Weight as of 1/17/23: 119.3 kg (263 lb).     Current weight: 263 lbs    Medications for Weight Loss:  None    NUTRITION HISTORY  Food allergies: none   Food intolerances: none  Supplements: synthroid, iron   Previous methods of diet modification for weight loss: Watching Portions or Calories, Exercise    Pt states she often struggles with emotional eating and boredom eating. Feels \"food addicted\". Last three years has made it worse. Has struggled with body image since she was a child. Feeling more motivated d/t current A1c being in prediabetic range.     Physical Activity:  No specific routine     Nutrition Prescription  Recommended energy/nutrient modification.    Nutrition Diagnosis  Obesity r/t long history of positive energy balance aeb BMI >30.    Nutrition Intervention  Materials/education provided on hypocaloric diet for weight loss. Discussed Volumetric eating to help satiety level on fewer calories; portion control and healthy food choices (Plate Method and Volumetrics handouts), 100 calorie snack choices, meal and snack planning and websites, sample meal plans     Patient demonstrates understanding.    Expected Engagement: good     Nutrition Goals  1) Mindful Eating  [email or web address]   2) Eat three meals per day   3) 9\" Plate method (1/2 plate non-starchy vegetables/fruit, 1/4 plate lean protein, 1/4 plate whole grain starch - no more than 1/2 cup carb/meal)  4) Avoid snacking as able. If snack is needed use lean protein and/or fruit/vegetable. Examples:   - 2 cup popcorn   - 1 cup mixed berries   - 15 almonds, walnuts, cashews   - carrot/celery sticks and 2 tbsp low-fat ranch   - 1 hard boiled egg   - Part-skim mozzarella cheese stick   - Low-fat, low-sugar greek yogurt with 1/2 cup berries   - Med apple or pear   - sliced bell peppers " with 1/2 cup salsa   - 1/2 cup roasted chickpeas   - sliced cucumbers with vinegar      The Plate Method  [email or web address]    Protein Sources   [email or web address]     Carbohydrates  [email or web address]     Summary of Volumetrics Eating Plan  [email or web address]     Follow-Up  PRN    Time spent with patient: 16 minutes.  CASSIDY VINCENT RD, LD

## 2023-01-18 NOTE — PATIENT INSTRUCTIONS
"Hi [place]!    Follow-up with RD as needed    Thank you,    Justyna Saeed, RD, LD  If you would like to schedule or reschedule an appointment with the RD, please call [phone]    Nutrition Goals  1) Mindful Eating  [email or web address]   2) Eat three meals per day   3) 9\" Plate method (1/2 plate non-starchy vegetables/fruit, 1/4 plate lean protein, 1/4 plate whole grain starch - no more than 1/2 cup carb/meal)  4) Avoid snacking as able. If snack is needed use lean protein and/or fruit/vegetable. Examples:   - 2 cup popcorn   - 1 cup mixed berries   - 15 almonds, walnuts, cashews   - carrot/celery sticks and 2 tbsp low-fat ranch   - 1 hard boiled egg   - Part-skim mozzarella cheese stick   - Low-fat, low-sugar greek yogurt with 1/2 cup berries   - Med apple or pear   - sliced bell peppers with 1/2 cup salsa   - 1/2 cup roasted chickpeas   - sliced cucumbers with vinegar      The Plate Method  [email or web address]    Protein Sources   [email or web address]     Carbohydrates  [email or web address]     Summary of Volumetrics Eating Plan  [email or web address]     Interested in working with a health ? Health coaches work with you to improve your overall health and wellbeing. They look at the whole person, and may involve discussion of different areas of life, including, but not limited to the four pillars of health (sleep, exercise, nutrition, and stress management). Discuss with your care team if you would like to start working a health .    Health Coaching-3 Pack:    $99 for three health coaching visits    Visits may be done in person or via phone    Coaching is a partnership between the  and the client; Coaches do not prescribe or diagnose    Coaching helps inspire the client to reach his/her personal goals    COMPREHENSIVE WEIGHT MANAGEMENT PROGRAM  VIRTUAL SUPPORT GROUPS    For Support Group Information:      We offer support groups for patients who " "are working on weight loss and considering, preparing for or have had weight loss surgery.   There is no cost for this opportunity.  You are invited to attend the?Virtual Support Groups?provided by any of the following locations:    [facility] via Microsoft Teams with Kealyn Atkinson RN  2.   [place] via [employer] with Todd Chatman, PhD, LP  3.   [place] via [employer] with Isa Jones RN  4.   [facility] via [employer] Teams with Isa Garber, [facility]    The following Support Group information can also be found on our website:  [email or web address]      [facility] Weight Loss Surgery Support Group    [facility] Weight Loss Surgery Support Group  The support group is a patient-lead forum that meets monthly to share experiences, encouragement and education. It is open to those who have had weight loss surgery, are scheduled for surgery, and those who are considering surgery.   WHEN: This group meets on the 3rd Wednesday of each month from 5:00PM - 6:00PM virtually using Microsoft Teams.   FACILITATOR: Led by Kaelyn Atkinson RD, LD, RN, the program's Clinical Coordinator.   TO REGISTER: Please contact the clinic via [employer] or call the nurse line directly at [phone] to inform our staff that you would like an invite sent to you and to let us know the email you would like the invite sent to. Prior to the meeting, a link with directions on how to join the meeting will be sent to you.    2022 Meetings  January 19: \"Let's Talk\" a time for the group to share.  February 16: \"Let's Talk\" a time for the group to share.  March 16: Guest Speakers: Psychologists, Divya Perez, PhD,LP and Cindy Wheeler PsyD,LP  April 20: Guest Speaker: Health , Tiffanie Tidwell, [facility],CHES, CPT  May 18: Guest Speaker: Dietitian, Tobin Haq, CHREYLE, LP  Mer 15: \"Let's Talk\" a time for the group to share.  July 20: \"Let's Talk\" a time for the " "group to share.  August 17: TBA  September 21: TBA  October 19: Guest Speaker: Dr Antolin Hurd MD Pulmonologist and Sleep Medicine Physician, \"Getting a Good Night's Sleep\".  November 16: TBA  December 21: TBA    [facility] Support Groups    Connections: Bariatric Care Support Group?  This is open to all [facility] (and those external to this program) pre- and post- operative bariatric surgery patients as well as their support system.   WHEN: This group meets the 2nd Tuesday of each month from 6:30 PM - 8:00 PM virtually using Microsoft Teams.   FACILITATOR: Led by Todd Chatman, Ph.D who is a Licensed Psychologist with the [facility] Comprehensive Weight Management Program.   TO REGISTER: Please send an email to Todd Chatman, Ph.D., LP at?[email or web address]?if you would like an invitation to the group and to learn about using [employer] Teams.    2022 Meetings  January 11: Jazlyn Nelson, PharmD, Pharmacy Resident at [facility], \"Medications and Bariatric Surgery\".  February 8: Open Forum  March 8  April 12  May 10  Mer 14    Connections: Post-Operative Bariatric Surgery Support Group  This is a support group for [facility] bariatric patients (and those external to [facility]) who have had bariatric surgery and are at least 3 months post-surgery.  WHEN: This support group meets the 4th Wednesday of the month from 11:00 AM - 12:00 PM virtually using Microsoft Teams.   FACILITATOR: Led by Certified Bariatric Nurse, Isa Jones RN.   TO REGISTER: Please send an email to Isa at [email or web address] if you would like an invitation to the group and to learn about using [employer] Teams.    2022 Meetings  January 26  February 23  March 23  April 27  May 25  Mer 22    [facility] Healthy Lifestyle Virtual Support Group    Healthy Lifestyle Virtual Support Group?  This is 60 minutes of small " "group guided discussion, support and resources. All are welcome who want a healthy lifestyle.  WHEN: This group meets monthly on a Friday from 12:30 PM - 1:30 PM virtually using Microsoft Teams.   FACILITATOR: Led by National Board Certified Health and , Isa Garber, [facility].   TO REGISTER: Please send an email to Isa at?[email or web address] to receive monthly invites to the group or if you have any questions about having a health .  Prior to the meeting, a link with directions on how to join the meeting will be sent to you.    2022 Meetings  January 21: Neida Fischer MS, RN, CIC, CBN, \"Healthy Habits\"  February 25: Open Forum  March 18: \"Setting Limits and Boundaries\"  April 29: Belkys Stapleton RD, \"Meal Planning Made Easy\"  May 20: Open Forum  Mer: To be determined                "

## 2023-01-23 ASSESSMENT — SLEEP AND FATIGUE QUESTIONNAIRES
HOW LIKELY ARE YOU TO NOD OFF OR FALL ASLEEP WHILE WATCHING TV: HIGH CHANCE OF DOZING
HOW LIKELY ARE YOU TO NOD OFF OR FALL ASLEEP IN A CAR, WHILE STOPPED FOR A FEW MINUTES IN TRAFFIC: WOULD NEVER DOZE
HOW LIKELY ARE YOU TO NOD OFF OR FALL ASLEEP WHILE SITTING AND TALKING TO SOMEONE: WOULD NEVER DOZE
HOW LIKELY ARE YOU TO NOD OFF OR FALL ASLEEP WHILE SITTING INACTIVE IN A PUBLIC PLACE: WOULD NEVER DOZE
HOW LIKELY ARE YOU TO NOD OFF OR FALL ASLEEP WHEN YOU ARE A PASSENGER IN A CAR FOR AN HOUR WITHOUT A BREAK: HIGH CHANCE OF DOZING
HOW LIKELY ARE YOU TO NOD OFF OR FALL ASLEEP WHILE LYING DOWN TO REST IN THE AFTERNOON WHEN CIRCUMSTANCES PERMIT: SLIGHT CHANCE OF DOZING
HOW LIKELY ARE YOU TO NOD OFF OR FALL ASLEEP WHILE SITTING AND READING: WOULD NEVER DOZE
HOW LIKELY ARE YOU TO NOD OFF OR FALL ASLEEP WHILE SITTING QUIETLY AFTER LUNCH WITHOUT ALCOHOL: SLIGHT CHANCE OF DOZING

## 2023-01-24 ENCOUNTER — VIRTUAL VISIT (OUTPATIENT)
Dept: SLEEP MEDICINE | Facility: CLINIC | Age: 48
End: 2023-01-24
Attending: FAMILY MEDICINE
Payer: COMMERCIAL

## 2023-01-24 VITALS — WEIGHT: 263 LBS | BODY MASS INDEX: 41.28 KG/M2 | HEIGHT: 67 IN

## 2023-01-24 DIAGNOSIS — F32.A ANXIETY AND DEPRESSION: ICD-10-CM

## 2023-01-24 DIAGNOSIS — F51.04 CHRONIC INSOMNIA: ICD-10-CM

## 2023-01-24 DIAGNOSIS — R53.83 FATIGUE, UNSPECIFIED TYPE: ICD-10-CM

## 2023-01-24 DIAGNOSIS — G47.30 OBESITY WITH SLEEP APNEA: ICD-10-CM

## 2023-01-24 DIAGNOSIS — E66.9 OBESITY WITH SLEEP APNEA: ICD-10-CM

## 2023-01-24 DIAGNOSIS — F41.9 ANXIETY AND DEPRESSION: ICD-10-CM

## 2023-01-24 DIAGNOSIS — G47.19 EXCESSIVE DAYTIME SLEEPINESS: ICD-10-CM

## 2023-01-24 DIAGNOSIS — G47.30 SLEEP DISORDER BREATHING: Primary | ICD-10-CM

## 2023-01-24 PROCEDURE — 99205 OFFICE O/P NEW HI 60 MIN: CPT | Mod: 95 | Performed by: INTERNAL MEDICINE

## 2023-01-24 NOTE — PATIENT INSTRUCTIONS
"          MY TREATMENT INFORMATION FOR SLEEP APNEA-  Mary Wray     DOCTOR : David Cortes MD    Am I having a home sleep study?  --->Watch the video for the device you are using:    -Disposable device sent out require phone/computer application-   https://www.MyCheck.com/watch?v=BCce_vbiwxE      Frequently asked questions:  1. What is Obstructive Sleep Apnea (KHURRAM)? KHURRAM is the most common type of sleep apnea. Apnea means, \"without breath.\"  Apnea is most often caused by narrowing or collapse of the upper airway as muscles relax during sleep.   Almost everyone has occasional apneas. Most people with sleep apnea have had brief interruptions at night frequently for many years.  The severity of sleep apnea is related to how frequent and severe the events are.   2. What are the consequences of KHURRAM? Symptoms include: feeling sleepy during the day, snoring loudly, gasping or stopping of breathing, trouble sleeping, and occasionally morning headaches or heartburn at night.  Sleepiness can be serious and even increase the risk of falling asleep while driving. Other health consequences may include development of high blood pressure and other cardiovascular disease in persons who are susceptible. Untreated KHURRAM  can contribute to heart disease, stroke and diabetes.   3. What are the treatment options? In most situations, sleep apnea is a lifelong disease that must be managed with daily therapy. Medications are not effective for sleep apnea and surgery is generally not considered until other therapies have been tried. Your treatment is your choice . Continuous Positive Airway (CPAP) works right away and is the therapy that is effective in nearly everyone. An oral device to hold your jaw forward is usually the next most reliable option. Other options include postioning devices (to keep you off your back), weight loss, and surgery including a tongue pacing device. There is more detail about some of these options below.  4. Are " my sleep studies covered by insurance? Although we will request verification of coverage, we advise you also check in advance of the study to ensure there is coverage.    Important tips for those choosing CPAP and similar devices   Know your equipment:  CPAP is continuous positive airway pressure that prevents obstructive sleep apnea by keeping the throat from collapsing while you are sleeping. In most cases, the device is  smart  and can slowly self-adjusts if your throat collapses and keeps a record every day of how well you are treated-this information is available to you and your care team.  BPAP is bilevel positive airway pressure that keeps your throat open and also assists each breath with a pressure boost to maintain adequate breathing.  Special kinds of BPAP are used in patients who have inadequate breathing from lung or heart disease. In most cases, the device is  smart  and can slowly self-adjusts to assist breathing. Like CPAP, the device keeps a record of how well you are treated.  Your mask is your connection to the device. You get to choose what feels most comfortable and the staff will help to make sure if fits. Here: are some examples of the different masks that are available:       Key points to remember on your journey with sleep apnea:  Sleep study.  PAP devices often need to be adjusted during a sleep study to show that they are effective and adjusted right.  Good tips to remember: Try wearing just the mask during a quiet time during the day so your body adapts to wearing it. A humidifier is recommended for comfort in most cases to prevent drying of your nose and throat. Allergy medication from your provider may help you if you are having nasal congestion.  Getting settled-in. It takes more than one night for most of us to get used to wearing a mask. Try wearing just the mask during a quiet time during the day so your body adapts to wearing it. A humidifier is recommended for comfort in most  cases. Our team will work with you carefully on the first day and will be in contact within 4 days and again at 2 and 4 weeks for advice and remote device adjustments. Your therapy is evaluated by the device each day.   Use it every night. The more you are able to sleep naturally for 7-8 hours, the more likely you will have good sleep and to prevent health risks or symptoms from sleep apnea. Even if you use it 4 hours it helps. Occasionally all of us are unable to use a medical therapy, in sleep apnea, it is not dangerous to miss one night.   Communicate. Call our skilled team on the number provided on the first day if your visit for problems that make it difficult to wear the device. Over 2 out of 3 patients can learn to wear the device long-term with help from our team. Remember to call our team or your sleep providers if you are unable to wear the device as we may have other solutions for those who cannot adapt to mask CPAP therapy. It is recommended that you sleep your sleep provider within the first 3 months and yearly after that if you are not having problems.   Use it for your health. We encourage use of CPAP masks during daytime quiet periods to allow your face and brain to adapt to the sensation of CPAP so that it will be a more natural sensation to awaken to at night or during naps. This can be very useful during the first few weeks or months of adapting to CPAP though it does not help medically to wear CPAP during wakefulness and  should not be used as a strategy just to meet guidelines.  Take care of your equipment. Make sure you clean your mask and tubing using directions every day and that your filter and mask are replaced as recommended or if they are not working.     BESIDES CPAP, WHAT OTHER THERAPIES ARE THERE?    Positioning Device  Positioning devices are generally used when sleep apnea is mild and only occurs on your back.This example shows a pillow that straps around the waist. It may be  appropriate for those whose sleep study shows milder sleep apnea that occurs primarily when lying flat on one's back. Preliminary studies have shown benefit but effectiveness at home may need to be verified by a home sleep test. These devices are generally not covered by medical insurance.  Examples of devices that maintain sleeping on the back to prevent snoring and mild sleep apnea.    Belt type body positioner  http://SmartSynch.com/    Electronic reminder  http://nightshifttherapy.com/  http://www.[employer].adBrite.au/      Oral Appliance  What is oral appliance therapy?  An oral appliance device fits on your teeth at night like a retainer used after having braces. The device is made by a specialized dentist and requires several visits over 1-2 months before a manufactured device is made to fit your teeth and is adjusted to prevent your sleep apnea. Once an oral device is working properly, snoring should be improved. A home sleep test may be recommended at that time if to determine whether the sleep apnea is adequately treated.       Some things to remember:  -Oral devices are often, but not always, covered by your medical insurance. Be sure to check with your insurance provider.   -If you are referred for oral therapy, you will be given a list of specialized dentists to consider or you may choose to visit the Web site of the American Academy of Dental Sleep Medicine  -Oral devices are less likely to work if you have severe sleep apnea or are extremely overweight.     More detailed information  An oral appliance is a small acrylic device that fits over the upper and lower teeth  (similar to a retainer or a mouth guard). This device slightly moves jaw forward, which moves the base of the tongue forward, opens the airway, improves breathing for effective treat snoring and obstructive sleep apnea in perhaps 7 out of 10 people .  The best working devices are custom-made by a dental device  after a mold is made  of the teeth 1, 2, 3.  When is an oral appliance indicated?  Oral appliance therapy is recommended as a first-line treatment for patients with primary snoring, mild sleep apnea, and for patients with moderate sleep apnea who prefer appliance therapy to use of CPAP4, 5. Severity of sleep apnea is determined by sleep testing and is based on the number of respiratory events per hour of sleep.   How successful is oral appliance therapy?  The success rate of oral appliance therapy in patients with mild sleep apnea is 75-80% while in patients with moderate sleep apnea it is 50-70%. The chance of success in patients with severe sleep apnea is 40-50%. The research also shows that oral appliances have a beneficial effect on the cardiovascular health of KHURRAM patients at the same magnitude as CPAP therapy7.  Oral appliances should be a second-line treatment in cases of severe sleep apnea, but if not completely successful then a combination therapy utilizing CPAP plus oral appliance therapy may be effective. Oral appliances tend to be effective in a broad range of patients although studies show that the patients who have the highest success are females, younger patients, those with milder disease, and less severe obesity. 3, 6.   Finding a dentist that practices dental sleep medicine  Specific training is available through the American Academy of Dental Sleep Medicine for dentists interested in working in the field of sleep. To find a dentist who is educated in the field of sleep and the use of oral appliances, near you, visit the Web site of the American Academy of Dental Sleep Medicine.    References  1. Cherise, et al. Objectively measured vs self-reported compliance during oral appliance therapy for sleep-disordered breathing. Chest 2013; 144(5): 7247-8167.  2. Gene et al. Objective measurement of compliance during oral appliance therapy for sleep-disordered breathing. Thorax 2013; 68(1): 91-96.  3. jacques Martinez  al. Mandibular advancement devices in 620 men and women with KHURRAM and snoring: tolerability and predictors of treatment success. Chest 2004; 125: 8725-9927.  4. Ev et al. Oral appliances for snoring and KHURRAM: a review. Sleep 2006; 29: 244-262.  5. Patricia et al. Oral appliance treatment for KHURRAM: an update. J Clin Sleep Med 2014; 10(2): 215-227.  6. Holly et al. Predictors of OSAH treatment outcome. J Dent Res 2007; 86: 0246-8631.      Weight Loss:    Weight loss is a long-term strategy that may improve sleep apnea in some patients.    Weight management is a personal decision and the decision should be based on your interest and the potential benefits.  If you are interested in exploring weight loss strategies, the following discussion covers the impact on weight loss on sleep apnea and the approaches that may be successful.    Being overweight does not necessarily mean you will have health consequences.  Those who have BMI over 35 or over 27 with existing medical conditions carries greater risk.   Weight loss decreases severity of sleep apnea in most people with obesity. For those with mild obesity who have developed snoring with weight gain, even 15-30 pound weight loss can improve and occasionally eliminate sleep apnea.  Structured and life-long dietary and health habits are necessary to lose weight and keep healthier weight levels.     Though there may be significant health benefits from weight loss, long-term weight loss is very difficult to achieve- studies show success with dietary management in less than 10% of people. In addition, substantial weight loss may require years of dietary control and may be difficult if patients have severe obesity. In these cases, surgical management may be considered.  Finally, older individuals who have tolerated obesity without health complications may be less likely to benefit from weight loss strategies.        Your BMI is Body mass index is 26.58 kg/m .  Weight  management is a personal decision.  If you are interested in exploring weight loss strategies, the following discussion covers the approaches that may be successful. Body mass index (BMI) is one way to tell whether you are at a healthy weight, overweight, or obese. It measures your weight in relation to your height.  A BMI of 18.5 to 24.9 is in the healthy range. A person with a BMI of 25 to 29.9 is considered overweight, and someone with a BMI of 30 or greater is considered obese. More than two-thirds of American adults are considered overweight or obese.  Being overweight or obese increases the risk for further weight gain. Excess weight may lead to heart disease and diabetes.  Creating and following plans for healthy eating and physical activity may help you improve your health.  Weight control is part of healthy lifestyle and includes exercise, emotional health, and healthy eating habits. Careful eating habits lifelong are the mainstay of weight control. Though there are significant health benefits from weight loss, long-term weight loss with diet alone may be very difficult to achieve- studies show long-term success with dietary management in less than 10% of people. Attaining a healthy weight may be especially difficult to achieve in those with severe obesity. In some cases, medications, devices and surgical management might be considered.  What can you do?  If you are overweight or obese and are interested in methods for weight loss, you should discuss this with your provider.   Consider reducing daily calorie intake by 500 calories.   Keep a food journal.   Avoiding skipping meals, consider cutting portions instead.    Diet combined with exercise helps maintain muscle while optimizing fat loss. Strength training is particularly important for building and maintaining muscle mass. Exercise helps reduce stress, increase energy, and improves fitness. Increasing exercise without diet control, however, may not  burn enough calories to loose weight.     Start walking three days a week 10-20 minutes at a time  Work towards walking thirty minutes five days a week   Eventually, increase the speed of your walking for 1-2 minutes at time    And look into health and wellness programs that may be available through your health insurance provider, employer, local community center, or [employer].        Surgery:    Surgery for obstructive sleep apnea is considered generally only when other therapies fail to work. Surgery may be discussed with you if you are having a difficult time tolerating CPAP and or when there is an abnormal structure that requires surgical correction.  Nose and throat surgeries often enlarge the airway to prevent collapse.  Most of these surgeries create pain for 1-2 weeks and up to half of the most common surgeries are not effective throughout life.  You should carefully discuss the benefits and drawbacks to surgery with your sleep provider and surgeon to determine if it is the best solution for you.   More information  Surgery for KHURRAM is directed at areas that are responsible for narrowing or complete obstruction of the airway during sleep.  There are a wide range of procedures available to enlarge and/or stabilize the airway to prevent blockage of breathing in the three major areas where it can occur: the palate, tongue, and nasal regions.  Successful surgical treatment depends on the accurate identification of the factors responsible for obstructive sleep apnea in each person.  A personalized approach is required because there is no single treatment that works well for everyone.  Because of anatomic variation, consultation with an examination by a sleep surgeon is a critical first step in determining what surgical options are best for each patient.  In some cases, examination during sedation may be recommended in order to guide the selection of procedures.  Patients will be counseled about risks and benefits  as well as the typical recovery course after surgery. Surgery is typically not a cure for a person s KHURRAM.  However, surgery will often significantly improve one s KHURRAM severity (termed  success rate ).  Even in the absence of a cure, surgery will decrease the cardiovascular risk associated with OSA7; improve overall quality of life8 (sleepiness, functionality, sleep quality, etc).      Palate Procedures:  Patients with KHURRAM often have narrowing of their airway in the region of their tonsils and uvula.  The goals of palate procedures are to widen the airway in this region as well as to help the tissues resist collapse.  Modern palate procedure techniques focus on tissue conservation and soft tissue rearrangement, rather than tissue removal.  Often the uvula is preserved in this procedure. Residual sleep apnea is common in patient after pharyngoplasty with an average reduction in sleep apnea events of 33%2.      Tongue Procedures:  ExamWhile patients are awake, the muscles that surround the throat are active and keep this region open for breathing. These muscles relax during sleep, allowing the tongue and other structures to collapse and block breathing.  There are several different tongue procedures available.  Selection of a tongue base procedure depends on characteristics seen on physical exam.  Generally, procedures are aimed at removing bulky tissues in this area or preventing the back of the tongue from falling back during sleep.  Success rates for tongue surgery range from 50-62%3.    Hypoglossal Nerve Stimulation:  Hypoglossal nerve stimulation has recently received approval from the [place] Food and Drug Administration for the treatment of obstructive sleep apnea.  This is based on research showing that the system was safe and effective in treating sleep apnea6.  Results showed that the median AHI score decreased 68%, from 29.3 to 9.0. This therapy uses an implant system that senses breathing patterns and  delivers mild stimulation to airway muscles, which keeps the airway open during sleep.  The system consists of three fully implanted components: a small generator (similar in size to a pacemaker), a breathing sensor, and a stimulation lead.  Using a small handheld remote, a patient turns the therapy on before bed and off upon awakening.    Candidates for this device must be greater than 22 years of age, have moderate to severe KHURRAM (AHI between 20-65), BMI less than 32, have tried CPAP/oral appliance without success, and have appropriate upper airway anatomy (determined by a sleep endoscopy performed by Dr. Mcgowan).    Hypoglossal Nerve Stimulation Pathway:    The sleep surgeon s office will work with the patient through the insurance prior-authorization process (including communications and appeals).    Nasal Procedures:  Nasal obstruction can interfere with nasal breathing during the day and night.  Studies have shown that relief of nasal obstruction can improve the ability of some patients to tolerate positive airway pressure therapy for obstructive sleep apnea1.  Treatment options include medications such as nasal saline, topical corticosteroid and antihistamine sprays, and oral medications such as antihistamines or decongestants. Non-surgical treatments can include external nasal dilators for selected patients. If these are not successful by themselves, surgery can improve the nasal airway either alone or in combination with these other options.      Combination Procedures:  Combination of surgical procedures and other treatments may be recommended, particularly if patients have more than one area of narrowing or persistent positional disease.  The success rate of combination surgery ranges from 66-80%2,3.    References  Hiram OSORIO. The Role of the Nose in Snoring and Obstructive Sleep Apnoea: An Update.  Eur Arch Otorhinolaryngol. 2011; 268: 1365-73.   Minda SM; Millie BLAKE; Richard OWENS; Pallanch JF; Sam PERALTA;  Michelle ONEAL; Harsha MCFARLAND. Surgical modifications of the upper airway for obstructive sleep apnea in adults: a systematic review and meta-analysis. SLEEP 2010;33(10):7928-4915. Crystal ABREU. Hypopharyngeal surgery in obstructive sleep apnea: an evidence-based medicine review.  Arch Otolaryngol Head Neck Surg. 2006 Feb;132(2):206-13.  Seferino YH1, Briana Y, Mac JHOANA. The efficacy of anatomically based multilevel surgery for obstructive sleep apnea. Otolaryngol Head Neck Surg. 2003 Oct;129(4):327-35.  Crystal ABREU, Goldberg A. Hypopharyngeal Surgery in Obstructive Sleep Apnea: An Evidence-Based Medicine Review. Arch Otolaryngol Head Neck Surg. 2006 Feb;132(2):206-13.  Joey OTTO et al. Upper-Airway Stimulation for Obstructive Sleep Apnea.  N Engl J Med. 2014 Jan 9;370(2):139-49.  Domingo Y et al. Increased Incidence of Cardiovascular Disease in Middle-aged Men with Obstructive Sleep Apnea. Am J Respir Crit Care Med; 2002 166: 159-165  Morley EM et al. Studying Life Effects and Effectiveness of Palatopharyngoplasty (SLEEP) study: Subjective Outcomes of Isolated Uvulopalatopharyngoplasty. Otolaryngol Head Neck Surg. 2011; 144: 623-631.        WHAT IF I ONLY HAVE SNORING?    Mandibular advancement devices, lateral sleep positioning, long-term weight loss and treatment of nasal allergies have been shown to improve snoring.  Exercising tongue muscles with a game (https://www.ncbi.nlm.nih.gov/pubmed/98677663) or stimulating the tongue during the day with a device (https://doi.org/10.3390/yej02991919) have improved snoring in some individuals.    Remember to Drive Safe... Drive Alive     Sleep health profoundly affects your health, mood, and your safety.  Thirty three percent of the population (one in three of us) is not getting enough sleep and many have a sleep disorder. Not getting enough sleep or having an untreated / undertreated sleep condition may make us sleepy without even knowing it. In fact, our driving could be dramatically  impaired due to our sleep health. As your provider, here are some things I would like you to know about driving:     Here are some warning signs for impairment and dangerous drowsy driving:              -Having been awake more than 16 hours               -Looking tired               -Eyelid drooping              -Head nodding (it could be too late at this point)              -Driving for more than 30 minutes     Some things you could do to make the driving safer if you are experiencing some drowsiness:              -Stop driving and rest              -Call for transportation              -Make sure your sleep disorder is adequately treated     Some things that have been shown NOT to work when experiencing drowsiness while driving:              -Turning on the radio              -Opening windows              -Eating any  distracting  /  entertaining  foods (e.g., sunflower seeds, candy, or any other)              -Talking on the phone      Your decision may not only impact your life, but also the life of others. Please, remember to drive safe for yourself and all of us.      Insomnia - Stimulus Control  When someone lays awake in bed over many nights, your body can actually learn to be awake in bed, mainly because that is what has happened so many times.  Your body has actually been  conditioned  or trained to be awake during the night because it has happened so often.  To break this habit you should try to follow these steps to improve your insomnia:  Set a strict bedtime and rise time to keep every day of the week, including weekends  Go to bed at the set time, but only if you are sleepy (not tired or fatigued but drowsy)  Don t lay in bed for more than 15-30 minutes if you can t sleep.  Get up and go do something relaxing like reading or watching TV until you get drowsy again   Get up at the same time every day regardless of how much sleep you get  Use the bedroom only for sleep and sex.  Do NOT watch TV, read,  use the computer, play on your cell phone or do work while in bed    Do not take naps during the daytime and avoid any situations where you might get drowsy or fall asleep unintentionally especially in the evening.     [place] [facility]      Treating Insomnia  Good sleeping habits are a key part of treatment. If needed, some medications may help you sleep better at first. Making healthy lifestyle changes and learning to relax can improve your sleep. Treating insomnia takes commitment, but trust that your efforts will pay off. Talk to your doctor before taking any medication.    Healthy Lifestyle  Your lifestyle affects your health and your sleep. Here are some healthy habits:  Keep a regular sleep schedule. Go to bed and get up at the same time each day.  Exercise regularly. It may help you reduce stress. Avoid strenuous exercise for two to four hours before bedtime.  Avoid or limit naps.  Use your bed only for sleep and sex.  Don t spend too much time in bed trying to fall asleep. If you can t fall asleep, get up and do something until you become tired and drowsy.  Avoid or limit caffeine and nicotine. They can keep you awake at night. Also avoid alcohol. It may help you fall asleep at first, but your sleep will not be restful.    Before Bedtime  To sleep better every night, try these tips:  Have a bedtime routine to let your body and mind know when it s time to sleep.  Going to bed should be relaxing so try to do only relaxing things around bedtime. Sleep will come sooner.  If your worries don t let you sleep, write them down in a diary. Then close it, and go to bed.  Make sure the room is not too hot or too cold. If it s not dark enough, an eye mask can help. If it s noisy, try using earplugs.    Learn to Relax  Stress, anxiety, and body tension may keep you awake at night. To unwind before bedtime, try reading a book, meditation, or yoga. Also, try the following:  Deep breathing. Sit or lie back in a  chair. Take a slow, deep breath. Hold it for 5 counts. Then breathe out slowly through your mouth. Keep doing this until you feel relaxed.  Imagery. Think of the last fun trip you took. In your mind, walk through the trip from start to finish. Put as much detail into the memory as you can remember. It will help you relax.    Cognitive Behavioral Treatment (CBT)  CBT is the most effective treatment for long-term insomnia. It tries to address the underlying causes of your sleep problems, including your habits and how you think about sleep.        Suggested Resources  Insomnia Treatment Books   Overcoming Insomnia by Marin Meyers and Taylor Eaton (2008)  No More Sleepless Nights by Jona Smith and Lorraine Frazier (1996)  Say Deric to Insomnia by Clif Kaba (2009)  The Insomnia Workbook by Grace Oliveros and Angel Luis Jordan (2009)  The Insomnia Answer by Todd Christianson and Kalyan Tesfaye (2006)      Stress Management and Relaxation Books  The Relaxation and Stress Reduction Workbook by Ange Domínguez, Paula Holloway and Cesar Hutton (2008)  Stress Management Workbook: Techniques and Self-Assessment Procedures by Deborah Goldstein and Ac Brown (1997)  A Mindfulness-Based Stress Reduction Workbook by Yohan Bradshaw and Carmina Lindo (2010)  The Complete Stress Management Workbook by Alonso Hernández, Ko Caldwell and Jan Downs (1996)  Assert Yourself by Earline Hamilton and Dashawn Hamilton (1977)    Relaxation Resources for Computer Download   These websites offer resources to help you relax. This list is for information only. [facility] is not responsible for the quality of services or the actions of any person or organization.  Progressive Muscle Relaxation (PMR):   http://www.[employer]Click4Rideo.com/progressive-muscle-relaxation-exercise.html   http://studentsupport.[facility]/counseling/resources/self-help/relaxation-and-stress-management/   Deep Breathing  Exercises:  http://www.[employer]/breathing-awareness.html     Meditation:   www.[employer]  www.the[facility] Networkguided-meditation-site.com You may have to pay for some of these resources.    Guided Imagery:  http://www.[employer]/guided-imagery-scripts.html   http://[email or web address]/library/poykivvmwg-boklsq-myhllbi/     Counseling / Behavioral Health  [facility]  Visit [email or web address] or call [phone] to find a clinic close to you.      This is not a prescription and these resources are optional. You must pay for any costs when using these resources. Please ask your insurance carrier if you can be reimbursed for these resources. If so, you are responsible for sending the needed details to your insurance carrier. These resources may also be tax deductible as medical expenses. Check with your .     These programs and publications are not affiliated in any way with [facility].

## 2023-01-24 NOTE — PROGRESS NOTES
Outpatient Sleep Medicine Consultation:      Name: Mary Wray MRN# [id number]   Age: 47 year old YOB: 1975     Date of Consultation: January 24, 2023  Consultation is requested by: Jazlyn Huitron MD  [address] [id number] Jazlyn Huitron  Primary care provider: Jazlyn Huitron       Reason for Sleep Consult:     Mary rWay is sent by Jazlyn Huitron for a sleep consultation regarding fatigue.    Patient s Reason for visit  Mary Ivyel main reason for visit: irregular sleep, feeling tierd, pre dieabetic  Patient states problem(s) started: last year  Mary Wray's goals for this visit: understand my sleep better and intervene with tools as needed           Assessment and Plan:     Summary Sleep Diagnoses:  #Previously diagnosed obstructive sleep apnea(untreated)  #Chronic insomnia, initiation and maintenance   #Excessive daytime sleepiness and fatigue    Comorbid Diagnoses:  History of Graves' disease, prediabetes, postablative hypothyroidism, depression, obesity.    Summary Recommendations:  #Previously diagnosed obstructive sleep apnea in 2015(untreated)  -She was diagnosed with mild KHURRAM predominant during REM Sleep. Oral appliance were recommended but the appliance was not obtained.  -We discussed about pathophysiology, testing and treatment option and consequences of untreated sleep apnea.   -Since there has been weight gain since her previous polysomnogram obtained in June 2015, recommended obtaining  HST with [facility](since we can get information regarding REM related events) to re evaluate the current status of the sleep disordered breathing.    #Chronic insomnia multifactorial- psychophysiological, anxiety, depression, untreated KHURRAM, back pain   -We discussed stimulus control measures.  She was instructed to go to bed only when she is ready to sleep.  If it takes more than 30 minutes to fall asleep when she first goes to bed or if it takes more  than 20 minutes to fall back asleep after the nighttime awakening, she was instructed to leave the bedroom, go to a different room keeping the lights dim and engaging in relaxing activity like reading a book or listening to music or meditation and return  back to bed when she is ready to sleep.   -Recommended  following regular sleep schedule, optimizing the duration and circadian timing of sleep, aiming at obtaining at least 7 to 8 hours per night and avoid sleep deprivation. Patient instructed to avoid bright light exposure a couple hours before the desired bedtime   -She was instructed to avoid mind stimulating activities before bed and avoid using electronic devices in bed.    -Recommended over-the-counter melatonin 1-3 mg strength, 2 hour prior to desired bedtime.   -We discussed CBTi as an option to consider in future.  -Information was provided regarding resources available through [facility] insomnia program as summary.    #Anxiety and depression: We discussed the association of insomnia with anxiety and depression. Recommended the patient to continue follow-up with her primary care provider for optimizing the management of anxiety and depression.      # Fatigue, EDS are multifactorial- anemia, untreated sleep apnea, depression, insufficient sleep time.  Recommended to follow-up with her primary care provider for management of anemia.    #Hypothyroidism: She is currently on Synthroid and the recently checked serum TSH levels was within normal limits.    #Obesity: we discuss about life style modification with regular exercise and healthy diet.     Patient was strongly advised to avoid driving, operating any heavy machinery or other hazardous situations while drowsy or sleepy.  Patient was counseled on the importance of driving while alert, to pull over if drowsy, or nap before getting into the vehicle if sleepy.     Orders Placed This Encounter   Procedures     HST - Home Sleep Apnea Test  - [facility]  "NonReturnable       Summary Counseling:    Sleep Testing Reviewed  Obstructive Sleep Apnea Reviewed  Complications of Untreated Sleep Apnea Reviewed    Medical Decision-making:   Educational materials provided in instructions    Plan is to follow-up via video visit in approximately 2 weeks after the sleep study is completed to review the test results and discuss plan of care    Patient was staffed with Dr Nishant Cortes MD  Sleep Medicine Fellow    CC: Jazlyn Huitron     The above note was dictated using voice recognition software. Although reviewed after completion, some word and grammatical error may remain . Please contact the author for any clarifications.     \" Total time spent was 60 minutes  for this appointment on this date of service which include time spent before, during and after the visit for chart review, patient care, counseling and coordination of care and documentation.\"      Attending: As the attending physician I was present with  Dr. Cortes, during this clinic visit. I personally reviewed the mansfield aspects of the history, chart review and discussed the plan with the patient and I agree with the above documentation.    Dayanna Dillard MD  [facility]  [address], [address], [place] [address]         History of Present Illness:     Past Sleep Evaluations: PSG 6/11/2015   Weight at the time of sleep study: 235.1 pounds   AHI 5.1/hr, REM AHI 28.4/hr; RDI: 5.5 events per hour    SLEEP-WAKE SCHEDULE:     Work/School Days: Patient goes to school/work: Yes   Usually gets into bed at 8-9pm  Takes patient about an hour to fall asleep  Has trouble falling asleep 1-2 times a week nights per week  Wakes up in the middle of the night every night   Wakes up due to    She has trouble falling back asleep everyday   It usually takes sometimes i don't, otherwise a couple of hours to get back to sleep  Patient is usually up at 6-7 a.m.  Uses " alarm: Yes    Weekends/Non-work Days/All Other Days:  Usually gets into bed at 8-9   Takes patient about 1 hour to fall asleep  Patient is usually up at 6-8  Uses alarm: Yes     She does not feel rested upon awakening from sleep and reports fatigue and EDS.    Sleep Need  Patient gets  6 sleep on average   Patient thinks she needs about 8-10 sleep    Dimple A Wray prefers to sleep in this position(s): Side   Patient states they do the following activities in bed: Use phone, computer, or tablet    Naps  Patient takes a purposeful nap 0 times a week and naps are usually NA in duration  She feels better after a nap: No  She dozes off unintentionally 0 days per week  Patient has had a driving accident or near-miss due to sleepiness/drowsiness: No      SLEEP DISRUPTIONS:    Breathing/Snoring  Patient snores:Yes  Other people complain about her snoring: No  Patient has been told she stops breathing in her sleep:No  She has issues with the following: Morning headaches;Morning mouth dryness;Heartburn or reflux at night;Getting up to urinate more than once    Movement:  Patient gets pain, discomfort, with an urge to move:  Yes  It happens when she is resting:  Yes  It happens more at night:  No  Patient has been told she kicks her legs at night:  No     Behaviors in Sleep:  She denies sleep talking, sleep walking, hypnagogic or hypnopompic hallucinations, confusional arousals, sleep eating or dream enactment behavior  or cataplexy.    CAFFEINE AND OTHER SUBSTANCES:    Patient consumes caffeinated beverages per day:  1  Last caffeine use is usually: am  List of any prescribed or over the counter stimulants that patient takes:    List of any prescribed or over the counter sleep medication patient takes:    List of previous sleep medications that patient has tried:    Patient drinks alcohol to help them sleep: No  Patient drinks alcohol near bedtime: No    Family History:  Patient has a family member been diagnosed with a  sleep disorder: Yes  dad         SCALES:    EPWORTH SLEEPINESS SCALE      [place] Sleepiness Scale ( FAUSTINA Tucker  1990-1997[facility]/English - Final version - 21 Nov 07 - [facility] [place].) 1/23/2023   Sitting and reading Would never doze   Watching TV High chance of dozing   Sitting, inactive in a public place (e.g. a theatre or a meeting) Would never doze   As a passenger in a car for an hour without a break High chance of dozing   Lying down to rest in the afternoon when circumstances permit Slight chance of dozing   Sitting and talking to someone Would never doze   Sitting quietly after a lunch without alcohol Slight chance of dozing   In a car, while stopped for a few minutes in traffic Would never doze   Day[place] Score (MC) 8   Day[place] Score (Sleep) 8         INSOMNIA SEVERITY INDEX (NOE)      Insomnia Severity Index (NOE) 1/23/2023   Difficulty falling asleep 1   Difficulty staying asleep 2   Problems waking up too early 3   How SATISFIED/DISSATISFIED are you with your CURRENT sleep pattern? 4   How NOTICEABLE to others do you think your sleep problem is in terms of impairing the quality of your life? 1   How WORRIED/DISTRESSED are you about your current sleep problem? 3   To what extent do you consider your sleep problem to INTERFERE with your daily functioning (e.g. daytime fatigue, mood, ability to function at work/daily chores, concentration, memory, mood, etc.) CURRENTLY? 4   NOE Total Score 18       Guidelines for Scoring/Interpretation:  Total score categories:  0-7 = No clinically significant insomnia   8-14 = Subthreshold insomnia   15-21 = Clinical insomnia (moderate severity)  22-28 = Clinical insomnia (severe)  Used via courtesy of www.Agnitusealth.va.gov with permission from Angel Luis Jordan PhD., [facility]      STOP BANG     STOP BANG Questionnaire (  2008, the American Society of Anesthesiologists, Inc. Sp [employer].) 1/24/2023   1. Snoring - Do you snore loudly  "(louder than talking or loud enough to be heard through closed doors)? -Yes   2. Tired - Do you often feel tired, fatigued, or sleepy during daytime? -Yes   3. Observed - Has anyone observed you stop breathing during your sleep? -No   4. Blood pressure - Do you have or are you being treated for high blood pressure? -No   5. BMI - BMI more than 35 kg/m2? -Yes   6. Age - Age over 50 yr old? -No   7. Neck circumference - Neck circumference greater than 40 cm? -Yes   8. Gender - Gender male? -No   STOP BANG Score (MC): - 4   Neck Cir (cm) Clinic: -   B/P Clinic: -   BMI Clinic: 41.19         GAD7    LETY-7  2/3/2021   1. Feeling nervous, anxious, or on edge 1   2. Not being able to stop or control worrying 1   3. Worrying too much about different things 1   4. Trouble relaxing 1   5. Being so restless that it is hard to sit still 0   6. Becoming easily annoyed or irritable 1   7. Feeling afraid, as if something awful might happen 0   LETY-7 Total Score 5   If you checked any problems, how difficult have they made it for you to do your work, take care of things at home, or get along with other people? Somewhat difficult         CAGE-AID    No flowsheet data found.    CAGE-AID reprinted with permission from the [place] [employer] Journal, NONA Haywood. and JOSE Daley, \"Conjoint screening questionnaires for alcohol and drug abuse\" [place] Medical Journal 94: 135-140, 1995.      PATIENT HEALTH QUESTIONNAIRE-9 (PHQ - 9)    PHQ-9 ([employer]) 2/3/2021   No Interest In Doing Things -   Feeling Depressed -   Trouble Sleeping -   Tired / No Energy -   No appetite or Over-Eating -   Feeling Bad about Self -   Trouble Concentrating -   Moving Slow or Restless -   Suicidal Thoughts -   Total Score -   1.  Little interest or pleasure in doing things 1   2.  Feeling down, depressed, or hopeless 1   3.  Trouble falling or staying asleep, or sleeping too much 2   4.  Feeling tired or having little energy 2   5.  Poor appetite or overeating 2 " "  6.  Feeling bad about yourself 0   7.  Trouble concentrating 1   8.  Moving slowly or restless 0   9.  Suicidal or self-harm thoughts 0   PHQ-9 Total Score 9   Difficulty at work, home, or with people Somewhat difficult       Developed by Tamica Gan, Lyndsay Leonard, Rip Shepherd and colleagues, with an educational shanique from [employer]. No permission required to reproduce, translate, display or distribute.        Allergies:    No Known Allergies    Medications:    Current Outpatient Medications   Medication Sig Dispense Refill     ferrous sulfate (FEROSUL) 325 (65 Fe) MG tablet Take 1 tablet (325 mg) by mouth daily (with breakfast) 90 tablet 1     SYNTHROID 150 MCG tablet Take 1 tablet (150 mcg) by mouth daily MON to SAT 1 tablet/day;SUN 1.5 tablet 90 tablet 4       Problem List:  Patient Active Problem List    Diagnosis Date Noted     BMI >35 02/09/2022     Priority: Medium     Intraductal papilloma of breast, left 07/29/2021     Priority: Medium     Added automatically from request for surgery [id number]       Current moderate episode of major depressive disorder without prior episode (H) 02/18/2021     Priority: Medium     History of Graves' disease 11/15/2019     Priority: Medium     Metatarsalgia of both feet 11/13/2019     Priority: Medium     H/O mammogram 12/11/2017     Priority: Medium     Class: Chronic     12/17: considered \"high risk\" by pre-screening questionnaire; normal mammogram noted, but offered high-risk mgmt       Pre-diabetes 12/10/2015     Priority: Medium     Postablative hypothyroidism 04/29/2014     Priority: Medium     Class: Chronic     4/26: pt currently taking synthroid 6 days a week, one day off  5/1: Elevated T4, consulted with Dr. Otto, advised reducing synthroid dose to 75 mcg, re-evaluate in 4-6 weeks + appointment with endocrinologist. Magdalene drawing labs, will direct med mgmt       Papanicolaou smear of cervix with low grade squamous intraepithelial lesion " "(LGSIL) 2011     Priority: Medium     Overview: 2002, LEEP - in [place], followed by normal paps  2013: Pap NIL, HPV-HR neg  2016: Pap LSIL, HPV-HR pos (16, 18 neg)  2016: Colposcopy - no lesions, ECC negative - recommend co-testing in 1 year  2017: pap NIL, HPV-HR neg - recommend co-testing in 3 yrs.  2018: pap NIL, HR-HPV neg  10/2019: pap NIL, HR-HPV NEG - recommend co-testing in 5 years          Past Medical/Surgical History:  Past Medical History:   Diagnosis Date     Graves disease      Hypovitaminosis D      Mild depression     , no meds, resolved with move     Miscarriage      Obesity      Postablative hypothyroidism     CCRM     Prediabetes      Premenstrual dysphoria      Past Surgical History:   Procedure Laterality Date     CATARACT EXTRACTION, BILATERAL      November and 2021      SECTION N/A 2018    Procedure:  SECTION;  Surgeon: Lola Otto MD;  Location: [facility] L[facility]D     COLONOSCOPY N/A 2022    Procedure: COLONOSCOPY;  Surgeon: Sharee Ramachandran MD;  [facility] [facility] [place]     LEEP TX, CERVICAL  2002    \"for HPV\"     LUMPECTOMY BREAST Left 8/10/2021    Procedure: LEFT Wire-Localized Lumpectomy;  Surgeon: Marta Frost MD;  [place] [facility] [place]       Social History:  Social History     Socioeconomic History     Marital status: Single     Spouse name: Not on file     Number of children: Not on file     Years of education: 22     Highest education level: Not on file   Occupational History     Occupation:      Employer: [employer]     Comment: School [employer]   Tobacco Use     Smoking status: Never     Smokeless tobacco: Never   Vaping Use     Vaping Use: Never used   Substance and Sexual Activity     Alcohol use: Not Currently     Alcohol/week: 1.7 standard drinks     Types: 2 Standard drinks or equivalent per week     Comment: occasional     Drug use: No     Sexual activity: " Not Currently     Partners: Male   Other Topics Concern     Parent/sibling w/ CABG, MI or angioplasty before 65F 55M? 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 [employer]; lives with daughterGail     Social Determinants of Health     Financial Resource Strain: Not on file   Food Insecurity: Not on file   Transportation Needs: Not on file   Physical Activity: Not on file   Stress: Not on file   Social Connections: Not on file   Intimate Partner Violence: Not on file   Housing Stability: Not on file       Family History:  Family History   Problem Relation Age of Onset     Obesity Mother      Diabetes Father 68     Obesity Father      Obesity Sister        Review of Systems:  A complete review of systems reviewed by me is negative with the exeption of what has been mentioned in the history of present illness.  In the last TWO WEEKS have you experienced any of the following symptoms?  Fevers: No  Night Sweats: Yes  Weight Gain: Yes  Pain at Night: Yes  Double Vision: Yes  Changes in Vision: Yes  Difficulty Breathing through Nose: No  Sore Throat in Morning: No  Dry Mouth in the Morning: Yes  Shortness of Breath Lying Flat: Yes  Shortness of Breath With Activity: Yes  Awakening with Shortness of Breath: Yes  Increased Cough: No  Heart Racing at Night: No  Swelling in Feet or Legs: Yes  Diarrhea at Night: No  Heartburn at Night: Yes  Urinating More than Once at Night: Yes  Losing Control of Urine at Night: No  Joint Pains at Night: Yes  Headaches in Morning: Yes  Weakness in Arms or Legs:  "Yes  Depressed Mood: Yes, denies suicidal ideations or thoughts of harming others  Anxiety: Yes     Physical Examination:  Vitals: Ht 1.702 m (5' 7\")   Wt 119.3 kg (263 lb)   BMI 41.19 kg/m    BMI= Body mass index is 41.19 kg/m .       General: No apparent distress, appropriately groomed  Head: Normocephalic, atraumatic  Chest: No cough, no audible wheezing, able to talk in full sentences  Psych: coherent speech, normal rate and volume, able to articulate logical thoughts, able   to abstract reason, no tangential thoughts, no hallucinations   or delusions   Her affect is normal  Neuro:  Mental status: Alert and  Oriented X 3  Speech: normal               Data: All pertinent previous laboratory data reviewed     Recent Labs   Lab Test 11/09/22  1058 11/13/18  0635    139   POTASSIUM 4.3 4.3   CHLORIDE 102 108   CO2 23 26   ANIONGAP 11 5   * 75   BUN 15.3 9   CR 0.74 0.71   CHANDRA 9.3 8.5       Recent Labs   Lab Test 01/13/23  1443   WBC 9.0   RBC 4.54   HGB 11.6*   HCT 36.0   MCV 79   MCH 25.6*   MCHC 32.2   RDW 16.7*          Recent Labs   Lab Test 11/09/22  1058   PROTTOTAL 7.3   ALBUMIN 4.1   BILITOTAL 0.6   ALKPHOS 87   AST 22   ALT 15       TSH   Date Value   11/09/2022 3.31 uIU/mL   08/05/2021 1.13 mU/L   02/03/2021 0.28 mU/L (L)   12/30/2020 0.13 mU/L (L)     Hemoglobin A1C   Date Value Ref Range Status   01/13/2023 6.3 (H) 0.0 - 5.6 % Final     Comment:     Normal <5.7%   Prediabetes 5.7-6.4%    Diabetes 6.5% or higher     Note: Adopted from ADA consensus guidelines.   01/09/2019 5.6 0 - 5.6 % Final     Comment:     Normal <5.7% Prediabetes 5.7-6.4%  Diabetes 6.5% or higher - adopted from ADA   consensus guidelines.       No results found for: UAMP, UBARB, BENZODIAZEUR, UCANN, UCOC, OPIT, UPCP    Iron Saturation Index   Date/Time Value Ref Range Status   04/29/2014 04:18 PM 12 (L) 15 - 46 % Final     Iron Sat Index   Date/Time Value Ref Range Status   11/09/2022 10:58 AM 11 (L) 15 - 46 % " Final     Ferritin   Date/Time Value Ref Range Status   01/13/2023 02:43 PM 49 6 - 175 ng/mL Final   04/29/2014 04:18 PM 23 10 - 120 ng/mL Final       No results found for: PH, PHARTERIAL, PO2, IP1DSXAUJHA, SAT, PCO2, HCO3, BASEEXCESS, TIERRA, BEB      Echocardiology: No results found for this or any previous visit (from the past 4320 hour(s)).    Chest x-ray: No results found for this or any previous visit from the past 365 days.      Chest CT: No results found for this or any previous visit from the past 365 days.      PFT: Most Recent [facility] Pulmonary Function Testing    No results found for: 20001      David Cortes MD 1/24/2023

## 2023-01-24 NOTE — PROGRESS NOTES
Mary is a 47 year old who is being evaluated via a billable video visit.      How would you like to obtain your AVS? MyChart  If the video visit is dropped, the invitation should be resent by: Send to e-mail at: [email or web address]  Will anyone else be joining your video visit? No    Patient confirms medications and allergies are accurate via patients echeck in completion, and or denies any changes since last reviewed/verified.     Flu shot given 9/30/2022    Barbara Salgado Virtual Facilitator/LPN      Video-Visit Details    Type of service:  Video Visit     Originating Location (pt. Location): Home    Distant Location (provider location):  Off-site  Platform used for Video Visit: Te

## 2023-03-03 DIAGNOSIS — E89.0 POSTABLATIVE HYPOTHYROIDISM: ICD-10-CM

## 2023-03-03 RX ORDER — LEVOTHYROXINE SODIUM 150 MCG
150 TABLET ORAL DAILY
Qty: 90 TABLET | Refills: 4 | Status: CANCELLED | OUTPATIENT
Start: 2023-03-03

## 2023-03-03 NOTE — TELEPHONE ENCOUNTER
"Request for medication refill:  SYNTHROID 150 MCG tablet    Providers if patient needs an appointment and you are willing to give a one month supply please refill for one month and  send a letter/MyChart using \".SMILLIMITEDREFILL\" .smillimited and route chart to \"P SMI \" (Giving one month refill in non controlled medications is strongly recommended before denial)    If refill has been denied, meaning absolutely no refills without visit, please complete the smart phrase \".smirxrefuse\" and route it to the \"P SMI MED REFILLS\"  pool to inform the patient and the pharmacy.    Boogie Velasquez MA        "

## 2023-03-06 ENCOUNTER — MYC MEDICAL ADVICE (OUTPATIENT)
Dept: SLEEP MEDICINE | Facility: CLINIC | Age: 48
End: 2023-03-06
Payer: COMMERCIAL

## 2023-03-06 ENCOUNTER — MYC MEDICAL ADVICE (OUTPATIENT)
Dept: FAMILY MEDICINE | Facility: CLINIC | Age: 48
End: 2023-03-06
Payer: COMMERCIAL

## 2023-03-06 DIAGNOSIS — G47.9 SLEEP DISTURBANCE: Primary | ICD-10-CM

## 2023-03-06 DIAGNOSIS — E89.0 POSTABLATIVE HYPOTHYROIDISM: ICD-10-CM

## 2023-03-06 NOTE — TELEPHONE ENCOUNTER
"Request for medication refill:  SYNTHROID 150 MCG tablet  Providers if patient needs an appointment and you are willing to give a one month supply please refill for one month and  send a letter/MyChart using \".SMILLIMITEDREFILL\" .smillimited and route chart to \"P SMI \" (Giving one month refill in non controlled medications is strongly recommended before denial)    If refill has been denied, meaning absolutely no refills without visit, please complete the smart phrase \".smirxrefuse\" and route it to the \"P SMI MED REFILLS\"  pool to inform the patient and the pharmacy.    Alma Weber RN        "

## 2023-03-09 RX ORDER — LEVOTHYROXINE SODIUM 150 MCG
150 TABLET ORAL DAILY
Qty: 90 TABLET | Refills: 0 | Status: SHIPPED | OUTPATIENT
Start: 2023-03-09 | End: 2023-03-29

## 2023-03-22 ENCOUNTER — VIRTUAL VISIT (OUTPATIENT)
Dept: FAMILY MEDICINE | Facility: CLINIC | Age: 48
End: 2023-03-22
Payer: COMMERCIAL

## 2023-03-22 ENCOUNTER — TELEPHONE (OUTPATIENT)
Dept: FAMILY MEDICINE | Facility: CLINIC | Age: 48
End: 2023-03-22

## 2023-03-22 DIAGNOSIS — E11.9 TYPE 2 DIABETES MELLITUS WITHOUT COMPLICATION, WITHOUT LONG-TERM CURRENT USE OF INSULIN (H): ICD-10-CM

## 2023-03-22 DIAGNOSIS — E66.01 MORBID OBESITY (H): ICD-10-CM

## 2023-03-22 DIAGNOSIS — F50.819 BINGE EATING DISORDER: Primary | ICD-10-CM

## 2023-03-22 DIAGNOSIS — G47.33 OSA (OBSTRUCTIVE SLEEP APNEA): ICD-10-CM

## 2023-03-22 DIAGNOSIS — F32.1 CURRENT MODERATE EPISODE OF MAJOR DEPRESSIVE DISORDER WITHOUT PRIOR EPISODE (H): ICD-10-CM

## 2023-03-22 PROCEDURE — 99215 OFFICE O/P EST HI 40 MIN: CPT | Mod: VID | Performed by: FAMILY MEDICINE

## 2023-03-22 ASSESSMENT — PATIENT HEALTH QUESTIONNAIRE - PHQ9
10. IF YOU CHECKED OFF ANY PROBLEMS, HOW DIFFICULT HAVE THESE PROBLEMS MADE IT FOR YOU TO DO YOUR WORK, TAKE CARE OF THINGS AT HOME, OR GET ALONG WITH OTHER PEOPLE: SOMEWHAT DIFFICULT
SUM OF ALL RESPONSES TO PHQ QUESTIONS 1-9: 10
SUM OF ALL RESPONSES TO PHQ QUESTIONS 1-9: 10

## 2023-03-22 NOTE — TELEPHONE ENCOUNTER
Prior Authorization Request  Who s requesting:  Pharmacy  Pharmacy Name and Location: [id number] [place] [address] AT [facility] [address]  Medication Name: semaglutide (OZEMPIC) 2 MG/1.5ML SOPN pen  Insurance Plan: MEDICA CHOICE  Insurance Member ID Number:  [id number]  CoverMyMeds Key: NA  Informed patient that prior authorizations can take up to 10 business days for response:   NA  Okay to leave a detailed message: No     Route to pool:  MINDY [facility] MED

## 2023-03-22 NOTE — ASSESSMENT & PLAN NOTE
We had a good conversation today regarding medical weight management and the treatment of obesity.  We discussed the multifactorial nature of this condition and I reviewed the factors that are contributing to the patient's current weight issues.  I do believe that psychological factors and ultimately binge eating disorder are both significant contributing factors to her challenges and weight management.  I placed a referral for consultation with an eating disorder clinic.  We discussed that Vyvanse is a medication that could potentially be used to assist with treating binge eating.  We discussed that her depression is currently active and may be contributing to more emotional eating behavior and stress.  I encouraged her to meet with her primary care provider to discuss treatment for her depression and encouraged her to continue to see her psychotherapist.  We discussed that Ozempic would be an appropriate medication with her history of type 2 diabetes and may assist with both weight loss as well as treating the diabetes.  The patient excepted a prescription for that today.  I asked patient to follow-up in 1 month.

## 2023-03-22 NOTE — ASSESSMENT & PLAN NOTE
The patient was diagnosed with this previously but is in the process of getting this reevaluated as well.

## 2023-03-22 NOTE — PROGRESS NOTES
How would you like to obtain your AVS? MyChart  If the video visit is dropped, the invitation should be resent by: Text to cell phone: [phone]  Will anyone else be joining your video visit? No    Video-Visit Details    Type of service:  Video Visit    Video Start Time: 8:15  Video End Time: 9:13    Originating Location (pt. Location): Home in [place]    Distant Location (provider location):  On-site    Platform used for Video Visit: [facility] Medical Weight Management Consult    PATIENT:  Mary Wray  MRN:         [id number]  :         1975  JENNIFER:         3/22/2023    Dear Dr. Huitron,    I had the pleasure of seeing your patient, Mary Wray. Full intake/assessment was done to determine barriers to weight loss success and develop a treatment plan. Mary Wray is a 47 year old female interested in treatment of medical problems associated with excess weight. She has a height of Data Unavailable, a weight of 0 lbs 0 oz, and the calculated There is no height or weight on file to calculate BMI.    ASSESSMENT/PLAN:  Problem List Items Addressed This Visit        Respiratory    KHURRAM (obstructive sleep apnea)     The patient was diagnosed with this previously but is in the process of getting this reevaluated as well.            Digestive    Morbid obesity (H)     We had a good conversation today regarding medical weight management and the treatment of obesity.  We discussed the multifactorial nature of this condition and I reviewed the factors that are contributing to the patient's current weight issues.  I do believe that psychological factors and ultimately binge eating disorder are both significant contributing factors to her challenges and weight management.  I placed a referral for consultation with an eating disorder clinic.  We discussed that Vyvanse is a medication that could potentially be used to assist with treating binge eating.  We discussed that her depression is currently  active and may be contributing to more emotional eating behavior and stress.  I encouraged her to meet with her primary care provider to discuss treatment for her depression and encouraged her to continue to see her psychotherapist.  We discussed that Ozempic would be an appropriate medication with her history of type 2 diabetes and may assist with both weight loss as well as treating the diabetes.  The patient excepted a prescription for that today.  I asked patient to follow-up in 1 month.         Relevant Medications    semaglutide (OZEMPIC) 2 MG/1.5ML SOPN pen       Endocrine    Type 2 diabetes mellitus without complication, without long-term current use of insulin (H)     She has relatively recently diagnosed type 2 diabetes with an A1c of 6.7.  She did have a repeat A1c more recently after making some dietary changes and it did improve down to an A1c of 6.3.  I started Ozempic today to try to assist with weight management.         Relevant Medications    semaglutide (OZEMPIC) 2 MG/1.5ML SOPN pen       Behavioral    Current moderate episode of major depressive disorder without prior episode (H)     The patient is seeing her psychotherapist on a regular basis and I encouraged her to talk to her primary care provider potentially about medication as well.  We talked about Wellbutrin being the most weight neutral but that this medication is not always the best if anxiety is a significant component to her mood disorder        Other Visit Diagnoses     Binge eating disorder    -  Primary    Relevant Orders    Adult Mental Health  Referral            She has the following co-morbidities:       3/16/2023   I have the following health issues associated with obesity: Type II Diabetes, Pre-Diabetes, Sleep Apnea, Hypothyroidism   I have the following symptoms associated with obesity: Knee Pain, Depression, Lower Extremity Swelling, Back Pain, Fatigue, Groin Rash, Hip Pain       Patient Goals 3/16/2023   I am  "interested in having a healthier weight to diminish current health problems: Yes   I am interested in having a healthier weight in order to prevent future health problems: Yes   I am interested in having a healthier weight in order to have a future surgery: No       Referring Provider 3/16/2023   Please name the provider who referred you to Medical Weight Management.  If you do not know, please answer: \"I Don't Know\". Dr. Jazlyn Huitron       Weight History 3/16/2023   How concerned are you about your weight? Very Concerned   Would you describe your weight gain as gradual? Yes   I became overweight: As a Teenager   The following factors have contributed to my weight gain: Mental Health Issues, Change in Schedule, Eating Too Much, Lack of Exercise, Genetic (Runs in the Family), Stress   I have tried the following methods to lose weight: Watching Portions or Calories, Exercise   My lowest weight since age 19 was: 175   My highest weight since age 19 was: 270   The most weight I have ever lost was: (lbs) 0   I have the following family history of obesity/being overweight: My mother is overweight, My father is overweight, One or more of my siblings are overweight, Many of my relatives are overweight   Has anyone in your family had weight loss surgery? Yes   How has your weight changed over the last year? Gained   How many pounds? 30lbs since March 2020       Diet Recall Review with Patient 3/16/2023   Do you typically eat breakfast? Yes   If you do eat breakfast, what types of food do you eat? oatmeal, eggs, veggie scramble, toast with almond butter   Do you typically eat lunch? Yes   If you do eat lunch, what types of food do you typically eat? same as above   Do you typically eat supper? Yes   If you do eat supper, what types of food do you typically eat? same as above   Do you typically eat snacks? Yes   If you do snack, what types of food do you typically eat? cheese   Do you like vegetables?  Yes   Do you drink " water? Yes   How many glasses of juice do you drink in a typical day? 0   How many of glasses of milk do you drink in a typical day? 0   If you do drink milk, what type? N/A   How many 8oz glasses of sugar containing drinks such as Alphonso-Aid/sweet tea do you drink in a day? 0   How many cans/bottles of sugar pop/soda/tea/sports drinks do you drink in a day? 0   How many cans/bottles of diet pop/soda/tea or sports drink do you drink in a day? 0   How often do you have a drink of alcohol? Monthly or Less   If you do drink, how many drinks might you have in a day? 1 or 2       Eating Habits 3/16/2023   Generally, my meals include foods like these: bread, pasta, rice, potatoes, corn, crackers, sweet dessert, pop, or juice. Once a Week   Generally, my meals include foods like these: fried meats, brats, burgers, french fries, pizza, cheese, chips, or ice cream. Less Than Weekly   Eat fast food (like [employer], [employer], [employer]). Never   Eat at a buffet or sit-down restaurant. Never   Eat most of my meals in front of the TV or computer. A Few Times a Week   Often skip meals, eat at random times, have no regular eating times. A Few Times a Week   Rarely sit down for a meal but snack or graze throughout. Once a Week   Eat extra snacks between meals. Once a Week   Eat most of my food at the end of the day. A Few Times a Week   Eat in the middle of the night or wake up at night to eat. Never   Eat extra snacks to prevent or correct low blood sugar. A Few Times a Week   Eat to prevent acid reflux or stomach pain. Never   Worry about not having enough food to eat. Never   Have you been to the food shelf at least a few times this year? No   I eat when I am depressed. A Few Times a Week   I eat when I am stressed. A Few Times a Week   I eat when I am bored. A Few Times a Week   I eat when I am anxious. A Few Times a Week   I eat when I am happy or as a reward. A Few Times a Week   I feel hungry all the time even if I just  have eaten. Once a Week   Feeling full is important to me. Less Than Weekly   I finish all the food on my plate even if I am already full. Never   I can't resist eating delicious food or walk past the good food/smell. Once a Week   I eat/snack without noticing that I am eating. Once a Week   I eat when I am preparing the meal. Once a Week   I eat more than usual when I see others eating. Once a Week   I have trouble not eating sweets, ice cream, cookies, or chips if they are around the house. Less Than Weekly   I think about food all day. Almost Everyday   What foods, if any, do you crave? Cheese   Please list any other foods you crave? spicy foods       Amount of Food 3/16/2023   I make myself vomit what I have eaten or use laxatives to get rid of food. Never   I eat a large amount of food, like a loaf of bread, a box of cookies, a pint/quart of ice cream, all at once. Never   I eat a large amount of food even when I am not hungry. Never   I eat rapidly. Weekly   I eat alone because I feel embarrassed and do not want others to see how much I have eaten. Weekly   I eat until I am uncomfortably full. Never   I feel bad, disgusted, or guilty after I overeat. Almost Everyday   I make myself vomit what I have eaten or use laxatives to get rid of food. Never       Activity/Exercise History 3/16/2023   How much of a typical 12 hour day do you spend sitting? Half the Day   How much of a typical 12 hour day do you spend lying down? Less Than Half the Day   How much of a typical day do you spend walking/standing? Half the Day   How many hours (not including work) do you spend on the TV/Video Games/Computer/Tablet/Phone? 2-3 Hours   How many times a week are you active for the purpose of exercise? 2-3 Times a Week   What keeps you from being more active? Lack of Time, Too tired   How many total minutes do you spend doing some activity for the purpose of exercising when you exercise? 15-30 Minutes       PAST MEDICAL  HISTORY:  Past Medical History:   Diagnosis Date     Depressive disorder 2014    On and off issues currently.     Diabetes (H) 2022     Graves disease 1997     Hypovitaminosis D      Mild depression     2014, no meds, resolved with move     Miscarriage 2017     Obesity      Postablative hypothyroidism 1997    CCRM     Prediabetes 2016     Premenstrual dysphoria        Work/Social History Reviewed With Patient 3/16/2023   My employment status is: Full-Time   My job is: administration   How much of your job is spent on the computer or phone? 100%   How many hours do you spend commuting to work daily? 2 hours twice a week   What is your marital status? Single   If in a relationship, is your significant other overweight? -   Do you have children? Yes   If you have children, are they overweight? No   Who do you live with? my child   Are they supportive of your health goals? Yes   Who does the food shopping? Me       Mental Health History Reviewed With Patient 3/16/2023   Have you ever been physically or sexually abused? No   If yes, do you feel that the abuse is affecting your weight? N/A   If yes, would you like to talk to a counselor about the abuse? N/A   How often in the past 2 weeks have you felt little interest or pleasure in doing things? For Several Days   Over the past 2 weeks how often have you felt down, depressed, or hopeless? For Several Days       Sleep History Reviewed With Patient 3/16/2023   How many hours do you sleep at night? 6   Do you think that you snore loudly or has anybody ever heard you snore loudly (louder than talking or so loud it can be heard behind a shut door)? Yes   Has anyone seen or heard you stop breathing during your sleep? No   Do you often feel tired, fatigued, or sleepy during the day? Yes   Do you have a TV/Computer in your bedroom? No       MEDICATIONS:   Current Outpatient Medications   Medication Sig Dispense Refill     ferrous sulfate (FEROSUL) 325 (65 Fe) MG tablet Take 1  tablet (325 mg) by mouth daily (with breakfast) 90 tablet 1     SYNTHROID 150 MCG tablet Take 1 tablet (150 mcg) by mouth daily MON to SAT 1 tablet/day;SUN 1.5 tablet 90 tablet 0       ALLERGIES:   No Known Allergies    PHYSICAL EXAM:  Generally well appearing female; tearful at times.     FOLLOW-UP:   4 weeks.    TIME: 58 min spent on evaluation, management, counseling, education, & motivational interviewing with greater than 50 % of the total time was spent on counseling and coordinating care    Sincerely,    CIELO LOPEZ MD

## 2023-03-22 NOTE — ASSESSMENT & PLAN NOTE
The patient is seeing her psychotherapist on a regular basis and I encouraged her to talk to her primary care provider potentially about medication as well.  We talked about Wellbutrin being the most weight neutral but that this medication is not always the best if anxiety is a significant component to her mood disorder

## 2023-03-27 NOTE — TELEPHONE ENCOUNTER
PRIOR AUTHORIZATION DENIED    Medication: semaglutide (OZEMPIC) 2 MG/1.5ML SOPN pen    Denial Date: 3/27/2023    Denial Rational: Patient needs to meet all the criteria listed below:        Appeal Information: Review the plan's reasons for denial listed above. Please utilize that information to complete letter and provide specific, detailed clinical information/rationale of your patient's health status to address their denial reasons.

## 2023-03-29 ENCOUNTER — OFFICE VISIT (OUTPATIENT)
Dept: FAMILY MEDICINE | Facility: CLINIC | Age: 48
End: 2023-03-29
Payer: COMMERCIAL

## 2023-03-29 VITALS
RESPIRATION RATE: 16 BRPM | SYSTOLIC BLOOD PRESSURE: 108 MMHG | BODY MASS INDEX: 42.91 KG/M2 | TEMPERATURE: 97.9 F | WEIGHT: 267 LBS | HEIGHT: 66 IN | HEART RATE: 62 BPM | OXYGEN SATURATION: 99 % | DIASTOLIC BLOOD PRESSURE: 66 MMHG

## 2023-03-29 DIAGNOSIS — Z12.4 SCREENING FOR CERVICAL CANCER: ICD-10-CM

## 2023-03-29 DIAGNOSIS — E89.0 POSTABLATIVE HYPOTHYROIDISM: ICD-10-CM

## 2023-03-29 DIAGNOSIS — E11.9 TYPE 2 DIABETES MELLITUS WITHOUT COMPLICATION, WITHOUT LONG-TERM CURRENT USE OF INSULIN (H): ICD-10-CM

## 2023-03-29 DIAGNOSIS — Z00.00 ROUTINE GENERAL MEDICAL EXAMINATION AT A HEALTH CARE FACILITY: Primary | ICD-10-CM

## 2023-03-29 LAB
CREAT UR-MCNC: 46 MG/DL
MICROALBUMIN UR-MCNC: 42.6 MG/L
MICROALBUMIN/CREAT UR: 92.61 MG/G CR (ref 0–25)

## 2023-03-29 PROCEDURE — 90472 IMMUNIZATION ADMIN EACH ADD: CPT | Performed by: FAMILY MEDICINE

## 2023-03-29 PROCEDURE — 90746 HEPB VACCINE 3 DOSE ADULT IM: CPT | Performed by: FAMILY MEDICINE

## 2023-03-29 PROCEDURE — 99396 PREV VISIT EST AGE 40-64: CPT | Mod: 25 | Performed by: FAMILY MEDICINE

## 2023-03-29 PROCEDURE — 90471 IMMUNIZATION ADMIN: CPT | Performed by: FAMILY MEDICINE

## 2023-03-29 PROCEDURE — 87624 HPV HI-RISK TYP POOLED RSLT: CPT | Performed by: FAMILY MEDICINE

## 2023-03-29 PROCEDURE — G0145 SCR C/V CYTO,THINLAYER,RESCR: HCPCS | Performed by: FAMILY MEDICINE

## 2023-03-29 PROCEDURE — 90677 PCV20 VACCINE IM: CPT | Performed by: FAMILY MEDICINE

## 2023-03-29 PROCEDURE — 82043 UR ALBUMIN QUANTITATIVE: CPT | Performed by: FAMILY MEDICINE

## 2023-03-29 PROCEDURE — 82570 ASSAY OF URINE CREATININE: CPT | Performed by: FAMILY MEDICINE

## 2023-03-29 RX ORDER — LEVOTHYROXINE SODIUM 150 MCG
150 TABLET ORAL DAILY
Qty: 90 TABLET | Refills: 0 | Status: SHIPPED | OUTPATIENT
Start: 2023-03-29 | End: 2023-03-29

## 2023-03-29 RX ORDER — LEVOTHYROXINE SODIUM 150 MCG
150 TABLET ORAL DAILY
Qty: 90 TABLET | Refills: 3 | Status: SHIPPED | OUTPATIENT
Start: 2023-03-29 | End: 2024-01-08

## 2023-03-29 ASSESSMENT — ENCOUNTER SYMPTOMS
MYALGIAS: 0
FEVER: 0
EYE PAIN: 0
HEMATOCHEZIA: 0
PALPITATIONS: 0
DIARRHEA: 0
PARESTHESIAS: 0
DYSURIA: 0
JOINT SWELLING: 0
NAUSEA: 0
HEMATURIA: 0
COUGH: 0
HEADACHES: 1
WEAKNESS: 0
DIZZINESS: 0
CHILLS: 0
CONSTIPATION: 0
SHORTNESS OF BREATH: 0
SORE THROAT: 0
FREQUENCY: 1
ABDOMINAL PAIN: 0
NERVOUS/ANXIOUS: 0
HEARTBURN: 0
BREAST MASS: 0
ARTHRALGIAS: 0

## 2023-03-29 NOTE — PROGRESS NOTES
SUBJECTIVE:   CC: Mary is an 47 year old who presents for preventive health visit.   Additional Questions 3/29/2023   Roomed by emily   Accompanied by self     Healthy Habits:     Getting at least 3 servings of Calcium per day:  Yes    Bi-annual eye exam:  Yes    Dental care twice a year:  Yes    Sleep apnea or symptoms of sleep apnea:  Daytime drowsiness and Sleep apnea    Diet:  Vegetarian/vegan    Frequency of exercise:  2-3 days/week    Duration of exercise:  15-30 minutes    Taking medications regularly:  Yes    Medication side effects:  None    PHQ-2 Total Score: 0    Additional concerns today:  Yes      Weight mgmt  - worked with a new nutrionist  - has had conversation about Ozempic  - concerned about whether being on it is absolutely necessary  - [facility], will be working with a therapist there about managing relationship with food    Will be due for A1c soon, but not yet    Today's PHQ-2 Score:   PHQ-2 ( 1999 [employer]) 3/29/2023   Q1: Little interest or pleasure in doing things 0   Q2: Feeling down, depressed or hopeless 0   PHQ-2 Score 0   PHQ-2 Total Score (12-17 Years)- Positive if 3 or more points; Administer PHQ-A if positive -   Q1: Little interest or pleasure in doing things Not at all   Q2: Feeling down, depressed or hopeless Not at all   PHQ-2 Score 0     Social History     Tobacco Use     Smoking status: Never     Smokeless tobacco: Never   Substance Use Topics     Alcohol use: Yes     Alcohol/week: 1.7 standard drinks     Types: 2 Standard drinks or equivalent per week     Comment: occasional         Alcohol Use 3/29/2023   Prescreen: >3 drinks/day or >7 drinks/week? No     Reviewed orders with patient.  Reviewed health maintenance and updated orders accordingly - Yes  BP Readings from Last 3 Encounters:   04/10/23 110/72   03/29/23 108/66   11/09/22 106/74    Wt Readings from Last 3 Encounters:   04/10/23 119.7 kg (264 lb)   03/29/23 121.1 kg (267 lb)   01/24/23 119.3 kg (263 lb)     "              Patient Active Problem List   Diagnosis     Postablative hypothyroidism     Type 2 diabetes mellitus without complication, without long-term current use of insulin (H)     Papanicolaou smear of cervix with low grade squamous intraepithelial lesion (LGSIL)     H/O mammogram     Metatarsalgia of both feet     History of Graves' disease     Current moderate episode of major depressive disorder without prior episode (H)     Intraductal papilloma of breast, left     Morbid obesity (H)     KHURRAM (obstructive sleep apnea)     Past Surgical History:   Procedure Laterality Date     CATARACT EXTRACTION, BILATERAL      November and 2021      SECTION N/A 2018    Procedure:  SECTION;  Surgeon: Lola Otto MD;  Location: [facility] L[facility]     COLONOSCOPY N/A 2022    Procedure: COLONOSCOPY;  Surgeon: Sharee Ramachandran MD;  [place] [facility] [place]     EYE SURGERY  DEC 2021    cataract     LEEP TX, CERVICAL  2002    \"for HPV\"     LUMPECTOMY BREAST Left 08/10/2021    Procedure: LEFT Wire-Localized Lumpectomy;  Surgeon: Marta Frost MD;  Location[place] [facility] [place]       Social History     Tobacco Use     Smoking status: Never     Smokeless tobacco: Never   Vaping Use     Vaping status: Never Used   Substance Use Topics     Alcohol use: Yes     Alcohol/week: 1.7 standard drinks of alcohol     Types: 2 Standard drinks or equivalent per week     Comment: occasional     Family History   Problem Relation Age of Onset     Obesity Mother      Hypertension Mother      Diabetes Father      Obesity Father      Obesity Sister         had weight loss surgery           Breast Cancer Screening:    FHS-7:   Breast CA Risk Assessment (FHS-7) 2021 10/7/2022   Did any of your first-degree relatives have breast or ovarian cancer? No No   Did any of your relatives have bilateral breast cancer? No Unknown   Did any man in your family have breast cancer? No No   Did any woman in your family have " breast and ovarian cancer? No No   Did any woman in your family have breast cancer before age 50 y? No Yes   Do you have 2 or more relatives with breast and/or ovarian cancer? No No   Do you have 2 or more relatives with breast and/or bowel cancer? No No       Mammogram Screening - Offered annual screening and updated Health Maintenance for mutual plan based on risk factor consideration    Pertinent mammograms are reviewed under the imaging tab.    History of abnormal Pap smear: NO - age 30-65 PAP every 5 years with negative HPV co-testing recommended  PAP / HPV Latest Ref Rng & Units 10/31/2019 2018 2017   PAP (Historical) - NIL NIL NIL   HPV16 NEG:Negative Negative Negative Negative   HPV18 NEG:Negative Negative Negative Negative   HRHPV NEG:Negative Negative Negative Negative     Reviewed and updated as needed this visit by clinical staff   Tobacco  Allergies               Reviewed and updated as needed this visit by Provider                 Past Medical History:   Diagnosis Date     Depressive disorder     On and off issues currently.     Diabetes (H)      Graves disease      Hypovitaminosis D      Mild depression     , no meds, resolved with move     Miscarriage      Obesity      Postablative hypothyroidism 1997    CCRM     Prediabetes 2016     Premenstrual dysphoria       OB History    Para Term  AB Living   2 1 0 1 1 1   SAB IAB Ectopic Multiple Live Births   1 0 0 0 1      # Outcome Date GA Lbr Arnold/2nd Weight Sex Delivery Anes PTL Lv   2  18 36w6d  2.2 kg (4 lb 13.6 oz) F CS-LTranv  Y RAJANI      Complications: Dysfunctional Labor, GBS      Name: PORFIRIO PRATHER DIMPLE      Apgar1: 8  Apgar5: 9   1 SAB 17               Review of Systems   Constitutional: Negative for chills and fever.   HENT: Negative for congestion, ear pain, hearing loss and sore throat.    Eyes: Negative for pain and visual disturbance.   Respiratory: Negative for cough and  "shortness of breath.    Cardiovascular: Positive for peripheral edema. Negative for chest pain and palpitations.   Gastrointestinal: Negative for abdominal pain, constipation, diarrhea, heartburn, hematochezia and nausea.   Breasts:  Negative for tenderness, breast mass and discharge.   Genitourinary: Positive for frequency. Negative for dysuria, genital sores, hematuria, pelvic pain, urgency, vaginal bleeding and vaginal discharge.   Musculoskeletal: Negative for arthralgias, joint swelling and myalgias.   Skin: Negative for rash.   Neurological: Positive for headaches. Negative for dizziness, weakness and paresthesias.   Psychiatric/Behavioral: Negative for mood changes. The patient is not nervous/anxious.           OBJECTIVE:   /66 (BP Location: Left arm, Patient Position: Sitting, Cuff Size: Adult Large)   Pulse 62   Temp 97.9  F (36.6  C) (Oral)   Resp 16   Ht 1.676 m (5' 6\")   Wt 121.1 kg (267 lb)   LMP 03/28/2023   SpO2 99%   BMI 43.09 kg/m    Physical Exam  GENERAL: healthy, alert and no distress  EYES: Eyes grossly normal to inspection, PERRL and conjunctivae and sclerae normal  HENT: ear canals and TM's normal, nose and mouth without ulcers or lesions  NECK: no adenopathy, no asymmetry, masses, or scars and thyroid normal to palpation  RESP: lungs clear to auscultation - no rales, rhonchi or wheezes  BREAST: normal without masses, tenderness or nipple discharge and no palpable axillary masses or adenopathy  CV: regular rate and rhythm, normal S1 S2, no S3 or S4, no murmur, click or rub, no peripheral edema and peripheral pulses strong  ABDOMEN: soft, nontender, no hepatosplenomegaly, no masses and bowel sounds normal   (female): normal female external genitalia, normal urethral meatus, vaginal mucosa pink, moist, well rugated, and normal cervix  MS: no gross musculoskeletal defects noted, no edema  SKIN: no suspicious lesions or rashes  NEURO: Normal strength and tone, mentation intact and " speech normal  PSYCH: mentation appears normal, affect normal/bright      ASSESSMENT/PLAN:   (Z00.00) Routine general medical examination at a health care facility  (primary encounter diagnosis)  Comment: updated screening  Plan: routine follow up in one year, more regular follow up for DM    (Z12.4) Screening for cervical cancer  Comment: updated today  Plan: PAP screen with HPV - recommended age 30 - 65         years, HPV Hold (Lab Only), HPV High Risk Types        DNA Cervical    (E89.0) Postablative hypothyroidism  Comment: refills today, last TSH 1/2022 was normal  Plan: SYNTHROID 150 MCG tablet, DISCONTINUED:         SYNTHROID 150 MCG tablet    (E11.9) Type 2 diabetes mellitus without complication, without long-term current use of insulin (H)  Comment: new diagnosis; waiting for repeat A1c; collect other labs and then discuss referral to DM education and nutrition  - answered questions today  - working on diet and relationship to food  - healthy exercise  Plan: Hemoglobin A1c, Albumin Random Urine         Quantitative with Creat Ratio, Lipid panel        COUNSELING:  Reviewed preventive health counseling, as reflected in patient instructions      She reports that she has never smoked. She has never used smokeless tobacco.          Jazlyn Huitron MD  [facility] [place]

## 2023-04-02 LAB
BKR LAB AP GYN ADEQUACY: NORMAL
BKR LAB AP GYN INTERPRETATION: NORMAL
BKR LAB AP HPV REFLEX: NORMAL
BKR LAB AP LMP: NORMAL
BKR LAB AP PREVIOUS ABNORMAL: NORMAL
PATH REPORT.COMMENTS IMP SPEC: NORMAL
PATH REPORT.COMMENTS IMP SPEC: NORMAL
PATH REPORT.RELEVANT HX SPEC: NORMAL

## 2023-04-03 LAB
HUMAN PAPILLOMA VIRUS 16 DNA: NEGATIVE
HUMAN PAPILLOMA VIRUS 18 DNA: NEGATIVE
HUMAN PAPILLOMA VIRUS FINAL DIAGNOSIS: NORMAL
HUMAN PAPILLOMA VIRUS OTHER HR: NEGATIVE

## 2023-04-06 ENCOUNTER — VIRTUAL VISIT (OUTPATIENT)
Dept: SLEEP MEDICINE | Facility: CLINIC | Age: 48
End: 2023-04-06
Attending: INTERNAL MEDICINE
Payer: COMMERCIAL

## 2023-04-06 DIAGNOSIS — G47.9 SLEEP DISTURBANCE: ICD-10-CM

## 2023-04-06 NOTE — PROGRESS NOTES
Device has been registered and shipped via [employer] on 4/6/2023. Patient was notified that package was mailed out.

## 2023-04-10 ENCOUNTER — OFFICE VISIT (OUTPATIENT)
Dept: FAMILY MEDICINE | Facility: CLINIC | Age: 48
End: 2023-04-10
Payer: COMMERCIAL

## 2023-04-10 VITALS
TEMPERATURE: 98.1 F | SYSTOLIC BLOOD PRESSURE: 110 MMHG | OXYGEN SATURATION: 97 % | WEIGHT: 264 LBS | BODY MASS INDEX: 42.43 KG/M2 | RESPIRATION RATE: 16 BRPM | HEIGHT: 66 IN | DIASTOLIC BLOOD PRESSURE: 72 MMHG | HEART RATE: 76 BPM

## 2023-04-10 DIAGNOSIS — R05.1 ACUTE COUGH: Primary | ICD-10-CM

## 2023-04-10 LAB
FLUAV RNA SPEC QL NAA+PROBE: NEGATIVE
FLUBV RNA RESP QL NAA+PROBE: NEGATIVE
RSV RNA SPEC NAA+PROBE: NEGATIVE
SARS-COV-2 RNA RESP QL NAA+PROBE: NEGATIVE

## 2023-04-10 PROCEDURE — 99213 OFFICE O/P EST LOW 20 MIN: CPT | Mod: CS | Performed by: FAMILY MEDICINE

## 2023-04-10 PROCEDURE — 87637 SARSCOV2&INF A&B&RSV AMP PRB: CPT | Performed by: FAMILY MEDICINE

## 2023-04-10 RX ORDER — AZITHROMYCIN 250 MG/1
TABLET, FILM COATED ORAL
Qty: 6 TABLET | Refills: 0 | Status: SHIPPED | OUTPATIENT
Start: 2023-04-10 | End: 2023-04-15

## 2023-04-10 ASSESSMENT — PAIN SCALES - GENERAL: PAINLEVEL: SEVERE PAIN (7)

## 2023-04-10 NOTE — PROGRESS NOTES
"  Assessment & Plan     Acute cough - worsening  1 week history of cough which is now worsening.  COVID and influenza negative  Treat with Zithromax  Symptomatic cares  If not improving consider prednisone for harsh cough and or steroid inhaler.    - Symptomatic Influenza A/B, RSV, & SARS-CoV2 PCR (COVID-19) Nasopharyngeal; Future  - azithromycin (ZITHROMAX) 250 MG tablet; Take 2 tablets (500 mg) by mouth daily for 1 day, THEN 1 tablet (250 mg) daily for 4 days.  - Symptomatic Influenza A/B, RSV, & SARS-CoV2 PCR (COVID-19) Nose               Jona Orr MD  [facility] SHALOM Hernandez is a 47 year old, presenting for the following health issues:  Cough (Over a week, pain scale 7 )        4/10/2023     9:59 AM   Additional Questions   Roomed by arnulfo   Accompanied by self     HPI   1 week history of cough chills and sweats.  Took a COVID test which was negative.  Recently traveling.  Cough is getting harsher and worse.  Is able to sleep at night.  Using NyQuil with limited relief.              Review of Systems         Objective    /72   Pulse 76   Temp 98.1  F (36.7  C) (Oral)   Resp 16   Ht 1.676 m (5' 6\")   Wt 119.7 kg (264 lb)   LMP 03/28/2023   SpO2 97%   BMI 42.61 kg/m    Body mass index is 42.61 kg/m .  Physical Exam   GENERAL: healthy, alert and no distress  HENT: ear canals and TM's normal, nose and mouth without ulcers or lesions  NECK: no adenopathy, no asymmetry, masses, or scars and thyroid normal to palpation  RESP: lungs clear to auscultation - no rales, rhonchi or wheezes  CV: regular rate and rhythm, normal S1 S2, no S3 or S4, no murmur, click or rub, no peripheral edema and peripheral pulses strong  ABDOMEN: soft, nontender, no hepatosplenomegaly, no masses and bowel sounds normal  MS: no gross musculoskeletal defects noted, no edema    Results for orders placed or performed in visit on 04/10/23 (from the past 24 hour(s))   Symptomatic Influenza A/B, RSV, " & SARS-CoV2 PCR (COVID-19) Nose    Specimen: Nose; Swab   Result Value Ref Range    Influenza A PCR Negative Negative    Influenza B PCR Negative Negative    RSV PCR Negative Negative    SARS CoV2 PCR Negative Negative    Narrative    Testing was performed using the Xpert Xpress CoV2/Flu/RSV Assay on the [employer] Discovery GeneXpert Instrument. This test should be ordered for the detection of SARS-CoV-2, influenza, and RSV viruses in individuals who meet clinical and/or epidemiological criteria. Test performance is unknown in asymptomatic patients. This test is for in vitro diagnostic use under the FDA EUA for laboratories certified under CLIA to perform high or moderate complexity testing. This test has not been FDA cleared or approved. A negative result does not rule out the presence of PCR inhibitors in the specimen or target RNA in concentration below the limit of detection for the assay. If only one viral target is positive but coinfection with multiple targets is suspected, the sample should be re-tested with another FDA cleared, approved, or authorized test, if coinfection would change clinical management. This test was validated by the [facility]. These laboratories are certified under the Clinical Laboratory Improvement Amendments of 1988 (CLIA-88) as qualified to perform high complexity laboratory testing.

## 2023-04-14 ENCOUNTER — MYC MEDICAL ADVICE (OUTPATIENT)
Dept: FAMILY MEDICINE | Facility: CLINIC | Age: 48
End: 2023-04-14
Payer: COMMERCIAL

## 2023-04-14 DIAGNOSIS — R05.1 ACUTE COUGH: Primary | ICD-10-CM

## 2023-04-15 RX ORDER — BUDESONIDE AND FORMOTEROL FUMARATE DIHYDRATE 80; 4.5 UG/1; UG/1
2 AEROSOL RESPIRATORY (INHALATION) 2 TIMES DAILY
Qty: 6.9 G | Refills: 0 | Status: SHIPPED | OUTPATIENT
Start: 2023-04-15 | End: 2023-05-10

## 2023-04-15 RX ORDER — PREDNISONE 20 MG/1
40 TABLET ORAL DAILY
Qty: 10 TABLET | Refills: 0 | Status: SHIPPED | OUTPATIENT
Start: 2023-04-15 | End: 2023-05-10

## 2023-04-30 ENCOUNTER — HEALTH MAINTENANCE LETTER (OUTPATIENT)
Age: 48
End: 2023-04-30

## 2023-05-10 ENCOUNTER — OFFICE VISIT (OUTPATIENT)
Dept: FAMILY MEDICINE | Facility: CLINIC | Age: 48
End: 2023-05-10
Payer: COMMERCIAL

## 2023-05-10 VITALS
SYSTOLIC BLOOD PRESSURE: 123 MMHG | BODY MASS INDEX: 42.43 KG/M2 | DIASTOLIC BLOOD PRESSURE: 73 MMHG | HEIGHT: 66 IN | OXYGEN SATURATION: 97 % | HEART RATE: 58 BPM | WEIGHT: 264 LBS

## 2023-05-10 DIAGNOSIS — E11.9 TYPE 2 DIABETES MELLITUS WITHOUT COMPLICATION, WITHOUT LONG-TERM CURRENT USE OF INSULIN (H): Primary | ICD-10-CM

## 2023-05-10 DIAGNOSIS — E66.01 MORBID OBESITY (H): ICD-10-CM

## 2023-05-10 LAB — HBA1C MFR BLD: 6.2 % (ref 0–5.6)

## 2023-05-10 PROCEDURE — 80061 LIPID PANEL: CPT | Performed by: FAMILY MEDICINE

## 2023-05-10 PROCEDURE — 86780 TREPONEMA PALLIDUM: CPT | Performed by: FAMILY MEDICINE

## 2023-05-10 PROCEDURE — 87591 N.GONORRHOEAE DNA AMP PROB: CPT | Performed by: FAMILY MEDICINE

## 2023-05-10 PROCEDURE — 87389 HIV-1 AG W/HIV-1&-2 AB AG IA: CPT | Performed by: FAMILY MEDICINE

## 2023-05-10 PROCEDURE — 90746 HEPB VACCINE 3 DOSE ADULT IM: CPT | Performed by: FAMILY MEDICINE

## 2023-05-10 PROCEDURE — 36415 COLL VENOUS BLD VENIPUNCTURE: CPT | Performed by: FAMILY MEDICINE

## 2023-05-10 PROCEDURE — 90471 IMMUNIZATION ADMIN: CPT | Performed by: FAMILY MEDICINE

## 2023-05-10 PROCEDURE — 83036 HEMOGLOBIN GLYCOSYLATED A1C: CPT | Performed by: FAMILY MEDICINE

## 2023-05-10 PROCEDURE — 99213 OFFICE O/P EST LOW 20 MIN: CPT | Mod: 25 | Performed by: FAMILY MEDICINE

## 2023-05-10 PROCEDURE — 87491 CHLMYD TRACH DNA AMP PROBE: CPT | Performed by: FAMILY MEDICINE

## 2023-05-10 RX ORDER — DULOXETIN HYDROCHLORIDE 30 MG/1
CAPSULE, DELAYED RELEASE ORAL
COMMUNITY
Start: 2023-05-01 | End: 2023-07-05

## 2023-05-10 RX ORDER — TIRZEPATIDE 2.5 MG/.5ML
2.5 INJECTION, SOLUTION SUBCUTANEOUS
Qty: 2 ML | Refills: 0 | Status: SHIPPED | OUTPATIENT
Start: 2023-05-10 | End: 2023-06-09

## 2023-05-10 NOTE — PROGRESS NOTES
"  Assessment & Plan   Problem List Items Addressed This Visit        Digestive    Morbid obesity (H)    Relevant Medications    tirzepatide (MOUNJARO) 2.5 MG/0.5ML pen       Endocrine    Type 2 diabetes mellitus without complication, without long-term current use of insulin (H) - Primary     Unfortunately, the patient's insurance does not cover GLP-1 agonists except under rare circumstances.  We discussed that Mounjaro can sometimes be obtained with a coupon and the patient was interested in getting a prescription for that.  I recommended that if she is going to start that medication, not to start metformin as well as she would not need both of these medications.  We had a good conversation today regarding the importance of addressing the binge eating with a structured program and a phone number was provided for the Isa program as she has not been contacted through my referral yet.  I congratulated the patient on what sounds like some great progress as it relates to improvement in overall quality of food, structure as well as mental health and exercise.  Follow-up in 4 to 6 weeks.         Relevant Medications    tirzepatide (MOUNJARO) 2.5 MG/0.5ML pen      6}     BMI:   Estimated body mass index is 42.61 kg/m  as calculated from the following:    Height as of this encounter: 1.676 m (5' 6\").    Weight as of this encounter: 119.7 kg (264 lb).       CIELO LOPEZ MD  [facility] is a 47 year old presents today for weight loss follow-up visit.  The patient has been monitoring her weight at home and is down about 11 pounds from her highest weight of 275.  Since our visit in March, she has made improvements in her exercise routine, overall quality of food and overall feels that her mental health has been better as well.  She was never contacted as it relates to the referral for support with eating disorder.  Unfortunately, the GLP-1 agonist Ozempic was not covered by " "insurance and reviewing the denial, does not look like they cover any GLP-1 for type 2 diabetes without first a trial of both metformin and insulin.  The patient reports that she did do some logging of food on my fitness pal.      Weight Loss        4/10/2023     9:59 AM   Additional Questions   Roomed by arnulfo   Accompanied by self     History of Present Illness       Reason for visit:  Follow up for weight mgmt    She eats 2-3 servings of fruits and vegetables daily.She consumes 0 sweetened beverage(s) daily.She exercises with enough effort to increase her heart rate 30 to 60 minutes per day.  She exercises with enough effort to increase her heart rate 4 days per week.   She is taking medications regularly.           Objective    /73 (BP Location: Left arm, Patient Position: Left side, Cuff Size: Adult Large)   Pulse 58   Ht 1.676 m (5' 6\")   Wt 119.7 kg (264 lb)   LMP 03/28/2023   SpO2 97%   BMI 42.61 kg/m    Body mass index is 42.61 kg/m .  Physical Exam   GENERAL: healthy, alert and no distress            "

## 2023-05-10 NOTE — ASSESSMENT & PLAN NOTE
Unfortunately, the patient's insurance does not cover GLP-1 agonists except under rare circumstances.  We discussed that Mounjaro can sometimes be obtained with a coupon and the patient was interested in getting a prescription for that.  I recommended that if she is going to start that medication, not to start metformin as well as she would not need both of these medications.  We had a good conversation today regarding the importance of addressing the binge eating with a structured program and a phone number was provided for the Isa program as she has not been contacted through my referral yet.  I congratulated the patient on what sounds like some great progress as it relates to improvement in overall quality of food, structure as well as mental health and exercise.  Follow-up in 4 to 6 weeks.

## 2023-05-11 ENCOUNTER — TELEPHONE (OUTPATIENT)
Dept: FAMILY MEDICINE | Facility: CLINIC | Age: 48
End: 2023-05-11

## 2023-05-11 LAB
C TRACH DNA SPEC QL PROBE+SIG AMP: NEGATIVE
CHOLEST SERPL-MCNC: 165 MG/DL
HDLC SERPL-MCNC: 57 MG/DL
HIV 1+2 AB+HIV1 P24 AG SERPL QL IA: NONREACTIVE
LDLC SERPL CALC-MCNC: 84 MG/DL
N GONORRHOEA DNA SPEC QL NAA+PROBE: NEGATIVE
NONHDLC SERPL-MCNC: 108 MG/DL
T PALLIDUM AB SER QL: NONREACTIVE
TRIGL SERPL-MCNC: 119 MG/DL

## 2023-05-11 NOTE — TELEPHONE ENCOUNTER
Prior Authorization Request  Who s requesting:  Pharmacy  Pharmacy Name and Location: [place] [address]  Medication Name: tirzepatide (MOUNJARO) 2.5 MG/0.5ML pen  Insurance Plan: [employer]  Insurance Member ID Number: [id number]  CoverMyMeds Key: [id number]  Informed patient that prior authorizations can take up to 10 business days for response:   NA  Okay to leave a detailed message: No     Route to pool:  [facility]

## 2023-05-12 ENCOUNTER — TELEPHONE (OUTPATIENT)
Dept: SLEEP MEDICINE | Facility: CLINIC | Age: 48
End: 2023-05-12
Payer: COMMERCIAL

## 2023-05-17 NOTE — TELEPHONE ENCOUNTER
Central Prior Authorization Team   Phone: [phone]    PA Initiation    Medication: Mounjaro 2.5MG/0.5ML pen-injectors  Insurance Company: [employer] - Phone [phone] Fax [phone]  Pharmacy Filling the Rx: [id number] [place], [place] - [address] AT [facility] [address]  Filling Pharmacy Phone: [phone]  Filling Pharmacy Fax:    Start Date: 5/17/2023

## 2023-05-18 NOTE — TELEPHONE ENCOUNTER
PRIOR AUTHORIZATION DENIED    Medication: Mounjaro 2.5MG/0.5ML pen-injectors    Denial Date: 5/18/2023    Denial Rational: Medication is a plan exclusion         Appeal Information:  Drug exclusions can not be appealed.  This medication will not be covered by the prescription plan for any reason. The drug is not on formulary and there are no loopholes to gaining approval.

## 2023-06-02 ENCOUNTER — TELEPHONE (OUTPATIENT)
Dept: FAMILY MEDICINE | Facility: CLINIC | Age: 48
End: 2023-06-02
Payer: COMMERCIAL

## 2023-06-02 NOTE — TELEPHONE ENCOUNTER
Confirmed with patient via telephone that she has already tried mounjaro savings card after receiving denial. Patient reports this as true. Awaiting fax from insurance company.

## 2023-06-02 NOTE — TELEPHONE ENCOUNTER
Pt called:  Stated that she spoke with her insurance and insurance will be faxing over a different form, a Coverage Request Form, that needs to be filled out and returned

## 2023-06-02 NOTE — TELEPHONE ENCOUNTER
"Forms received from [employer] is duplicate PA request. PA was already denied with rationale-   \"Medication is a plan exclusion. Drug exclusions can not be appealed.  This medication will not be covered by the prescription plan for any reason. The drug is not on formulary and there are no loopholes to gaining approval.\"    Please advise on alt plan     "

## 2023-06-02 NOTE — TELEPHONE ENCOUNTER
General Call      Reason for Call:  Weight Management meds    What are your questions or concerns:  Patient's Mounjaro PA was denied due to plan exclusion, patient states that she will speak to her insurance to find out if if anything else might be covered. But she would like to know if  has any alternative therapy plans for her to try and maybe look into.    Please advise.    Date of last appointment with provider: 05/10/23    Could we send this information to you in [facility] or would you prefer to receive a phone call?:   Patient would prefer a phone call     Okay to leave a detailed message?: Yes at Cell number on file:    Telephone Information:   Mobile [phone]

## 2023-06-02 NOTE — TELEPHONE ENCOUNTER
[employer] calling to see if we received the forms. Told them we have not, relayed good callback and fax number for the clinic. They will fax over the forms again if a few minutes.

## 2023-06-04 ENCOUNTER — OFFICE VISIT (OUTPATIENT)
Dept: URGENT CARE | Facility: URGENT CARE | Age: 48
End: 2023-06-04
Payer: COMMERCIAL

## 2023-06-04 ENCOUNTER — ANCILLARY PROCEDURE (OUTPATIENT)
Dept: GENERAL RADIOLOGY | Facility: CLINIC | Age: 48
End: 2023-06-04
Attending: PHYSICIAN ASSISTANT
Payer: COMMERCIAL

## 2023-06-04 VITALS
WEIGHT: 265 LBS | HEART RATE: 69 BPM | SYSTOLIC BLOOD PRESSURE: 120 MMHG | DIASTOLIC BLOOD PRESSURE: 83 MMHG | TEMPERATURE: 98.2 F | RESPIRATION RATE: 18 BRPM | BODY MASS INDEX: 42.77 KG/M2 | OXYGEN SATURATION: 99 %

## 2023-06-04 DIAGNOSIS — R07.0 THROAT PAIN: ICD-10-CM

## 2023-06-04 DIAGNOSIS — J34.3 NASAL TURBINATE HYPERTROPHY: ICD-10-CM

## 2023-06-04 DIAGNOSIS — R06.00 DYSPNEA, UNSPECIFIED TYPE: ICD-10-CM

## 2023-06-04 DIAGNOSIS — R05.8 PRODUCTIVE COUGH: Primary | ICD-10-CM

## 2023-06-04 DIAGNOSIS — R09.82 POST-NASAL DRIP: ICD-10-CM

## 2023-06-04 LAB
DEPRECATED S PYO AG THROAT QL EIA: NEGATIVE
GROUP A STREP BY PCR: NOT DETECTED

## 2023-06-04 PROCEDURE — 87651 STREP A DNA AMP PROBE: CPT | Performed by: PHYSICIAN ASSISTANT

## 2023-06-04 PROCEDURE — 99214 OFFICE O/P EST MOD 30 MIN: CPT | Performed by: PHYSICIAN ASSISTANT

## 2023-06-04 PROCEDURE — 71046 X-RAY EXAM CHEST 2 VIEWS: CPT | Mod: TC | Performed by: RADIOLOGY

## 2023-06-04 RX ORDER — CETIRIZINE HYDROCHLORIDE 10 MG/1
10 TABLET ORAL EVERY EVENING
Qty: 30 TABLET | Refills: 0 | Status: SHIPPED | OUTPATIENT
Start: 2023-06-04 | End: 2023-11-20

## 2023-06-04 RX ORDER — PREDNISONE 20 MG/1
20 TABLET ORAL DAILY
Qty: 5 TABLET | Refills: 0 | Status: SHIPPED | OUTPATIENT
Start: 2023-06-04 | End: 2023-06-09

## 2023-06-04 RX ORDER — MOMETASONE FUROATE MONOHYDRATE 50 UG/1
2 SPRAY, METERED NASAL DAILY
Qty: 17 G | Refills: 0 | Status: SHIPPED | OUTPATIENT
Start: 2023-06-04 | End: 2023-11-20

## 2023-06-04 NOTE — PROGRESS NOTES
Patient presents with:  Cough: Coughing out purulent mucus, SOB and sore throat since yesterday     (R05.8) Productive cough  (primary encounter diagnosis)  Comment:   Plan: XR Chest 2 Views            (R07.0) Throat pain  Comment:   Plan: Streptococcus A Rapid Screen w/Reflex to PCR -         Clinic Collect, Group A Streptococcus PCR         Throat Swab            (R09.82) Post-nasal drip  Comment: likely secondary to allergies  Plan: cetirizine (ZYRTEC) 10 MG tablet        Take nightly for minimum of one month.     (J34.3) Nasal turbinate hypertrophy  Comment: likely secondary to underlying allergies  Plan: mometasone (NASONEX) 50 MCG/ACT nasal spray,         predniSONE (DELTASONE) 20 MG tablet          2 sprays each nostril at bedtime for minimum of a month.  In future, if you stop and start this, the minimum time that you should use (nightly) it is 2 weeks.    (R06.00) Dyspnea, unspecified type  Comment: likely secondary to turbinate swelling impeding your ability to breathe through your nose.    Plan: predniSONE (DELTASONE) 20 MG tablet          Use saline nasal spray as needed     Follow up with PCP should symptoms persist or worsen.      33 minutes spent by me on the date of the encounter doing chart review, review of test results, interpretation of tests, patient visit and documentation       SUBJECTIVE:   Mary Wray is a 47 year old female who presents today with a purulent cough and shortness of breath onset yesterday.  Denies any palpitations, chest pressure or pain.      Throat burns.    No fever.      Past Medical History:   Diagnosis Date     Depressive disorder 2014    On and off issues currently.     Diabetes (H) 2022     Graves disease 1997     Hypovitaminosis D      Mild depression     2014, no meds, resolved with move     Miscarriage 2017     Obesity      Postablative hypothyroidism 1997    CCRM     Prediabetes 2016     Premenstrual dysphoria          Current Outpatient Medications    Medication Sig Dispense Refill     Multiple Vitamins-Iron (DAILY-YUNG/IRON/BETA-CAROTENE) TABS TAKE 1 TABLET BY MOUTH DAILY. (Patient not taking: Reported on 10/19/2020) 30 tablet 7     Social History     Tobacco Use     Smoking status: Never Smoker     Smokeless tobacco: Never Used   Substance Use Topics     Alcohol use: Not on file     Family History   Problem Relation Age of Onset     Diabetes Mother      Diabetes Father          ROS:    10 point ROS of systems including Constitutional, Eyes, Respiratory, Cardiovascular, Gastroenterology, Genitourinary, Integumentary, Muscularskeletal, Psychiatric ,neurological were all negative except for pertinent positives noted in my HPI       OBJECTIVE:  /83 (BP Location: Left arm, Patient Position: Sitting, Cuff Size: Adult Large)   Pulse 69   Temp 98.2  F (36.8  C) (Oral)   Resp 18   Wt 120.2 kg (265 lb)   SpO2 99%   BMI 42.77 kg/m    Physical Exam:  GENERAL APPEARANCE: healthy, alert and no distress  EYES: EOMI,  PERRL, conjunctiva clear  HENT: ear canals and TM's normal.  Nose and mouth without ulcers, erythema or lesions  HENT: nasal turbinates boggy with bluish hue nearly occluding the nares on right and rhinorrhea clear  NECK: supple, nontender, no lymphadenopathy  RESP: lungs clear to auscultation - no rales, rhonchi or wheezes  CV: regular rates and rhythm, normal S1 S2, no murmur noted  ABDOMEN:  soft, nontender, no HSM or masses and bowel sounds normal  NEURO: Normal strength and tone, sensory exam grossly normal,  normal speech and mentation  SKIN: no suspicious lesions or rashes    X-Ray was done, my findings are: no infiltrates, masses or pneumothorax

## 2023-06-04 NOTE — PATIENT INSTRUCTIONS
(R05.8) Productive cough  (primary encounter diagnosis)  Comment:   Plan: XR Chest 2 Views            (R07.0) Throat pain  Comment:   Plan: Streptococcus A Rapid Screen w/Reflex to PCR -         Clinic Collect, Group A Streptococcus PCR         Throat Swab            (R09.82) Post-nasal drip  Comment: likely secondary to allergies  Plan: cetirizine (ZYRTEC) 10 MG tablet        Take nightly for minimum of one month.     (J34.3) Nasal turbinate hypertrophy  Comment: likely secondary to underlying allergies  Plan: mometasone (NASONEX) 50 MCG/ACT nasal spray,         predniSONE (DELTASONE) 20 MG tablet          2 sprays each nostril at bedtime for minimum of a month.  In future, if you stop and start this, the minimum time that you should use (nightly) it is 2 weeks.    (R06.00) Dyspnea, unspecified type  Comment: likely secondary to turbinate swelling impeding your ability to breathe through your nose.    Plan: predniSONE (DELTASONE) 20 MG tablet          Use saline nasal spray as needed

## 2023-06-06 ENCOUNTER — MYC MEDICAL ADVICE (OUTPATIENT)
Dept: FAMILY MEDICINE | Facility: CLINIC | Age: 48
End: 2023-06-06
Payer: COMMERCIAL

## 2023-06-06 DIAGNOSIS — F50.819 BINGE EATING DISORDER: Primary | ICD-10-CM

## 2023-06-09 DIAGNOSIS — E11.9 TYPE 2 DIABETES MELLITUS WITHOUT COMPLICATION, WITHOUT LONG-TERM CURRENT USE OF INSULIN (H): Primary | ICD-10-CM

## 2023-06-09 NOTE — TELEPHONE ENCOUNTER
"  Assessment & Plan     Infection due to 2019 novel coronavirus  -Tested Positive for Covid 2/21 and had negative result and complete symptom resolution by 2/25  -Stable today with no acute concern for illness progression to pneumonia, no CXR warranted at this time with PE and presenting symptoms unremarkable  -Watchful waiting and symptom management at home with rest, isolation, and adequate hydration                 BMI:   Estimated body mass index is 30.86 kg/m  as calculated from the following:    Height as of 2/2/23: 1.715 m (5' 7.5\").    Weight as of this encounter: 90.7 kg (200 lb).       See Patient Instructions  Patient Instructions   Continue with isolation as long as symptom are present  Symptom management with adequate hydration, may take ibuprofen/tylenol for fever and discomfort  Follow-up with emergency care if chest pain or SOB occur  Follow-up with primary care if symptoms persist or do not improve to revisit potential need for Chest Xray      Return in about 1 week (around 3/6/2023) for worsening or continued symptoms.   Follow-Up with Primary Care Provider for Routine Physical and BP management    Ginger Garrido RN, BSN  DNP-FNP Student, [facility]    Amelia Estrada APRN CNP  [facility]    Jovan Cannon is a 41 year old, presenting for the following health issues:  RECHECK (Recently had covid, would like a chest x-ray. When he had covid December in 2021 it led to double pnemonia. )      Healthy Habits:     Getting at least 3 servings of Calcium per day:  Yes    Bi-annual eye exam:  Yes    Dental care twice a year:  Yes    Sleep apnea or symptoms of sleep apnea:  None    Diet:  Regular (no restrictions)    Frequency of exercise:  1 day/week    Duration of exercise:  15-30 minutes    Taking medications regularly:  Yes    Medication side effects:  Not applicable    PHQ-2 Total Score: 0    Additional concerns today:  No     Martell is a 41 year old male with PMH " The form the patient is referring to was received and has been placed in Dr. Morris mailbox.   of HTN who presents today with concern for escalating Covid symptoms. Patient had Covid infection in 2021, which progressed to bilateral pneumonia and critical illness - he was not vaccinated at this time, and patient has concern that his current illness could progress to this point. Martell noted a mild sore throat on 2/20, which progressed to fever, chills, HA, sore throat, and cough on 2/21. Patient tested Covid positive at home on 2/21. He was prescribed Paxlovid and began dosing on 2/21, but stopped medication course early on 2/24 due to SE of hoarse voice. Patient reports that symptoms were entirely resolved by the time he stopped Paxlovid on 2/24. Patient has taken home Covid test x3 and tested negative on 2/24 and 2/26. Patient presents today with no reported symptoms. He is fully immunized against Covid, and received the Bivalent Booster on 9/16/2022. Patient attests to complete resolution of Covid symptoms, but expresses anxiety related to past experience with symptom progression to bilateral pneumonia.     Hypertension  Patient has a PMH of hypertension, which is currently treated with losartyan 50mg Qday. BP reading upon rooming was 128/94. Patient denies symptoms of chest pain, SOB, or peripheral edema. BP was rechecked by Student NP at end of visit for a reading of 125/87. Patient attests to taking medication as prescribed without missed doses, and monitors his blood pressure at home. Advised to remain on current medication dosing and follow-up with PCP at routine physical for further management.      Review of Systems   Constitutional: Negative for chills and fever.   HENT: Negative for congestion, ear pain, hearing loss and sore throat.    Eyes: Negative for pain and visual disturbance.   Respiratory: Negative for cough and shortness of breath.    Cardiovascular: Negative for chest pain, palpitations and peripheral edema.   Gastrointestinal: Negative for abdominal pain, constipation, diarrhea,  heartburn, hematochezia and nausea.   Genitourinary: Negative for dysuria, frequency, genital sores, hematuria, impotence, penile discharge and urgency.   Musculoskeletal: Negative for arthralgias, joint swelling and myalgias.   Skin: Negative for rash.   Neurological: Negative for dizziness, weakness, headaches and paresthesias.   Psychiatric/Behavioral: Negative for mood changes. The patient is not nervous/anxious.       CONSTITUTIONAL: NEGATIVE for fever, chills, change in weight  ENT/MOUTH: NEGATIVE for ear, mouth and throat problems  RESP: NEGATIVE for significant cough or SOB  CV: NEGATIVE for chest pain, palpitations or peripheral edema      Objective    BP (!) 128/94   Pulse 80   Temp 97.5  F (36.4  C) (Tympanic)   Resp 16   Wt 90.7 kg (200 lb)   SpO2 98%   BMI 30.86 kg/m    Body mass index is 30.86 kg/m .  Physical Exam   GENERAL: healthy, alert and no distress  EYES: Eyes grossly normal to inspection, PERRL and conjunctivae and sclerae normal  HENT: ear canals and TM's normal, nose and mouth without ulcers or lesions  RESP: lungs clear to auscultation - no rales, rhonchi or wheezes  CV: regular rate and rhythm, normal S1 S2, no S3 or S4, no murmur, click or rub, no peripheral edema and peripheral pulses strong  PSYCH: mentation appears normal, affect normal/bright

## 2023-06-20 RX ORDER — LISDEXAMFETAMINE DIMESYLATE 30 MG/1
30 CAPSULE ORAL EVERY MORNING
Qty: 30 CAPSULE | Refills: 0 | Status: SHIPPED | OUTPATIENT
Start: 2023-06-20 | End: 2023-07-26

## 2023-06-20 NOTE — TELEPHONE ENCOUNTER
Spoke with patient and will give a trial to Vyvanse due to the binge eating disorder to try to assist with weight loss; reiterated importance of seeking counseling as well for this.

## 2023-06-23 ENCOUNTER — VIRTUAL VISIT (OUTPATIENT)
Dept: SLEEP MEDICINE | Facility: CLINIC | Age: 48
End: 2023-06-23
Payer: COMMERCIAL

## 2023-06-23 DIAGNOSIS — G47.9 SLEEP DISTURBANCE: ICD-10-CM

## 2023-06-23 PROCEDURE — 95800 SLP STDY UNATTENDED: CPT | Performed by: INTERNAL MEDICINE

## 2023-06-23 NOTE — PROGRESS NOTES
Device has been registered and shipped via [employer] on 06/23/23. Patient was notified that package was mailed out.    Jazlyn Houston CMA, HST Specialist  [place] [facility]

## 2023-07-05 ENCOUNTER — OFFICE VISIT (OUTPATIENT)
Dept: OBGYN | Facility: CLINIC | Age: 48
End: 2023-07-05
Attending: OBSTETRICS & GYNECOLOGY
Payer: COMMERCIAL

## 2023-07-05 ENCOUNTER — MYC MEDICAL ADVICE (OUTPATIENT)
Dept: FAMILY MEDICINE | Facility: CLINIC | Age: 48
End: 2023-07-05

## 2023-07-05 VITALS
SYSTOLIC BLOOD PRESSURE: 115 MMHG | DIASTOLIC BLOOD PRESSURE: 77 MMHG | HEIGHT: 66 IN | BODY MASS INDEX: 42.77 KG/M2 | HEART RATE: 60 BPM

## 2023-07-05 DIAGNOSIS — N92.6 IRREGULAR MENSTRUAL CYCLE: Primary | ICD-10-CM

## 2023-07-05 DIAGNOSIS — E66.01 MORBID OBESITY (H): ICD-10-CM

## 2023-07-05 DIAGNOSIS — E11.9 TYPE 2 DIABETES MELLITUS WITHOUT COMPLICATION, WITHOUT LONG-TERM CURRENT USE OF INSULIN (H): Primary | ICD-10-CM

## 2023-07-05 PROCEDURE — G0463 HOSPITAL OUTPT CLINIC VISIT: HCPCS | Performed by: OBSTETRICS & GYNECOLOGY

## 2023-07-05 PROCEDURE — 99203 OFFICE O/P NEW LOW 30 MIN: CPT | Performed by: OBSTETRICS & GYNECOLOGY

## 2023-07-05 NOTE — LETTER
7/5/2023       RE: Mary Wray  [address]  [facility] [address]     Dear Colleague,    Thank you for referring your patient, Mary Wray, to the [facility] [place] [facility]. Please see a copy of my visit note below.    48 yo  LMP 6/2023 here for concern for hormonal imbalances in the setting of COVID related situational depression and weight gain.     She is working with Dr. Jazlyn Huitron and a weight loss clinic in [place].   She is picking up rx for Vyvanse today and working on semaglutide approval  She has been Rx'd selective serotonin reuptake inhibitor but hasn't started this yet.     She does feel better during her follicular phase but menses are q28-45 days with 3-8 days of flow so this is getting more difficult to track.     Depression and anxiety are a notable problem (no SI) but she feels the weight gain/inability to lose is a more impactful issue currently.     Patient Active Problem List   Diagnosis    Postablative hypothyroidism    Type 2 diabetes mellitus without complication, without long-term current use of insulin (H)    Papanicolaou smear of cervix with low grade squamous intraepithelial lesion (LGSIL)    H/O mammogram    Metatarsalgia of both feet    History of Graves' disease    Current moderate episode of major depressive disorder without prior episode (H)    Intraductal papilloma of breast, left    Morbid obesity (H)    KHURRAM (obstructive sleep apnea)     Past Medical History:   Diagnosis Date    Depressive disorder 2014    On and off issues currently.    Diabetes (H) 2022    Graves disease 1997    Hypovitaminosis D     Mild depression     2014, no meds, resolved with move    Miscarriage 2017    Obesity     Postablative hypothyroidism 1997    CCRM    Prediabetes 2016    Premenstrual dysphoria      Past Surgical History:   Procedure Laterality Date    CATARACT EXTRACTION, BILATERAL      November  "and 2021     SECTION N/A 2018    Procedure:  SECTION;  Surgeon: Lola Otto MD;  Location: [facility] L+D    COLONOSCOPY N/A 2022    Procedure: COLONOSCOPY;  Surgeon: Sharee Ramachandran MD;  Location: [facility] OR    EYE SURGERY  DEC 2021    cataract    LEEP TX, CERVICAL      \"for HPV\"    LUMPECTOMY BREAST Left 08/10/2021    Procedure: LEFT Wire-Localized Lumpectomy;  Surgeon: Marta Frost MD;  Location: [facility] OR     OB History    Para Term  AB Living   2 1 0 1 1 1   SAB IAB Ectopic Multiple Live Births   1 0 0 0 1      # Outcome Date GA Lbr Arnold/2nd Weight Sex Delivery Anes PTL Lv   2  18 36w6d  2.2 kg (4 lb 13.6 oz) F CS-LTranv  Y RAJANI      Complications: Dysfunctional Labor, GBS      Name: PORFIRIO PRATHER DIMPLE      Apgar1: 8  Apgar5: 9   1 SAB 17             /77   Pulse 60   Ht 1.676 m (5' 6\")   LMP 2023   BMI 42.77 kg/m    Appears well  HCM up to date    A/P:  Multiple issues ongoing   Will check hormonal assays and pelvic ultrasound -- discussed timing of each.   Phone call with results  Consider selective serotonin reuptake inhibitor and consider Wegovy if ins will cover    Time spent in chart review and discussion today is 37 mins.   Luly Bai MD        "

## 2023-07-05 NOTE — PROGRESS NOTES
48 yo  LMP 2023 here for concern for hormonal imbalances in the setting of COVID related situational depression and weight gain.     She is working with Dr. Jazlyn Huitron and a weight loss clinic in [place].   She is picking up rx for Vyvanse today and working on semaglutide approval  She has been Rx'd selective serotonin reuptake inhibitor but hasn't started this yet.     She does feel better during her follicular phase but menses are q28-45 days with 3-8 days of flow so this is getting more difficult to track.     Depression and anxiety are a notable problem (no SI) but she feels the weight gain/inability to lose is a more impactful issue currently.     Patient Active Problem List   Diagnosis     Postablative hypothyroidism     Type 2 diabetes mellitus without complication, without long-term current use of insulin (H)     Papanicolaou smear of cervix with low grade squamous intraepithelial lesion (LGSIL)     H/O mammogram     Metatarsalgia of both feet     History of Graves' disease     Current moderate episode of major depressive disorder without prior episode (H)     Intraductal papilloma of breast, left     Morbid obesity (H)     KHURRAM (obstructive sleep apnea)     Past Medical History:   Diagnosis Date     Depressive disorder     On and off issues currently.     Diabetes (H)      Graves disease      Hypovitaminosis D      Mild depression     , no meds, resolved with move     Miscarriage      Obesity      Postablative hypothyroidism 1997    CCR     Prediabetes 2016     Premenstrual dysphoria      Past Surgical History:   Procedure Laterality Date     CATARACT EXTRACTION, BILATERAL      November and 2021      SECTION N/A 2018    Procedure:  SECTION;  Surgeon: Lola Otto MD;  Location:  L[facility]D     COLONOSCOPY N/A 2022    Procedure: COLONOSCOPY;  Surgeon: Sharee Ramachandran MD;  Location: [facility] OR     EYE SURGERY  DEC 2021     "cataract     LEEP TX, CERVICAL      \"for HPV\"     LUMPECTOMY BREAST Left 08/10/2021    Procedure: LEFT Wire-Localized Lumpectomy;  Surgeon: Marta Frost MD;  Location: [facility] OR     OB History    Para Term  AB Living   2 1 0 1 1 1   SAB IAB Ectopic Multiple Live Births   1 0 0 0 1      # Outcome Date GA Lbr Arnold/2nd Weight Sex Delivery Anes PTL Lv   2  18 36w6d  2.2 kg (4 lb 13.6 oz) F CS-LTranv  Y RAJANI      Complications: Dysfunctional Labor, GBS      Name: PORFIRIO PRATHER DIMPLE      Apgar1: 8  Apgar5: 9   1 SAB 17             /77   Pulse 60   Ht 1.676 m (5' 6\")   LMP 2023   BMI 42.77 kg/m    Appears well  HCM up to date    A/P:  Multiple issues ongoing   Will check hormonal assays and pelvic ultrasound -- discussed timing of each.   Phone call with results  Consider selective serotonin reuptake inhibitor and consider Wegovy if ins will cover    Time spent in chart review and discussion today is 37 mins.   Luly Bai MD        "

## 2023-07-12 NOTE — TELEPHONE ENCOUNTER
Forms received on 7/12/23.      Routing to Dr Morris to advise on medication.    Hemoglobin A1C 5/10/23

## 2023-07-13 ENCOUNTER — TELEPHONE (OUTPATIENT)
Dept: FAMILY MEDICINE | Facility: CLINIC | Age: 48
End: 2023-07-13
Payer: COMMERCIAL

## 2023-07-13 NOTE — TELEPHONE ENCOUNTER
Prior Authorization Request  Who s requesting:  Pharmacy and Patient  Pharmacy Name and Location: [id number] [place], [place]  Medication Name: liraglutide - Weight Management (SAXENDA) 18 MG/3ML pen  Insurance Plan: MEDICA CHOICE  Insurance Member ID Number:  [id number]  CoverMyMeds Key: [id number]  Informed patient that prior authorizations can take up to 10 business days for response:   Yes  Okay to leave a detailed message: Yes     Route to pool:  MINDY COLON MED

## 2023-07-17 ENCOUNTER — TELEPHONE (OUTPATIENT)
Dept: FAMILY MEDICINE | Facility: CLINIC | Age: 48
End: 2023-07-17

## 2023-07-17 RX ORDER — TIRZEPATIDE 2.5 MG/.5ML
2.5 INJECTION, SOLUTION SUBCUTANEOUS
Qty: 2 ML | Refills: 0 | Status: SHIPPED | OUTPATIENT
Start: 2023-07-17 | End: 2023-11-20

## 2023-07-17 NOTE — TELEPHONE ENCOUNTER
Prior Authorization Request  Who s requesting:  Patient  Pharmacy Name and Location: [place] [address]  Medication Name: tirzepatide (MOUNJARO) 2.5 MG/0.5ML pen  Insurance Plan: [employer]  Insurance Member ID Number:  [id number]  CoverMyMeds Key:   Informed patient that prior authorizations can take up to 10 business days for response:   Yes  Okay to leave a detailed message: Yes     Route to pool:  MINDY COLON [facility]

## 2023-07-18 ENCOUNTER — DOCUMENTATION ONLY (OUTPATIENT)
Dept: SLEEP MEDICINE | Facility: CLINIC | Age: 48
End: 2023-07-18
Payer: COMMERCIAL

## 2023-07-19 NOTE — PROGRESS NOTES
Watch Pat has been scored using rule 1B, 4%.  Patient to follow up with provider to determine appropriate therapy.    PAT AHI: 18.9    Ordering Provider: Dr. Nazario

## 2023-07-20 NOTE — TELEPHONE ENCOUNTER
Central Prior Authorization Team   Phone: [phone]    PA Initiation    Medication: tirzepatide (MOUNJARO) 2.5 MG/0.5ML pen  Insurance Company: [employer] - Phone [phone] Fax [phone]  Pharmacy Filling the Rx: [id number] [place] [address] AT [facility] [address]  Filling Pharmacy Phone: [phone]  Filling Pharmacy Fax:    Start Date: 7/20/2023

## 2023-07-20 NOTE — TELEPHONE ENCOUNTER
Prior Authorization Approval    Authorization Effective Date: 7/20/2023  Authorization Expiration Date: 7/20/2024  Medication: tirzepatide (MOUNJARO) 2.5 MG/0.5ML pen-PA APPROVED   Approved Dose/Quantity: UP TO 4 PENS PER 28 DAYS   Reference #:     Insurance Company: [employer] - Phone [phone] Fax [phone]  Expected CoPay:       CoPay Card Available:      Foundation Assistance Needed:    Which Pharmacy is filling the prescription (Not needed for infusion/clinic administered): [facility] #[id number] - [place] [id number] AT [facility] [address]  Pharmacy Notified: Yes- **Instructed pharmacy to notify patient when script is ready to /ship.**-Pharmacy stated that they have a paid claim on medication through patients insurance and will place an order for medication. Pharmacy will notify the patient on medication.   Patient Notified: Yes

## 2023-07-24 ENCOUNTER — VIRTUAL VISIT (OUTPATIENT)
Dept: SLEEP MEDICINE | Facility: CLINIC | Age: 48
End: 2023-07-24
Payer: COMMERCIAL

## 2023-07-24 VITALS
HEIGHT: 66 IN | DIASTOLIC BLOOD PRESSURE: 77 MMHG | BODY MASS INDEX: 42.59 KG/M2 | SYSTOLIC BLOOD PRESSURE: 115 MMHG | WEIGHT: 265 LBS

## 2023-07-24 DIAGNOSIS — G47.33 OSA (OBSTRUCTIVE SLEEP APNEA): Primary | ICD-10-CM

## 2023-07-24 DIAGNOSIS — G47.30 OBESITY WITH SLEEP APNEA: ICD-10-CM

## 2023-07-24 DIAGNOSIS — E66.9 OBESITY WITH SLEEP APNEA: ICD-10-CM

## 2023-07-24 PROCEDURE — 99213 OFFICE O/P EST LOW 20 MIN: CPT | Mod: VID | Performed by: INTERNAL MEDICINE

## 2023-07-24 ASSESSMENT — SLEEP AND FATIGUE QUESTIONNAIRES
HOW LIKELY ARE YOU TO NOD OFF OR FALL ASLEEP WHILE SITTING QUIETLY AFTER LUNCH WITHOUT ALCOHOL: WOULD NEVER DOZE
HOW LIKELY ARE YOU TO NOD OFF OR FALL ASLEEP WHILE LYING DOWN TO REST IN THE AFTERNOON WHEN CIRCUMSTANCES PERMIT: SLIGHT CHANCE OF DOZING
HOW LIKELY ARE YOU TO NOD OFF OR FALL ASLEEP IN A CAR, WHILE STOPPED FOR A FEW MINUTES IN TRAFFIC: WOULD NEVER DOZE
HOW LIKELY ARE YOU TO NOD OFF OR FALL ASLEEP WHEN YOU ARE A PASSENGER IN A CAR FOR AN HOUR WITHOUT A BREAK: MODERATE CHANCE OF DOZING
HOW LIKELY ARE YOU TO NOD OFF OR FALL ASLEEP WHILE WATCHING TV: MODERATE CHANCE OF DOZING
HOW LIKELY ARE YOU TO NOD OFF OR FALL ASLEEP WHILE SITTING INACTIVE IN A PUBLIC PLACE: WOULD NEVER DOZE
HOW LIKELY ARE YOU TO NOD OFF OR FALL ASLEEP WHILE SITTING AND READING: WOULD NEVER DOZE
HOW LIKELY ARE YOU TO NOD OFF OR FALL ASLEEP WHILE SITTING AND TALKING TO SOMEONE: WOULD NEVER DOZE

## 2023-07-24 ASSESSMENT — PAIN SCALES - GENERAL: PAINLEVEL: NO PAIN (0)

## 2023-07-24 NOTE — PROGRESS NOTES
Virtual Visit Details    Type of service:  Video Visit     Originating Location (pt. Location): Home    Distant Location (provider location):  Off-site  Platform used for Video Visit: [facility]  Sleep clinic follow-up visit note        Date:  7/24/23     Chief complaint: Review results of the home sleep study     Mary Wray  is a 47 year old female with previously diagnosed mild KHURRAM, who underwent home sleep study on 7/18/23  to re-evaluate the overall severity of KHURRAM due to weight gain.  Patient presents to sleep clinic today to review the test results and to discuss plan of care.     Home sleep study report:  Study date: 7/18/23    Total Recording Time: 7 hrs, 0 min  Total Sleep Time: 6 hrs, 36 min  % of Sleep Time REM: 38.5%     Respiratory:  Snoring: Snoring was present.  Respiratory events: The PAT respiratory disturbance index [pRDI] was 21.3 events per hour.  The PAT apnea/hypopnea index [pAHI] was 18.9 events per hour.  YULY was 11.7 events per hour.  During REM sleep the pAHI was 43.3 events per hour.  Sleep Associated Hypoxemia: sustained hypoxemia was not present. Mean oxygen saturation was 94%.  Minimum was 87%.  Time with saturation less than 88% was 0 minutes.     Heart Rate: By pulse oximetry, the mean pulse rate was normal at 70 bpm.  The minimum pulse rate was 51 bpm and the maximum pulse rate was 104 bpm.     Position: Percent of time spent: supine -14.6%, prone - 0%, on right -45.7%, on left -39.6%.  pAHI was 19.8 per hour supine, n/a prone, 25.0 per hour on right side, and 11.5 per hour on left side.      Assessment:     Moderate obstructive sleep apnea was present without sleep associated hypoxemia.     Test results were discussed with the patient in detail.        Past medical/surgical history, family history, social history, medications and allergies were reviewed.               Physical Examination:   General: Pleasant. Cooperative. In no apparent  "distress.  Pulmonary: Able to speak in full sentences easily. No cough or wheeze.   Neurologic: Alert, oriented x3.  Psychiatric: Mood euthymic. Affect congruent with full range and intensity.     ASSESSMENT/PLAN:  Moderate obstructive sleep apnea, pronounced during REM sleep: We discussed the results of the home sleep study with the patient in detail.  We discussed the consequences of untreated sleep apnea.  We also discussed the treatment options for KHURRAM.  Patient was interested in CPAP treatment.  DME orders generated for auto CPAP 5 to 15 cm water.  After obtaining CPAP equipment, patient was recommended to use the device regularly during sleep and was instructed to get back to us if any concerns with the device use.  We discussed the mask exchange policy and the compliance goals.  Patient  will be followed through sleep therapy management program.  Plan is to follow-up via video visit in approximately 8 weeks after obtaining the CPAP equipment to review compliance measures.    Obesity: We discussed weight management with healthy diet, and exercise.     Patient was strongly advised to avoid driving, operating any heavy machinery or other hazardous situations while drowsy or sleepy.  Patient was counseled on the importance of driving while alert, to pull over if drowsy, or nap before getting into the vehicle if sleepy.         The above note was dictated using voice recognition software. Although reviewed after completion, some word and grammatical error may remain . Please contact the author for any clarifications.      \" Total time spent was 20 minutes  for this appointment on this date of service which include time spent before, during and after the visit for chart review, patient care, counseling and coordination of care. Including documentation\"       Dayanna Dillard MD  [facility]  [address] [place] [address]      "

## 2023-07-24 NOTE — NURSING NOTE
Is the patient currently in the state of [place]? YES    Visit mode:VIDEO    If the visit is dropped, the patient can be reconnected by: VIDEO VISIT: Send to e-mail at: [email or web address]    Will anyone else be joining the visit? NO      How would you like to obtain your AVS? MyChart    Are changes needed to the allergy or medication list? NO    Reason for visit: RECHECK      Has patient had flu shot for current/most recent flu season? If so, when? Yes: 9/2022    Sharonda Oliveros VF

## 2023-07-24 NOTE — PROCEDURES
"WatchPAT - HOME SLEEP STUDY INTERPRETATION    Patient: Mary Wray  MRN: [id number]  YOB: 1975  Study Date: 7/18/2023  Referring Provider: Jazlyn Huitron  Ordering Provider: Dayanna Dillard MD    Chain of custody patient verification was not enabled.  Chain of custody verification was not present throughout the entire study.     Indications for Home Study: Mary Wray is a 47 year old female with previously diagnosed mild obstructive sleep apnea(KHURRAM). Patient reports weight gain since the last sleep study. Home sleep study is obtained to evaluate the overall severity of the KHURRAM.    Estimated body mass index is 42.77 kg/m  as calculated from the following:    Height as of 7/5/23: 1.676 m (5' 6\").    Weight as of 6/4/23: 120.2 kg (265 lb).  Total score - [place]: 8 (1/23/2023 10:50 AM)      Data: A full night home sleep study was performed recording the standard physiologic parameters including peripheral arterial tonometry (PAT), sound/snoring, body position,  movement, sound, and oxygen saturation by pulse oximetry. Pulse rate was estimated by oximetry recording. Sleep staging (wake, REM, light, and deep sleep) was derived from PAT signal.  This study was considered adequate based on > 4 hours of quality oximetry and respiratory recording. As specified by the AASM Manual for the Scoring of Sleep and Associated events, version 2.3, Rule VIII.D 1B, 4% oxygen desaturation scoring for hypopneas is used as a standard of care on all home sleep apnea testing.    Total Recording Time: 7 hrs, 0 min  Total Sleep Time: 6 hrs, 36 min  % of Sleep Time REM: 38.5%    Respiratory:  Snoring: Snoring was present.  Respiratory events: The PAT respiratory disturbance index [pRDI] was 21.3 events per hour.  The PAT apnea/hypopnea index [pAHI] was 18.9 events per hour.  YULY was 11.7 events per hour.  During REM sleep the pAHI was 43.3 events per hour.  Sleep Associated Hypoxemia: sustained " hypoxemia was not present. Mean oxygen saturation was 94%.  Minimum was 87%.  Time with saturation less than 88% was 0 minutes.    Heart Rate: By pulse oximetry, the mean pulse rate was normal at 70 bpm.  The minimum pulse rate was 51 bpm and the maximum pulse rate was 104 bpm.    Position: Percent of time spent: supine -14.6%, prone - 0%, on right -45.7%, on left -39.6%.  pAHI was 19.8 per hour supine, n/a prone, 25.0 per hour on right side, and 11.5 per hour on left side.     Assessment:     Moderate obstructive sleep apnea was present without sleep associated hypoxemia.    Recommendations:    Consider the following treatment options for KHURRAM:  auto-CPAP at 5-15 cmH2O, oral appliance therapy or surgical options.    Suggest optimizing sleep hygiene and avoiding sleep deprivation.    Weight management.    Diagnosis Code(s): Obstructive Sleep Apnea G47.33, Snoring R06.83    Dayanna Dillard MD, July 24, 2023   Diplomate, American Board of Internal Medicine, Sleep Medicine

## 2023-07-27 ENCOUNTER — ANCILLARY PROCEDURE (OUTPATIENT)
Dept: ULTRASOUND IMAGING | Facility: CLINIC | Age: 48
End: 2023-07-27
Attending: OBSTETRICS & GYNECOLOGY
Payer: COMMERCIAL

## 2023-07-27 ENCOUNTER — LAB (OUTPATIENT)
Dept: LAB | Facility: CLINIC | Age: 48
End: 2023-07-27
Attending: OBSTETRICS & GYNECOLOGY
Payer: COMMERCIAL

## 2023-07-27 DIAGNOSIS — N92.6 IRREGULAR MENSTRUAL CYCLE: ICD-10-CM

## 2023-07-27 DIAGNOSIS — E11.9 TYPE 2 DIABETES MELLITUS WITHOUT COMPLICATION, WITHOUT LONG-TERM CURRENT USE OF INSULIN (H): ICD-10-CM

## 2023-07-27 LAB
DHEA-S SERPL-MCNC: 85 UG/DL (ref 35–430)
ESTRADIOL SERPL-MCNC: 81 PG/ML
FSH SERPL IRP2-ACNC: 7.5 MIU/ML
HBA1C MFR BLD: 6.5 %
LH SERPL-ACNC: 5.6 MIU/ML
PROGEST SERPL-MCNC: 0.2 NG/ML
SHBG SERPL-SCNC: 39 NMOL/L (ref 30–135)

## 2023-07-27 PROCEDURE — 76830 TRANSVAGINAL US NON-OB: CPT | Mod: 26 | Performed by: OBSTETRICS & GYNECOLOGY

## 2023-07-27 PROCEDURE — 84403 ASSAY OF TOTAL TESTOSTERONE: CPT

## 2023-07-27 PROCEDURE — 83002 ASSAY OF GONADOTROPIN (LH): CPT

## 2023-07-27 PROCEDURE — 84144 ASSAY OF PROGESTERONE: CPT

## 2023-07-27 PROCEDURE — 76830 TRANSVAGINAL US NON-OB: CPT

## 2023-07-27 PROCEDURE — 83036 HEMOGLOBIN GLYCOSYLATED A1C: CPT

## 2023-07-27 PROCEDURE — 82627 DEHYDROEPIANDROSTERONE: CPT

## 2023-07-27 PROCEDURE — 83001 ASSAY OF GONADOTROPIN (FSH): CPT

## 2023-07-27 PROCEDURE — 82670 ASSAY OF TOTAL ESTRADIOL: CPT

## 2023-07-27 PROCEDURE — 84270 ASSAY OF SEX HORMONE GLOBUL: CPT

## 2023-07-27 PROCEDURE — 36415 COLL VENOUS BLD VENIPUNCTURE: CPT

## 2023-07-29 LAB
TESTOST FREE SERPL-MCNC: 0.31 NG/DL
TESTOST SERPL-MCNC: 19 NG/DL (ref 8–60)

## 2023-07-31 NOTE — RESULT ENCOUNTER NOTE
Hi Dimple - your A1c remains <7 which is great. Please let me know if you have any questions.    emily

## 2023-07-31 NOTE — TELEPHONE ENCOUNTER
Patient called and would like to establish prenatal care   Patient LMP IVF 3/2/18  Patient G2 P  Patient complains of nothing at this time  Patient is taking prenatal vitamins.   Orders for dating ultrasound entered.  
Yes - the patient is able to be screened

## 2023-08-09 ENCOUNTER — MYC MEDICAL ADVICE (OUTPATIENT)
Dept: FAMILY MEDICINE | Facility: CLINIC | Age: 48
End: 2023-08-09
Payer: COMMERCIAL

## 2023-08-09 DIAGNOSIS — E66.01 MORBID OBESITY (H): Primary | ICD-10-CM

## 2023-08-10 RX ORDER — TIRZEPATIDE 5 MG/.5ML
5 INJECTION, SOLUTION SUBCUTANEOUS
Qty: 2 ML | Refills: 0 | Status: SHIPPED | OUTPATIENT
Start: 2023-08-10 | End: 2023-11-20

## 2023-09-12 RX ORDER — TIRZEPATIDE 7.5 MG/.5ML
7.5 INJECTION, SOLUTION SUBCUTANEOUS
Qty: 2 ML | Refills: 0 | Status: SHIPPED | OUTPATIENT
Start: 2023-09-12 | End: 2023-11-20

## 2023-10-10 ENCOUNTER — MYC MEDICAL ADVICE (OUTPATIENT)
Dept: FAMILY MEDICINE | Facility: CLINIC | Age: 48
End: 2023-10-10
Payer: COMMERCIAL

## 2023-10-10 DIAGNOSIS — E11.9 TYPE 2 DIABETES MELLITUS WITHOUT COMPLICATION, WITHOUT LONG-TERM CURRENT USE OF INSULIN (H): ICD-10-CM

## 2023-10-10 DIAGNOSIS — E66.01 MORBID OBESITY (H): Primary | ICD-10-CM

## 2023-10-10 NOTE — TELEPHONE ENCOUNTER
From: Catrachita Longoria  To: Dr. Munir Ramirez: 6/28/2022 1:41 PM EDT  Subject: Jaspal Grossman duglas    Would you please call a XipLink script into [facility] [address].   Thank you Pended requested medication for review and if agree, approval.

## 2023-10-11 RX ORDER — TIRZEPATIDE 10 MG/.5ML
10 INJECTION, SOLUTION SUBCUTANEOUS
Qty: 2 ML | Refills: 0 | Status: SHIPPED | OUTPATIENT
Start: 2023-10-11 | End: 2023-11-20

## 2023-11-14 ENCOUNTER — MYC MEDICAL ADVICE (OUTPATIENT)
Dept: FAMILY MEDICINE | Facility: CLINIC | Age: 48
End: 2023-11-14
Payer: COMMERCIAL

## 2023-11-17 ENCOUNTER — MYC MEDICAL ADVICE (OUTPATIENT)
Dept: FAMILY MEDICINE | Facility: CLINIC | Age: 48
End: 2023-11-17
Payer: COMMERCIAL

## 2023-11-17 DIAGNOSIS — E66.01 MORBID OBESITY (H): Primary | ICD-10-CM

## 2023-11-17 RX ORDER — TIRZEPATIDE 12.5 MG/.5ML
12.5 INJECTION, SOLUTION SUBCUTANEOUS
Qty: 2 ML | Refills: 3 | Status: SHIPPED | OUTPATIENT
Start: 2023-11-17 | End: 2024-06-05

## 2023-11-17 NOTE — TELEPHONE ENCOUNTER
Spoke with patient on the phone. Scheduled her for appointment on 11/20/23 with next available [place] provider.     Shell Bee RN

## 2023-11-20 ENCOUNTER — OFFICE VISIT (OUTPATIENT)
Dept: FAMILY MEDICINE | Facility: CLINIC | Age: 48
End: 2023-11-20
Payer: COMMERCIAL

## 2023-11-20 VITALS
RESPIRATION RATE: 19 BRPM | DIASTOLIC BLOOD PRESSURE: 70 MMHG | HEART RATE: 72 BPM | SYSTOLIC BLOOD PRESSURE: 112 MMHG | TEMPERATURE: 98.6 F | OXYGEN SATURATION: 99 %

## 2023-11-20 DIAGNOSIS — Z23 NEED FOR HEPATITIS B VACCINATION: ICD-10-CM

## 2023-11-20 DIAGNOSIS — J06.9 VIRAL URI WITH COUGH: Primary | ICD-10-CM

## 2023-11-20 DIAGNOSIS — E89.0 POSTABLATIVE HYPOTHYROIDISM: ICD-10-CM

## 2023-11-20 DIAGNOSIS — E11.9 TYPE 2 DIABETES MELLITUS WITHOUT COMPLICATION, WITHOUT LONG-TERM CURRENT USE OF INSULIN (H): ICD-10-CM

## 2023-11-20 LAB
ANION GAP SERPL CALCULATED.3IONS-SCNC: 10 MMOL/L (ref 7–15)
BUN SERPL-MCNC: 14.2 MG/DL (ref 6–20)
CALCIUM SERPL-MCNC: 9.7 MG/DL (ref 8.6–10)
CHLORIDE SERPL-SCNC: 105 MMOL/L (ref 98–107)
CREAT SERPL-MCNC: 0.79 MG/DL (ref 0.51–0.95)
DEPRECATED HCO3 PLAS-SCNC: 24 MMOL/L (ref 22–29)
EGFRCR SERPLBLD CKD-EPI 2021: >90 ML/MIN/1.73M2
ERYTHROCYTE [DISTWIDTH] IN BLOOD BY AUTOMATED COUNT: 13.6 % (ref 10–15)
GLUCOSE SERPL-MCNC: 93 MG/DL (ref 70–99)
HBA1C MFR BLD: 5.5 % (ref 0–5.6)
HCT VFR BLD AUTO: 42.9 % (ref 35–47)
HGB BLD-MCNC: 13.9 G/DL (ref 11.7–15.7)
MCH RBC QN AUTO: 26.6 PG (ref 26.5–33)
MCHC RBC AUTO-ENTMCNC: 32.4 G/DL (ref 31.5–36.5)
MCV RBC AUTO: 82 FL (ref 78–100)
PLATELET # BLD AUTO: 303 10E3/UL (ref 150–450)
POTASSIUM SERPL-SCNC: 4.8 MMOL/L (ref 3.4–5.3)
RBC # BLD AUTO: 5.22 10E6/UL (ref 3.8–5.2)
SODIUM SERPL-SCNC: 139 MMOL/L (ref 135–145)
TSH SERPL DL<=0.005 MIU/L-ACNC: 0.96 UIU/ML (ref 0.3–4.2)
WBC # BLD AUTO: 8.2 10E3/UL (ref 4–11)

## 2023-11-20 PROCEDURE — 90746 HEPB VACCINE 3 DOSE ADULT IM: CPT

## 2023-11-20 PROCEDURE — 90471 IMMUNIZATION ADMIN: CPT

## 2023-11-20 PROCEDURE — 80048 BASIC METABOLIC PNL TOTAL CA: CPT

## 2023-11-20 PROCEDURE — 85027 COMPLETE CBC AUTOMATED: CPT

## 2023-11-20 PROCEDURE — 84443 ASSAY THYROID STIM HORMONE: CPT

## 2023-11-20 PROCEDURE — 36415 COLL VENOUS BLD VENIPUNCTURE: CPT

## 2023-11-20 PROCEDURE — 99214 OFFICE O/P EST MOD 30 MIN: CPT | Mod: 25

## 2023-11-20 PROCEDURE — 83036 HEMOGLOBIN GLYCOSYLATED A1C: CPT

## 2023-11-20 RX ORDER — BENZONATATE 200 MG/1
200 CAPSULE ORAL 3 TIMES DAILY PRN
Qty: 45 CAPSULE | Refills: 0 | Status: SHIPPED | OUTPATIENT
Start: 2023-11-20 | End: 2023-12-07

## 2023-11-20 NOTE — PROGRESS NOTES
Preceptor Attestation:    I discussed the patient with the resident and evaluated the patient in person. I have verified the content of the note, which accurately reflects my assessment of the patient and the plan of care.   Supervising Physician:  Ronni Corona MD.

## 2023-11-20 NOTE — PROGRESS NOTES
Assessment & Plan     Viral URI with cough  With clear lungs, no fevers/chills, no tenderness to sinuses, pharyngeal edema or exudate, most likely viral URI. Symptomatic cares encouraged.  - benzonatate (TESSALON) 200 MG capsule; Take 1 capsule (200 mg) by mouth 3 times daily as needed for cough  - CBC with platelets    Type 2 diabetes mellitus without complication, without long-term current use of insulin (H)  Due for labs.  - HEMOGLOBIN A1C  - BASIC METABOLIC PANEL    Postablative hypothyroidism  Due for labs.  - TSH WITH FREE T4 REFLEX    Need for hepatitis B vaccination  - HEPATITIS B VACCINE,ADULT,IM    Return if symptoms worsen or fail to improve.    [facility] SHALOM Hernandez is a 48 year old, presenting for the following health issues:  Cough (Ongoing for 3 weeks cough and congestion. Coughing attacks)        11/20/2023     9:19 AM   Additional Questions   Roomed by lizette   Accompanied by self       HPI     Cough - About 3 weeks now cough, congestion. Feels like there was a brief period where it got better and then it got worse. Not as bad as it was at its absolute worst (around 9th-10th) but not as good as it was on the initial upswing. Yellow phlegm, no blood. No chest pain, rib pain. No fevers. Tested negative for COVID. Has a 5 year old who goes to . No one else at home as gotten sick. Felt similarly last spring but was considered allergies and got fluticasone, cetrizine, prednisone - which were helpful. Had some leftovers and tried some more recently. Has been trying mucinex, ibuprofen, & Nyquil/Dayquil - maybe a little easier to sleep but not perfect.    No smoking, COPD, asthma.     Review of Systems   Constitutional, HEENT, cardiovascular, pulmonary, gi and gu systems are negative, except as otherwise noted.      Objective    /70   Pulse 72   Temp 98.6  F (37  C)   Resp 19   SpO2 99%   There is no height or weight on file to  calculate BMI.  Physical Exam   Constitutional: awake, alert, cooperative, in NAD  Eyes: Sclera without jaundice or injection, PERRLA, EOM intact   HENT: NCAT without lesions, oral cavity with MMM, intact dentition. No pharyngeal erythema or exudate. Pink nasal turbinates with mild L turbinate edema. Sinuses non-tender.   Respiratory: Lungs CTAB without crackles, wheezing, rales, or increased work of breathing  Cardiovascular: RRR without murmurs or extra sounds, radial pulses 2+ bilaterally  Skin: Warm & dry, without lesions, erythema, rashes, or ecchymosis  Musculoskeletal: No joint/extremity redness, warmth, swelling, or tenderness with full ROM  Neurologic: A&Ox4, without focal deficit, cranial nerves II-XII grossly intact  Psychiatric: Alert & calm with appropriate affect, mood, insight, and thought processes

## 2023-12-06 ENCOUNTER — HOSPITAL ENCOUNTER (OUTPATIENT)
Dept: MAMMOGRAPHY | Facility: CLINIC | Age: 48
Discharge: HOME | End: 2023-12-06
Attending: FAMILY MEDICINE | Admitting: FAMILY MEDICINE
Payer: COMMERCIAL

## 2023-12-06 DIAGNOSIS — Z12.31 VISIT FOR SCREENING MAMMOGRAM: ICD-10-CM

## 2023-12-06 PROCEDURE — 77067 SCR MAMMO BI INCL CAD: CPT

## 2023-12-07 PROBLEM — M77.41 METATARSALGIA OF BOTH FEET: Status: RESOLVED | Noted: 2019-11-13 | Resolved: 2023-12-07

## 2023-12-07 PROBLEM — M77.42 METATARSALGIA OF BOTH FEET: Status: RESOLVED | Noted: 2019-11-13 | Resolved: 2023-12-07

## 2024-01-08 ENCOUNTER — OFFICE VISIT (OUTPATIENT)
Dept: FAMILY MEDICINE | Facility: CLINIC | Age: 49
End: 2024-01-08
Payer: COMMERCIAL

## 2024-01-08 VITALS
DIASTOLIC BLOOD PRESSURE: 75 MMHG | SYSTOLIC BLOOD PRESSURE: 113 MMHG | HEIGHT: 67 IN | RESPIRATION RATE: 14 BRPM | OXYGEN SATURATION: 99 % | BODY MASS INDEX: 38.61 KG/M2 | HEART RATE: 71 BPM | WEIGHT: 246 LBS

## 2024-01-08 DIAGNOSIS — Z12.4 SCREENING FOR MALIGNANT NEOPLASM OF CERVIX: ICD-10-CM

## 2024-01-08 DIAGNOSIS — E11.9 TYPE 2 DIABETES MELLITUS WITHOUT COMPLICATION, WITHOUT LONG-TERM CURRENT USE OF INSULIN (H): ICD-10-CM

## 2024-01-08 DIAGNOSIS — E89.0 POSTABLATIVE HYPOTHYROIDISM: ICD-10-CM

## 2024-01-08 DIAGNOSIS — Z00.00 ROUTINE GENERAL MEDICAL EXAMINATION AT A HEALTH CARE FACILITY: Primary | ICD-10-CM

## 2024-01-08 LAB
CREAT UR-MCNC: 98.7 MG/DL
MICROALBUMIN UR-MCNC: <12 MG/L
MICROALBUMIN/CREAT UR: NORMAL MG/G{CREAT}

## 2024-01-08 PROCEDURE — 99396 PREV VISIT EST AGE 40-64: CPT | Performed by: FAMILY MEDICINE

## 2024-01-08 PROCEDURE — 82570 ASSAY OF URINE CREATININE: CPT | Performed by: FAMILY MEDICINE

## 2024-01-08 PROCEDURE — 87624 HPV HI-RISK TYP POOLED RSLT: CPT | Performed by: FAMILY MEDICINE

## 2024-01-08 PROCEDURE — 88175 CYTOPATH C/V AUTO FLUID REDO: CPT | Performed by: FAMILY MEDICINE

## 2024-01-08 PROCEDURE — 82043 UR ALBUMIN QUANTITATIVE: CPT | Performed by: FAMILY MEDICINE

## 2024-01-08 RX ORDER — LEVOTHYROXINE SODIUM 150 MCG
150 TABLET ORAL DAILY
Qty: 90 TABLET | Refills: 3 | Status: SHIPPED | OUTPATIENT
Start: 2024-01-08 | End: 2024-06-12

## 2024-01-08 ASSESSMENT — ENCOUNTER SYMPTOMS
HEARTBURN: 0
DIARRHEA: 0
HEMATOCHEZIA: 0
CONSTIPATION: 0
FREQUENCY: 0
DYSURIA: 0
DIZZINESS: 0
HEADACHES: 0
NERVOUS/ANXIOUS: 0
EYE PAIN: 0
JOINT SWELLING: 1
SORE THROAT: 0
MYALGIAS: 0
CHILLS: 0
WEAKNESS: 0
PARESTHESIAS: 0
COUGH: 0
BREAST MASS: 0
PALPITATIONS: 0
SHORTNESS OF BREATH: 0
HEMATURIA: 0
ABDOMINAL PAIN: 0
NAUSEA: 0
FEVER: 0
ARTHRALGIAS: 0

## 2024-01-08 ASSESSMENT — PATIENT HEALTH QUESTIONNAIRE - PHQ9
10. IF YOU CHECKED OFF ANY PROBLEMS, HOW DIFFICULT HAVE THESE PROBLEMS MADE IT FOR YOU TO DO YOUR WORK, TAKE CARE OF THINGS AT HOME, OR GET ALONG WITH OTHER PEOPLE: NOT DIFFICULT AT ALL
SUM OF ALL RESPONSES TO PHQ QUESTIONS 1-9: 3
10. IF YOU CHECKED OFF ANY PROBLEMS, HOW DIFFICULT HAVE THESE PROBLEMS MADE IT FOR YOU TO DO YOUR WORK, TAKE CARE OF THINGS AT HOME, OR GET ALONG WITH OTHER PEOPLE: NOT DIFFICULT AT ALL
SUM OF ALL RESPONSES TO PHQ QUESTIONS 1-9: 3

## 2024-01-08 NOTE — PROGRESS NOTES
SUBJECTIVE:   Mary is a 48 year old, presenting for the following:  Physical (A1C, kidney questions.)        1/8/2024     8:56 AM   Additional Questions   Roomed by mj   Accompanied by self       Healthy Habits:     Getting at least 3 servings of Calcium per day:  Yes    Bi-annual eye exam:  Yes    Dental care twice a year:  Yes    Sleep apnea or symptoms of sleep apnea:  Sleep apnea    Diet:  Vegetarian/vegan    Frequency of exercise:  2-3 days/week    Duration of exercise:  15-30 minutes    Taking medications regularly:  Yes    Medication side effects:  Not applicable    Additional concerns today:  No    Follow up:  Thyroid stable - last lab in Nov  DM - doing well, happy with progress and better labs  Weight - on Mounjaro with some loss already noted  - happy about this, but also struggles with shame still, negative self-talk after years of cultural and family pressure around weight; knows she still has work to do on this    Has focused time/money on acupuncture and massage for self-care, no longer seeing a therapist - but open to this again in the future    Today's PHQ-9 Score:       1/8/2024     8:46 AM   PHQ-9 SCORE   PHQ-9 Total Score MyChart 3 (Minimal depression)   PHQ-9 Total Score 3    3     Social History     Tobacco Use    Smoking status: Never    Smokeless tobacco: Never   Substance Use Topics    Alcohol use: Yes     Alcohol/week: 1.7 standard drinks of alcohol     Types: 2 Standard drinks or equivalent per week     Comment: occasional             1/8/2024     8:50 AM   Alcohol Use   Prescreen: >3 drinks/day or >7 drinks/week? Not Applicable     Reviewed orders with patient.  Reviewed health maintenance and updated orders accordingly - Yes  BP Readings from Last 3 Encounters:   01/08/24 113/75   11/20/23 112/70   07/24/23 115/77    Wt Readings from Last 3 Encounters:   01/08/24 111.6 kg (246 lb)   07/24/23 120.2 kg (265 lb)   06/04/23 120.2 kg (265 lb)            Patient Active Problem List  "  Diagnosis    Postablative hypothyroidism    Type 2 diabetes mellitus without complication, without long-term current use of insulin (H)    Papanicolaou smear of cervix with low grade squamous intraepithelial lesion (LGSIL)    H/O mammogram    History of Graves' disease    Current moderate episode of major depressive disorder without prior episode (H)    Intraductal papilloma of breast, left    Morbid obesity (H)    KHURRAM (obstructive sleep apnea)     Past Surgical History:   Procedure Laterality Date    CATARACT EXTRACTION, BILATERAL      November and 2021     SECTION N/A 2018    Procedure:  SECTION;  Surgeon: Lola Otto MD;  Location: [facility] L[facility]    COLONOSCOPY N/A 2022    Procedure: COLONOSCOPY;  Surgeon: Sharee Ramachandran MD;  Location: [facility] OR    EYE SURGERY  DEC 2021    cataract    LEEP TX, CERVICAL  2002    \"for HPV\"    LUMPECTOMY BREAST Left 08/10/2021    Procedure: LEFT Wire-Localized Lumpectomy;  Surgeon: Marta Frost MD;  Location: [facility] OR       Social History     Tobacco Use    Smoking status: Never    Smokeless tobacco: Never   Substance Use Topics    Alcohol use: Yes     Alcohol/week: 1.7 standard drinks of alcohol     Types: 2 Standard drinks or equivalent per week     Comment: occasional     Family History   Problem Relation Age of Onset    Obesity Mother     Hypertension Mother     Dementia Mother     Diabetes Father 68    Obesity Father     Obesity Sister         had weight loss surgery         Recent Labs   Lab Test 23  1004 23  0900 05/10/23  1312 23  1443 22  1058 19  1440 18  0857 18  0635 18  0705 18  1240   A1C 5.5 6.5* 6.2*   < > 6.7*   < >  --   --   --   --    LDL  --   --  84  --   --   --  109  --   --   --    HDL  --   --  57  --   --   --  69.1  --   --   --    TRIG  --   --  119  --   --   --  47.0  --   --   --    ALT  --   --   --   --  15  --   --   --  59* 56*   CR " 0.79  --   --   --  0.74  --   --  0.71 0.80 0.84   GFRESTIMATED >90  --   --   --  >90  --   --  >90 78 74   GFRESTBLACK  --   --   --   --   --   --   --  >90 >90 89   POTASSIUM 4.8  --   --   --  4.3  --   --  4.3  --   --    TSH 0.96  --   --   --  3.31   < > 0.01*  --   --   --     < > = values in this interval not displayed.        Breast Cancer Screening:    FHS-7:       7/13/2021    11:58 AM 10/7/2022     2:37 PM 12/6/2023     8:15 AM   Breast CA Risk Assessment (FHS-7)   Did any of your first-degree relatives have breast or ovarian cancer? No No No   Did any of your relatives have bilateral breast cancer? No Unknown No   Did any man in your family have breast cancer? No No No   Did any woman in your family have breast and ovarian cancer? No No No   Did any woman in your family have breast cancer before age 50 y? No Yes Yes   Do you have 2 or more relatives with breast and/or ovarian cancer? No No No   Do you have 2 or more relatives with breast and/or bowel cancer? No No        Mammogram Screening: Recommended annual mammography  Pertinent mammograms are reviewed under the imaging tab.    History of abnormal Pap smear: NO - age 30-65 PAP every 5 years with negative HPV co-testing recommended      Latest Ref Rng & Units 3/29/2023    11:19 AM 10/31/2019     5:05 PM 12/18/2018    12:45 PM   PAP / HPV   PAP  Negative for Intraepithelial Lesion or Malignancy (NILM)      PAP (Historical)   NIL  NIL    HPV 16 DNA Negative Negative  Negative     HPV 18 DNA Negative Negative  Negative     Other HR HPV Negative Negative  Negative       Reviewed and updated as needed this visit by clinical staff   Tobacco  Allergies  Meds              Reviewed and updated as needed this visit by Provider                 Past Medical History:   Diagnosis Date    Depressive disorder 2014    On and off issues currently.    Diabetes (H) 2022    Graves disease 1997    Hypovitaminosis D     Mild depression     2014, no meds, resolved  "with move    Miscarriage 2017    Obesity     Postablative hypothyroidism 1997    [facility]    Prediabetes 2016    Premenstrual dysphoria         Review of Systems   Constitutional:  Negative for chills and fever.   HENT:  Negative for congestion, ear pain, hearing loss and sore throat.    Eyes:  Negative for pain and visual disturbance.   Respiratory:  Negative for cough and shortness of breath.    Cardiovascular:  Positive for peripheral edema. Negative for chest pain and palpitations.   Gastrointestinal:  Negative for abdominal pain, constipation, diarrhea, heartburn, hematochezia and nausea.   Breasts:  Negative for tenderness, breast mass and discharge.   Genitourinary:  Negative for dysuria, frequency, genital sores, hematuria, pelvic pain, urgency, vaginal bleeding and vaginal discharge.   Musculoskeletal:  Positive for joint swelling. Negative for arthralgias and myalgias.   Skin:  Negative for rash.   Neurological:  Negative for dizziness, weakness, headaches and paresthesias.   Psychiatric/Behavioral:  Negative for mood changes. The patient is not nervous/anxious.         OBJECTIVE:   /75 (BP Location: Left arm, Patient Position: Sitting, Cuff Size: Adult Large)   Pulse 71   Resp 14   Ht 1.702 m (5' 7\")   Wt 111.6 kg (246 lb)   SpO2 99%   BMI 38.53 kg/m    Physical Exam  GENERAL: healthy, alert and no distress  EYES: Eyes grossly normal to inspection, PERRL and conjunctivae and sclerae normal  HENT: ear canals and TM's normal, nose and mouth without ulcers or lesions  NECK: no adenopathy, no asymmetry, masses, or scars and thyroid normal to palpation  RESP: lungs clear to auscultation - no rales, rhonchi or wheezes  BREAST: normal without masses, tenderness or nipple discharge and no palpable axillary masses or adenopathy  CV: regular rate and rhythm, normal S1 S2, no S3 or S4, no murmur, click or rub, no peripheral edema and peripheral pulses strong  ABDOMEN: soft, nontender, no hepatosplenomegaly, " "no masses and bowel sounds normal  MS: no gross musculoskeletal defects noted, no edema  SKIN: no suspicious lesions or rashes  NEURO: Normal strength and tone, mentation intact and speech normal  PSYCH: mentation appears normal, affect normal/bright        ASSESSMENT/PLAN:   Mary was seen today for physical.    Diagnoses and all orders for this visit:    Routine general medical examination at a health care facility  Screening for malignant neoplasm of cervix  Discussed healthy diet, exercise - congratulated on work she is doing on her own  Reconsider talking with therapist again if starting to have more negative self-talk (especially around weight, expectations of self)  Discussed HPV vaccination - may not be covered by insurance at this point, but could consider getting the series (was negative in the past for 16, 18)  -     Pap screen with HPV - recommended age 30 - 65 years    Type 2 diabetes mellitus without complication, without long-term current use of insulin (H)  Last A1c 5.5  Weight loss noted  Will repeat urine microalbumin today - was elevated last year  Add ACEi if still elevated  -     Albumin Random Urine Quantitative with Creat Ratio; Future  -     Albumin Random Urine Quantitative with Creat Ratio    Postablative hypothyroidism  -     SYNTHROID 150 MCG tablet; Take 1 tablet (150 mcg) by mouth daily      COUNSELING:  Reviewed preventive health counseling, as reflected in patient instructions      BMI:   Estimated body mass index is 38.53 kg/m  as calculated from the following:    Height as of this encounter: 1.702 m (5' 7\").    Weight as of this encounter: 111.6 kg (246 lb).   Weight management plan: working with weight mgmt team, on medication      She reports that she has never smoked. She has never used smokeless tobacco.          Jazlyn Huitron MD  [facility]  Answers submitted by the patient for this visit:  Patient Health Questionnaire (Submitted on 1/8/2024)  If you " checked off any problems, how difficult have these problems made it for you to do your work, take care of things at home, or get along with other people?: Not difficult at all  PHQ9 TOTAL SCORE: 3

## 2024-01-10 LAB
BKR LAB AP GYN ADEQUACY: NORMAL
BKR LAB AP GYN INTERPRETATION: NORMAL
BKR LAB AP HPV REFLEX: NORMAL
BKR LAB AP PREVIOUS ABNORMAL: NORMAL
PATH REPORT.COMMENTS IMP SPEC: NORMAL
PATH REPORT.COMMENTS IMP SPEC: NORMAL
PATH REPORT.RELEVANT HX SPEC: NORMAL

## 2024-01-17 ENCOUNTER — VIRTUAL VISIT (OUTPATIENT)
Dept: FAMILY MEDICINE | Facility: CLINIC | Age: 49
End: 2024-01-17
Payer: COMMERCIAL

## 2024-01-17 DIAGNOSIS — E11.9 TYPE 2 DIABETES MELLITUS WITHOUT COMPLICATION, WITHOUT LONG-TERM CURRENT USE OF INSULIN (H): Primary | ICD-10-CM

## 2024-01-17 DIAGNOSIS — E66.01 MORBID OBESITY (H): ICD-10-CM

## 2024-01-17 PROCEDURE — 99213 OFFICE O/P EST LOW 20 MIN: CPT | Mod: 95 | Performed by: FAMILY MEDICINE

## 2024-01-17 RX ORDER — HYDROQUINONE 40 MG/G
CREAM TOPICAL
COMMUNITY
Start: 2024-01-06 | End: 2024-06-05

## 2024-01-17 NOTE — PROGRESS NOTES
Mary is a 48 year old who is being evaluated via a billable video visit.      How would you like to obtain your AVS? MyChart  If the video visit is dropped, the invitation should be resent by: Text to cell phone: [phone]  Will anyone else be joining your video visit? No          Assessment & Plan   Problem List Items Addressed This Visit       Type 2 diabetes mellitus without complication, without long-term current use of insulin (H) - Primary     The patient's most recent A1c was 5.5 suggesting very well-controlled type 2 diabetes.         Relevant Medications    tirzepatide (MOUNJARO) 15 MG/0.5ML pen    Morbid obesity (H)     The patient is doing very well with her weight loss and is very pleased with the results to date.  She is currently on Mounjaro 12.5 mg dose and I sent in a prescription for the final 15 mg weekly dose.  She denies any side effects definitely feels that it helps control appetite, cravings as well as improved satiety.  She has a history of emotional eating and binge eating disorder.  She is seeing a psychotherapist individually regarding this but states that she really only emotionally eats perhaps twice per month at this time.  She feels that the medication has helped with that as well.  We discussed continuing the medication long-term, continuing to keep nutrition interesting with new recipes and healthy options, as well as incorporating more exercise into her routine.  Follow-up in 6 months.         Relevant Medications    tirzepatide (MOUNJARO) 15 MG/0.5ML pen          Goran Hernandez is a 48 year old who presents today for a medical weight management follow-up.  The patient was initially seen in March 2023 for a medical weight management consultation.  At that time, we did discuss and review her history of emotional eating behaviors and I did think she qualified for binge eating disorder.  She was referred to the [facility] but due to time constraints ultimately found a  "psychotherapist to work individually on this topic.  She has been continuing to see that individual on a weekly basis.  The patient was started on Mounjaro as she also has a history of type 2 diabetes.  She currently is at the 12.5 mg weekly dose and tolerating it well.  The patient really feels that the medication has been very effective and helpful.  She is down about 24 pounds and feeling overall better energy and much more comfortable.  She feels in much better control of her eating and has very little emotional eating behavior at this point.  She denies any side effects to the medication.  She did see her primary care provider in January for an annual physical exam.  Weight Loss    Via the Health Maintenance questionnaire, the patient has reported the following services have been completed -Foot Exam, this information has been sent to the abstraction team.  History of Present Illness       Reason for visit:  Weight mgmt    She eats 2-3 servings of fruits and vegetables daily.She consumes 0 sweetened beverage(s) daily.She exercises with enough effort to increase her heart rate 30 to 60 minutes per day.  She exercises with enough effort to increase her heart rate 3 or less days per week.   She is taking medications regularly.           Objective    Vitals - Patient Reported  Weight (Patient Reported): 111.6 kg (246 lb)  Height (Patient Reported): 170.2 cm (5' 7\")  BMI (Based on Pt Reported Ht/Wt): 38.53      Physical Exam   GENERAL: alert and no distress  EYES: Eyes grossly normal to inspection.  No discharge or erythema, or obvious scleral/conjunctival abnormalities.  RESP: No audible wheeze, cough, or visible cyanosis.    SKIN: Visible skin clear. No significant rash, abnormal pigmentation or lesions.  NEURO: Cranial nerves grossly intact.  Mentation and speech appropriate for age.  PSYCH: Appropriate affect, tone, and pace of words          Video-Visit Details    Type of service:  Video Visit     Originating " Location (pt. Location): Other her car    Distant Location (provider location):  On-site  Platform used for Video Visit: Te  Signed Electronically by: CIELO LOPEZ MD

## 2024-01-17 NOTE — ASSESSMENT & PLAN NOTE
The patient is doing very well with her weight loss and is very pleased with the results to date.  She is currently on Mounjaro 12.5 mg dose and I sent in a prescription for the final 15 mg weekly dose.  She denies any side effects definitely feels that it helps control appetite, cravings as well as improved satiety.  She has a history of emotional eating and binge eating disorder.  She is seeing a psychotherapist individually regarding this but states that she really only emotionally eats perhaps twice per month at this time.  She feels that the medication has helped with that as well.  We discussed continuing the medication long-term, continuing to keep nutrition interesting with new recipes and healthy options, as well as incorporating more exercise into her routine.  Follow-up in 6 months.

## 2024-04-28 ENCOUNTER — HEALTH MAINTENANCE LETTER (OUTPATIENT)
Age: 49
End: 2024-04-28

## 2024-05-07 ENCOUNTER — MYC MEDICAL ADVICE (OUTPATIENT)
Dept: OBGYN | Facility: CLINIC | Age: 49
End: 2024-05-07
Payer: COMMERCIAL

## 2024-05-07 ENCOUNTER — TELEPHONE (OUTPATIENT)
Dept: OBGYN | Facility: CLINIC | Age: 49
End: 2024-05-07
Payer: COMMERCIAL

## 2024-06-05 ENCOUNTER — OFFICE VISIT (OUTPATIENT)
Dept: FAMILY MEDICINE | Facility: CLINIC | Age: 49
End: 2024-06-05
Payer: COMMERCIAL

## 2024-06-05 VITALS
DIASTOLIC BLOOD PRESSURE: 78 MMHG | HEIGHT: 67 IN | TEMPERATURE: 98.4 F | RESPIRATION RATE: 14 BRPM | SYSTOLIC BLOOD PRESSURE: 119 MMHG | WEIGHT: 241 LBS | OXYGEN SATURATION: 99 % | BODY MASS INDEX: 37.83 KG/M2 | HEART RATE: 58 BPM

## 2024-06-05 DIAGNOSIS — E11.9 TYPE 2 DIABETES MELLITUS WITHOUT COMPLICATION, WITHOUT LONG-TERM CURRENT USE OF INSULIN (H): ICD-10-CM

## 2024-06-05 DIAGNOSIS — E89.0 POSTABLATIVE HYPOTHYROIDISM: ICD-10-CM

## 2024-06-05 DIAGNOSIS — E66.01 MORBID OBESITY (H): Primary | ICD-10-CM

## 2024-06-05 LAB
HBA1C MFR BLD: 5.3 % (ref 0–5.6)
HOLD SPECIMEN: NORMAL
HOLD SPECIMEN: NORMAL

## 2024-06-05 PROCEDURE — 80061 LIPID PANEL: CPT | Performed by: FAMILY MEDICINE

## 2024-06-05 PROCEDURE — 36415 COLL VENOUS BLD VENIPUNCTURE: CPT | Performed by: FAMILY MEDICINE

## 2024-06-05 PROCEDURE — 84443 ASSAY THYROID STIM HORMONE: CPT | Performed by: FAMILY MEDICINE

## 2024-06-05 PROCEDURE — 99213 OFFICE O/P EST LOW 20 MIN: CPT | Performed by: FAMILY MEDICINE

## 2024-06-05 PROCEDURE — 83036 HEMOGLOBIN GLYCOSYLATED A1C: CPT | Performed by: FAMILY MEDICINE

## 2024-06-05 ASSESSMENT — PATIENT HEALTH QUESTIONNAIRE - PHQ9
SUM OF ALL RESPONSES TO PHQ QUESTIONS 1-9: 2
SUM OF ALL RESPONSES TO PHQ QUESTIONS 1-9: 2

## 2024-06-05 NOTE — ASSESSMENT & PLAN NOTE
The patient has had a successful weight loss of 26 pounds which is just shy of 10% weight loss from her highest weight of 267 pounds.  She has done this with the support of Mounjaro which she is using for combination of type 2 diabetes in addition to weight loss.  She is quite pleased with her weight loss results although states that she really has plateaued in the past couple months without any additional weight loss.  She is anticipating being done with a stressful job in July as hoping that the change in lifestyle will provide additional hours of sleep at night, reduced stress, and ability to exercise more.  Continue current dose of Mounjaro and follow-up in 6 months

## 2024-06-05 NOTE — PROGRESS NOTES
Assessment & Plan   Problem List Items Addressed This Visit       Postablative hypothyroidism    Relevant Orders    TSH    Type 2 diabetes mellitus without complication, without long-term current use of insulin (H)     A1c came back at 5.3.         Relevant Medications    tirzepatide (MOUNJARO) 15 MG/0.5ML pen    Other Relevant Orders    HEMOGLOBIN A1C (Completed)    Lipid panel reflex to direct LDL Non-fasting    Morbid obesity (H) - Primary     The patient has had a successful weight loss of 26 pounds which is just shy of 10% weight loss from her highest weight of 267 pounds.  She has done this with the support of Mounjaro which she is using for combination of type 2 diabetes in addition to weight loss.  She is quite pleased with her weight loss results although states that she really has plateaued in the past couple months without any additional weight loss.  She is anticipating being done with a stressful job in July as hoping that the change in lifestyle will provide additional hours of sleep at night, reduced stress, and ability to exercise more.  Continue current dose of Mounjaro and follow-up in 6 months         Relevant Medications    tirzepatide (MOUNJARO) 15 MG/0.5ML pen              Goran Hernandez is a 48 year old who presents today for weight loss follow-up visit.  The patient had a medical weight management intake appointment last March.  She was started on Mounjaro and has had a successful weight loss and improvement in her A1c with a new diagnosis of type 2 diabetes on that same time.  The patient is feeling much better overall and overall pleased with her weight loss results.  She is a bit disappointed that she has not continued to lose weight.  She notes that in the past 2 months she really has not dropped any weight.  She is working an extremely stressful job which she recently quit and will be finished in July.  She is hoping that with the reduction in stress that she will be able to  "continue to see some weight loss.  She feels she is adhering to the nutrition plan quite well.  She is eating healthy and finds that the medication helps her to do so.  She is also exercising specifically with walking about 20 minutes/day.  Weight Loss      6/5/2024    10:35 AM   Additional Questions   Roomed by as   Accompanied by self         6/5/2024    10:35 AM   Patient Reported Additional Medications   Patient reports taking the following new medications no     History of Present Illness       Diabetes:   She presents for follow up of diabetes.    She is not checking blood glucose.         She has no concerns regarding her diabetes at this time.   She is not experiencing numbness or burning in feet, excessive thirst, blurry vision, weight changes or redness, sores or blisters on feet. The patient has not had a diabetic eye exam in the last 12 months.          Reason for visit:  Weight mgmt    She eats 2-3 servings of fruits and vegetables daily.She consumes 0 sweetened beverage(s) daily.She exercises with enough effort to increase her heart rate 20 to 29 minutes per day.  She exercises with enough effort to increase her heart rate 3 or less days per week.   She is taking medications regularly.           Objective    /78 (BP Location: Left arm, Patient Position: Left side, Cuff Size: Adult Large)   Pulse 58   Temp 98.4  F (36.9  C) (Oral)   Resp 14   Ht 1.702 m (5' 7\")   Wt 109.3 kg (241 lb)   LMP 05/30/2024 (Exact Date)   SpO2 99%   BMI 37.75 kg/m    Body mass index is 37.75 kg/m .  Physical Exam   GENERAL: alert and no distress            Signed Electronically by: CIELO LOPEZ MD    "

## 2024-06-06 LAB
CHOLEST SERPL-MCNC: 202 MG/DL
FASTING STATUS PATIENT QL REPORTED: NO
HDLC SERPL-MCNC: 64 MG/DL
LDLC SERPL CALC-MCNC: 126 MG/DL
NONHDLC SERPL-MCNC: 138 MG/DL
TRIGL SERPL-MCNC: 61 MG/DL
TSH SERPL DL<=0.005 MIU/L-ACNC: 0.08 UIU/ML (ref 0.3–4.2)

## 2024-06-11 ENCOUNTER — TRANSFERRED RECORDS (OUTPATIENT)
Dept: HEALTH INFORMATION MANAGEMENT | Facility: CLINIC | Age: 49
End: 2024-06-11
Payer: COMMERCIAL

## 2024-06-12 RX ORDER — LEVOTHYROXINE SODIUM 137 UG/1
137 TABLET ORAL DAILY
Qty: 90 TABLET | Refills: 0 | Status: SHIPPED | OUTPATIENT
Start: 2024-06-12 | End: 2024-09-11

## 2024-06-19 ENCOUNTER — MYC MEDICAL ADVICE (OUTPATIENT)
Dept: FAMILY MEDICINE | Facility: CLINIC | Age: 49
End: 2024-06-19
Payer: COMMERCIAL

## 2024-06-19 DIAGNOSIS — E11.9 TYPE 2 DIABETES MELLITUS WITHOUT COMPLICATION, WITHOUT LONG-TERM CURRENT USE OF INSULIN (H): Primary | ICD-10-CM

## 2024-06-19 DIAGNOSIS — E66.01 MORBID OBESITY (H): ICD-10-CM

## 2024-06-19 NOTE — TELEPHONE ENCOUNTER
Pended 2.4 mg dose of Wegovy   Consent: Written consent obtained. Risks include but not limited to bruising, beading, irregular texture, ulceration, infection, allergic reaction, scar formation, incomplete augmentation, temporary nature, procedural pain.

## 2024-09-01 ENCOUNTER — TRANSFERRED RECORDS (OUTPATIENT)
Dept: MULTI SPECIALTY CLINIC | Facility: CLINIC | Age: 49
End: 2024-09-01

## 2024-09-01 LAB — RETINOPATHY: NORMAL

## 2024-09-09 DIAGNOSIS — E89.0 POSTABLATIVE HYPOTHYROIDISM: ICD-10-CM

## 2024-09-11 ENCOUNTER — MYC REFILL (OUTPATIENT)
Dept: FAMILY MEDICINE | Facility: CLINIC | Age: 49
End: 2024-09-11
Payer: MEDICAID

## 2024-09-11 DIAGNOSIS — E11.9 TYPE 2 DIABETES MELLITUS WITHOUT COMPLICATION, WITHOUT LONG-TERM CURRENT USE OF INSULIN (H): ICD-10-CM

## 2024-09-11 RX ORDER — LEVOTHYROXINE SODIUM 137 UG/1
137 TABLET ORAL DAILY
Qty: 90 TABLET | Refills: 0 | Status: SHIPPED | OUTPATIENT
Start: 2024-09-11

## 2024-09-13 ENCOUNTER — TELEPHONE (OUTPATIENT)
Dept: FAMILY MEDICINE | Facility: CLINIC | Age: 49
End: 2024-09-13
Payer: MEDICAID

## 2024-09-13 DIAGNOSIS — E11.9 TYPE 2 DIABETES MELLITUS WITHOUT COMPLICATION, WITHOUT LONG-TERM CURRENT USE OF INSULIN (H): Primary | ICD-10-CM

## 2024-09-13 NOTE — TELEPHONE ENCOUNTER
Prior Authorization Request  Who s requesting:  Pharmacy  Pharmacy Name and Location: [place] [address]  Medication Name: tirzepatide (MOUNJARO) 15 MG/0.5ML pen   Insurance Plan: [place] Medicaid  Insurance Member ID Number:  [id number]   CoverMyMeds Key:   Informed patient that prior authorizations can take up to 10 business days for response:   NA  Okay to leave a detailed message: No

## 2024-09-15 ENCOUNTER — HEALTH MAINTENANCE LETTER (OUTPATIENT)
Age: 49
End: 2024-09-15

## 2024-09-17 NOTE — TELEPHONE ENCOUNTER
Central Prior Authorization Team   Phone: [phone]    PA Initiation    Medication: tirzepatide (MOUNJARO) 15 MG/0.5ML pen   Insurance Company: [place] Medicaid ([facility]) - Phone [phone] Fax [phone]  Pharmacy Filling the Rx: [id number] [place] [address] [facility] [place] [facility] [address]  Filling Pharmacy Phone: [phone]  Filling Pharmacy Fax:    Start Date: 9/17/2024

## 2024-09-18 NOTE — TELEPHONE ENCOUNTER
PRIOR AUTHORIZATION DENIED    Medication: tirzepatide (MOUNJARO) 15 MG/0.5ML pen     Denial Date: 9/18/2024    Denial Rational: Patient changed insurances to [facility], required to try/fail at least two preferred drugs   Some preferred injectables may still need a PA        Appeal Information: This medication was denied. If physician would like to appeal because patient has contraindication or allergy to covered medication please write letter of medical necessity and route back to PA team to initiate.  If no further action is needed please close encounter thank you.

## 2024-09-19 NOTE — TELEPHONE ENCOUNTER
Patient returned call. Provider message relayed and patient verbalized understanding. Patient is scheduled 10/7 for follow-up.

## 2024-09-19 NOTE — TELEPHONE ENCOUNTER
Please contact patient.  It looks like her insurance does not cover Mounjaro.  They will cover Ozempic which is a reasonable alternative that I just sent in a 2 mg weekly dose of that medication.  I would like to connect with her about 3 to 4 weeks after starting the new medication to see how she is doing.  Let me know if she has any additional questions or concerns.

## 2024-09-20 ENCOUNTER — E-VISIT (OUTPATIENT)
Dept: URGENT CARE | Facility: CLINIC | Age: 49
End: 2024-09-20
Payer: MEDICAID

## 2024-09-20 DIAGNOSIS — R30.0 DYSURIA: Primary | ICD-10-CM

## 2024-09-20 PROCEDURE — 99207 PR NON-BILLABLE SERV PER CHARTING: CPT | Performed by: NURSE PRACTITIONER

## 2024-09-20 RX ORDER — NITROFURANTOIN 25; 75 MG/1; MG/1
100 CAPSULE ORAL 2 TIMES DAILY
Qty: 10 CAPSULE | Refills: 0 | Status: SHIPPED | OUTPATIENT
Start: 2024-09-20 | End: 2024-09-25

## 2024-09-20 NOTE — PATIENT INSTRUCTIONS
Dear Mary Wray    After reviewing your responses, I've been able to diagnose you with a urinary tract infection, which is a common infection of the bladder with bacteria.  This is not a sexually transmitted infection, though urinating immediately after intercourse can help prevent infections.  Drinking lots of fluids is also helpful to clear your current infection and prevent the next one.      I have sent a prescription for antibiotics to your pharmacy to treat this infection.    It is important that you take all of your prescribed medication even if your symptoms are improving after a few doses.  Taking all of your medicine helps prevent the symptoms from returning.     If your symptoms worsen, you develop pain in your back or stomach, develop fevers, or are not improving in 5 days, please contact your primary care provider for an appointment or visit any of our convenient Walk-in or Urgent Care Centers to be seen, which can be found on our website here.    Thanks again for choosing us as your health care partner,    Cnocha Swanson NP  Dear Mary Wray,     After reviewing your responses, I would like you to come in for a urine test to make sure we treat you correctly. This urine test is to evaluate you for a possible urinary tract infection, and should be scheduled for today or tomorrow. Schedule a Lab Only appointment here.     Lab appointments are not available at most locations on the weekends. If no Lab Only appointment is available, you should be seen in any of our convenient Walk-in or Urgent Care Centers, which can be found on our website here.     You will receive instructions with your results in [facility] once they are available.     If your symptoms worsen, you develop pain in your back or stomach, develop fevers, or are not improving in 5 days, please contact your primary care provider for an appointment or visit a Walk-in or Urgent Care Center to be seen.     Thanks again for choosing us as  your health care partner,     Concha Swanson NP

## 2024-10-07 ENCOUNTER — OFFICE VISIT (OUTPATIENT)
Dept: FAMILY MEDICINE | Facility: CLINIC | Age: 49
End: 2024-10-07
Payer: COMMERCIAL

## 2024-10-07 VITALS
HEART RATE: 56 BPM | BODY MASS INDEX: 38.14 KG/M2 | SYSTOLIC BLOOD PRESSURE: 117 MMHG | RESPIRATION RATE: 16 BRPM | HEIGHT: 67 IN | DIASTOLIC BLOOD PRESSURE: 81 MMHG | WEIGHT: 243 LBS | OXYGEN SATURATION: 100 % | TEMPERATURE: 98.8 F

## 2024-10-07 DIAGNOSIS — E11.9 TYPE 2 DIABETES MELLITUS WITHOUT COMPLICATION, WITHOUT LONG-TERM CURRENT USE OF INSULIN (H): Primary | ICD-10-CM

## 2024-10-07 DIAGNOSIS — F50.810 MILD BINGE-EATING DISORDER: ICD-10-CM

## 2024-10-07 DIAGNOSIS — E89.0 POSTABLATIVE HYPOTHYROIDISM: ICD-10-CM

## 2024-10-07 LAB
EST. AVERAGE GLUCOSE BLD GHB EST-MCNC: 114 MG/DL
HBA1C MFR BLD: 5.6 % (ref 0–5.6)
TSH SERPL DL<=0.005 MIU/L-ACNC: 0.21 UIU/ML (ref 0.3–4.2)

## 2024-10-07 PROCEDURE — 99214 OFFICE O/P EST MOD 30 MIN: CPT | Performed by: FAMILY MEDICINE

## 2024-10-07 PROCEDURE — G2211 COMPLEX E/M VISIT ADD ON: HCPCS | Performed by: FAMILY MEDICINE

## 2024-10-07 PROCEDURE — 83036 HEMOGLOBIN GLYCOSYLATED A1C: CPT | Performed by: FAMILY MEDICINE

## 2024-10-07 PROCEDURE — 36415 COLL VENOUS BLD VENIPUNCTURE: CPT | Performed by: FAMILY MEDICINE

## 2024-10-07 PROCEDURE — 84443 ASSAY THYROID STIM HORMONE: CPT | Performed by: FAMILY MEDICINE

## 2024-10-07 RX ORDER — LISDEXAMFETAMINE DIMESYLATE 30 MG/1
30 CAPSULE ORAL EVERY MORNING
Qty: 30 CAPSULE | Refills: 0 | Status: SHIPPED | OUTPATIENT
Start: 2024-10-07 | End: 2024-11-07

## 2024-10-07 NOTE — ASSESSMENT & PLAN NOTE
The patient expresses that on the Ozempic she is having increased binge eating tendencies and thoughts.  She does not feel the medication is as effective at helping with this.  She never tried the Vyvanse that was prescribed last year but would be open to it at this time.  A prescription was provided for the 30 mg dose with a 1 month follow-up recommended.

## 2024-10-07 NOTE — PROGRESS NOTES
"  Assessment & Plan   Problem List Items Addressed This Visit       Postablative hypothyroidism     Patient is due for an updated TSH which was checked today.         Type 2 diabetes mellitus without complication, without long-term current use of insulin (H) - Primary     A1c is under excellent control at 5.6 on her current GLP-1 medication.  However, she has plateaued with her weight loss as it relates to this class of medicine and feels that she has increased in appetite and cravings on Ozempic versus Mounjaro.  She is now on a different insurance and can go back on Mounjaro so prescription was provided for the 7.5 mg dose to transition her gradually back to that medication with plans to increase monthly up to 15 mg.         Relevant Medications    tirzepatide (MOUNJARO) 7.5 MG/0.5ML pen    Other Relevant Orders    Hemoglobin A1c (Completed)    Mild binge-eating disorder     The patient expresses that on the Ozempic she is having increased binge eating tendencies and thoughts.  She does not feel the medication is as effective at helping with this.  She never tried the Vyvanse that was prescribed last year but would be open to it at this time.  A prescription was provided for the 30 mg dose with a 1 month follow-up recommended.         Relevant Medications    lisdexamfetamine (VYVANSE) 30 MG capsule             BMI  Estimated body mass index is 38.06 kg/m  as calculated from the following:    Height as of this encounter: 1.702 m (5' 7\").    Weight as of this encounter: 110.2 kg (243 lb).         Goran Hernandez is a 48 year old delightful female presenting today for follow-up regarding medical weight management and type 2 diabetes.  The patient needed to switch to Ozempic based on insurance coverage and has been on the 2 mg weekly dose.  She does not feel that that medication has been as effective as Mounjaro was.  She is having increase in binge eating thoughts as well and eating mindlessly with a focus on " "food greater than what she remembers when she was on Mounjaro 15 mg weekly.  She now has different insurance and is wondering about switching back to the Mounjaro.  She is due for diabetic visit today as well.  The patient does mention that she has been exercising regularly since our last visit.  She is getting out for a 30-minute walk daily and is doing resistance training twice weekly.  She says that overall this does help her energy level and she is feeling pretty good.  However, she is not happy with the fact that her weight has plateaued and frustrated that she is not losing additional weight.  He continues to work hard to apply a healthy diet.  Weight Loss    History of Present Illness       Diabetes:   She presents for follow up of diabetes.    She is not checking blood glucose.        She is concerned about other.   She is having weight gain.            Reason for visit:  Weight mgmt    She eats 2-3 servings of fruits and vegetables daily.She consumes 0 sweetened beverage(s) daily.She exercises with enough effort to increase her heart rate 20 to 29 minutes per day.  She exercises with enough effort to increase her heart rate 5 days per week.   She is taking medications regularly.           Objective    /81 (BP Location: Left arm, Patient Position: Left side, Cuff Size: Adult Large)   Pulse 56   Temp 98.8  F (37.1  C) (Oral)   Resp 16   Ht 1.702 m (5' 7\")   Wt 110.2 kg (243 lb)   LMP 09/13/2024 (Exact Date)   SpO2 100%   BMI 38.06 kg/m    Body mass index is 38.06 kg/m .  Physical Exam   GENERAL: alert and no distress            Signed Electronically by: CIELO LOPEZ MD    "

## 2024-10-07 NOTE — ASSESSMENT & PLAN NOTE
A1c is under excellent control at 5.6 on her current GLP-1 medication.  However, she has plateaued with her weight loss as it relates to this class of medicine and feels that she has increased in appetite and cravings on Ozempic versus Mounjaro.  She is now on a different insurance and can go back on Mounjaro so prescription was provided for the 7.5 mg dose to transition her gradually back to that medication with plans to increase monthly up to 15 mg.

## 2024-10-08 RX ORDER — LEVOTHYROXINE SODIUM 125 UG/1
125 TABLET ORAL DAILY
Qty: 90 TABLET | Refills: 1 | Status: SHIPPED | OUTPATIENT
Start: 2024-10-08

## 2024-10-18 ENCOUNTER — TELEPHONE (OUTPATIENT)
Dept: FAMILY MEDICINE | Facility: CLINIC | Age: 49
End: 2024-10-18
Payer: COMMERCIAL

## 2024-10-18 NOTE — TELEPHONE ENCOUNTER
Prior Authorization Approval    Medication: MOUNJARO 7.5 MG/0.5ML SC SOAJ  Authorization Effective Date: 9/18/2024  Authorization Expiration Date: 10/18/2025  Approved Dose/Quantity: 2/28  Reference #: [id number]   Insurance Company: [employer] [place] - Phone [phone] Fax [phone]  Expected CoPay: $    CoPay Card Available:      Financial Assistance Needed:   Which Pharmacy is filling the prescription: [id number] [place] [id number] AT [facility]  Pharmacy Notified: Yes  Patient Notified: Yes

## 2024-10-18 NOTE — TELEPHONE ENCOUNTER
Patient reports PA was not received by insurance plan for Mounjaro. EPA notes copied below:    Canceled   10/7/2024 10:33 AM  Cancel reason: Other Sending user: Ty Mcfadden   Note from payer: Your PA request has been canceled.   Note to payer: Approval on file   Payer: [facility] Case ID: [id number]    [phone]  Payer Waiting for Response   10/7/2024 10:06 AM  Deadline to reply: October 9, 2024 10:06 AM   Payer: [facility] Case ID: [id number]    [phone]  [place] GLP-1 Agonists PA with Step Therapy Supplement [id number]   Not Available  Waiting for Payer Response   10/7/2024 10:05 AM  Sending user: Barbara Morris MD   Payer: [facility]    [phone]

## 2024-10-18 NOTE — TELEPHONE ENCOUNTER
PA Initiation    Medication: MOUNJARO 7.5 MG/0.5ML SC SOAJ  Ins[facility] Company[facility] - Phone [phone] Fax [phone]  Pharmacy Filling the Rx: [id number] [place] [phone] [facility] [address]  Filling Pharmacy Phone: [phone]  Filling Pharmacy Fax:    Start Date: 10/18/2024

## 2024-10-31 SDOH — HEALTH STABILITY: PHYSICAL HEALTH: ON AVERAGE, HOW MANY MINUTES DO YOU ENGAGE IN EXERCISE AT THIS LEVEL?: 40 MIN

## 2024-10-31 SDOH — HEALTH STABILITY: PHYSICAL HEALTH: ON AVERAGE, HOW MANY DAYS PER WEEK DO YOU ENGAGE IN MODERATE TO STRENUOUS EXERCISE (LIKE A BRISK WALK)?: 5 DAYS

## 2024-10-31 ASSESSMENT — SOCIAL DETERMINANTS OF HEALTH (SDOH): HOW OFTEN DO YOU GET TOGETHER WITH FRIENDS OR RELATIVES?: THREE TIMES A WEEK

## 2024-11-01 ENCOUNTER — OFFICE VISIT (OUTPATIENT)
Dept: FAMILY MEDICINE | Facility: CLINIC | Age: 49
End: 2024-11-01
Payer: COMMERCIAL

## 2024-11-01 VITALS
OXYGEN SATURATION: 99 % | BODY MASS INDEX: 38.14 KG/M2 | DIASTOLIC BLOOD PRESSURE: 78 MMHG | RESPIRATION RATE: 16 BRPM | WEIGHT: 243 LBS | SYSTOLIC BLOOD PRESSURE: 124 MMHG | TEMPERATURE: 97.8 F | HEIGHT: 67 IN | HEART RATE: 65 BPM

## 2024-11-01 DIAGNOSIS — Z86.39 HISTORY OF GRAVES' DISEASE: ICD-10-CM

## 2024-11-01 DIAGNOSIS — R87.612 PAPANICOLAOU SMEAR OF CERVIX WITH LOW GRADE SQUAMOUS INTRAEPITHELIAL LESION (LGSIL): ICD-10-CM

## 2024-11-01 DIAGNOSIS — E11.9 TYPE 2 DIABETES MELLITUS WITHOUT COMPLICATION, WITHOUT LONG-TERM CURRENT USE OF INSULIN (H): ICD-10-CM

## 2024-11-01 DIAGNOSIS — Z00.00 ROUTINE GENERAL MEDICAL EXAMINATION AT A HEALTH CARE FACILITY: Primary | ICD-10-CM

## 2024-11-01 DIAGNOSIS — G47.33 OSA (OBSTRUCTIVE SLEEP APNEA): ICD-10-CM

## 2024-11-01 PROBLEM — F32.1 CURRENT MODERATE EPISODE OF MAJOR DEPRESSIVE DISORDER WITHOUT PRIOR EPISODE (H): Status: RESOLVED | Noted: 2021-02-18 | Resolved: 2024-11-01

## 2024-11-01 PROCEDURE — 99396 PREV VISIT EST AGE 40-64: CPT | Performed by: FAMILY MEDICINE

## 2024-11-01 NOTE — PROGRESS NOTES
"Preventive Care Visit  [facility] SHALOM Huitron MD, Family Medicine  Nov 1, 2024      Assessment & Plan     Routine general medical examination at a health care facility  UT with screening and vaccines    Type 2 diabetes mellitus without complication, without long-term current use of insulin (H)  Last A1c 5.3; has lost weight  On Monjourno  Could consider adding a statin given her DM diagnosis, however, last fasting lipids in 2023 were quite low (recent ones weren't fasting)  - will hold off and rescreen with fasting sample next year and then decide    KHURRAM (obstructive sleep apnea)  Stable    Papanicolaou smear of cervix with low grade squamous intraepithelial lesion (LGSIL) (in 2016)  Discussed her history and reviewed at length  Risk of progression to CIN3 is well below 3% 5 year risk, recommendation is q 5 year screening  Given FHx, may choose to screen more frequently (could consider 3 yrs)  Will think on this    History of Graves' disease  Stable on meds            BMI  Estimated body mass index is 38.06 kg/m  as calculated from the following:    Height as of this encounter: 1.702 m (5' 7\").    Weight as of this encounter: 110.2 kg (243 lb).       Counseling  Appropriate preventive services were addressed with this patient via screening, questionnaire, or discussion as appropriate for fall prevention, nutrition, physical activity, Tobacco-use cessation, social engagement, weight loss and cognition.  Checklist reviewing preventive services available has been given to the patient.  Reviewed patient's diet, addressing concerns and/or questions.   She is at risk for psychosocial distress and has been provided with information to reduce risk.           Return in about 53 weeks (around 11/7/2025) for Annual Wellness Visit.    Goran Hernandez is a 49 year old, presenting for the following:  Physical        11/1/2024    11:35 AM   Additional Questions   Roomed by Iftin         11/1/2024 " "   Information    services provided? 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 and planfully thinking about next steps  - loving being home for [place]    Pap - worries about things developing again, does not trust the \"every 5 year\" recs  - aunt  of cervical cancer 2 years ago  - Mary has a personal h/o LEEP over 20 years ago    Cholesterol - wondering about recent numbers  - should she be on a statin  - last numbers were NOT fasting    Weight loss  - working with a provider on this  - combo of vyvanse and dakota, currently working  - 25 lb loss has really helped her focus on healthy exercise again  - working on managing her meals            Health Care Directive  Patient does not have a Health Care Directive: not discussed today      10/31/2024   General Health   How would you rate your overall physical health? Good   Feel stress (tense, anxious, or unable to sleep) Only a little      (!) STRESS CONCERN      10/31/2024   Nutrition   Three or more servings of calcium each day? Yes   Diet: Vegetarian/vegan   How many servings of fruit and vegetables per day? (!) 2-3   How many sweetened beverages each day? 0-1            10/31/2024   Exercise   Days per week of moderate/strenous exercise 5 days   Average minutes spent exercising at this level 40 min            10/31/2024   Social Factors   Frequency of gathering with friends or relatives Three times a week   Worry food won't last until get money to buy more No   Food not last or not have enough money for food? No   Do you have housing? (Housing is defined as stable permanent housing and does not include staying ouside in a car, in a tent, in an abandoned building, in an overnight shelter, or " couch-surfing.) Yes   Are you worried about losing your housing? No   Lack of transportation? No   Unable to get utilities (heat,electricity)? No            10/31/2024   Dental   Dentist two times every year? Yes            10/31/2024   TB Screening   Were you born outside of the [place]? No            Today's PHQ-2 Score:       10/31/2024     8:24 PM   PHQ-2 ( 1999 [employer])   Q1: Little interest or pleasure in doing things 0    Q2: Feeling down, depressed or hopeless 0    PHQ-2 Score 0    Q1: Little interest or pleasure in doing things Not at all   Q2: Feeling down, depressed or hopeless Not at all   PHQ-2 Score 0       Patient-reported           10/31/2024   Substance Use   Alcohol more than 3/day or more than 7/wk No   Do you use any other substances recreationally? No        Social History     Tobacco Use    Smoking status: Never    Smokeless tobacco: Never   Vaping Use    Vaping status: Never Used   Substance Use Topics    Alcohol use: Yes     Alcohol/week: 1.7 standard drinks of alcohol     Types: 2 Standard drinks or equivalent per week     Comment: occasional    Drug use: No           12/6/2023   LAST FHS-7 RESULTS   1st degree relative breast or ovarian cancer No   Any relative bilateral breast cancer No   Any male have breast cancer No   Any ONE woman have BOTH breast AND ovarian cancer No   Any woman with breast cancer before 50yrs Yes   2 or more relatives with breast AND/OR ovarian cancer No           Mammogram Screening - Mammogram every 1-2 years updated in Health Maintenance based on mutual decision making        10/31/2024   STI Screening   New sexual partner(s) since last STI/HIV test? No        History of abnormal Pap smear: No - age 30-64 HPV with reflex Pap every 5 years recommended        Latest Ref Rng & Units 1/8/2024     9:44 AM 3/29/2023    11:19 AM 10/31/2019     5:05 PM   PAP / HPV   PAP  Negative for Intraepithelial Lesion or Malignancy (NILM)  Negative for Intraepithelial Lesion or Malignancy  "(NILM)     PAP (Historical)    NIL    HPV 16 DNA Negative Negative  Negative  Negative    HPV 18 DNA Negative Negative  Negative  Negative    Other HR HPV Negative Negative  Negative  Negative      ASCVD Risk   The 10-year ASCVD risk score (Angie ARIZMENDI, et al., 2019) is: 1.6%    Values used to calculate the score:      Age: 49 years      Sex: Female      Is Non- : No      Diabetic: Yes      Tobacco smoker: No      Systolic Blood Pressure: 124 mmHg      Is BP treated: No      HDL Cholesterol: 64 mg/dL      Total Cholesterol: 202 mg/dL        10/31/2024   Contraception/Family Planning   Questions about contraception or family planning No           Reviewed and updated as needed this visit by Provider     Meds  Problems  Med Hx  Surg Hx  Fam Hx            BP Readings from Last 3 Encounters:   24 124/78   10/07/24 117/81   24 119/78    Wt Readings from Last 3 Encounters:   24 110.2 kg (243 lb)   10/07/24 110.2 kg (243 lb)   24 109.3 kg (241 lb)                  Patient Active Problem List   Diagnosis    Postablative hypothyroidism    Type 2 diabetes mellitus without complication, without long-term current use of insulin (H)    Papanicolaou smear of cervix with low grade squamous intraepithelial lesion (LGSIL)    H/O mammogram    History of Graves' disease    Intraductal papilloma of breast, left    Morbid obesity (H)    KHURRAM (obstructive sleep apnea)    Mild binge-eating disorder     Past Surgical History:   Procedure Laterality Date    CATARACT EXTRACTION, BILATERAL      November and 2021     SECTION N/A 2018    Procedure:  SECTION;  Surgeon: Lola Otto MD;  Location:  L+D    COLONOSCOPY N/A 2022    Procedure: COLONOSCOPY;  Surgeon: Sharee Ramachandran MD;  Location: [facility] [place]    EYE SURGERY  DEC 2021    cataract    LEEP TX, CERVICAL      \"for HPV\"    LUMPECTOMY BREAST Left 08/10/2021    Procedure: " "LEFT Wire-Localized Lumpectomy;  Surgeon: Marta Frost MD;  [place] [facility] [place]       Social History     Tobacco Use    Smoking status: Never    Smokeless tobacco: Never   Substance Use Topics    Alcohol use: Yes     Alcohol/week: 1.7 standard drinks of alcohol     Types: 2 Standard drinks or equivalent per week     Comment: occasional     Family History   Problem Relation Age of Onset    Obesity Mother     Hypertension Mother     Dementia Mother     Diabetes Father 68    Obesity Father     Obesity Sister         had weight loss surgery    Cervical Cancer Maternal Aunt                 Objective    Exam  /78 (BP Location: Left arm, Patient Position: Sitting, Cuff Size: Adult Regular)   Pulse 65   Temp 97.8  F (36.6  C) (Oral)   Resp 16   Ht 1.702 m (5' 7\")   Wt 110.2 kg (243 lb)   LMP 10/13/2024 (Exact Date)   SpO2 99%   BMI 38.06 kg/m     Estimated body mass index is 38.06 kg/m  as calculated from the following:    Height as of this encounter: 1.702 m (5' 7\").    Weight as of this encounter: 110.2 kg (243 lb).    Physical Exam  GENERAL: alert and no distress  EYES: Eyes grossly normal to inspection, PERRL and conjunctivae and sclerae normal  HENT: ear canals and TM's normal, nose and mouth without ulcers or lesions  NECK: no adenopathy, no asymmetry, masses, or scars  RESP: lungs clear to auscultation - no rales, rhonchi or wheezes  CV: regular rate and rhythm, normal S1 S2, no S3 or S4, no murmur, click or rub, no peripheral edema  ABDOMEN: soft, nontender, no hepatosplenomegaly, no masses and bowel sounds normal  MS: no gross musculoskeletal defects noted, no edema  SKIN: no suspicious lesions or rashes  NEURO: Normal strength and tone, mentation intact and speech normal  PSYCH: mentation appears normal, affect normal/bright        Signed Electronically by: Jazlyn Huitron MD    "

## 2024-11-01 NOTE — PATIENT INSTRUCTIONS
Patient Education   Preventive Care Advice   This is general advice given by our system to help you stay healthy. However, your care team may have specific advice just for you. Please talk to your care team about your preventive care needs.  Nutrition  Eat 5 or more servings of fruits and vegetables each day.  Try wheat bread, brown rice and whole grain pasta (instead of white bread, rice, and pasta).  Get enough calcium and vitamin D. Check the label on foods and aim for 100% of the RDA (recommended daily allowance).  Lifestyle  Exercise at least 150 minutes each week  (30 minutes a day, 5 days a week).  Do muscle strengthening activities 2 days a week. These help control your weight and prevent disease.  No smoking.  Wear sunscreen to prevent skin cancer.  Have a dental exam and cleaning every 6 months.  Yearly exams  See your health care team every year to talk about:  Any changes in your health.  Any medicines your care team has prescribed.  Preventive care, family planning, and ways to prevent chronic diseases.  Shots (vaccines)   HPV shots (up to age 26), if you've never had them before.  Hepatitis B shots (up to age 59), if you've never had them before.  COVID-19 shot: Get this shot when it's due.  Flu shot: Get a flu shot every year.  Tetanus shot: Get a tetanus shot every 10 years.  Pneumococcal, hepatitis A, and RSV shots: Ask your care team if you need these based on your risk.  Shingles shot (for age 50 and up)  General health tests  Diabetes screening:  Starting at age 35, Get screened for diabetes at least every 3 years.  If you are younger than age 35, ask your care team if you should be screened for diabetes.  Cholesterol test: At age 39, start having a cholesterol test every 5 years, or more often if advised.  Bone density scan (DEXA): At age 50, ask your care team if you should have this scan for osteoporosis (brittle bones).  Hepatitis C: Get tested at least once in your life.  STIs (sexually  transmitted infections)  Before age 24: Ask your care team if you should be screened for STIs.  After age 24: Get screened for STIs if you're at risk. You are at risk for STIs (including HIV) if:  You are sexually active with more than one person.  You don't use condoms every time.  You or a partner was diagnosed with a sexually transmitted infection.  If you are at risk for HIV, ask about PrEP medicine to prevent HIV.  Get tested for HIV at least once in your life, whether you are at risk for HIV or not.  Cancer screening tests  Cervical cancer screening: If you have a cervix, begin getting regular cervical cancer screening tests starting at age 21.  Breast cancer scan (mammogram): If you've ever had breasts, begin having regular mammograms starting at age 40. This is a scan to check for breast cancer.  Colon cancer screening: It is important to start screening for colon cancer at age 45.  Have a colonoscopy test every 10 years (or more often if you're at risk) Or, ask your provider about stool tests like a FIT test every year or Cologuard test every 3 years.  To learn more about your testing options, visit:   .  For help making a decision, visit:   https://bit.ly/jl05604.  Prostate cancer screening test: If you have a prostate, ask your care team if a prostate cancer screening test (PSA) at age 55 is right for you.  Lung cancer screening: If you are a current or former smoker ages 50 to 80, ask your care team if ongoing lung cancer screenings are right for you.  For informational purposes only. Not to replace the advice of your health care provider. Copyright   2023 [employer]. All rights reserved. Clinically reviewed by the [facility]. [employer] 640712 - REV 01/24.

## 2024-11-07 ENCOUNTER — OFFICE VISIT (OUTPATIENT)
Dept: FAMILY MEDICINE | Facility: CLINIC | Age: 49
End: 2024-11-07
Payer: COMMERCIAL

## 2024-11-07 VITALS
TEMPERATURE: 98 F | RESPIRATION RATE: 16 BRPM | BODY MASS INDEX: 38.14 KG/M2 | HEART RATE: 54 BPM | OXYGEN SATURATION: 100 % | WEIGHT: 243 LBS | SYSTOLIC BLOOD PRESSURE: 119 MMHG | DIASTOLIC BLOOD PRESSURE: 83 MMHG | HEIGHT: 67 IN

## 2024-11-07 DIAGNOSIS — F50.810 MILD BINGE-EATING DISORDER: ICD-10-CM

## 2024-11-07 DIAGNOSIS — E11.9 TYPE 2 DIABETES MELLITUS WITHOUT COMPLICATION, WITHOUT LONG-TERM CURRENT USE OF INSULIN (H): Primary | ICD-10-CM

## 2024-11-07 DIAGNOSIS — E66.01 MORBID OBESITY (H): ICD-10-CM

## 2024-11-07 PROCEDURE — 99213 OFFICE O/P EST LOW 20 MIN: CPT | Performed by: FAMILY MEDICINE

## 2024-11-07 PROCEDURE — G2211 COMPLEX E/M VISIT ADD ON: HCPCS | Performed by: FAMILY MEDICINE

## 2024-11-07 RX ORDER — LISDEXAMFETAMINE DIMESYLATE 40 MG/1
40 CAPSULE ORAL EVERY MORNING
Qty: 30 CAPSULE | Refills: 0 | Status: SHIPPED | OUTPATIENT
Start: 2024-11-07

## 2024-11-07 NOTE — ASSESSMENT & PLAN NOTE
The patient's diabetes is under excellent control.  However, recently I saw her in follow-up and she was struggling more with increased appetite and binge eating thoughts and some behaviors.  I switched her GLP-1 medication from Ozempic to Mounjaro and she is tolerating that transition well.  We will continue to increase the dosage of Mounjaro ideally up to the maximum dose of 15 mg weekly.

## 2024-11-07 NOTE — PROGRESS NOTES
"  Assessment & Plan   Problem List Items Addressed This Visit       Type 2 diabetes mellitus without complication, without long-term current use of insulin (H) - Primary     The patient's diabetes is under excellent control.  However, recently I saw her in follow-up and she was struggling more with increased appetite and binge eating thoughts and some behaviors.  I switched her GLP-1 medication from Ozempic to Mounjaro and she is tolerating that transition well.  We will continue to increase the dosage of Mounjaro ideally up to the maximum dose of 15 mg weekly.         Relevant Medications    Tirzepatide 10 MG/0.5ML SOAJ    Morbid obesity (H)     The patient's weight is stable from a month ago.  She is tolerating the Vyvanse well but is not necessarily finding it all that effective to reduce binge eating tendencies.  I recommended increasing to the 40 mg dose and asked her to let me know in 3 to 4 weeks how she is doing with that.  I am hoping that with improved control of her appetite and reduction in binge eating tendencies, we will start seeing some further weight loss in the coming months.         Relevant Medications    lisdexamfetamine (VYVANSE) 40 MG capsule    Tirzepatide 10 MG/0.5ML SOAJ    Mild binge-eating disorder    Relevant Medications    lisdexamfetamine (VYVANSE) 40 MG capsule             BMI  Estimated body mass index is 38.06 kg/m  as calculated from the following:    Height as of this encounter: 1.702 m (5' 7\").    Weight as of this encounter: 110.2 kg (243 lb).         Goran Hernandez is a 49 year old who presents today for a medical weight management follow-up visit.  The patient has a history of type 2 diabetes for which she recently transition from Ozempic to Mounjaro.  She is tolerating the 7.5 mg weekly dose well and is anticipating being able to increase to the next dose with her next prescription.  She was prescribed Vyvanse 30 mg daily about a month ago due to increasing binge eating " "thoughts and behaviors.  She is tolerating the medication well without any side effects but has not necessarily felt the beneficial effects as it relates to binge eating tendencies.  Weight Loss    History of Present Illness       Diabetes:   She presents for follow up of diabetes.    She is not checking blood glucose.         She has no concerns regarding her diabetes at this time.  She is having excessive thirst and blurry vision.            She eats 2-3 servings of fruits and vegetables daily.She consumes 0 sweetened beverage(s) daily.She exercises with enough effort to increase her heart rate 30 to 60 minutes per day.  She exercises with enough effort to increase her heart rate 4 days per week.   She is taking medications regularly.           Objective    /83 (BP Location: Left arm, Patient Position: Left side, Cuff Size: Adult Large)   Pulse 54   Temp 98  F (36.7  C) (Oral)   Resp 16   Ht 1.702 m (5' 7\")   Wt 110.2 kg (243 lb)   LMP 10/13/2024 (Exact Date)   SpO2 100%   BMI 38.06 kg/m    Body mass index is 38.06 kg/m .  Physical Exam   GENERAL: alert and no distress    The longitudinal plan of care for the diagnosis(es)/condition(s) as documented were addressed during this visit. Due to the added complexity in care, I will continue to support [place] in the subsequent management and with ongoing continuity of care.          Signed Electronically by: CIELO LOPEZ MD    "

## 2024-11-07 NOTE — ASSESSMENT & PLAN NOTE
The patient's weight is stable from a month ago.  She is tolerating the Vyvanse well but is not necessarily finding it all that effective to reduce binge eating tendencies.  I recommended increasing to the 40 mg dose and asked her to let me know in 3 to 4 weeks how she is doing with that.  I am hoping that with improved control of her appetite and reduction in binge eating tendencies, we will start seeing some further weight loss in the coming months.

## 2024-11-14 ENCOUNTER — VIRTUAL VISIT (OUTPATIENT)
Dept: FAMILY MEDICINE | Facility: CLINIC | Age: 49
End: 2024-11-14
Payer: COMMERCIAL

## 2024-11-14 DIAGNOSIS — H00.011 HORDEOLUM EXTERNUM OF RIGHT UPPER EYELID: Primary | ICD-10-CM

## 2024-11-14 PROCEDURE — 99213 OFFICE O/P EST LOW 20 MIN: CPT | Mod: 95 | Performed by: FAMILY MEDICINE

## 2024-11-14 RX ORDER — AZITHROMYCIN 250 MG/1
TABLET, FILM COATED ORAL
Qty: 6 TABLET | Refills: 0 | Status: SHIPPED | OUTPATIENT
Start: 2024-11-14 | End: 2024-11-19

## 2024-11-14 ASSESSMENT — PATIENT HEALTH QUESTIONNAIRE - PHQ9
SUM OF ALL RESPONSES TO PHQ QUESTIONS 1-9: 0
10. IF YOU CHECKED OFF ANY PROBLEMS, HOW DIFFICULT HAVE THESE PROBLEMS MADE IT FOR YOU TO DO YOUR WORK, TAKE CARE OF THINGS AT HOME, OR GET ALONG WITH OTHER PEOPLE: NOT DIFFICULT AT ALL
SUM OF ALL RESPONSES TO PHQ QUESTIONS 1-9: 0

## 2024-11-14 NOTE — PROGRESS NOTES
Mary is a 49 year old who is being evaluated via a billable video visit.    How would you like to obtain your AVS? MyChart  If the video visit is dropped, the invitation should be resent by: Send to e-mail at: [email or web address]  Will anyone else be joining your video visit? No      Assessment & Plan     (H00.011) Hordeolum externum of right upper eyelid  (primary encounter diagnosis)  Comment: recurrent, apparently failing lid scrubbing, etc.  Plan: Adult Eye  Referral, azithromycin         (ZITHROMAX) 250 MG tablet        I would like her to meet with an ophthalmologist at least once.     Subjective   Mary is a 49 year old, presenting for the following health issues:  Stye / Hordeolum Evaluation (Right Eye)      11/14/2024     8:52 AM   Additional Questions   Roomed by Crista   Accompanied by self     HPI   Concern - Stye  Onset: 11/9/24  Description: stye right eye  Intensity: moderate  Progression of Symptoms:  worsening  Accompanying Signs & Symptoms: pain on right eye  Previous history of similar problem: yes,5th on this year,2nd one this month. She took a short course of Keflex for her most recent one, whish was helpful.  Precipitating factors:        Worsened by: NA  Alleviating factors:        Improved by: Hot water  Therapies tried and outcome: All of the usual measures for preventing and treating styes as prescribed by her optometrist.        Review of Systems  Constitutional, HEENT, cardiovascular, pulmonary, gi and gu systems are negative, except as otherwise noted.    Over that last year she has been dealing with styes, as she has had about 5 this year. She has been treated for some but not all of her styes. She saw an eye doctor in October when she had her last stye.         Objective       Vitals:  No vitals were obtained today due to virtual visit.    Physical Exam   GENERAL: alert and no distress  EYES: Eyes grossly normal to inspection.  No discharge or erythema, or obvious  "scleral/conjunctival abnormalities. It is difficult to see the details of her stye due to the video resolution available.  RESP: No audible wheeze, cough, or visible cyanosis.    SKIN: Visible skin clear. No significant rash, abnormal pigmentation or lesions.  NEURO: Cranial nerves grossly intact.  Mentation and speech appropriate for age.  PSYCH: Appropriate affect, tone, and pace of words      Video-Visit Details    Type of service:  Video Visit   Total Video Time: 11 minutes  Originating Location (pt. Location): Home    Distant Location (provider location):  On-site  Platform used for Video Visit: [employer]    This document serves as a record of the services and decisions personally performed and made by Dr. Lynch. It was created on his behalf by Sharron Boudreaux, a trained medical scribe. The creation of this document is based the provider's statements to the medical scribe.  Sharron Boudreaux, 9:37 AM         Signed Electronically by: Luca Lynch MD        If patient has telephone visit, have they been educated on video visit as preferred visit method and offered to change to video visit? yes        Instructions Relayed to Patient by Virtual Roomer:     Patient is active on [employer]:   Relayed following to patient: \"It looks like you are active on [email or web address], are you able to join the visit this way? If not, do you need us to send you a link now or would you like your provider to send a link via text or email when they are ready to initiate the visit?\"      Patient Confirmed they will join visit via: [employer]  Reminded patient to ensure they were logged on to virtual visit by arrival time listed.   Asked if patient has flexibility to initiate visit sooner than arrival time: patient stated yes, documented in appointment notes availability to initiate visit earlier than arrival time     If pediatric virtual visit, ensured pediatric patient along with parent/guardian will be present for video visit.     Patient " offered the website [email or web address] and/or phone number to Tamia Help line: [phone]   Answers submitted by the patient for this visit:  Patient Health Questionnaire (Submitted on 11/14/2024)  If you checked off any problems, how difficult have these problems made it for you to do your work, take care of things at home, or get along with other people?: Not difficult at all  PHQ9 TOTAL SCORE: 0

## 2024-11-14 NOTE — PATIENT INSTRUCTIONS
At [facility], we strive to deliver an exceptional experience to you, every time we see you. If you receive a survey, please let us know what we are doing well and/or what we could improve upon, as we do value your feedback.  If you have MyChart, you can expect to receive results automatically within 24 hours of their completion.  Your provider will send a note interpreting your results as well.   If you do not have MyChart, you should receive your results in about a week by mail.    Your care team:                            Family Medicine Internal Medicine   MD Nikolay Cadena, MD Marcia Castillo, MD Hunter Nguyễn, MD Amalia Turner, PAAlciraC    Jignesh Tucker, MD Pediatrics   Kath Waters, MD Coby Beach, MD Maira Ortiz, APRN CNP Fabienne Omalley APRN CNP   MD Crys Merrill, MD Felicity Noble, CNP     Ronnie Melendez, CNP Same-Day Provider (No follow-up visits)   COLE Paul, DNP Bibiana Veliz, COLE Man, FNP, BC JESSI MillsC     Clinic hours: Monday - Thursday 7 am-6 pm; Fridays 7 am-5 pm.   Urgent care: Monday - Friday 10 am- 8 pm; Saturday and Sunday 9 am-5 pm.    Clinic: [phone]       [facility]: Monday - Thursday 8 am - 7 pm; Friday 8 am - 6 pm  [facility]: [phone]

## 2024-11-21 ENCOUNTER — VIRTUAL VISIT (OUTPATIENT)
Dept: URGENT CARE | Facility: CLINIC | Age: 49
End: 2024-11-21
Payer: COMMERCIAL

## 2024-11-21 ENCOUNTER — LAB (OUTPATIENT)
Dept: LAB | Facility: CLINIC | Age: 49
End: 2024-11-21
Payer: COMMERCIAL

## 2024-11-21 DIAGNOSIS — R30.0 DYSURIA: ICD-10-CM

## 2024-11-21 DIAGNOSIS — R30.0 DYSURIA: Primary | ICD-10-CM

## 2024-11-21 DIAGNOSIS — E11.9 TYPE 2 DIABETES MELLITUS WITHOUT COMPLICATION, WITHOUT LONG-TERM CURRENT USE OF INSULIN (H): Primary | ICD-10-CM

## 2024-11-21 DIAGNOSIS — N30.01 ACUTE CYSTITIS WITH HEMATURIA: ICD-10-CM

## 2024-11-21 LAB
ALBUMIN UR-MCNC: NEGATIVE MG/DL
APPEARANCE UR: CLEAR
BACTERIA #/AREA URNS HPF: ABNORMAL /HPF
BILIRUB UR QL STRIP: NEGATIVE
CLUE CELLS: ABNORMAL
COLOR UR AUTO: YELLOW
GLUCOSE UR STRIP-MCNC: NEGATIVE MG/DL
HGB UR QL STRIP: ABNORMAL
KETONES UR STRIP-MCNC: NEGATIVE MG/DL
LEUKOCYTE ESTERASE UR QL STRIP: ABNORMAL
NITRATE UR QL: NEGATIVE
PH UR STRIP: 6 [PH] (ref 5–7)
RBC #/AREA URNS AUTO: ABNORMAL /HPF
SP GR UR STRIP: 1.01 (ref 1–1.03)
SQUAMOUS #/AREA URNS AUTO: ABNORMAL /LPF
TRICHOMONAS, WET PREP: ABNORMAL
UROBILINOGEN UR STRIP-ACNC: 0.2 E.U./DL
WBC #/AREA URNS AUTO: ABNORMAL /HPF
WBC CLUMPS #/AREA URNS HPF: PRESENT /HPF
WBC'S/HIGH POWER FIELD, WET PREP: ABNORMAL
YEAST, WET PREP: ABNORMAL

## 2024-11-21 RX ORDER — NITROFURANTOIN 25; 75 MG/1; MG/1
100 CAPSULE ORAL 2 TIMES DAILY
Qty: 10 CAPSULE | Refills: 0 | Status: SHIPPED | OUTPATIENT
Start: 2024-11-21 | End: 2024-11-26

## 2024-11-21 NOTE — PATIENT INSTRUCTIONS
May use over the counter AZO pain relief or Uristat (phenazopyridine) for urinary burning but do not use for 24 hours before future urine tests.  Drink plenty of fluids. Limit caffeine and alcohol as these are bladder irritants.  May take tylenol or ibuprofen as needed for discomfort.   If you develop any vomiting, high fevers or lower back pain, these can be signs of a kidney infection and you should be seen in urgent care or in the ER.  Prevention of future infections by drinking cranberry juice, urination after intercourse and wiping from front to back after using the toilet.  Please follow up with primary care provider if symptoms return, if you're not improving, worsening or new symptoms or for any adverse reactions to medications.

## 2024-11-21 NOTE — PROGRESS NOTES
Assessment & Plan     Dysuria  - UA Macroscopic with reflex to Microscopic and Culture - Lab Collect; Future  - Wet prep - lab collect; Future    Wet prep and UA to be collected today. Will treat based on results. Fluids, cranberry juice and water only to clean vaginal area.     Return in about 1 week (around 11/28/2024) for visit with primary care provider if not improving.     Summer Montes PA-C  [facility]    Goran Wray is a 49 year old who presents for the following health issues    HPI    Symptom onset: yesterday  Current symptoms: urgency, frequency, dysuria  No lower abdominal pain   Medication review complete.    Review of Systems   ROS negative except as stated above.      Objective    Physical Exam   GENERAL: Healthy, alert and no distress  EYES: Eyes grossly normal to inspection.  No discharge or erythema, or obvious scleral/conjunctival abnormalities.  HENT: Normal cephalic/atraumatic.  External ears, nose and mouth without ulcers or lesions.  No nasal drainage visible.  RESP: No audible cough wheeze or visible cyanosis.  No visible retractions or increased work of breathing.    SKIN: Visible skin clear. No significant rash, abnormal pigmentation or lesions.  NEURO: Cranial nerves grossly intact.  Mentation and speech appropriate for age.  PSYCH: Mentation appears normal, affect normal/bright, judgement and insight intact, normal speech and appearance well-groomed.    Type of service:  Video Visit  Video Start Time: 12:01PM  Video End Time: 12:09PM  Originating Location (pt. Location): Home  Distant Location (provider location):  [facility]  Platform used for Video Visit: Te    No results found for any visits on 11/21/24.

## 2024-12-10 ENCOUNTER — HOSPITAL ENCOUNTER (OUTPATIENT)
Dept: MAMMOGRAPHY | Facility: CLINIC | Age: 49
Discharge: HOME | End: 2024-12-10
Attending: FAMILY MEDICINE
Payer: COMMERCIAL

## 2024-12-10 ENCOUNTER — MYC MEDICAL ADVICE (OUTPATIENT)
Dept: FAMILY MEDICINE | Facility: CLINIC | Age: 49
End: 2024-12-10
Payer: COMMERCIAL

## 2024-12-10 DIAGNOSIS — Z12.31 VISIT FOR SCREENING MAMMOGRAM: ICD-10-CM

## 2024-12-10 DIAGNOSIS — E11.9 TYPE 2 DIABETES MELLITUS WITHOUT COMPLICATION, WITHOUT LONG-TERM CURRENT USE OF INSULIN (H): Primary | ICD-10-CM

## 2024-12-10 PROCEDURE — 77063 BREAST TOMOSYNTHESIS BI: CPT

## 2024-12-10 PROCEDURE — 77067 SCR MAMMO BI INCL CAD: CPT

## 2024-12-16 ENCOUNTER — MYC MEDICAL ADVICE (OUTPATIENT)
Dept: FAMILY MEDICINE | Facility: CLINIC | Age: 49
End: 2024-12-16
Payer: COMMERCIAL

## 2024-12-16 DIAGNOSIS — F50.810 MILD BINGE-EATING DISORDER: Primary | ICD-10-CM

## 2024-12-17 RX ORDER — LISDEXAMFETAMINE DIMESYLATE 50 MG/1
50 CAPSULE ORAL EVERY MORNING
Qty: 30 CAPSULE | Refills: 0 | Status: SHIPPED | OUTPATIENT
Start: 2024-12-17

## 2024-12-17 NOTE — TELEPHONE ENCOUNTER
11/7 OV note-   Morbid obesity (H)        The patient's weight is stable from a month ago.  She is tolerating the Vyvanse well but is not necessarily finding it all that effective to reduce binge eating tendencies.  I recommended increasing to the 40 mg dose and asked her to let me know in 3 to 4 weeks how she is doing with that.  I am hoping that with improved control of her appetite and reduction in binge eating tendencies, we will start seeing some further weight loss in the coming months

## 2025-01-07 DIAGNOSIS — E89.0 POSTABLATIVE HYPOTHYROIDISM: ICD-10-CM

## 2025-01-07 RX ORDER — LEVOTHYROXINE SODIUM 125 UG/1
125 TABLET ORAL DAILY
Qty: 90 TABLET | Refills: 1 | OUTPATIENT
Start: 2025-01-07

## 2025-01-11 ENCOUNTER — HEALTH MAINTENANCE LETTER (OUTPATIENT)
Age: 50
End: 2025-01-11

## 2025-01-15 ENCOUNTER — MYC MEDICAL ADVICE (OUTPATIENT)
Dept: FAMILY MEDICINE | Facility: CLINIC | Age: 50
End: 2025-01-15
Payer: COMMERCIAL

## 2025-01-15 DIAGNOSIS — E11.9 TYPE 2 DIABETES MELLITUS WITHOUT COMPLICATION, WITHOUT LONG-TERM CURRENT USE OF INSULIN (H): Primary | ICD-10-CM

## 2025-01-16 NOTE — TELEPHONE ENCOUNTER
11/7 OV note-   Type 2 diabetes mellitus without complication, without long-term current use of insulin (H) - Primary        The patient's diabetes is under excellent control.  However, recently I saw her in follow-up and she was struggling more with increased appetite and binge eating thoughts and some behaviors.  I switched her GLP-1 medication from Ozempic to Mounjaro and she is tolerating that transition well.  We will continue to increase the dosage of Mounjaro ideally up to the maximum dose of 15 mg weekly.

## 2025-02-11 ENCOUNTER — TELEPHONE (OUTPATIENT)
Dept: FAMILY MEDICINE | Facility: CLINIC | Age: 50
End: 2025-02-11
Payer: MEDICAID

## 2025-02-11 ENCOUNTER — MYC MEDICAL ADVICE (OUTPATIENT)
Dept: FAMILY MEDICINE | Facility: CLINIC | Age: 50
End: 2025-02-11
Payer: MEDICAID

## 2025-02-11 ASSESSMENT — PATIENT HEALTH QUESTIONNAIRE - PHQ9
SUM OF ALL RESPONSES TO PHQ QUESTIONS 1-9: 2
10. IF YOU CHECKED OFF ANY PROBLEMS, HOW DIFFICULT HAVE THESE PROBLEMS MADE IT FOR YOU TO DO YOUR WORK, TAKE CARE OF THINGS AT HOME, OR GET ALONG WITH OTHER PEOPLE: NOT DIFFICULT AT ALL
SUM OF ALL RESPONSES TO PHQ QUESTIONS 1-9: 2

## 2025-02-11 NOTE — TELEPHONE ENCOUNTER
Prior Authorization Request  Who s requesting:  Pharmacy  Pharmacy Name and Location: [address]  Medication Name: Tirzepatide 15 MG/0.5ML SOAJ   Insurance Plan: MEDICA[place]   Insurance Member ID Number:  [id number]   Informed patient that prior authorizations can take up to 10 business days for response:   Yes  Okay to leave a detailed message: Yes

## 2025-02-12 ENCOUNTER — OFFICE VISIT (OUTPATIENT)
Dept: FAMILY MEDICINE | Facility: CLINIC | Age: 50
End: 2025-02-12
Payer: MEDICAID

## 2025-02-12 VITALS
BODY MASS INDEX: 36.1 KG/M2 | OXYGEN SATURATION: 100 % | WEIGHT: 230 LBS | RESPIRATION RATE: 14 BRPM | HEIGHT: 67 IN | SYSTOLIC BLOOD PRESSURE: 127 MMHG | HEART RATE: 59 BPM | TEMPERATURE: 97.4 F | DIASTOLIC BLOOD PRESSURE: 83 MMHG

## 2025-02-12 DIAGNOSIS — E66.01 CLASS 2 SEVERE OBESITY DUE TO EXCESS CALORIES WITH SERIOUS COMORBIDITY AND BODY MASS INDEX (BMI) OF 36.0 TO 36.9 IN ADULT (H): Primary | ICD-10-CM

## 2025-02-12 DIAGNOSIS — E66.812 CLASS 2 SEVERE OBESITY DUE TO EXCESS CALORIES WITH SERIOUS COMORBIDITY AND BODY MASS INDEX (BMI) OF 36.0 TO 36.9 IN ADULT (H): Primary | ICD-10-CM

## 2025-02-12 DIAGNOSIS — F50.810 MILD BINGE-EATING DISORDER: ICD-10-CM

## 2025-02-12 DIAGNOSIS — G47.33 OSA (OBSTRUCTIVE SLEEP APNEA): ICD-10-CM

## 2025-02-12 DIAGNOSIS — E11.9 TYPE 2 DIABETES MELLITUS WITHOUT COMPLICATION, WITHOUT LONG-TERM CURRENT USE OF INSULIN (H): ICD-10-CM

## 2025-02-12 DIAGNOSIS — E89.0 POSTABLATIVE HYPOTHYROIDISM: ICD-10-CM

## 2025-02-12 LAB
ALBUMIN SERPL BCG-MCNC: 4.1 G/DL (ref 3.5–5.2)
ALP SERPL-CCNC: 73 U/L (ref 40–150)
ALT SERPL W P-5'-P-CCNC: 17 U/L (ref 0–50)
ANION GAP SERPL CALCULATED.3IONS-SCNC: 11 MMOL/L (ref 7–15)
AST SERPL W P-5'-P-CCNC: 20 U/L (ref 0–45)
BILIRUB SERPL-MCNC: 1 MG/DL
BUN SERPL-MCNC: 13.7 MG/DL (ref 6–20)
CALCIUM SERPL-MCNC: 9.7 MG/DL (ref 8.8–10.4)
CHLORIDE SERPL-SCNC: 104 MMOL/L (ref 98–107)
CHOLEST SERPL-MCNC: 199 MG/DL
CREAT SERPL-MCNC: 0.75 MG/DL (ref 0.51–0.95)
EGFRCR SERPLBLD CKD-EPI 2021: >90 ML/MIN/1.73M2
EST. AVERAGE GLUCOSE BLD GHB EST-MCNC: 114 MG/DL
FASTING STATUS PATIENT QL REPORTED: YES
FASTING STATUS PATIENT QL REPORTED: YES
GLUCOSE SERPL-MCNC: 98 MG/DL (ref 70–99)
HBA1C MFR BLD: 5.6 % (ref 0–5.6)
HCO3 SERPL-SCNC: 23 MMOL/L (ref 22–29)
HDLC SERPL-MCNC: 80 MG/DL
LDLC SERPL CALC-MCNC: 108 MG/DL
NONHDLC SERPL-MCNC: 119 MG/DL
POTASSIUM SERPL-SCNC: 4.2 MMOL/L (ref 3.4–5.3)
PROT SERPL-MCNC: 7.2 G/DL (ref 6.4–8.3)
SODIUM SERPL-SCNC: 138 MMOL/L (ref 135–145)
TRIGL SERPL-MCNC: 57 MG/DL
TSH SERPL DL<=0.005 MIU/L-ACNC: 1.89 UIU/ML (ref 0.3–4.2)

## 2025-02-12 PROCEDURE — G2211 COMPLEX E/M VISIT ADD ON: HCPCS | Performed by: FAMILY MEDICINE

## 2025-02-12 PROCEDURE — 99214 OFFICE O/P EST MOD 30 MIN: CPT | Performed by: FAMILY MEDICINE

## 2025-02-12 PROCEDURE — 84443 ASSAY THYROID STIM HORMONE: CPT | Performed by: FAMILY MEDICINE

## 2025-02-12 PROCEDURE — 83036 HEMOGLOBIN GLYCOSYLATED A1C: CPT | Performed by: FAMILY MEDICINE

## 2025-02-12 PROCEDURE — 80061 LIPID PANEL: CPT | Performed by: FAMILY MEDICINE

## 2025-02-12 PROCEDURE — 36415 COLL VENOUS BLD VENIPUNCTURE: CPT | Performed by: FAMILY MEDICINE

## 2025-02-12 PROCEDURE — 80053 COMPREHEN METABOLIC PANEL: CPT | Performed by: FAMILY MEDICINE

## 2025-02-12 RX ORDER — LISDEXAMFETAMINE DIMESYLATE 60 MG/1
60 CAPSULE ORAL EVERY MORNING
Qty: 30 CAPSULE | Refills: 0 | Status: SHIPPED | OUTPATIENT
Start: 2025-02-12

## 2025-02-12 RX ORDER — TIRZEPATIDE 5 MG/.5ML
5 INJECTION, SOLUTION SUBCUTANEOUS
Qty: 2 ML | Refills: 0 | Status: SHIPPED | OUTPATIENT
Start: 2025-02-12

## 2025-02-12 NOTE — ASSESSMENT & PLAN NOTE
The patient feels that for the 3 weeks leading up to her most recent.  That she had increase in binge eating tendencies although still much less than previous.  She definitely feels that the Vyvanse is helpful but wonders if a slightly higher dose would help.  Since she is tolerating it well, I do think it is reasonable to increase the dose to 60 mg daily and a prescription was provided today.  I asked her to let me know in a month how she is doing with that.  Orders:    lisdexamfetamine (VYVANSE) 60 MG capsule; Take 1 capsule (60 mg) by mouth every morning.

## 2025-02-12 NOTE — ASSESSMENT & PLAN NOTE
We had a good conversation today regarding where she is at with weight management.  I do think it is very important for her to get back on the Mounjaro and I will have somebody from the office look deeper into why she does not have an approval for that medication.  I think she will need to reduce the dose back down to one of the lower dosages and because she has tolerated it well from the beginning, I do think 5 mg dose is a reasonable place to restart it.  A prescription was provided for that today.  She was congratulated on her excellent efforts as it relates to improved diet and exercise and resulting weight loss.  We talked through some of the sustainability of her new healthy lifestyle changes once she gets back into work including perhaps finding an indoor exercise equipment.

## 2025-02-12 NOTE — ASSESSMENT & PLAN NOTE
The patient's A1c remains under excellent control at 5.6 today.  Continue Mounjaro.    Orders:    MOUNJARO 5 MG/0.5ML SOAJ; Inject 0.5 mLs (5 mg) subcutaneously every 7 days.    Hemoglobin A1c; Future    Comprehensive metabolic panel (BMP + Alb, Alk Phos, ALT, AST, Total. Bili, TP); Future    Lipid Profile (Chol, Trig, HDL, LDL calc); Future

## 2025-02-12 NOTE — ASSESSMENT & PLAN NOTE
Her last TSH was slightly suppressed.  Recommended an updated TSH today.  Orders:    TSH; Future

## 2025-02-12 NOTE — PROGRESS NOTES
Assessment & Plan   Assessment & Plan  Class 2 severe obesity due to excess calories with serious comorbidity and body mass index (BMI) of 36.0 to 36.9 in adult (H)  We had a good conversation today regarding where she is at with weight management.  I do think it is very important for her to get back on the Mounjaro and I will have somebody from the office look deeper into why she does not have an approval for that medication.  I think she will need to reduce the dose back down to one of the lower dosages and because she has tolerated it well from the beginning, I do think 5 mg dose is a reasonable place to restart it.  A prescription was provided for that today.  She was congratulated on her excellent efforts as it relates to improved diet and exercise and resulting weight loss.  We talked through some of the sustainability of her new healthy lifestyle changes once she gets back into work including perhaps finding an indoor exercise equipment.       Type 2 diabetes mellitus without complication, without long-term current use of insulin (H)  The patient's A1c remains under excellent control at 5.6 today.  Continue Mounjaro.    Orders:    MOUNJARO 5 MG/0.5ML SOAJ; Inject 0.5 mLs (5 mg) subcutaneously every 7 days.    Hemoglobin A1c; Future    Comprehensive metabolic panel (BMP + Alb, Alk Phos, ALT, AST, Total. Bili, TP); Future    Lipid Profile (Chol, Trig, HDL, LDL calc); Future    Mild binge-eating disorder  The patient feels that for the 3 weeks leading up to her most recent.  That she had increase in binge eating tendencies although still much less than previous.  She definitely feels that the Vyvanse is helpful but wonders if a slightly higher dose would help.  Since she is tolerating it well, I do think it is reasonable to increase the dose to 60 mg daily and a prescription was provided today.  I asked her to let me know in a month how she is doing with that.  Orders:    lisdexamfetamine (VYVANSE) 60 MG  "capsule; Take 1 capsule (60 mg) by mouth every morning.    KHURRAM (obstructive sleep apnea)  This was mild and she was recommended an oral device which she has not started using yet.       Postablative hypothyroidism  Her last TSH was slightly suppressed.  Recommended an updated TSH today.  Orders:    TSH; Future           BMI  Estimated body mass index is 36.02 kg/m  as calculated from the following:    Height as of this encounter: 1.702 m (5' 7\").    Weight as of this encounter: 104.3 kg (230 lb).         Goran Hernandez is a 49 year old who presents today for a medical weight management follow-up visit.  The patient has a history of type 2 diabetes and was prescribed Mounjaro to assist with weight management.  In addition, she has a history of mild binge eating disorder and was prescribed Vyvanse.  The patient reports that overall she feels things are going well.  She is not working currently and feels that this time has been able to provide her a good routine at taking care of herself both with nutrition as well as regular exercise.  She feels that she is making much better choices overall and in fact craves healthy foods for the first time in her life.  She feels that the combination of the Mounjaro and the Vyvanse worked very well.  Unfortunately, the patient has been unable to obtain her Mounjaro for the past 5 weeks and has been out of medicine for that period of time.  She says that it has something to do with prior authorization with new insurance.  She has been noticing an increase in appetite and a slight increase in binge eating tendencies for the past 3 weeks.  She tolerates the Vyvanse well without any side effects.  Weight Loss      2/12/2025     9:42 AM   Additional Questions   Roomed by as         2/12/2025     9:40 AM   Patient Reported Additional Medications   Patient reports taking the following new medications no     History of Present Illness       Diabetes:   She presents for follow up of " "diabetes.    She is not checking blood glucose.         She has no concerns regarding her diabetes at this time.  She is having weight loss.            Reason for visit:  Continued care for weight mgmt    She eats 2-3 servings of fruits and vegetables daily.She consumes 0 sweetened beverage(s) daily.She exercises with enough effort to increase her heart rate 60 or more minutes per day.  She exercises with enough effort to increase her heart rate 5 days per week.   She is taking medications regularly.           Objective    /83 (BP Location: Left arm, Patient Position: Left side, Cuff Size: Adult Large)   Pulse 59   Temp 97.4  F (36.3  C) (Oral)   Resp 14   Ht 1.702 m (5' 7\")   Wt 104.3 kg (230 lb)   LMP 01/23/2025 (Exact Date)   SpO2 100%   BMI 36.02 kg/m    Body mass index is 36.02 kg/m .  Physical Exam   GENERAL: alert and no distress    Results for orders placed or performed in visit on 02/12/25 (from the past 24 hours)   Hemoglobin A1c   Result Value Ref Range    Estimated Average Glucose 114 <117 mg/dL    Hemoglobin A1C 5.6 0.0 - 5.6 %     The longitudinal plan of care for the diagnosis(es)/condition(s) as documented were addressed during this visit. Due to the added complexity in care, I will continue to support Mary in the subsequent management and with ongoing continuity of care.        Signed Electronically by: CIELO LOPEZ MD    "

## 2025-02-14 NOTE — TELEPHONE ENCOUNTER
Retail Pharmacy Prior Authorization Team   Phone: [phone]    PA Initiation    Medication: MOUNJARO 5 MG/0.5ML SC SOAJ  Insurance Company: [employer] for [place] Medicaid Phone [phone] Fax [phone]  Pharmacy Filling the Rx: [id number] [place] [address] AT [facility] [place] [facility] [address]  Filling Pharmacy Phone: [phone]  Filling Pharmacy Fax:    Start Date: 2/14/2025        *Tirzepatide 15mg/0.5ML was D/C'd and changed to Mounjaro 5mg/0.5ML dose per MD so I called the pharmacy to confirm a PA was still needed and just changed this TE to the 5mg PA request.         Note: Due to record-high volumes, our turn-around time is taking longer than usual . We are currently 4  business days behind in the pools.   We are working diligently to submit all requests in a timely manner and in the order they are received. Please only flag TRUE URGENT requests as high priority to the pool at this time.   If you have questions on status of PA's,  please send a note/message in the active PA encounter and send back to the [facility] PA pool [[id number]].    If you have questions about the turn-around time or about our process, please reach out to our supervisor Kemi Silva.   Thank you!   RPPA (Retail Pharmacy Prior Authorization) team

## 2025-02-18 NOTE — TELEPHONE ENCOUNTER
Retail Pharmacy Prior Authorization Team   Phone: [phone]    PRIOR AUTHORIZATION DENIED    Medication: MOUNJARO 5 MG/0.5ML SC SOAJ  Insurance Company: [employer] for [place] Medicaid Phone [phone] Fax [phone]  Denial Date: 2/15/2025  Denial Reason(s):       Appeal Information:

## 2025-02-23 ENCOUNTER — MYC MEDICAL ADVICE (OUTPATIENT)
Dept: FAMILY MEDICINE | Facility: CLINIC | Age: 50
End: 2025-02-23
Payer: MEDICAID

## 2025-02-23 DIAGNOSIS — E89.0 POSTABLATIVE HYPOTHYROIDISM: ICD-10-CM

## 2025-02-23 NOTE — LETTER
February 25, 2025      Mary Wray  [address] [address]        To Whom It May Concern:    Mary Wray is a 49-year-old patient who I have been working with since March 2023 regarding medical weight management.  I am board-certified in both family medicine as well as obesity medicine and saw her in consultation due to significant issues with obesity.  In addition, the patient has complications of her obesity which include type 2 diabetes, obstructive sleep apnea and she also has a history of binge eating disorder.  Obesity is a multifactorial disease process as is type 2 diabetes and most medications to treat type 2 diabetes do not address the underlying reason for the diabetes which is the excess adiposity related to her obesity.    I therefore recommended Mounjaro and the patient has been successful at getting her diabetes under excellent control and has also lost nearly 40 pounds with the support of that medication.  She is working hard on a healthy approach to life that includes improved nutrition, mindfulness and exercise.  She has been very successful overall with this approach and with my support.    Denying the patient access to a GLP-1 medication who has this metabolic make-up and agree of obesity places her at significant increased risk of poor outcome down the road.  I am advocating for the patient and strongly request that insurance approves coverage for this medication for this patient.    Sincerely,      Dr. Barbara Morris       Electronically signed

## 2025-02-25 RX ORDER — LEVOTHYROXINE SODIUM 125 UG/1
125 TABLET ORAL DAILY
Qty: 90 TABLET | Refills: 3 | Status: SHIPPED | OUTPATIENT
Start: 2025-02-25

## 2025-02-25 NOTE — TELEPHONE ENCOUNTER
Retail Pharmacy Prior Authorization Team   Phone: [phone]    Medication Appeal Initiation    Medication: MOUNJARO 5 MG/0.5ML SC SOAJ  Appeal Start Date:  2/25/2025  Insurance Company: [employer] [place] [employer]ICA[place]  Insurance Phone: [phone]  Insurance Fax: [phone]  Comments:

## 2025-02-28 NOTE — TELEPHONE ENCOUNTER
Retail Pharmacy Prior Authorization Team   Phone: [phone]    I called [place] Medicaid to verify they received the appeal request.   ISAIAH He stated I had faxed the wrong place, we discussed where I got the fax # from and she concluded the fax on their forms is wrong.   She will bring it to her supervisors attention and have the forms updated as soon as possible.     Refaxed appeal to: [phone]

## 2025-03-04 ENCOUNTER — TELEPHONE (OUTPATIENT)
Dept: FAMILY MEDICINE | Facility: CLINIC | Age: 50
End: 2025-03-04
Payer: COMMERCIAL

## 2025-03-04 ENCOUNTER — MYC MEDICAL ADVICE (OUTPATIENT)
Dept: FAMILY MEDICINE | Facility: CLINIC | Age: 50
End: 2025-03-04
Payer: COMMERCIAL

## 2025-03-04 NOTE — TELEPHONE ENCOUNTER
Prior Authorization Request  Who s requesting:  Patient  Pharmacy Name and Location: [address]  Medication Name: MOUNJARO 5 MG/0.5ML SOAJ   Insurance Plan: [employer] [place]   Insurance Member ID Number:  [id number]   Informed patient that prior authorizations can take up to 10 business days for response:   Yes  Okay to leave a detailed message: No    Patient has new insurance coverage 03/01/2025; requesting PA

## 2025-03-06 NOTE — TELEPHONE ENCOUNTER
Retail Pharmacy Prior Authorization Team   Phone: [phone]    PA Initiation  NEW INSURANCE 03/01/2025    Medication: MOUNJARO 5 MG/0.5ML SC SOAJ  Insurance Company: [employer] [facility] - Phone [phone] Fax [phone]  Pharmacy Filling the Rx: [id number] [place] [address] AT [facility] [place] [facility] [address]  Filling Pharmacy Phone: [phone]  Filling Pharmacy Fax:    Start Date: 3/6/2025      Note: Due to record-high volumes, our turn-around time is taking longer than usual . We are currently 4  business days behind in the pools.   We are working diligently to submit all requests in a timely manner and in the order they are received. Please only flag TRUE URGENT requests as high priority to the pool at this time.   If you have questions on status of PA's,  please send a note/message in the active PA encounter and send back to the [facility] PA pool [[id number]].    If you have questions about the turn-around time or about our process, please reach out to our supervisor Kemi Silva.   Thank you!   RPPA (Retail Pharmacy Prior Authorization) team

## 2025-03-06 NOTE — TELEPHONE ENCOUNTER
Retail Pharmacy Prior Authorization Team   Phone: [phone]    APPEAL Not Needed Due to Insurance Change    Medication: MOUNJARO 5 MG/0.5ML SC SOAJ  Insurance Company: [employer] for [place] Medicaid Phone [phone] Fax [phone]  Expected CoPay: $    Pharmacy Filling the Rx: [facility] #[id number] [address] AT [facility] [address]      As of 03/01/2025 insurance plan was changed to [employer]. NEW PA initiated in a TE dated 03/04/2025    [place] Medicaid Appeal no longer needed. I am closing this encounter.   Thank you!  Hillary Franco CPhT  [facility] Team

## 2025-03-06 NOTE — TELEPHONE ENCOUNTER
Retail Pharmacy Prior Authorization Team   Phone: [phone]    Called [place] [employer] to check status of appeal, this PA  Rep stated the appeals dept has no phone number. ONLY FAX for communication. She confirmed the FAX # [phone].   However there was no way to confirm the appeal has been received and is being worked on.     Per PA Rep Jazlyn ALONSO

## 2025-03-13 NOTE — TELEPHONE ENCOUNTER
Retail Pharmacy Prior Authorization Team   Phone: [phone]    Prior Authorization Approval    Medication: MOUNJARO 5 MG/0.5ML SC SOAJ  Authorization Effective Date: 3/1/2025  Authorization Expiration Date: 3/6/2026  Insurance Company: [employer] [facility] [place] - Phone [phone] Fax [phone]  Which Pharmacy is filling the prescription: [id number] [place] [address] AT [facility] [address] [facility]  Pharmacy Notified: YES  Patient Notified: **Instructed pharmacy to notify patient when script is ready to /ship.**

## 2025-03-18 ENCOUNTER — MYC MEDICAL ADVICE (OUTPATIENT)
Dept: FAMILY MEDICINE | Facility: CLINIC | Age: 50
End: 2025-03-18
Payer: COMMERCIAL

## 2025-03-18 DIAGNOSIS — F50.810 MILD BINGE-EATING DISORDER: ICD-10-CM

## 2025-03-18 DIAGNOSIS — E11.9 TYPE 2 DIABETES MELLITUS WITHOUT COMPLICATION, WITHOUT LONG-TERM CURRENT USE OF INSULIN (H): Primary | ICD-10-CM

## 2025-03-18 RX ORDER — LISDEXAMFETAMINE DIMESYLATE 60 MG/1
60 CAPSULE ORAL EVERY MORNING
Qty: 30 CAPSULE | Refills: 0 | Status: SHIPPED | OUTPATIENT
Start: 2025-03-18

## 2025-04-22 ENCOUNTER — MYC MEDICAL ADVICE (OUTPATIENT)
Dept: FAMILY MEDICINE | Facility: CLINIC | Age: 50
End: 2025-04-22
Payer: COMMERCIAL

## 2025-04-22 DIAGNOSIS — E11.9 TYPE 2 DIABETES MELLITUS WITHOUT COMPLICATION, WITHOUT LONG-TERM CURRENT USE OF INSULIN (H): Primary | ICD-10-CM

## 2025-04-22 DIAGNOSIS — F50.810 MILD BINGE-EATING DISORDER: ICD-10-CM

## 2025-04-22 RX ORDER — LISDEXAMFETAMINE DIMESYLATE 60 MG/1
60 CAPSULE ORAL EVERY MORNING
Qty: 30 CAPSULE | Refills: 0 | Status: SHIPPED | OUTPATIENT
Start: 2025-04-22

## 2025-05-26 ENCOUNTER — MYC REFILL (OUTPATIENT)
Dept: FAMILY MEDICINE | Facility: CLINIC | Age: 50
End: 2025-05-26
Payer: COMMERCIAL

## 2025-05-26 DIAGNOSIS — F50.810 MILD BINGE-EATING DISORDER: ICD-10-CM

## 2025-05-26 RX ORDER — LISDEXAMFETAMINE DIMESYLATE 60 MG/1
60 CAPSULE ORAL EVERY MORNING
Qty: 30 CAPSULE | Refills: 0 | Status: CANCELLED | OUTPATIENT
Start: 2025-05-26

## 2025-05-27 ENCOUNTER — MEDICAL CORRESPONDENCE (OUTPATIENT)
Dept: HEALTH INFORMATION MANAGEMENT | Facility: CLINIC | Age: 50
End: 2025-05-27
Payer: COMMERCIAL

## 2025-05-27 RX ORDER — LISDEXAMFETAMINE DIMESYLATE 70 MG/1
70 CAPSULE ORAL EVERY MORNING
Qty: 30 CAPSULE | Refills: 0 | Status: SHIPPED | OUTPATIENT
Start: 2025-05-27

## 2025-05-30 ENCOUNTER — MYC REFILL (OUTPATIENT)
Dept: FAMILY MEDICINE | Facility: CLINIC | Age: 50
End: 2025-05-30
Payer: COMMERCIAL

## 2025-05-30 DIAGNOSIS — E11.9 TYPE 2 DIABETES MELLITUS WITHOUT COMPLICATION, WITHOUT LONG-TERM CURRENT USE OF INSULIN (H): ICD-10-CM

## 2025-06-01 ENCOUNTER — HEALTH MAINTENANCE LETTER (OUTPATIENT)
Age: 50
End: 2025-06-01

## 2025-06-17 ENCOUNTER — TELEPHONE (OUTPATIENT)
Dept: FAMILY MEDICINE | Facility: CLINIC | Age: 50
End: 2025-06-17
Payer: COMMERCIAL

## 2025-06-17 NOTE — TELEPHONE ENCOUNTER
Patient Quality Outreach    Patient is due for the following:   Diabetes -  A1C    Action(s) Taken:   Patient has upcoming appointment, these items will be addressed at that time.    Type of outreach:    No contact, upcoming appt    Questions for provider review:    None         Ev Horowitz MA  Chart routed to None.

## 2025-06-20 ENCOUNTER — RESULTS FOLLOW-UP (OUTPATIENT)
Dept: FAMILY MEDICINE | Facility: CLINIC | Age: 50
End: 2025-06-20

## 2025-06-20 PROBLEM — E66.811 CLASS 1 OBESITY DUE TO EXCESS CALORIES WITH SERIOUS COMORBIDITY AND BODY MASS INDEX (BMI) OF 34.0 TO 34.9 IN ADULT: Status: ACTIVE | Noted: 2025-02-12

## 2025-06-20 PROBLEM — E66.09 CLASS 1 OBESITY DUE TO EXCESS CALORIES WITH SERIOUS COMORBIDITY AND BODY MASS INDEX (BMI) OF 34.0 TO 34.9 IN ADULT: Status: ACTIVE | Noted: 2025-02-12

## 2025-07-01 ENCOUNTER — MYC REFILL (OUTPATIENT)
Dept: FAMILY MEDICINE | Facility: CLINIC | Age: 50
End: 2025-07-01
Payer: COMMERCIAL

## 2025-07-01 DIAGNOSIS — F50.810 MILD BINGE-EATING DISORDER: ICD-10-CM

## 2025-07-02 RX ORDER — LISDEXAMFETAMINE DIMESYLATE 70 MG/1
70 CAPSULE ORAL EVERY MORNING
Qty: 30 CAPSULE | Refills: 0 | Status: SHIPPED | OUTPATIENT
Start: 2025-07-02

## 2025-07-17 ENCOUNTER — MYC REFILL (OUTPATIENT)
Dept: FAMILY MEDICINE | Facility: CLINIC | Age: 50
End: 2025-07-17
Payer: COMMERCIAL

## 2025-07-17 DIAGNOSIS — E11.9 TYPE 2 DIABETES MELLITUS WITHOUT COMPLICATION, WITHOUT LONG-TERM CURRENT USE OF INSULIN (H): ICD-10-CM

## 2025-07-17 NOTE — TELEPHONE ENCOUNTER
6/19 OV note-   Type 2 diabetes mellitus without complication, without long-term current use of insulin (H)  The patient's diabetes is under excellent control with an A1c of 5.4 today.  Continue Mounjaro 12.5 mg weekly.  Updated urine microalbumin checked today.  She continues to adhere to a healthy diet and exercise regularly.

## 2025-07-22 ENCOUNTER — MYC REFILL (OUTPATIENT)
Dept: FAMILY MEDICINE | Facility: CLINIC | Age: 50
End: 2025-07-22
Payer: COMMERCIAL

## 2025-07-22 DIAGNOSIS — E89.0 POSTABLATIVE HYPOTHYROIDISM: ICD-10-CM

## 2025-07-22 RX ORDER — LEVOTHYROXINE SODIUM 125 UG/1
125 TABLET ORAL DAILY
Qty: 90 TABLET | Refills: 3 | Status: CANCELLED | OUTPATIENT
Start: 2025-07-22

## 2025-07-25 ENCOUNTER — TRANSFERRED RECORDS (OUTPATIENT)
Dept: HEALTH INFORMATION MANAGEMENT | Facility: CLINIC | Age: 50
End: 2025-07-25
Payer: COMMERCIAL

## 2025-07-25 LAB — RETINOPATHY: NORMAL

## 2025-07-30 ENCOUNTER — MYC REFILL (OUTPATIENT)
Dept: FAMILY MEDICINE | Facility: CLINIC | Age: 50
End: 2025-07-30
Payer: COMMERCIAL

## 2025-07-30 DIAGNOSIS — F50.810 MILD BINGE-EATING DISORDER: ICD-10-CM

## 2025-07-30 RX ORDER — LISDEXAMFETAMINE DIMESYLATE 70 MG/1
70 CAPSULE ORAL EVERY MORNING
Qty: 30 CAPSULE | Refills: 0 | Status: SHIPPED | OUTPATIENT
Start: 2025-07-30

## 2025-08-16 ENCOUNTER — MYC REFILL (OUTPATIENT)
Dept: FAMILY MEDICINE | Facility: CLINIC | Age: 50
End: 2025-08-16
Payer: COMMERCIAL

## 2025-08-16 DIAGNOSIS — E89.0 POSTABLATIVE HYPOTHYROIDISM: ICD-10-CM

## 2025-08-16 DIAGNOSIS — F41.1 GAD (GENERALIZED ANXIETY DISORDER): ICD-10-CM

## 2025-08-16 DIAGNOSIS — E11.9 TYPE 2 DIABETES MELLITUS WITHOUT COMPLICATION, WITHOUT LONG-TERM CURRENT USE OF INSULIN (H): ICD-10-CM

## 2025-08-18 RX ORDER — LEVOTHYROXINE SODIUM 125 UG/1
125 TABLET ORAL DAILY
Qty: 90 TABLET | Refills: 3 | OUTPATIENT
Start: 2025-08-18

## 2025-08-18 RX ORDER — ESCITALOPRAM OXALATE 5 MG/1
5 TABLET ORAL DAILY
Qty: 30 TABLET | Refills: 1 | Status: CANCELLED | OUTPATIENT
Start: 2025-08-18

## 2025-08-19 DIAGNOSIS — F41.1 GAD (GENERALIZED ANXIETY DISORDER): ICD-10-CM

## 2025-08-19 RX ORDER — ESCITALOPRAM OXALATE 10 MG/1
10 TABLET ORAL DAILY
Qty: 90 TABLET | Refills: 3 | Status: SHIPPED | OUTPATIENT
Start: 2025-08-19

## 2025-08-20 RX ORDER — ESCITALOPRAM OXALATE 5 MG/1
5 TABLET ORAL DAILY
Qty: 90 TABLET | Refills: 3 | Status: SHIPPED | OUTPATIENT
Start: 2025-08-20

## 2025-09-02 ENCOUNTER — MYC REFILL (OUTPATIENT)
Dept: FAMILY MEDICINE | Facility: CLINIC | Age: 50
End: 2025-09-02
Payer: COMMERCIAL

## 2025-09-02 DIAGNOSIS — F50.810 MILD BINGE-EATING DISORDER: ICD-10-CM

## 2025-09-03 RX ORDER — LISDEXAMFETAMINE DIMESYLATE 70 MG/1
70 CAPSULE ORAL EVERY MORNING
Qty: 30 CAPSULE | Refills: 0 | Status: SHIPPED | OUTPATIENT
Start: 2025-09-03

## (undated) DEVICE — STRAP KNEE/BODY 31143004

## (undated) DEVICE — SU VICRYL 0 CT-1 36" J346H

## (undated) DEVICE — PACK C-SECTION LF PL15OTA83B

## (undated) DEVICE — ESU GROUND PAD ADULT W/CORD E7507

## (undated) DEVICE — GOWN IMPERVIOUS 2XL BLUE

## (undated) DEVICE — PACK MINOR CUSTOM ASC

## (undated) DEVICE — SUCTION MANIFOLD NEPTUNE 2 SYS 1 PORT 702-025-000

## (undated) DEVICE — ESU ELEC BLADE 2.75" COATED/INSULATED E1455

## (undated) DEVICE — DRAPE STERI INCISE 1050

## (undated) DEVICE — SU VICRYL 3-0 CTX 36" UND J980H

## (undated) DEVICE — DRSG STERI STRIP 1/4X3" R1541

## (undated) DEVICE — NDL 15GA 1.5" 8881200029

## (undated) DEVICE — DRSG PRIMAPORE 03 1/8X6" 66000318

## (undated) DEVICE — SPECIMEN CONTAINER 5OZ STERILE 2600SA

## (undated) DEVICE — ESU PENCIL SMOKE EVAC W/ROCKER SWITCH 0703-047-000

## (undated) DEVICE — CLIP HORIZON SM RED WIDE SLOT 001201

## (undated) DEVICE — GLOVE ESTEEM POWDER FREE SMT 6.5  2D72PT65

## (undated) DEVICE — SYR 10ML FINGER CONTROL W/O NDL 309695

## (undated) DEVICE — NDL 30GA 0.5" 305106

## (undated) DEVICE — BLADE KNIFE SURG 15 371115

## (undated) DEVICE — SOL WATER IRRIG 500ML BOTTLE 2F7113

## (undated) DEVICE — SPECIMEN CONTAINER 3OZ W/FORMALIN 59901

## (undated) DEVICE — LINEN TOWEL PACK X5 5464

## (undated) DEVICE — SUCTION CANISTER MEDIVAC LINER 1500ML W/LID 65651-515

## (undated) DEVICE — SU MONOCRYL 4-0 P-3 18" UND Y494G

## (undated) DEVICE — SOL NACL 0.9% IRRIG 500ML BOTTLE 2F7123

## (undated) DEVICE — BASIN SET MAJOR

## (undated) DEVICE — GLOVE PROTEXIS MICRO 5.5  2D73PM55

## (undated) DEVICE — SNARE CAPIVATOR ROUND COLD SNR BX10 M00561101

## (undated) DEVICE — DRSG STERI STRIP 1/2X4" R1547

## (undated) DEVICE — SU VICRYL 3-0 SH 27" UND J416H

## (undated) DEVICE — SOL WATER IRRIG 1000ML BOTTLE 07139-09

## (undated) DEVICE — SU MONOCRYL 0 CT-1 36" Y346H

## (undated) DEVICE — TUBING SUCTION 12"X1/4" N612

## (undated) DEVICE — SU MONOCRYL 4-0 PS-2 18" UND Y496G

## (undated) DEVICE — SYR 05ML LL W/O NDL

## (undated) DEVICE — PREP CHLORAPREP 26ML TINTED ORANGE  260815

## (undated) DEVICE — CATH TRAY FOLEY 16FR SILICONE 907416

## (undated) DEVICE — ESU GROUND PAD UNIVERSAL W/O CORD

## (undated) DEVICE — KIT ENDO TURNOVER/PROCEDURE CARRY-ON 101822

## (undated) DEVICE — STOCKING SLEEVE COMPRESSION CALF LG

## (undated) DEVICE — SOL NACL 0.9% IRRIG 1000ML BOTTLE 07138-09

## (undated) DEVICE — CLIP HORIZON MED BLUE 002200

## (undated) DEVICE — GLOVE PROTEXIS BLUE W/NEU-THERA 7.0  2D73EB70

## (undated) DEVICE — DRAPE U SPLIT 74X120" 29440

## (undated) DEVICE — GLOVE PROTEXIS BLUE W/NEU-THERA 6.0  2D73EB60

## (undated) DEVICE — BASIN EMESIS STERILE  SSK9005A

## (undated) RX ORDER — FENTANYL CITRATE 50 UG/ML
INJECTION, SOLUTION INTRAMUSCULAR; INTRAVENOUS
Status: DISPENSED
Start: 2018-11-09

## (undated) RX ORDER — ONDANSETRON 2 MG/ML
INJECTION INTRAMUSCULAR; INTRAVENOUS
Status: DISPENSED
Start: 2021-08-10

## (undated) RX ORDER — ACETAMINOPHEN 325 MG/1
TABLET ORAL
Status: DISPENSED
Start: 2021-08-10

## (undated) RX ORDER — PHENYLEPHRINE HCL IN 0.9% NACL 1 MG/10 ML
SYRINGE (ML) INTRAVENOUS
Status: DISPENSED
Start: 2018-11-09

## (undated) RX ORDER — BUPIVACAINE HYDROCHLORIDE 7.5 MG/ML
INJECTION, SOLUTION INTRASPINAL
Status: DISPENSED
Start: 2018-11-09

## (undated) RX ORDER — CEFAZOLIN SODIUM 1 G/3ML
INJECTION, POWDER, FOR SOLUTION INTRAMUSCULAR; INTRAVENOUS
Status: DISPENSED
Start: 2021-08-10

## (undated) RX ORDER — GLYCOPYRROLATE 0.2 MG/ML
INJECTION INTRAMUSCULAR; INTRAVENOUS
Status: DISPENSED
Start: 2021-08-10

## (undated) RX ORDER — DEXAMETHASONE SODIUM PHOSPHATE 4 MG/ML
INJECTION, SOLUTION INTRA-ARTICULAR; INTRALESIONAL; INTRAMUSCULAR; INTRAVENOUS; SOFT TISSUE
Status: DISPENSED
Start: 2021-08-10

## (undated) RX ORDER — PROPOFOL 10 MG/ML
INJECTION, EMULSION INTRAVENOUS
Status: DISPENSED
Start: 2021-08-10

## (undated) RX ORDER — FENTANYL CITRATE 50 UG/ML
INJECTION, SOLUTION INTRAMUSCULAR; INTRAVENOUS
Status: DISPENSED
Start: 2021-08-10

## (undated) RX ORDER — HYDROMORPHONE HYDROCHLORIDE 1 MG/ML
INJECTION, SOLUTION INTRAMUSCULAR; INTRAVENOUS; SUBCUTANEOUS
Status: DISPENSED
Start: 2021-08-10

## (undated) RX ORDER — ONDANSETRON 2 MG/ML
INJECTION INTRAMUSCULAR; INTRAVENOUS
Status: DISPENSED
Start: 2018-11-09

## (undated) RX ORDER — LIDOCAINE HYDROCHLORIDE 20 MG/ML
INJECTION, SOLUTION EPIDURAL; INFILTRATION; INTRACAUDAL; PERINEURAL
Status: DISPENSED
Start: 2021-08-10